# Patient Record
Sex: MALE | Race: WHITE | HISPANIC OR LATINO | Employment: FULL TIME | URBAN - METROPOLITAN AREA
[De-identification: names, ages, dates, MRNs, and addresses within clinical notes are randomized per-mention and may not be internally consistent; named-entity substitution may affect disease eponyms.]

---

## 2017-01-02 ENCOUNTER — TELEPHONE (OUTPATIENT)
Dept: ALLERGY | Facility: CLINIC | Age: 37
End: 2017-01-02

## 2017-01-02 ENCOUNTER — OFFICE VISIT (OUTPATIENT)
Dept: ALLERGY | Facility: CLINIC | Age: 37
End: 2017-01-02
Payer: COMMERCIAL

## 2017-01-02 VITALS
DIASTOLIC BLOOD PRESSURE: 77 MMHG | HEIGHT: 68 IN | OXYGEN SATURATION: 96 % | BODY MASS INDEX: 31.25 KG/M2 | WEIGHT: 206.2 LBS | HEART RATE: 66 BPM | SYSTOLIC BLOOD PRESSURE: 139 MMHG

## 2017-01-02 DIAGNOSIS — J45.30 MILD PERSISTENT ASTHMA WITHOUT COMPLICATION: Primary | ICD-10-CM

## 2017-01-02 DIAGNOSIS — J31.0 NONALLERGIC RHINITIS: ICD-10-CM

## 2017-01-02 LAB
FEF 25/75: NORMAL
FEV-1: NORMAL
FEV1/FVC: NORMAL
FVC: NORMAL

## 2017-01-02 PROCEDURE — 95004 PERQ TESTS W/ALRGNC XTRCS: CPT | Performed by: ALLERGY & IMMUNOLOGY

## 2017-01-02 PROCEDURE — 94060 EVALUATION OF WHEEZING: CPT | Performed by: ALLERGY & IMMUNOLOGY

## 2017-01-02 PROCEDURE — 99244 OFF/OP CNSLTJ NEW/EST MOD 40: CPT | Mod: 25 | Performed by: ALLERGY & IMMUNOLOGY

## 2017-01-02 RX ORDER — FLUTICASONE PROPIONATE AND SALMETEROL XINAFOATE 115; 21 UG/1; UG/1
2 AEROSOL, METERED RESPIRATORY (INHALATION) 2 TIMES DAILY
Qty: 1 INHALER | Refills: 1 | Status: SHIPPED | OUTPATIENT
Start: 2017-01-02 | End: 2018-01-15

## 2017-01-02 NOTE — Clinical Note
My Asthma Action Plan  Name: Keith Spangler   YOB: 1980  Date: 1/2/2017   My doctor: Andrei Kyle MD  My clinic: [facility]      My Control Medicine: Fluticasone+salmeterol (Advair) -  /21 mcg        Dose: 2 puffs twice daily. Use with spacer. Rinse mouth and brush teeth afterwards  My Rescue Medicine: Albuterol (Proair/Ventolin/Proventil) HFA        Dose: 2-4 puffs every 4 hours as needed   My Asthma Severity: mild persistent  Avoid your asthma triggers: upper respiratory infections        GREEN ZONE   Good Control    I feel good    No cough or wheeze    Can work, sleep and play without asthma symptoms       Take your asthma control medicine every day.     1. If exercise triggers your asthma, take your rescue medication    15 minutes before exercise or sports, and    During exercise if you have asthma symptoms  2. Spacer to use with inhaler: If you have a spacer, make sure to use it with your inhaler             YELLOW ZONE Getting Worse  I have ANY of these:    I do not feel good    Cough or wheeze    Chest feels tight    Wake up at night   1. Keep taking your Green Zone medications  2. Start taking your rescue medicine:    every 20 minutes for up to 1 hour. Then every 4 hours for 24-48 hours.  3. If you stay in the Yellow Zone for more than 12-24 hours, contact your doctor.           RED ZONE Medical Alert - Get Help  I have ANY of these:    I feel awful    Medicine is not helping    Breathing getting harder    Trouble walking or talking    Nose opens wide to breathe       1. Take your rescue medicine NOW  2. If your provider has prescribed an oral steroid medicine, start taking it NOW  3. Call your doctor NOW  4. If you are still in the Red Zone after 20 minutes and you have not reached your doctor:    Take your rescue medicine again and    Call 911 or go to the emergency room right away    See your regular doctor within 2 weeks of an Emergency Room or Urgent Care visit for  follow-up treatment.        The above medication may be given at school or day care?: N/A (Adult Patient)  Child can carry and use inhaler(s) at school with approval of school nurse?: N/A (Adult Patient)    Electronically signed by: Andrei Kyle, January 2, 2017    Annual Reminders:  Meet with Asthma Educator,  Flu Shot in the Fall, consider Pneumonia Vaccination for patients with asthma (aged 19 and older).    Pharmacy: [facility], [address] [place].                    Asthma Triggers  How To Control Things That Make Your Asthma Worse    Triggers are things that make your asthma worse.  Look at the list below to help you find your triggers and what you can do about them.  You can help prevent asthma flare-ups by staying away from your triggers.      Trigger                                                          What you can do   Cigarette Smoke  Tobacco smoke can make asthma worse. Do not allow smoking in your home, car or around you.  Be sure no one smokes at a child s day care or school.  If you smoke, ask your health care provider for ways to help you quit.  Ask family members to quit too.  Ask your health care provider for a referral to Quit Plan to help you quit smoking, or call 7-963-803-PLAN.     Colds, Flu, Bronchitis  These are common triggers of asthma. Wash your hands often.  Don t touch your eyes, nose or mouth.  Get a flu shot every year.     Dust Mites  These are tiny bugs that live in cloth or carpet. They are too small to see. Wash sheets and blankets in hot water every week.   Encase pillows and mattress in dust mite proof covers.  Avoid having carpet if you can. If you have carpet, vacuum weekly.   Use a dust mask and HEPA vacuum.   Pollen and Outdoor Mold  Some people are allergic to trees, grass, or weed pollen, or molds. Try to keep your windows closed.  Limit time out doors when pollen count is high.   Ask you health care provider about taking  medicine during allergy season.     Animal Dander  Some people are allergic to skin flakes, urine or saliva from pets with fur or feathers. Keep pets with fur or feathers out of your home.    If you can t keep the pet outdoors, then keep the pet out of your bedroom.  Keep the bedroom door closed.  Keep pets off cloth furniture and away from stuffed toys.     Mice, Rats, and Cockroaches  Some people are allergic to the waste from these pests.   Cover food and garbage.  Clean up spills and food crumbs.  Store grease in the refrigerator.   Keep food out of the bedroom.   Indoor Mold  This can be a trigger if your home has high moisture. Fix leaking faucets, pipes, or other sources of water.   Clean moldy surfaces.  Dehumidify basement if it is damp and smelly.   Smoke, Strong Odors, and Sprays  These can reduce air quality. Stay away from strong odors and sprays, such as perfume, powder, hair spray, paints, smoke incense, paint, cleaning products, candles and new carpet.   Exercise or Sports  Some people with asthma have this trigger. Be active!  Ask your doctor about taking medicine before sports or exercise to prevent symptoms.    Warm up for 5-10 minutes before and after sports or exercise.     Other Triggers of Asthma  Cold air:  Cover your nose and mouth with a scarf.  Sometimes laughing or crying can be a trigger.  Some medicines and food can trigger asthma.

## 2017-01-02 NOTE — NURSING NOTE
"Chief Complaint   Patient presents with     Consult     sob x3 weeks. dried out easy, fatigue        Initial /77 mmHg  Pulse 66  Ht 1.73 m (5' 8.11\")  Wt 93.532 kg (206 lb 3.2 oz)  BMI 31.25 kg/m2  SpO2 96% Estimated body mass index is 31.25 kg/(m^2) as calculated from the following:    Height as of this encounter: 1.73 m (5' 8.11\").    Weight as of this encounter: 93.532 kg (206 lb 3.2 oz).  BP completed using cuff size: regular  Wen Adler MA          "

## 2017-01-02 NOTE — PATIENT INSTRUCTIONS
If you have any questions regarding your allergies, asthma, or what we discussed during your visit today please call the allergy clinic or contact us via [email or web address].    [facility]: [phone]      Start the following medications:   Advair 115/21mcg - Take 2 puffs twice daily. Use with spacer. Rinse mouth and brush teeth afterwards   Ventolin (albuterol) inhaler - Take 2 to 4 puffs every 4 hours as needed. Can also take 2 puffs 15 minutes prior to exercise or activity   Use a sinus irrigation rinse daily as needed      Using an Inhaler with a Spacer  To control asthma, you need to use your medications the right way. Some medications are inhaled using a device called a metered-dose inhaler (MDI). Metered-dose inhalers deliver medication with a fine spray. You may be asked to use a spacer (holding tube) with your inhaler. The spacer helps make sure all the medication you need goes into your lungs.     Steps for Using an Inhaler with a Spacer  Step 1:    Remove the caps from the inhaler and spacer.    Shake the inhaler well and attach the spacer. If the inhaler is being used for the first time or has not been used for a while, prime it as directed by the .  Step 2:    Breathe out normally.    Put the spacer between your teeth. Close your lips tightly around it.    Keep your chin up.  Step 3:    Spray 1 puff into the spacer by pressing down on the inhaler.    Then breathe in through your mouth as slowly and deeply as you can. This should take about 5-10 seconds. (If you breathe too quickly, you may hear a whistling sound in the spacer.)  Step 4:    Take the spacer out of your mouth.    Hold your breath for a count of 10.    Then hold your lips together and slowly breathe out through your mouth.          If you re prescribed more than 1 puff of medication at a time, wait at least 30 seconds between puffs. This number may be different for different medications. Shake the inhaler again. Then repeat  steps 2 to 4.     8226-4796 [employer]. [address] [place] [address], [place] [address]. All rights reserved. This information is not intended as a substitute for professional medical care. Always follow your healthcare professional's instructions.

## 2017-01-02 NOTE — PROGRESS NOTES
"Dear ERMELINDA TADEO PA-C,    Thank you for referring your patient Keith Spangler to the Allergy/Immunology Clinic. Keith Spangler was seen in the Allergy Clinic at [facility]. The following are my recommendations regarding his Mild Persistent Asthma and Nonallergic Rhinitis    1. Begin advair 115/21mcg, 2 puffs twice daily  2. Continue albuterol HFA, 2-4 puffs every 4 hours as needed  3. Optichamber given in clinic and appropriate inhaler and spacer technique reviewed  4. Asthma action plan reviewed with patient and provided  5. Begin trial of sinus irrigation rinse daily  6. Follow-up in 1 month    Keith Spangler is a 36 year old Two or more races male being seen today in consultation for \"ongoing symptoms of inflammation and difficulty breathing.\" Keith states that he feels chest tightness and that his bronchial passages are inflamed. At times his stomach has felt bloated and he does not feel his respiratory system is functioning like it should be. He denies cough and has not had limitations in his activity. Keith feels these symptoms are present throughout the year. Keith states these symptoms began after he moved out to [place] in 1997. Due to the smog and poor air quality he began having respiratory symptoms and had multiple URIs, bronchitis, and \"walking pneumonia.\" Since then he has had issues with chest congestion, coughing, and bringing up phlegm. Keith has also had symptoms of sneezing, rhinorrhea, and nasal congestion that are present during the spring and fall months. He also has nasal congestion, irritation, and dryness during the winter months. He feels that he is very sensitive to changes in climate or temperature. During the winter Keith does not run his furnace at night because his nose will become very dry, irritated, and congested. Keith also reports feeling like he is always dry and dehydrated. He has to drink a lot of water during the day to not feel so irritated. If " he drinks more than one cup of coffee per day the symptoms are exacerbated. He tries to manage the dryness and irritation by using a humidifier and he now also avoids alcoholic beverages. He denies symptoms of itchy, watery, dry, or irritated eyes. Keith has had pet dogs for the past 4 years and grew up with dogs and has not noted any worsening symptoms when around the animals. He has tried to modify his environment by vacuuming regularly, using air purifiers, regularly changing his furnace filters, having his ducts cleaned, and washing his bedding weekly. With these environmental modifications he has not been sick but still has persistent symptoms. He has tried claritin, sudafed, flonase, Zycam, and nasal saline spray but has not found these to be effective. He has not been consistent with medications and uses them for a couple of weeks and then stops. Keith denies having a history of asthma but he was prescribed flovent and albuterol in the past which he states he used until the inhales ran out but he did not refill them as he didn't feel the medications were helpful.    Keith had a rhinoplasty in 2009 to fix a broken nose and deviated septum. He feels he had improvement in his nasal obstruction after this surgery.    No known family history of asthma, allergies, or atopic dermatitis.       Past Medical History   Diagnosis Date     NO ACTIVE PROBLEMS      Uncomplicated asthma Sept 2014     Intermittent asthma     COPD (chronic obstructive pulmonary disease) (H)      Family History   Problem Relation Age of Onset     C.A.D. No family hx of      DIABETES No family hx of      Hypertension No family hx of      CEREBROVASCULAR DISEASE No family hx of      Breast Cancer No family hx of      Cancer - colorectal No family hx of      Prostate Cancer No family hx of      GASTROINTESTINAL DISEASE Brother      colitis     Asthma No family hx of      Unknown/Adopted Mother      Diagnosed w/ALS June 2015     Past Surgical  History   Procedure Laterality Date     Surgical history of -        Thumb 2005-Pins placed      Surgical history of -        ER visit, left upper eyelid laceration repair     Surgical history of -        Septo/rhinoplasty of nose secondary to boxing injury     Surgical history of -        Left hernia      Appendectomy  Oct. 1991     Eye surgery  2013 and 2014     PRK laser correction     Hernia repair  2002       ENVIRONMENTAL HISTORY: [place]. The home is heated with a gas furnace. They does have central air conditioning. The patient's bedroom is furnished with carpeting in bedroom and fabric window coverings.  Pets inside the house include 2 dog(s). There is not history of cockroach or mice infestation. There is/are 0 smokers in the house.  The house does not have a damp basement.     SOCIAL HISTORY:   Keith is employed as an  . He has missed 0 days of school/work. He lives with his girlfriend and kids.  His [employer]riend works as a .    REVIEW OF SYSTEMS:  General: positive  for weight gain. negative for weight loss. negative for changes in sleep.   Eyes: negative for itching. negative for redness. negative for tearing/watering.  Ears: negative for fullness. negative for hearing loss. negative for dizziness.   Nose: positive  for snoring.negative for changes in smell. negative for drainage.   Throat: negative for hoarseness. negative for sore throat. negative for trouble swallowing.   Lungs: positive  for shortness of breath.positive  for wheezing. positive  for sputum production.   Cardiovascular: negative for chest pain. negative for swelling of ankles. negative for fast or irregular heartbeat.   Gastrointestinal: negative for nausea. negative for heartburn. positive  for acid reflux.   Musculoskeletal: negative for joint pain. negative for joint stiffness. negative for joint swelling.   Neurologic: negative for seizures. negative for fainting.  "negative for weakness.   Psychiatric: negative for changes in mood. negative for anxiety.   Endocrine: negative for cold intolerance. negative for heat intolerance. negative for tremors.   Hematologic: negative for easy bruising. negative for easy bleeding.  Integumentary: negative for rash. negative for scaling. negative for nail changes.       Current outpatient prescriptions:      albuterol (PROAIR HFA, PROVENTIL HFA, VENTOLIN HFA) 108 (90 BASE) MCG/ACT inhaler, Inhale 2 puffs into the lungs every 6 hours as needed, Disp: 1 Inhaler, Rfl: 1     fluticasone (FLOVENT HFA) 110 MCG/ACT inhaler, Inhale 2 puffs into the lungs 2 times daily, Disp: 1 Inhaler, Rfl: 2     fluticasone (FLONASE) 50 MCG/ACT nasal spray, Spray 2 sprays into both nostrils daily, Disp: 16 g, Rfl: 2     order for DME, Equipment being ordered: spacer for inhaler, Disp: 1 Inhaler, Rfl: 0  Immunization History   Administered Date(s) Administered     Hepatitis B 11/24/1993, 12/27/1993, 05/17/1994     OPV 1980, 1980, 09/03/1981, 06/14/1984     TD (ADULT, 7+) 11/24/1993, 04/30/2009     TDAP (ADACEL AGES 11-64) 04/30/2009     TDAP (BOOSTRIX AGES 10-64) 04/21/2016     No Known Allergies      EXAM:   /77 mmHg  Pulse 66  Ht 1.73 m (5' 8.11\")  Wt 93.532 kg (206 lb 3.2 oz)  BMI 31.25 kg/m2  SpO2 96%  GENERAL APPEARANCE: alert, cooperative and not in distress  SKIN: no rashes, no lesions  HEAD: atraumatic, normocephalic  EYES: lids and lashes normal, conjunctivae and sclerae clear, pupils equal, round, reactive to light, EOM full and intact  ENT: no scars or lesions, nasal exam showed no discharge, swelling or lesions noted, otoscopy showed external auditory canals clear, tympanic membranes normal, tongue midline and normal, soft palate, uvula, and tonsils normal  NECK: no asymmetry, masses, or scars, supple without significant adenopathy  LUNGS: unlabored respirations, no intercostal retractions or accessory muscle use, clear to " auscultation without rales or wheezes  HEART: regular rate and rhythm without murmurs and normal S1 and S2  MUSCULOSKELETAL: no musculoskeletal defects are noted  NEURO: no focal deficits noted, mental status intact  PSYCH: does not appear depressed or anxious and short and long term memory appears intact    WORKUP: Spirometry  SPIROMETRY  initial FVC 3.85L (76% of predicted); after bronchodilator 4.39L (14% change)   initial FEV1 3.17L (78% of predicted); after bronchodilator 3.72L (17% change)  initial FEV1/FVC 82%; after bronchodilator 85%  initial FEF 25%-75% 3.45L/s (87% of predicted); after bronchodilator 4.53L/s (31% change)    These values are consistent with a mixed obstructive and restrictive pattern. He had significant improvement in values and reversibility of obstruction after bronchodilator administration.    Skin prick testing was performed to our standard adult aeroallergen panel. He had positive reaction to histamine and all others were negative.    ASSESSMENT/PLAN:  Keith Spangler is a 36 year old male here for evaluation of respiratory symptoms. Spirometry obtained today was consistent with airflow obstruction and he had complete reversibility after bronchodilator administration. We discussed that this pattern is consistent with asthma. Keith was counseled regarding the signs and symptoms of asthma, pathophysiology, and treatment options. He has previously been on inhaled corticosteroids for brief periods but did not find these to be helpful. Given the level of improvement after bronchodilator administration he may benefit further from use of combination ICS/LABA therapy and we discussed the risks and benefits of this medication. He agreed to initiate therapy and follow-up in 1 month.    1. Begin advair 115/21mcg, 2 puffs twice daily  2. Continue albuterol HFA, 2-4 puffs every 4 hours as needed  3. Optichamber given in clinic and appropriate inhaler and spacer technique reviewed  4. Asthma  action plan reviewed with patient and provided  5. Begin trial of sinus irrigation rinse daily  6. Follow-up in 1 month      Andrei Kyle MD  Allergy/Immunology  [facility] and [place], [place]      Chart documentation done in part with Dragon Voice Recognition Software. Although reviewed after completion, some word and grammatical errors may remain.

## 2017-01-02 NOTE — TELEPHONE ENCOUNTER
"Keith Spangler is a 36 year old male who MyChart messaged with difficulty breathing, fatigue, difficulty getting up phlegm. Feelings of dehydration.        NURSING ASSESSMENT:  Description:    Onset/duration:  Ongoing- Since   Precip. factors:  Began getting respiratory infections when he lived in [place] (7 years ago)  Associated symptoms:  Feels \"dried out\" even with increased fluid intake  Improves/worsens symptoms: symptoms worsen as the day goes on, nebulizer treatments did help, inhalers did not help  Pain scale (0-10)   0/10  LMP/preg/breast feeding:  N/A  Last exam/Treatment:  August 2016 with a PCP  Allergies: No Known Allergies    MEDICATIONS:   Taking medication(s) as prescribed? No  Taking over the counter medication(s?) No  Any medication side effects? No significant side effects    Any barriers to taking medication(s) as prescribed?  N/A  Medication(s) improving/managing symptoms?  No. Patient states that he does not use Flovent daily. He has not used Albuterol inhaler at all today. He states that breathing isn't too difficult currently, these are just symptoms that he's been dealing with for a long period of time.   Medication reconciliation completed: No      NURSING PLAN: Nursing advice to patient : go to the appt at 1 pm with Dr. Kyle.    RECOMMENDED DISPOSITION:  To office now, another person to drive  Will comply with recommendation: Yes  If further questions/concerns or if symptoms do not improve, worsen or new symptoms develop, call your PCP or [facility] as soon as possible.      Closing encounter.      Anel Hamm RN      "

## 2017-01-02 NOTE — NURSING NOTE
The following nebulizer treatment was given:     MEDICATION: Albuterol Sulfate 2.5 mg  : [employer]  LOT #: [id number]   EXPIRATION DATE:  08/01/18  NDC # 4719-0791-96

## 2017-01-02 NOTE — MR AVS SNAPSHOT
After Visit Summary   1/2/2017    Keith Spangler    MRN: [id number]           Patient Information     Date Of Birth          1980        Visit Information        Provider Department      1/2/2017 1:00 PM Anrdei Kyle MD [facility]        Today's Diagnoses     Mild persistent asthma without complication    -  1     Nonallergic rhinitis           Care Instructions    If you have any questions regarding your allergies, asthma, or what we discussed during your visit today please call the allergy clinic or contact us via "[employer]t.    [facility] Allergy: [phone]      Start the following medications:   Advair 115/21mcg - Take 2 puffs twice daily. Use with spacer. Rinse mouth and brush teeth afterwards   Ventolin (albuterol) inhaler - Take 2 to 4 puffs every 4 hours as needed. Can also take 2 puffs 15 minutes prior to exercise or activity   Use a sinus irrigation rinse daily as needed      Using an Inhaler with a Spacer  To control asthma, you need to use your medications the right way. Some medications are inhaled using a device called a metered-dose inhaler (MDI). Metered-dose inhalers deliver medication with a fine spray. You may be asked to use a spacer (holding tube) with your inhaler. The spacer helps make sure all the medication you need goes into your lungs.     Steps for Using an Inhaler with a Spacer  Step 1:    Remove the caps from the inhaler and spacer.    Shake the inhaler well and attach the spacer. If the inhaler is being used for the first time or has not been used for a while, prime it as directed by the .  Step 2:    Breathe out normally.    Put the spacer between your teeth. Close your lips tightly around it.    Keep your chin up.  Step 3:    Spray 1 puff into the spacer by pressing down on the inhaler.    Then breathe in through your mouth as slowly and deeply as you can. This should take about 5-10 seconds. (If you breathe too quickly, you may  "hear a whistling sound in the spacer.)  Step 4:    Take the spacer out of your mouth.    Hold your breath for a count of 10.    Then hold your lips together and slowly breathe out through your mouth.          If you re prescribed more than 1 puff of medication at a time, wait at least 30 seconds between puffs. This number may be different for different medications. Shake the inhaler again. Then repeat steps 2 to 4.     1149-7791 [employer]. [address] [place] [address]. All rights reserved. This information is not intended as a substitute for professional medical care. Always follow your healthcare professional's instructions.              Follow-ups after your visit        Who to contact     If you have questions or need follow up information about today's clinic visit or your schedule please contact [facility] directly at [phone].  Normal or non-critical lab and imaging results will be communicated to you by MyChart, letter or phone within 4 business days after the clinic has received the results. If you do not hear from us within 7 days, please contact the clinic through [employer] or phone. If you have a critical or abnormal lab result, we will notify you by phone as soon as possible.  Submit refill requests through [employer] or call your pharmacy and they will forward the refill request to us. Please allow 3 business days for your refill to be completed.          Additional Information About Your Visit        MyChart Information     [employer] gives you secure access to your electronic health record. If you see a primary care provider, you can also send messages to your care team and make appointments. If you have questions, please call your primary care clinic.  If you do not have a primary care provider, please call [phone] and they will assist you.        Your Vitals Were     Pulse Height BMI (Body Mass Index) Pulse Oximetry          66 1.73 m (5' 8.11\") 31.25 " kg/m2 96%         Blood Pressure from Last 3 Encounters:   01/02/17 139/77   10/18/16 128/80   04/21/16 126/82    Weight from Last 3 Encounters:   01/02/17 93.532 kg (206 lb 3.2 oz)   10/18/16 94.348 kg (208 lb)   04/21/16 93.895 kg (207 lb)              We Performed the Following     ALBUTEROL UNIT DOSE, 1 MG     ALLERGY SKIN TESTS,ALLERGENS     BRONCHODILATION RESPONSE, PRE/POST ADMIN     INHALATION/NEBULIZER TREATMENT, INITIAL     Spirometry, Breathing Capacity          Today's Medication Changes          These changes are accurate as of: 1/2/17  2:33 PM.  If you have any questions, ask your nurse or doctor.               Start taking these medicines.        Dose/Directions    fluticasone-salmeterol 115-21 MCG/ACT Inhaler   Commonly known as:  ADVAIR-HFA   Used for:  Mild persistent asthma without complication   Started by:  Andrei Kyle MD        Dose:  2 puff   Inhale 2 puffs into the lungs 2 times daily   Quantity:  1 Inhaler   Refills:  1            Where to get your medicines      [place] [facility] [place] [address]  [address], [facility] [address]     Phone:  [phone]    - fluticasone-salmeterol 115-21 MCG/ACT Inhaler             Primary Care Provider Office Phone # Fax #    Joseph Adams PA-C [phone]       [address] [address]        Thank you!     Thank you for choosing [facility]  for your care. Our goal is always to provide you with excellent care. Hearing back from our patients is one way we can continue to improve our services. Please take a few minutes to complete the written survey that you may receive in the mail after your visit with us. Thank you!             Your Updated Medication List - Protect others around you: Learn how to safely use, store and throw away your medicines at www.disposemymeds.org.          This list is  accurate as of: 1/2/17  2:33 PM.  Always use your most recent med list.                   Brand Name Dispense Instructions for use    albuterol 108 (90 BASE) MCG/ACT Inhaler    PROAIR HFA/PROVENTIL HFA/VENTOLIN HFA    1 Inhaler    Inhale 2 puffs into the lungs every 6 hours as needed       fluticasone 110 MCG/ACT Inhaler    FLOVENT HFA    1 Inhaler    Inhale 2 puffs into the lungs 2 times daily       fluticasone 50 MCG/ACT spray    FLONASE    16 g    Spray 2 sprays into both nostrils daily       fluticasone-salmeterol 115-21 MCG/ACT Inhaler    ADVAIR-HFA    1 Inhaler    Inhale 2 puffs into the lungs 2 times daily       order for DME     1 Inhaler    Equipment being ordered: spacer for inhaler

## 2017-03-21 ENCOUNTER — OFFICE VISIT (OUTPATIENT)
Dept: FAMILY MEDICINE | Facility: CLINIC | Age: 37
End: 2017-03-21
Payer: COMMERCIAL

## 2017-03-21 VITALS
TEMPERATURE: 98.1 F | WEIGHT: 212 LBS | BODY MASS INDEX: 32.13 KG/M2 | HEIGHT: 68 IN | HEART RATE: 64 BPM | DIASTOLIC BLOOD PRESSURE: 76 MMHG | SYSTOLIC BLOOD PRESSURE: 114 MMHG

## 2017-03-21 DIAGNOSIS — Z00.00 ROUTINE GENERAL MEDICAL EXAMINATION AT A HEALTH CARE FACILITY: Primary | ICD-10-CM

## 2017-03-21 DIAGNOSIS — J01.01 ACUTE RECURRENT MAXILLARY SINUSITIS: ICD-10-CM

## 2017-03-21 DIAGNOSIS — J45.30 MILD PERSISTENT ASTHMA WITHOUT COMPLICATION: ICD-10-CM

## 2017-03-21 PROCEDURE — 82947 ASSAY GLUCOSE BLOOD QUANT: CPT | Performed by: PHYSICIAN ASSISTANT

## 2017-03-21 PROCEDURE — 36415 COLL VENOUS BLD VENIPUNCTURE: CPT | Performed by: PHYSICIAN ASSISTANT

## 2017-03-21 PROCEDURE — 80061 LIPID PANEL: CPT | Performed by: PHYSICIAN ASSISTANT

## 2017-03-21 PROCEDURE — 99395 PREV VISIT EST AGE 18-39: CPT | Performed by: PHYSICIAN ASSISTANT

## 2017-03-21 NOTE — NURSING NOTE
"Chief Complaint   Patient presents with     Physical       Initial /76 (BP Location: Right arm, Cuff Size: Adult Large)  Pulse 64  Temp 98.1  F (36.7  C) (Oral)  Ht 5' 8\" (1.727 m)  Wt 212 lb (96.2 kg)  BMI 32.23 kg/m2 Estimated body mass index is 32.23 kg/(m^2) as calculated from the following:    Height as of this encounter: 5' 8\" (1.727 m).    Weight as of this encounter: 212 lb (96.2 kg).  Medication Reconciliation: complete     Laura Ochoa MA     "

## 2017-03-21 NOTE — PROGRESS NOTES
SUBJECTIVE:     CC: Keith Spangler is an 36 year old male who presents for preventative health visit.     Physical   Annual:     Getting at least 3 servings of Calcium per day::  Yes    Bi-annual eye exam::  NO    Dental care twice a year::  Yes    Sleep apnea or symptoms of sleep apnea::  None    Diet::  Regular (no restrictions)    Frequency of exercise::  2-3 days/week    Duration of exercise::  45-60 minutes    Taking medications regularly::  Yes    Medication side effects::  Not applicable    Additional concerns today::  No    1. 3 weeks of sinus pain and pressure. Is not recovering from this despite his own self cares and nasal saline rinses and steroids. Would like to consider treating for a sinus infection.     Today's PHQ-2 Score: 0  PHQ-2 ( 1999 Pfizer) 3/16/2017   Little interest or pleasure in doing things Not at all   Feeling down, depressed or hopeless Not at all   PHQ-2 Score 0       Abuse: Current or Past(Physical, Sexual or Emotional)- No  Do you feel safe in your environment - Yes    Social History   Substance Use Topics     Smoking status: Never Smoker     Smokeless tobacco: Never Used      Comment: Mom smoked his upbringing     Alcohol use Yes      Comment: A few drinks a week     The patient does not drink >3 drinks per day nor >7 drinks per week.    Last PSA: No results found for: PSA    Recent Labs   Lab Test  08/04/16   0736  04/21/15   1221   04/30/09   0929   CHOL  224*  223*   --   190   HDL  33*  40*   --   42   LDL  155*  156*   < >  131*   TRIG  179*  137   --   85   CHOLHDLRATIO   --   5.6*   --   4.5   NHDL  191*   --    --    --     < > = values in this interval not displayed.       Reviewed orders with patient. Reviewed health maintenance and updated orders accordingly - Yes    Reviewed and updated as needed this visit by clinical staff  Tobacco  Allergies  Med Hx  Surg Hx  Fam Hx  Soc Hx        Reviewed and updated as needed this visit by Provider            ROS:  C:  "NEGATIVE for fever, chills, change in weight  I: NEGATIVE for worrisome rashes, moles or lesions  E: NEGATIVE for vision changes or irritation  ENT: NEGATIVE for ear, mouth and throat problems  R: NEGATIVE for significant cough or SOB  CV: NEGATIVE for chest pain, palpitations or peripheral edema  GI: NEGATIVE for nausea, abdominal pain, heartburn, or change in bowel habits   male: negative for dysuria, hematuria, decreased urinary stream, erectile dysfunction, urethral discharge  M: NEGATIVE for significant arthralgias or myalgia  N: NEGATIVE for weakness, dizziness or paresthesias  P: NEGATIVE for changes in mood or affect    Problem list, Medication list, Allergies, and Medical/Social/Surgical histories reviewed in [facility] and updated as appropriate.  OBJECTIVE:     /76 (BP Location: Right arm, Cuff Size: Adult Large)  Pulse 64  Temp 98.1  F (36.7  C) (Oral)  Ht 5' 8\" (1.727 m)  Wt 212 lb (96.2 kg)  BMI 32.23 kg/m2  EXAM:  GENERAL: healthy, alert and no distress  EYES: Eyes grossly normal to inspection, PERRL and conjunctivae and sclerae normal  HENT: sinus tenderness with palpation, otherwise ear canals and TM's normal, nose and mouth without ulcers or lesions  NECK: no adenopathy, no asymmetry, masses, or scars and thyroid normal to palpation  RESP: lungs clear to auscultation - no rales, rhonchi or wheezes  CV: regular rate and rhythm, normal S1 S2, no S3 or S4, no murmur, click or rub, no peripheral edema and peripheral pulses strong  ABDOMEN: soft, nontender, no hepatosplenomegaly, no masses and bowel sounds normal  MS: no gross musculoskeletal defects noted, no edema  SKIN: no suspicious lesions or rashes  NEURO: Normal strength and tone, mentation intact and speech normal  BACK: no CVA tenderness, no paralumbar tenderness  PSYCH: mentation appears normal, affect normal/bright  LYMPH: no cervical, supraclavicular, axillary, or inguinal adenopathy    ASSESSMENT/PLAN:     (Z00.00) Routine general medical " "examination at a health care facility  (primary encounter diagnosis)  Comment: Well person  Plan: Lipid Profile (Chol, Trig, HDL, LDL calc),         Glucose        Diet, exercise, wellness and other preventive recommendations related to health maintenance were discussed.  Follow up as needed for acute issues.  Physical exam in 1 year.     (J45.30) Mild persistent asthma without complication  Comment:   Plan: Stable - he was seen by Allergy/Asthma and is happy with how he's feeling     (J01.01) Acute recurrent maxillary sinusitis  Comment:   Plan: amoxicillin-clavulanate (AUGMENTIN) 875-125 MG         per tablet        Will treat. This prescription is given with a discussion of side effects, risks and proper use.  Instructions are given to follow up if not improving or symptoms change or worsen as discussed.       COUNSELING:   Reviewed preventive health counseling, as reflected in patient instructions         reports that he has never smoked. He has never used smokeless tobacco.    Estimated body mass index is 32.23 kg/(m^2) as calculated from the following:    Height as of this encounter: 5' 8\" (1.727 m).    Weight as of this encounter: 212 lb (96.2 kg).       Counseling Resources:  ATP IV Guidelines  Pooled Cohorts Equation Calculator  FRAX Risk Assessment  ICSI Preventive Guidelines  Dietary Guidelines for Americans, 2010  USDA's MyPlate  ASA Prophylaxis  Lung CA Screening    ERMELINDA TADEO PA-C  [facility]  "

## 2017-03-21 NOTE — MR AVS SNAPSHOT
After Visit Summary   3/21/2017    Keith Spangler    MRN: [id number]           Patient Information     Date Of Birth          1980        Visit Information        Provider Department      3/21/2017 3:00 PM Joseph Adams PA-C [facility]        Today's Diagnoses     Routine general medical examination at a health care facility    -  1    Mild persistent asthma without complication        Acute recurrent maxillary sinusitis          Care Instructions      [email or web address] - to check [place] pharmacies     Preventive Health Recommendations  Male Ages 26 - 39    Yearly exam:             See your health care provider every year in order to  o   Review health changes.   o   Discuss preventive care.    o   Review your medicines if your doctor has prescribed any.    You should be tested each year for STDs (sexually transmitted diseases), if you re at risk.     After age 35, talk to your provider about cholesterol testing. If you are at risk for heart disease, have your cholesterol tested at least every 5 years.     If you are at risk for diabetes, you should have a diabetes test (fasting glucose).  Shots: Get a flu shot each year. Get a tetanus shot every 10 years.     Nutrition:    Eat at least 5 servings of fruits and vegetables daily.     Eat whole-grain bread, whole-wheat pasta and brown rice instead of white grains and rice.     Talk to your provider about Calcium and Vitamin D.     Lifestyle    Exercise for at least 150 minutes a week (30 minutes a day, 5 days a week). This will help you control your weight and prevent disease.     Limit alcohol to one drink per day.     No smoking.     Wear sunscreen to prevent skin cancer.     See your dentist every six months for an exam and cleaning.     [facility]   Discharged by : Marisa BARAJAS, Certified Medical Assistant (AAMA)March 21, 2017 3:36 PM    Paper scripts provided to patient : none   If you have any questions  regarding to your visit please contact your care team:   [facility] Hours Telephone Number   CAROLIN Stewart Dr., PA-C    7am-7pm Monday - Thursday   7am-5pm Fridays  [phone]   (Appointment scheduling available 24/7)   RN Line   [phone] option 2       What options do I have for visits at the clinic other than the traditional office visit?   To expand how we care for you, many of our providers are utilizing electronic visits (e-visits) and telephone visits, when medically appropriate, for interactions with their patients rather than a visit in the clinic. We also offer nurse visits for many medical concerns. Just like any other service, we will bill your insurance company for this type of visit based on time spent on the phone with your provider. Not all insurance companies cover these visits. Please check with your medical insurance if this type of visit is covered. You will be responsible for any charges that are not paid by your insurance.     E-visits via [email or web address]: generally incur a $35.00 fee.     Telephone visits:   Time spent on the phone: *charged based on time that is spent on the phone in increments of 10 minutes. Estimated cost:   5-10 mins $30.00   11-20 mins. $59.00   21-30 mins. $85.00   Use [employer] (secure email communication and access to your chart) to send your primary care provider a message or make an appointment. Ask someone on your Team how to sign up for [email or web address].   For a Price Quote for your services, please call our Consumer Price Line at [phone].   As always, Thank you for trusting us with your health care needs!                [facility]:    Scheduling Appointments  [facility]  Call: [phone]    [facility]  Call: [phone]    [facility]  Call: [phone]                  Follow-ups after your visit        Who to  "contact     If you have questions or need follow up information about today's clinic visit or your schedule please contact [facility] directly at [phone].  Normal or non-critical lab and imaging results will be communicated to you by [employer]hart, letter or phone within 4 business days after the clinic has received the results. If you do not hear from us within 7 days, please contact the clinic through [employer] or phone. If you have a critical or abnormal lab result, we will notify you by phone as soon as possible.  Submit refill requests through [employer] or call your pharmacy and they will forward the refill request to us. Please allow 3 business days for your refill to be completed.          Additional Information About Your Visit        [employer] Information     [employer] gives you secure access to your electronic health record. If you see a primary care provider, you can also send messages to your care team and make appointments. If you have questions, please call your primary care clinic.  If you do not have a primary care provider, please call [phone] and they will assist you.        Care EveryWhere ID     This is your Care EveryWhere ID. This could be used by other organizations to access your [place] medical records  [id number]        Your Vitals Were     Pulse Temperature Height BMI (Body Mass Index)          64 98.1  F (36.7  C) (Oral) 5' 8\" (1.727 m) 32.23 kg/m2         Blood Pressure from Last 3 Encounters:   03/21/17 114/76   01/02/17 139/77   10/18/16 128/80    Weight from Last 3 Encounters:   03/21/17 212 lb (96.2 kg)   01/02/17 206 lb 3.2 oz (93.5 kg)   10/18/16 208 lb (94.3 kg)              We Performed the Following     Glucose     Lipid Profile (Chol, Trig, HDL, LDL calc)          Today's Medication Changes          These changes are accurate as of: 3/21/17  3:36 PM.  If you have any questions, ask your nurse or doctor.               Start taking these medicines.        " Dose/Directions    amoxicillin-clavulanate 875-125 MG per tablet   Commonly known as:  AUGMENTIN   Used for:  Acute recurrent maxillary sinusitis   Started by:  Joseph Adams PA-C        Dose:  1 tablet   Take 1 tablet by mouth 2 times daily   Quantity:  20 tablet   Refills:  0            Where to get your medicines      These medications were sent to [facility] [place], [place] - [address]  [address], [facility] [address]     Phone:  [phone]     amoxicillin-clavulanate 875-125 MG per tablet                Primary Care Provider Office Phone # Fax #    Joseph Adams PA-C [phone]       [facility] [address] [address]        Thank you!     Thank you for choosing [facility]  for your care. Our goal is always to provide you with excellent care. Hearing back from our patients is one way we can continue to improve our services. Please take a few minutes to complete the written survey that you may receive in the mail after your visit with us. Thank you!             Your Updated Medication List - Protect others around you: Learn how to safely use, store and throw away your medicines at www.disposemymeds.org.          This list is accurate as of: 3/21/17  3:36 PM.  Always use your most recent med list.                   Brand Name Dispense Instructions for use    albuterol 108 (90 BASE) MCG/ACT Inhaler    PROAIR HFA/PROVENTIL HFA/VENTOLIN HFA    1 Inhaler    Inhale 2 puffs into the lungs every 6 hours as needed       amoxicillin-clavulanate 875-125 MG per tablet    AUGMENTIN    20 tablet    Take 1 tablet by mouth 2 times daily       fluticasone 50 MCG/ACT spray    FLONASE    16 g    Spray 2 sprays into both nostrils daily       fluticasone-salmeterol 115-21 MCG/ACT Inhaler    ADVAIR-HFA    1 Inhaler    Inhale 2 puffs into the lungs 2 times daily       order for DME     1 Inhaler     Equipment being ordered: spacer for inhaler

## 2017-03-21 NOTE — PATIENT INSTRUCTIONS
[email or web address] - to check [place] pharmacies     Preventive Health Recommendations  Male Ages 26 - 39    Yearly exam:             See your health care provider every year in order to  o   Review health changes.   o   Discuss preventive care.    o   Review your medicines if your doctor has prescribed any.    You should be tested each year for STDs (sexually transmitted diseases), if you re at risk.     After age 35, talk to your provider about cholesterol testing. If you are at risk for heart disease, have your cholesterol tested at least every 5 years.     If you are at risk for diabetes, you should have a diabetes test (fasting glucose).  Shots: Get a flu shot each year. Get a tetanus shot every 10 years.     Nutrition:    Eat at least 5 servings of fruits and vegetables daily.     Eat whole-grain bread, whole-wheat pasta and brown rice instead of white grains and rice.     Talk to your provider about Calcium and Vitamin D.     Lifestyle    Exercise for at least 150 minutes a week (30 minutes a day, 5 days a week). This will help you control your weight and prevent disease.     Limit alcohol to one drink per day.     No smoking.     Wear sunscreen to prevent skin cancer.     See your dentist every six months for an exam and cleaning.     [facility]   Discharged by : Marisa BARAJAS Certified Medical Assistant (AAMA)March 21, 2017 3:36 PM    Paper scripts provided to patient : none   If you have any questions regarding to your visit please contact your care team:   [facility] Clinic Hours Telephone Number   CAROLIN Stewart Dr., PA-C    7am-7pm Monday - Thursday   7am-5pm Fridays  [phone]   (Appointment scheduling available 24/7)   RN Line   [phone] option 2       What options do I have for visits at the clinic other than the traditional office visit?   To expand how we care for you, many of our providers are utilizing electronic visits  (e-visits) and telephone visits, when medically appropriate, for interactions with their patients rather than a visit in the clinic. We also offer nurse visits for many medical concerns. Just like any other service, we will bill your insurance company for this type of visit based on time spent on the phone with your provider. Not all insurance companies cover these visits. Please check with your medical insurance if this type of visit is covered. You will be responsible for any charges that are not paid by your insurance.     E-visits via [employer]: generally incur a $35.00 fee.     Telephone visits:   Time spent on the phone: *charged based on time that is spent on the phone in increments of 10 minutes. Estimated cost:   5-10 mins $30.00   11-20 mins. $59.00   21-30 mins. $85.00   Use [employer] (secure email communication and access to your chart) to send your primary care provider a message or make an appointment. Ask someone on your Team how to sign up for [employer].   For a Price Quote for your services, please call our Consumer Price Line at [phone].   As always, Thank you for trusting us with your health care needs!                [facility] [place] [facility]:    Scheduling Appointments  [facility]  Call: [phone]    [facility]  Call: [phone]    [facility]  Call: [phone]

## 2017-03-22 LAB
CHOLEST SERPL-MCNC: 206 MG/DL
GLUCOSE SERPL-MCNC: 90 MG/DL (ref 70–99)
HDLC SERPL-MCNC: 32 MG/DL
LDLC SERPL CALC-MCNC: 126 MG/DL
NONHDLC SERPL-MCNC: 174 MG/DL
TRIGL SERPL-MCNC: 238 MG/DL

## 2017-04-27 ENCOUNTER — OFFICE VISIT (OUTPATIENT)
Dept: URGENT CARE | Facility: URGENT CARE | Age: 37
End: 2017-04-27
Payer: COMMERCIAL

## 2017-04-27 ENCOUNTER — MYC MEDICAL ADVICE (OUTPATIENT)
Dept: FAMILY MEDICINE | Facility: CLINIC | Age: 37
End: 2017-04-27

## 2017-04-27 DIAGNOSIS — R42 VERTIGO: ICD-10-CM

## 2017-04-27 DIAGNOSIS — H60.392 OTHER INFECTIVE ACUTE OTITIS EXTERNA OF LEFT EAR: ICD-10-CM

## 2017-04-27 DIAGNOSIS — H66.012 ACUTE SUPPURATIVE OTITIS MEDIA OF LEFT EAR WITH SPONTANEOUS RUPTURE OF TYMPANIC MEMBRANE, RECURRENCE NOT SPECIFIED: Primary | ICD-10-CM

## 2017-04-27 PROCEDURE — 99213 OFFICE O/P EST LOW 20 MIN: CPT | Performed by: NURSE PRACTITIONER

## 2017-04-27 RX ORDER — MECLIZINE HYDROCHLORIDE 25 MG/1
25 TABLET ORAL EVERY 6 HOURS PRN
Qty: 30 TABLET | Refills: 1 | Status: SHIPPED | OUTPATIENT
Start: 2017-04-27 | End: 2018-03-26

## 2017-04-27 RX ORDER — OFLOXACIN 3 MG/ML
10 SOLUTION AURICULAR (OTIC) 2 TIMES DAILY
Qty: 10 ML | Refills: 0 | Status: SHIPPED | OUTPATIENT
Start: 2017-04-27 | End: 2017-05-04

## 2017-04-27 NOTE — NURSING NOTE
"Chief Complaint   Patient presents with     URI     Pt c/o breathing concerns, left ear problem, vertigo, and nausea. Sx started over one week ago.       Initial There were no vitals taken for this visit. Estimated body mass index is 32.23 kg/(m^2) as calculated from the following:    Height as of 3/21/17: 5' 8\" (1.727 m).    Weight as of 3/21/17: 212 lb (96.2 kg).  Medication Reconciliation: complete     Cami Carlson CMA (AAMA)      "

## 2017-04-27 NOTE — MR AVS SNAPSHOT
After Visit Summary   4/27/2017    Keith Spangler    MRN: [id number]           Patient Information     Date Of Birth          1980        Visit Information        Provider Department      4/27/2017 11:05 AM Drea White NP [facility]        Today's Diagnoses     Acute suppurative otitis media of left ear with spontaneous rupture of tympanic membrane, recurrence not specified    -  1    Other infective acute otitis externa of left ear        Vertigo          Care Instructions      Eardrum Rupture (Perforation)  Your eardrum is a thin membrane between your outer and middle ear. Sound waves entering your ear cause the membrane to vibrate, which helps you hear. An injury or infection can cause your eardrum to tear (rupture). This creates a hole (perforation) that may affect your hearing.  Causes of Eardrum Perforation  Causes of a ruptured eardrum include:    Pressure from an ear infection    Putting an object, such as a cotton swab or pencil, into the ear    A very loud noise (such as a gunshot) close to the ear    Rapid changes in air pressure, which can occur during scuba diving or traveling at high altitudes    A slap or blow to the ear  When to Go to the Emergency Room (ER)  Seek medical care right away if you:    Have severe pain, bleeding, or ringing in your ear.    Lose your hearing suddenly.    Become very dizzy for no reason.    Have an object lodged in your ear.  A ruptured eardrum from an ear infection usually isn't an emergency. In fact, the rupture often relieves pressure and pain. Still, the ear should be examined by a doctor or pediatrician within 24 hours.  What to Expect in the ER  Your ear will be examined. Treatment will depend on how severe the damage is. Small holes often heal on their own. A small patch may be placed over a minor eardrum tear. Large tears may need to be repaired during an operation. If you are very dizzy or have severe hearing loss, you are  likely to stay in the hospital for treatment for one or more days.  Do not clean inside the ear canal with cotton swabs or any other object.     7187-9119 [employer]. [address] [place] [address], [place] [address]. All rights reserved. This information is not intended as a substitute for professional medical care. Always follow your healthcare professional's instructions.              Follow-ups after your visit        Who to contact     If you have questions or need follow up information about today's clinic visit or your schedule please contact [facility] directly at [phone].  Normal or non-critical lab and imaging results will be communicated to you by Pongrhart, letter or phone within 4 business days after the clinic has received the results. If you do not hear from us within 7 days, please contact the clinic through [employer] or phone. If you have a critical or abnormal lab result, we will notify you by phone as soon as possible.  Submit refill requests through Reimage or call your pharmacy and they will forward the refill request to us. Please allow 3 business days for your refill to be completed.          Additional Information About Your Visit        MyChart Information     Reimage gives you secure access to your electronic health record. If you see a primary care provider, you can also send messages to your care team and make appointments. If you have questions, please call your primary care clinic.  If you do not have a primary care provider, please call [phone] and they will assist you.        Care EveryWhere ID     This is your Care EveryWhere ID. This could be used by other organizations to access your [place] medical records  [id number]         Blood Pressure from Last 3 Encounters:   03/21/17 114/76   01/02/17 139/77   10/18/16 128/80    Weight from Last 3 Encounters:   03/21/17 212 lb (96.2 kg)   01/02/17 206 lb 3.2 oz (93.5 kg)   10/18/16 208 lb (94.3 kg)               Today, you had the following     No orders found for display         Today's Medication Changes          These changes are accurate as of: 4/27/17 11:55 AM.  If you have any questions, ask your nurse or doctor.               Start taking these medicines.        Dose/Directions    amoxicillin-clavulanate 875-125 MG per tablet   Commonly known as:  AUGMENTIN   Used for:  Acute suppurative otitis media of left ear with spontaneous rupture of tympanic membrane, recurrence not specified   Started by:  Drea White NP        Dose:  1 tablet   Take 1 tablet by mouth 2 times daily for 7 days   Quantity:  14 tablet   Refills:  0       meclizine 25 MG tablet   Commonly known as:  ANTIVERT   Used for:  Vertigo   Started by:  Drea White NP        Dose:  25 mg   Take 1 tablet (25 mg) by mouth every 6 hours as needed for dizziness   Quantity:  30 tablet   Refills:  1       ofloxacin 0.3 % otic solution   Commonly known as:  FLOXIN   Used for:  Other infective acute otitis externa of left ear   Started by:  Drea White NP        Dose:  10 drop   Place 10 drops Into the left ear 2 times daily for 7 days   Quantity:  10 mL   Refills:  0            Where to get your medicines      These medications were [place] [facility] [place] - [address]  [address], [facility] [address]     Phone:  [phone]     amoxicillin-clavulanate 875-125 MG per tablet    meclizine 25 MG tablet    ofloxacin 0.3 % otic solution                Primary Care Provider Office Phone # Fax #    Joseph Adams PA-C [phone]       [address] [address]        Thank you!     Thank you for choosing [facility]  for your care. Our goal is always to provide you with excellent care. Hearing back from our patients is one way we can continue to improve our services. Please take a few minutes to complete the written  survey that you may receive in the mail after your visit with us. Thank you!             Your Updated Medication List - Protect others around you: Learn how to safely use, store and throw away your medicines at www.disposemymeds.org.          This list is accurate as of: 4/27/17 11:55 AM.  Always use your most recent med list.                   Brand Name Dispense Instructions for use    albuterol 108 (90 BASE) MCG/ACT Inhaler    PROAIR HFA/PROVENTIL HFA/VENTOLIN HFA    1 Inhaler    Inhale 2 puffs into the lungs every 6 hours as needed       amoxicillin-clavulanate 875-125 MG per tablet    AUGMENTIN    14 tablet    Take 1 tablet by mouth 2 times daily for 7 days       fluticasone 50 MCG/ACT spray    FLONASE    16 g    Spray 2 sprays into both nostrils daily       fluticasone-salmeterol 115-21 MCG/ACT Inhaler    ADVAIR-HFA    1 Inhaler    Inhale 2 puffs into the lungs 2 times daily       meclizine 25 MG tablet    ANTIVERT    30 tablet    Take 1 tablet (25 mg) by mouth every 6 hours as needed for dizziness       ofloxacin 0.3 % otic solution    FLOXIN    10 mL    Place 10 drops Into the left ear 2 times daily for 7 days       order for DME     1 Inhaler    Equipment being ordered: spacer for inhaler

## 2017-04-27 NOTE — PROGRESS NOTES
"  SUBJECTIVE:                                                    Keith Spangler is a 36 year old male who presents to clinic today for the following health issues:    RESPIRATORY SYMPTOMS      Duration: over one week    Description  Breathing concerns, nausea, left ear congestion, vertigo that started today.     Severity: moderate    Accompanying signs and symptoms: Pt states that he is not feeling himself.     History (predisposing factors):  Was feeling \"under the weather\" at his last visit about a month ago. Pt was given amoxicillin.     Precipitating or alleviating factors: None    Therapies tried and outcome: increase water intake, rest, inhalers- no relief.      No Known Allergies    Past Medical History:   Diagnosis Date     COPD (chronic obstructive pulmonary disease) (H)      NO ACTIVE PROBLEMS      Uncomplicated asthma Sept 2014    Intermittent asthma         Current Outpatient Prescriptions on File Prior to Visit:  fluticasone-salmeterol (ADVAIR-HFA) 115-21 MCG/ACT Inhaler Inhale 2 puffs into the lungs 2 times daily   albuterol (PROAIR HFA, PROVENTIL HFA, VENTOLIN HFA) 108 (90 BASE) MCG/ACT inhaler Inhale 2 puffs into the lungs every 6 hours as needed   fluticasone (FLONASE) 50 MCG/ACT nasal spray Spray 2 sprays into both nostrils daily   order for DME Equipment being ordered: spacer for inhaler     No current facility-administered medications on file prior to visit.     Social History   Substance Use Topics     Smoking status: Never Smoker     Smokeless tobacco: Never Used      Comment: Mom smoked his upbringing     Alcohol use Yes      Comment: A few drinks a week       ROS:   Constitutional: no fevers  ENT: as above    OBJECTIVE:  There were no vitals taken for this visit.   General:   awake, alert, and cooperative.  NAD.   Head: Normocephalic, atraumatic.  Eyes: Conjunctiva clear,   ENT: . LEFT CANAL is inflamed, left TM is erythematous and is perforated.  Neuro: Alert and oriented - normal " speech.    ASSESSMENT:    ICD-10-CM    1. Acute suppurative otitis media of left ear with spontaneous rupture of tympanic membrane, recurrence not specified H66.012 amoxicillin-clavulanate (AUGMENTIN) 875-125 MG per tablet   2. Other infective acute otitis externa of left ear H60.392 ofloxacin (FLOXIN) 0.3 % otic solution   3. Vertigo R42 meclizine (ANTIVERT) 25 MG tablet       PLAN:   I discussed clinical findings with patient.  Patient educational/instructional material provided including reasons for follow-up    The patient indicates understanding of these issues and agrees with the plan.  Drea White  [facility]-BC  Family Nurse Practitoner

## 2017-04-27 NOTE — TELEPHONE ENCOUNTER
Chart reviewed.  Telephone call to Keith.  Keith was treated with Augmentin for sinus infection in late March. His sx resolved and he has felt well until one week ago. Since then sinus pressure/ear plugged/ feels short of breath despite using inhalers as ordered. No wheezing. Thick phlegm back of throat that he has to clear frequently.  This started about a week ago and he is trying to resolve it by getting adequate sleep, hydration, good food. No improvement. Last night he developed nausea, vertigo/dizziness. He requests a Zpak. I explained that because his sx are different this time around he needs to be seen. He plans to go to  at [facility].  Natalya Rizzo RN  Triage  [facility]  [phone]

## 2017-04-27 NOTE — PATIENT INSTRUCTIONS
Eardrum Rupture (Perforation)  Your eardrum is a thin membrane between your outer and middle ear. Sound waves entering your ear cause the membrane to vibrate, which helps you hear. An injury or infection can cause your eardrum to tear (rupture). This creates a hole (perforation) that may affect your hearing.  Causes of Eardrum Perforation  Causes of a ruptured eardrum include:    Pressure from an ear infection    Putting an object, such as a cotton swab or pencil, into the ear    A very loud noise (such as a gunshot) close to the ear    Rapid changes in air pressure, which can occur during scuba diving or traveling at high altitudes    A slap or blow to the ear  When to Go to the Emergency Room (ER)  Seek medical care right away if you:    Have severe pain, bleeding, or ringing in your ear.    Lose your hearing suddenly.    Become very dizzy for no reason.    Have an object lodged in your ear.  A ruptured eardrum from an ear infection usually isn't an emergency. In fact, the rupture often relieves pressure and pain. Still, the ear should be examined by a doctor or pediatrician within 24 hours.  What to Expect in the ER  Your ear will be examined. Treatment will depend on how severe the damage is. Small holes often heal on their own. A small patch may be placed over a minor eardrum tear. Large tears may need to be repaired during an operation. If you are very dizzy or have severe hearing loss, you are likely to stay in the hospital for treatment for one or more days.  Do not clean inside the ear canal with cotton swabs or any other object.     6605-2021 [employer]. [address] [place] [address], [place] [address]. All rights reserved. This information is not intended as a substitute for professional medical care. Always follow your healthcare professional's instructions.

## 2017-04-27 NOTE — LETTER
[facility]  [address]  [facility] [address]  Phone: [phone]    April 27, 2017        Keith Spangler  [address] [facility] [address]          To whom it may concern:    RE: Keith Spangler    Patient was seen and treated today at our clinic and missed work.  Patient may return to work 4/28/2017 with no restrictions.    Please contact me for questions or concerns.      Sincerely,        Drea White NP

## 2017-05-12 ENCOUNTER — MYC MEDICAL ADVICE (OUTPATIENT)
Dept: FAMILY MEDICINE | Facility: CLINIC | Age: 37
End: 2017-05-12

## 2018-01-11 ENCOUNTER — MYC REFILL (OUTPATIENT)
Dept: FAMILY MEDICINE | Facility: CLINIC | Age: 38
End: 2018-01-11

## 2018-01-11 ENCOUNTER — MYC REFILL (OUTPATIENT)
Dept: ALLERGY | Facility: CLINIC | Age: 38
End: 2018-01-11

## 2018-01-11 DIAGNOSIS — R09.81 CHRONIC NASAL CONGESTION: ICD-10-CM

## 2018-01-11 DIAGNOSIS — J45.30 MILD PERSISTENT ASTHMA WITHOUT COMPLICATION: ICD-10-CM

## 2018-01-11 RX ORDER — FLUTICASONE PROPIONATE AND SALMETEROL XINAFOATE 115; 21 UG/1; UG/1
2 AEROSOL, METERED RESPIRATORY (INHALATION) 2 TIMES DAILY
Qty: 1 INHALER | Refills: 1 | Status: CANCELLED | OUTPATIENT
Start: 2018-01-11

## 2018-01-11 RX ORDER — FLUTICASONE PROPIONATE 50 MCG
2 SPRAY, SUSPENSION (ML) NASAL DAILY
Qty: 16 G | Refills: 2 | Status: SHIPPED | OUTPATIENT
Start: 2018-01-11 | End: 2018-02-21

## 2018-01-11 NOTE — TELEPHONE ENCOUNTER
Will forward to PCP for review and recommendations - patient requesting refill of allergy medication.  Notes indicate he is declining to f/up with allergy clinic due to cost.  It was advised he see PCP but patient has not scheduled yet.    Would you consider refill if patient schedules future office visit with you?    Dez Parikh RN

## 2018-01-11 NOTE — TELEPHONE ENCOUNTER
Message from [place]:  Xuan Lao, RN Thu Jan 11, 2018 7:38 AM        ----- Message -----   From: Keith Spangler   Sent: 1/11/2018 6:53 AM   To: [facility] Team  Subject: Medication Renewal Request     Original authorizing provider: MD Keith Baldwin would like a refill of the following medications:  fluticasone-salmeterol (ADVAIR-HFA) 115-21 MCG/ACT Inhaler [Andrei Kyle MD]    Preferred pharmacy: [address]    Comment:      Medication renewals requested in this message routed to other providers:  fluticasone (FLONASE) 50 MCG/ACT nasal spray [ERMELINDA TADEO PA-C]

## 2018-01-11 NOTE — TELEPHONE ENCOUNTER
Pending Prescriptions:                       Disp   Refills    fluticasone-salmeterol (ADVAIR-HFA) 115-2*1 Inha*1            Sig: Inhale 2 puffs into the lungs 2 times daily    RN spoke with patient to schedule follow up appt, as he has not been seen in >1 year. He states that he will not follow up with Dr. Kyle, as specialty visits cost too much with his insurance. RN recommended that he visit his PCP and request the medication to be filled by him. He stated that he will do that, but he doesn't want to schedule at this moment.     Closing encounter.    Anel Hamm RN

## 2018-01-15 ENCOUNTER — TELEPHONE (OUTPATIENT)
Dept: FAMILY MEDICINE | Facility: CLINIC | Age: 38
End: 2018-01-15

## 2018-01-15 DIAGNOSIS — J45.30 MILD PERSISTENT ASTHMA WITHOUT COMPLICATION: ICD-10-CM

## 2018-01-15 RX ORDER — FLUTICASONE PROPIONATE AND SALMETEROL XINAFOATE 115; 21 UG/1; UG/1
2 AEROSOL, METERED RESPIRATORY (INHALATION) 2 TIMES DAILY
Qty: 1 INHALER | Refills: 2 | Status: SHIPPED | OUTPATIENT
Start: 2018-01-15 | End: 2018-03-26

## 2018-01-15 NOTE — TELEPHONE ENCOUNTER
Sanjay,    It looks like patient's insurance charges extra for him to see specialist so patient would like you to cover Advair HFA.  Will you cover refills on this medication or does the patient need to schedule with you to get a refill?     Ambar Beck PharmD, [facility]  Pharmacist Manager   [facility]  [phone]

## 2018-01-16 NOTE — TELEPHONE ENCOUNTER
Sent with refills - he should see me in March - I noted that in the prescription.  Thank you.  Sanjay

## 2018-01-31 ENCOUNTER — MYC MEDICAL ADVICE (OUTPATIENT)
Dept: FAMILY MEDICINE | Facility: CLINIC | Age: 38
End: 2018-01-31

## 2018-01-31 DIAGNOSIS — J45.30 MILD PERSISTENT ASTHMA WITHOUT COMPLICATION: Primary | ICD-10-CM

## 2018-01-31 RX ORDER — ALBUTEROL SULFATE 0.83 MG/ML
1 SOLUTION RESPIRATORY (INHALATION) EVERY 6 HOURS PRN
Qty: 25 VIAL | Refills: 2 | Status: SHIPPED | OUTPATIENT
Start: 2018-01-31 | End: 2018-03-26

## 2018-02-21 ENCOUNTER — MYC REFILL (OUTPATIENT)
Dept: FAMILY MEDICINE | Facility: CLINIC | Age: 38
End: 2018-02-21

## 2018-02-21 DIAGNOSIS — R06.02 SOB (SHORTNESS OF BREATH): ICD-10-CM

## 2018-02-21 DIAGNOSIS — R09.81 CHRONIC NASAL CONGESTION: ICD-10-CM

## 2018-02-21 RX ORDER — ALBUTEROL SULFATE 90 UG/1
2 AEROSOL, METERED RESPIRATORY (INHALATION) EVERY 6 HOURS PRN
Qty: 1 INHALER | Refills: 1 | Status: SHIPPED | OUTPATIENT
Start: 2018-02-21 | End: 2018-03-26

## 2018-02-21 RX ORDER — FLUTICASONE PROPIONATE 50 MCG
2 SPRAY, SUSPENSION (ML) NASAL DAILY
Qty: 16 G | Refills: 0 | Status: SHIPPED | OUTPATIENT
Start: 2018-02-21 | End: 2018-03-26

## 2018-02-21 NOTE — TELEPHONE ENCOUNTER
Routing refill request to provider for review/approval because:  See below. Patient has physical scheduled for 3/21/18. Almost 6 months past due for asthma visit.    Sebas Pereira RN

## 2018-02-28 ENCOUNTER — TELEPHONE (OUTPATIENT)
Dept: FAMILY MEDICINE | Facility: CLINIC | Age: 38
End: 2018-02-28

## 2018-03-01 ASSESSMENT — ASTHMA QUESTIONNAIRES: ACT_TOTALSCORE: 13

## 2018-03-02 NOTE — TELEPHONE ENCOUNTER
He's due for a yearly follow up either way - please call to schedule.  Thank you.  Joseph Adams, MPAS, PA-C

## 2018-03-26 ENCOUNTER — OFFICE VISIT (OUTPATIENT)
Dept: FAMILY MEDICINE | Facility: CLINIC | Age: 38
End: 2018-03-26
Payer: COMMERCIAL

## 2018-03-26 VITALS
WEIGHT: 208.8 LBS | DIASTOLIC BLOOD PRESSURE: 70 MMHG | BODY MASS INDEX: 30.93 KG/M2 | SYSTOLIC BLOOD PRESSURE: 122 MMHG | HEART RATE: 65 BPM | OXYGEN SATURATION: 95 % | HEIGHT: 69 IN | TEMPERATURE: 98.3 F

## 2018-03-26 DIAGNOSIS — R09.81 CHRONIC NASAL CONGESTION: ICD-10-CM

## 2018-03-26 DIAGNOSIS — J45.30 MILD PERSISTENT ASTHMA WITHOUT COMPLICATION: ICD-10-CM

## 2018-03-26 DIAGNOSIS — Z00.00 ROUTINE GENERAL MEDICAL EXAMINATION AT A HEALTH CARE FACILITY: Primary | ICD-10-CM

## 2018-03-26 DIAGNOSIS — R06.02 SOB (SHORTNESS OF BREATH): ICD-10-CM

## 2018-03-26 PROCEDURE — 99395 PREV VISIT EST AGE 18-39: CPT | Performed by: PHYSICIAN ASSISTANT

## 2018-03-26 RX ORDER — ALBUTEROL SULFATE 90 UG/1
2 AEROSOL, METERED RESPIRATORY (INHALATION) EVERY 6 HOURS PRN
Qty: 1 INHALER | Refills: 5 | Status: SHIPPED | OUTPATIENT
Start: 2018-03-26 | End: 2019-07-08

## 2018-03-26 RX ORDER — MONTELUKAST SODIUM 10 MG/1
10 TABLET ORAL AT BEDTIME
Qty: 90 TABLET | Refills: 3 | Status: SHIPPED | OUTPATIENT
Start: 2018-03-26 | End: 2019-07-08

## 2018-03-26 RX ORDER — FLUTICASONE PROPIONATE AND SALMETEROL XINAFOATE 115; 21 UG/1; UG/1
2 AEROSOL, METERED RESPIRATORY (INHALATION) 2 TIMES DAILY
Qty: 1 INHALER | Refills: 5 | Status: SHIPPED | OUTPATIENT
Start: 2018-03-26 | End: 2019-07-08

## 2018-03-26 RX ORDER — ALBUTEROL SULFATE 0.83 MG/ML
1 SOLUTION RESPIRATORY (INHALATION) EVERY 6 HOURS PRN
Qty: 25 VIAL | Refills: 2 | Status: SHIPPED | OUTPATIENT
Start: 2018-03-26 | End: 2018-07-10

## 2018-03-26 RX ORDER — FLUTICASONE PROPIONATE 50 MCG
2 SPRAY, SUSPENSION (ML) NASAL DAILY
Qty: 16 G | Refills: 5 | Status: SHIPPED | OUTPATIENT
Start: 2018-03-26 | End: 2019-07-08

## 2018-03-26 NOTE — MR AVS SNAPSHOT
After Visit Summary   3/26/2018    Keith Spangler    MRN: [id number]           Patient Information     Date Of Birth          1980        Visit Information        Provider Department      3/26/2018 7:40 AM Joseph Adams PA-C [facility]        Today's Diagnoses     Routine general medical examination at a health care facility    -  1    SOB (shortness of breath)        Chronic nasal congestion        Mild persistent asthma without complication          Care Instructions      1. Keto diet? Could look into it for short term to see on allergy and asthma symptoms       Preventive Health Recommendations  Male Ages 26 - 39    Yearly exam:             See your health care provider every year in order to  o   Review health changes.   o   Discuss preventive care.    o   Review your medicines if your doctor has prescribed any.    You should be tested each year for STDs (sexually transmitted diseases), if you re at risk.     After age 35, talk to your provider about cholesterol testing. If you are at risk for heart disease, have your cholesterol tested at least every 5 years.     If you are at risk for diabetes, you should have a diabetes test (fasting glucose).  Shots: Get a flu shot each year. Get a tetanus shot every 10 years.     Nutrition:    Eat at least 5 servings of fruits and vegetables daily.     Eat whole-grain bread, whole-wheat pasta and brown rice instead of white grains and rice.     Talk to your provider about Calcium and Vitamin D.     Lifestyle    Exercise for at least 150 minutes a week (30 minutes a day, 5 days a week). This will help you control your weight and prevent disease.     Limit alcohol to one drink per day.     No smoking.     Wear sunscreen to prevent skin cancer.     See your dentist every six months for an exam and cleaning.             Follow-ups after your visit        Additional Services     ALLERGY/ASTHMA ADULT REFERRAL       Your provider has  referred you to: [facility]: [facility] [phone]  [email or web address]    Please be aware that coverage of these services is subject to the terms and limitations of your health insurance plan.  Call member services at your health plan with any benefit or coverage questions.      Please bring the following with you to your appointment:    (1) Any X-Rays, CTs or MRIs which have been performed.  Contact the facility where they were done to arrange for  prior to your scheduled appointment.    (2) List of current medications  (3) This referral request   (4) Any documents/labs given to you for this referral                  Who to contact     If you have questions or need follow up information about today's clinic visit or your schedule please contact [facility] directly at [phone].  Normal or non-critical lab and imaging results will be communicated to you by MyChart, letter or phone within 4 business days after the clinic has received the results. If you do not hear from us within 7 days, please contact the clinic through MyChart or phone. If you have a critical or abnormal lab result, we will notify you by phone as soon as possible.  Submit refill requests through [employer] or call your pharmacy and they will forward the refill request to us. Please allow 3 business days for your refill to be completed.          Additional Information About Your Visit        MyChart Information     [employer] gives you secure access to your electronic health record. If you see a primary care provider, you can also send messages to your care team and make appointments. If you have questions, please call your primary care clinic.  If you do not have a primary care provider, please call [phone] and they will assist you.        Care EveryWhere ID     This is your Care EveryWhere ID. This could be used by other organizations to access your [facility]  "records  [id number]        Your Vitals Were     Pulse Temperature Height Pulse Oximetry BMI (Body Mass Index)       65 98.3  F (36.8  C) (Oral) 5' 8.5\" (1.74 m) 95% 31.29 kg/m2        Blood Pressure from Last 3 Encounters:   03/26/18 122/70   03/21/17 114/76   01/02/17 139/77    Weight from Last 3 Encounters:   03/26/18 208 lb 12.8 oz (94.7 kg)   03/21/17 212 lb (96.2 kg)   01/02/17 206 lb 3.2 oz (93.5 kg)              We Performed the Following     ALLERGY/ASTHMA ADULT REFERRAL          Today's Medication Changes          These changes are accurate as of 3/26/18  8:27 AM.  If you have any questions, ask your nurse or doctor.               Start taking these medicines.        Dose/Directions    montelukast 10 MG tablet   Commonly known as:  SINGULAIR   Used for:  Mild persistent asthma without complication, Chronic nasal congestion   Started by:  Joseph Adams PA-C        Dose:  10 mg   Take 1 tablet (10 mg) by mouth At Bedtime   Quantity:  90 tablet   Refills:  3         These medicines have changed or have updated prescriptions.        Dose/Directions    * albuterol 108 (90 BASE) MCG/ACT Inhaler   Commonly known as:  PROAIR HFA/PROVENTIL HFA/VENTOLIN HFA   This may have changed:  additional instructions   Used for:  SOB (shortness of breath)   Changed by:  Joseph Adams PA-C        Dose:  2 puff   Inhale 2 puffs into the lungs every 6 hours as needed   Quantity:  1 Inhaler   Refills:  5       * albuterol (2.5 MG/3ML) 0.083% neb solution   This may have changed:  Another medication with the same name was changed. Make sure you understand how and when to take each.   Used for:  Mild persistent asthma without complication   Changed by:  Joseph Adams PA-C        Dose:  1 vial   Take 1 vial (2.5 mg) by nebulization every 6 hours as needed for shortness of breath / dyspnea or wheezing   Quantity:  25 vial   Refills:  2       fluticasone 50 MCG/ACT spray   Commonly known as:  FLONASE   This may " have changed:  additional instructions   Used for:  Chronic nasal congestion   Changed by:  Joseph Adams PA-C        Dose:  2 spray   Spray 2 sprays into both nostrils daily   Quantity:  16 g   Refills:  5       fluticasone-salmeterol 115-21 MCG/ACT Inhaler   Commonly known as:  ADVAIR-HFA   This may have changed:  additional instructions   Used for:  Mild persistent asthma without complication   Changed by:  Joseph Adams PA-C        Dose:  2 puff   Inhale 2 puffs into the lungs 2 times daily   Quantity:  1 Inhaler   Refills:  5       * Notice:  This list has 2 medication(s) that are the same as other medications prescribed for you. Read the directions carefully, and ask your doctor or other care provider to review them with you.         Where to get your medicines      These medications were sent to [facility] [address] [place] [address].  [address] [place] [address], [place] [address]     Phone:  [phone]     albuterol (2.5 MG/3ML) 0.083% neb solution    albuterol 108 (90 BASE) MCG/ACT Inhaler    fluticasone 50 MCG/ACT spray    fluticasone-salmeterol 115-21 MCG/ACT Inhaler    montelukast 10 MG tablet                Primary Care Provider Office Phone # Fax #    Joseph Adams PA-C [phone]       [address] [place], [place] [id number]        Equal Access to Services     ANDRÉS RENDON AH: Hadii aad ku hadasho Soomaali, waaxda luqadaha, qaybta kaalmada adeegyada, waxay idiin hayaan esau pierce . So [facility] [phone].    ATENCIÓN: Si habla español, tiene a estes disposición servicios gratuitos de asistencia lingüística. Llame al [phone].    We comply with applicable federal civil rights laws and [place] laws. We do not discriminate on the basis of race, color, national origin, age, disability, sex, sexual orientation, or gender identity.            Thank you!     Thank you for choosing [facility]  for your care.  Our goal is always to provide you with excellent care. Hearing back from our patients is one way we can continue to improve our services. Please take a few minutes to complete the written survey that you may receive in the mail after your visit with us. Thank you!             Your Updated Medication List - Protect others around you: Learn how to safely use, store and throw away your medicines at www.disposemymeds.org.          This list is accurate as of 3/26/18  8:27 AM.  Always use your most recent med list.                   Brand Name Dispense Instructions for use Diagnosis    * albuterol 108 (90 BASE) MCG/ACT Inhaler    PROAIR HFA/PROVENTIL HFA/VENTOLIN HFA    1 Inhaler    Inhale 2 puffs into the lungs every 6 hours as needed    SOB (shortness of breath)       * albuterol (2.5 MG/3ML) 0.083% neb solution     25 vial    Take 1 vial (2.5 mg) by nebulization every 6 hours as needed for shortness of breath / dyspnea or wheezing    Mild persistent asthma without complication       fluticasone 50 MCG/ACT spray    FLONASE    16 g    Spray 2 sprays into both nostrils daily    Chronic nasal congestion       fluticasone-salmeterol 115-21 MCG/ACT Inhaler    ADVAIR-HFA    1 Inhaler    Inhale 2 puffs into the lungs 2 times daily    Mild persistent asthma without complication       montelukast 10 MG tablet    SINGULAIR    90 tablet    Take 1 tablet (10 mg) by mouth At Bedtime    Mild persistent asthma without complication, Chronic nasal congestion       order for DME     1 Inhaler    Equipment being ordered: spacer for inhaler    Acute bronchitis with bronchospasm       * Notice:  This list has 2 medication(s) that are the same as other medications prescribed for you. Read the directions carefully, and ask your doctor or other care provider to review them with you.

## 2018-03-26 NOTE — PATIENT INSTRUCTIONS
1. Keto diet? Could look into it for short term to see on allergy and asthma symptoms       Preventive Health Recommendations  Male Ages 26 - 39    Yearly exam:             See your health care provider every year in order to  o   Review health changes.   o   Discuss preventive care.    o   Review your medicines if your doctor has prescribed any.    You should be tested each year for STDs (sexually transmitted diseases), if you re at risk.     After age 35, talk to your provider about cholesterol testing. If you are at risk for heart disease, have your cholesterol tested at least every 5 years.     If you are at risk for diabetes, you should have a diabetes test (fasting glucose).  Shots: Get a flu shot each year. Get a tetanus shot every 10 years.     Nutrition:    Eat at least 5 servings of fruits and vegetables daily.     Eat whole-grain bread, whole-wheat pasta and brown rice instead of white grains and rice.     Talk to your provider about Calcium and Vitamin D.     Lifestyle    Exercise for at least 150 minutes a week (30 minutes a day, 5 days a week). This will help you control your weight and prevent disease.     Limit alcohol to one drink per day.     No smoking.     Wear sunscreen to prevent skin cancer.     See your dentist every six months for an exam and cleaning.

## 2018-03-26 NOTE — NURSING NOTE
"Chief Complaint   Patient presents with     Physical       Initial /70 (BP Location: Right arm, Patient Position: Sitting, Cuff Size: Adult Large)  Pulse 65  Temp 98.3  F (36.8  C) (Oral)  Ht 5' 8.5\" (1.74 m)  Wt 208 lb 12.8 oz (94.7 kg)  SpO2 95%  BMI 31.29 kg/m2 Estimated body mass index is 31.29 kg/(m^2) as calculated from the following:    Height as of this encounter: 5' 8.5\" (1.74 m).    Weight as of this encounter: 208 lb 12.8 oz (94.7 kg).  Medication Reconciliation: complete    "

## 2018-03-26 NOTE — PROGRESS NOTES
SUBJECTIVE:   CC: Keith Spangler is an 37 year old male who presents for preventative health visit.     Physical   Annual:     Getting at least 3 servings of Calcium per day::  Yes    Bi-annual eye exam::  NO    Dental care twice a year::  Yes    Sleep apnea or symptoms of sleep apnea::  Daytime drowsiness    Diet::  Regular (no restrictions)    Taking medications regularly::  Yes    Medication side effects::  Not applicable    Additional concerns today::  No            1. Asthma / allergies - asthma  A bit worse, needing his nebulizer more often - allergies are pretty good now that he's doing nasal rinses. Did see allergist last year - no significant positive skin findings.     Asthma Follow-Up    Was ACT completed today?    Yes    ACT Total Scores 3/26/2018   ACT TOTAL SCORE (Goal Greater than or Equal to 20) 15   In the past 12 months, how many times did you visit the emergency room for your asthma without being admitted to the hospital? 0   In the past 12 months, how many times were you hospitalized overnight because of your asthma? 0       Recent asthma triggers that patient is dealing with: Drinking Alcohol        Today's PHQ-2 Score:   PHQ-2 ( 1999 [employer]) 3/26/2018   Q1: Little interest or pleasure in doing things 0   Q2: Feeling down, depressed or hopeless 0   PHQ-2 Score 0   Q1: Little interest or pleasure in doing things Not at all   Q2: Feeling down, depressed or hopeless Not at all   PHQ-2 Score 0       Abuse: Current or Past(Physical, Sexual or Emotional)- No  Do you feel safe in your environment - Yes    Social History   Substance Use Topics     Smoking status: Never Smoker     Smokeless tobacco: Never Used      Comment: Mom smoked his upbringing     Alcohol use Yes      Comment: Really rare now      Alcohol Use 3/26/2018   If you drink alcohol do you typically have greater than 3 drinks per day OR greater than 7 drinks per week? No   No flowsheet data found.    Last PSA: No results found for:  "PSA    Reviewed orders with patient. Reviewed health maintenance and updated orders accordingly - Yes  Labs reviewed in EPIC    Reviewed and updated as needed this visit by clinical staff  Tobacco  Allergies  Meds  Med Hx  Surg Hx  Fam Hx  Soc Hx        Reviewed and updated as needed this visit by Provider            Review of Systems  C: NEGATIVE for fever, chills, change in weight  I: NEGATIVE for worrisome rashes, moles or lesions  E: NEGATIVE for vision changes or irritation  ENT: NEGATIVE for ear, mouth and throat problems  R: NEGATIVE for significant cough or SOB  CV: NEGATIVE for chest pain, palpitations or peripheral edema  GI: NEGATIVE for nausea, abdominal pain, heartburn, or change in bowel habits   male: negative for dysuria, hematuria, decreased urinary stream, erectile dysfunction, urethral discharge  M: NEGATIVE for significant arthralgias or myalgia  N: NEGATIVE for weakness, dizziness or paresthesias  P: NEGATIVE for changes in mood or affect    OBJECTIVE:   /70 (BP Location: Right arm, Patient Position: Sitting, Cuff Size: Adult Large)  Pulse 65  Temp 98.3  F (36.8  C) (Oral)  Ht 5' 8.5\" (1.74 m)  Wt 208 lb 12.8 oz (94.7 kg)  SpO2 95%  BMI 31.29 kg/m2    Physical Exam  GENERAL: healthy, alert and no distress  EYES: Eyes grossly normal to inspection, PERRL and conjunctivae and sclerae normal  HENT: ear canals and TM's normal, nose and mouth without ulcers or lesions  NECK: no adenopathy, no asymmetry, masses, or scars and thyroid normal to palpation  RESP: lungs clear to auscultation - no rales, rhonchi or wheezes  CV: regular rate and rhythm, normal S1 S2, no S3 or S4, no murmur, click or rub, no peripheral edema and peripheral pulses strong  ABDOMEN: soft, nontender, no hepatosplenomegaly, no masses and bowel sounds normal  MS: no gross musculoskeletal defects noted, no edema  SKIN: no suspicious lesions or rashes  NEURO: Normal strength and tone, mentation intact and speech " "normal  PSYCH: mentation appears normal, affect normal/bright  LYMPH: no cervical, supraclavicular, axillary, or inguinal adenopathy    ASSESSMENT/PLAN:   (Z00.00) Routine general medical examination at a health care facility  (primary encounter diagnosis)  Comment: Well person   Plan: Diet, exercise, wellness and other preventive recommendations related to health maintenance were discussed.  Follow up as needed for acute issues.  Physical exam in 1 year.     (R06.02) SOB (shortness of breath)  Comment:   Plan: albuterol (PROAIR HFA/PROVENTIL HFA/VENTOLIN         HFA) 108 (90 BASE) MCG/ACT Inhaler        Allergy/asthma related     (R09.81) Chronic nasal congestion  Comment:   Plan: fluticasone (FLONASE) 50 MCG/ACT spray,         montelukast (SINGULAIR) 10 MG tablet,         ALLERGY/ASTHMA ADULT REFERRAL        Improved with nasal rinse. Continue that.    (J45.30) Mild persistent asthma without complication  Comment:   Plan: albuterol (2.5 MG/3ML) 0.083% neb solution,         fluticasone-salmeterol (ADVAIR-HFA) 115-21         MCG/ACT Inhaler, montelukast (SINGULAIR) 10 MG         tablet, ALLERGY/ASTHMA ADULT REFERRAL        Reviewed options. Can start Singulair for better symptom management. ACT was 15 today. He agrees. Follow up in a few months       COUNSELING:   Reviewed preventive health counseling, as reflected in patient instructions         reports that he has never smoked. He has never used smokeless tobacco.    Estimated body mass index is 31.29 kg/(m^2) as calculated from the following:    Height as of this encounter: 5' 8.5\" (1.74 m).    Weight as of this encounter: 208 lb 12.8 oz (94.7 kg).       Counseling Resources:  ATP IV Guidelines  Pooled Cohorts Equation Calculator  FRAX Risk Assessment  ICSI Preventive Guidelines  Dietary Guidelines for Americans, 2010  USDA's MyPlate  ASA Prophylaxis  Lung CA Screening    ERMELINDA TADEO PA-C  [facility]  "

## 2018-03-27 ASSESSMENT — ASTHMA QUESTIONNAIRES: ACT_TOTALSCORE: 15

## 2018-04-26 ENCOUNTER — MYC REFILL (OUTPATIENT)
Dept: FAMILY MEDICINE | Facility: CLINIC | Age: 38
End: 2018-04-26

## 2018-04-26 DIAGNOSIS — R09.81 CHRONIC NASAL CONGESTION: ICD-10-CM

## 2018-04-26 DIAGNOSIS — J45.30 MILD PERSISTENT ASTHMA WITHOUT COMPLICATION: ICD-10-CM

## 2018-04-26 RX ORDER — MONTELUKAST SODIUM 10 MG/1
10 TABLET ORAL AT BEDTIME
Qty: 90 TABLET | Refills: 3 | Status: CANCELLED | OUTPATIENT
Start: 2018-04-26

## 2018-04-26 RX ORDER — FLUTICASONE PROPIONATE 50 MCG
2 SPRAY, SUSPENSION (ML) NASAL DAILY
Qty: 16 G | Refills: 5 | Status: CANCELLED | OUTPATIENT
Start: 2018-04-26

## 2018-07-10 ENCOUNTER — MYC REFILL (OUTPATIENT)
Dept: FAMILY MEDICINE | Facility: CLINIC | Age: 38
End: 2018-07-10

## 2018-07-10 DIAGNOSIS — J45.30 MILD PERSISTENT ASTHMA WITHOUT COMPLICATION: ICD-10-CM

## 2018-07-10 DIAGNOSIS — R09.81 CHRONIC NASAL CONGESTION: ICD-10-CM

## 2018-07-10 RX ORDER — MONTELUKAST SODIUM 10 MG/1
10 TABLET ORAL AT BEDTIME
Qty: 90 TABLET | Refills: 3 | Status: CANCELLED | OUTPATIENT
Start: 2018-07-10

## 2018-07-10 NOTE — TELEPHONE ENCOUNTER
"Requested Prescriptions   Pending Prescriptions Disp Refills     albuterol (2.5 MG/3ML) 0.083% neb solution  Last Written Prescription Date:  3/26/2018  Last Fill Quantity: 25 vial,  # refills: 2   Last office visit: 3/26/2018 with prescribing provider:  josef tolentino   Future Office Visit:     25 vial 2     Sig: Take 1 vial (2.5 mg) by nebulization every 6 hours as needed for shortness of breath / dyspnea or wheezing    Asthma Maintenance Inhalers - Anticholinergics Failed    7/10/2018 12:07 PM       Failed - Asthma control assessment score within normal limits in last 6 months    Please review ACT score.     ACT Total Scores 9/1/2015 2/28/2018 3/26/2018   ACT TOTAL SCORE (Goal Greater than or Equal to 20) 12 13 15   In the past 12 months, how many times did you visit the emergency room for your asthma without being admitted to the hospital? 0 0 0   In the past 12 months, how many times were you hospitalized overnight because of your asthma? 1 0 0              Passed - Patient is age 12 years or older       Passed - Recent (6 mo) or future (30 days) visit within the authorizing provider's specialty    Patient had office visit in the last 6 months or has a visit in the next 30 days with authorizing provider or within the authorizing provider's specialty.  See \"Patient Info\" tab in inbasket, or \"Choose Columns\" in Meds & Orders section of the refill encounter.                      montelukast (SINGULAIR) 10 MG tablet  This medication may not be due for a refill.    Last Written Prescription Date:  3/26/2018  Last Fill Quantity: 90 tablet,  # refills: 3   Last office visit: 3/26/2018 with prescribing provider:  josef tolentino   Future Office Visit:     90 tablet 3     Sig: Take 1 tablet (10 mg) by mouth At Bedtime    Leukotriene Inhibitors Protocol Failed    7/10/2018 12:07 PM       Failed - Asthma control assessment score within normal limits in last 6 months    Please review ACT score.   ACT Total Scores 9/1/2015 2/28/2018 " "3/26/2018   ACT TOTAL SCORE (Goal Greater than or Equal to 20) 12 13 15   In the past 12 months, how many times did you visit the emergency room for your asthma without being admitted to the hospital? 0 0 0   In the past 12 months, how many times were you hospitalized overnight because of your asthma? 1 0 0              Passed - Patient is age 12 or older    If patient is under 16, ok to refill using age based dosing.          Passed - Recent (6 mo) or future (30 days) visit within the authorizing provider's specialty    Patient had office visit in the last 6 months or has a visit in the next 30 days with authorizing provider or within the authorizing provider's specialty.  See \"Patient Info\" tab in inbasket, or \"Choose Columns\" in Meds & Orders section of the refill encounter.              "

## 2018-07-12 RX ORDER — ALBUTEROL SULFATE 0.83 MG/ML
2.5 SOLUTION RESPIRATORY (INHALATION) EVERY 6 HOURS PRN
Qty: 25 VIAL | Refills: 0 | Status: SHIPPED | OUTPATIENT
Start: 2018-07-12 | End: 2019-07-08

## 2018-07-12 NOTE — TELEPHONE ENCOUNTER
3/26 OV says: Follow up in a few months. Sent martha. Singulair should have 2 refills left.  Yamilka Wilburn RN

## 2018-07-13 NOTE — TELEPHONE ENCOUNTER
Patient does not understand why he needs an appointment. He had his yearly physical and doesn't want or have time to come in to the clinic when he is managing his asthma very well.

## 2018-07-16 NOTE — TELEPHONE ENCOUNTER
He did have a visit in March, but his asthma was not controlled at that time and we started a new medication. The visit is a follow up / recheck of his current status.     If he's feeling great and has no concerns with the new regimen, we could mail him the ACT - but I would prefer to see him.    Thanks.  Sanjay

## 2019-07-08 ENCOUNTER — OFFICE VISIT (OUTPATIENT)
Dept: FAMILY MEDICINE | Facility: CLINIC | Age: 39
End: 2019-07-08
Payer: COMMERCIAL

## 2019-07-08 VITALS
HEART RATE: 84 BPM | DIASTOLIC BLOOD PRESSURE: 74 MMHG | BODY MASS INDEX: 29.65 KG/M2 | HEIGHT: 69 IN | WEIGHT: 200.2 LBS | TEMPERATURE: 98.4 F | SYSTOLIC BLOOD PRESSURE: 136 MMHG

## 2019-07-08 DIAGNOSIS — J45.30 MILD PERSISTENT ASTHMA WITHOUT COMPLICATION: ICD-10-CM

## 2019-07-08 DIAGNOSIS — B35.3 TINEA PEDIS OF BOTH FEET: ICD-10-CM

## 2019-07-08 DIAGNOSIS — R09.81 CHRONIC NASAL CONGESTION: ICD-10-CM

## 2019-07-08 DIAGNOSIS — Z00.00 ROUTINE GENERAL MEDICAL EXAMINATION AT A HEALTH CARE FACILITY: Primary | ICD-10-CM

## 2019-07-08 DIAGNOSIS — R06.02 SOB (SHORTNESS OF BREATH): ICD-10-CM

## 2019-07-08 PROCEDURE — 80061 LIPID PANEL: CPT | Performed by: PHYSICIAN ASSISTANT

## 2019-07-08 PROCEDURE — 99213 OFFICE O/P EST LOW 20 MIN: CPT | Mod: 25 | Performed by: PHYSICIAN ASSISTANT

## 2019-07-08 PROCEDURE — 99396 PREV VISIT EST AGE 40-64: CPT | Performed by: PHYSICIAN ASSISTANT

## 2019-07-08 PROCEDURE — 36415 COLL VENOUS BLD VENIPUNCTURE: CPT | Performed by: PHYSICIAN ASSISTANT

## 2019-07-08 PROCEDURE — 82947 ASSAY GLUCOSE BLOOD QUANT: CPT | Performed by: PHYSICIAN ASSISTANT

## 2019-07-08 RX ORDER — FLUTICASONE PROPIONATE 50 MCG
2 SPRAY, SUSPENSION (ML) NASAL DAILY
Qty: 16 G | Refills: 5 | Status: SHIPPED | OUTPATIENT
Start: 2019-07-08 | End: 2019-09-03

## 2019-07-08 RX ORDER — ALBUTEROL SULFATE 0.83 MG/ML
2.5 SOLUTION RESPIRATORY (INHALATION) EVERY 6 HOURS PRN
Qty: 25 VIAL | Refills: 5 | Status: SHIPPED | OUTPATIENT
Start: 2019-07-08 | End: 2019-12-16

## 2019-07-08 RX ORDER — MONTELUKAST SODIUM 10 MG/1
10 TABLET ORAL AT BEDTIME
Qty: 90 TABLET | Refills: 3 | Status: SHIPPED | OUTPATIENT
Start: 2019-07-08 | End: 2019-12-16

## 2019-07-08 RX ORDER — FLUTICASONE PROPIONATE AND SALMETEROL XINAFOATE 115; 21 UG/1; UG/1
2 AEROSOL, METERED RESPIRATORY (INHALATION) 2 TIMES DAILY
Qty: 1 INHALER | Refills: 5 | Status: SHIPPED | OUTPATIENT
Start: 2019-07-08 | End: 2020-02-27

## 2019-07-08 RX ORDER — TERBINAFINE HYDROCHLORIDE 250 MG/1
TABLET ORAL
Qty: 56 TABLET | Refills: 1 | Status: SHIPPED | OUTPATIENT
Start: 2019-07-08 | End: 2020-02-27

## 2019-07-08 RX ORDER — ALBUTEROL SULFATE 90 UG/1
2 AEROSOL, METERED RESPIRATORY (INHALATION) EVERY 6 HOURS PRN
Qty: 1 INHALER | Refills: 5 | Status: SHIPPED | OUTPATIENT
Start: 2019-07-08 | End: 2020-08-18

## 2019-07-08 RX ORDER — CLOTRIMAZOLE 1 %
CREAM (GRAM) TOPICAL 2 TIMES DAILY
Qty: 40 G | Refills: 2 | Status: SHIPPED | OUTPATIENT
Start: 2019-07-08 | End: 2020-03-20

## 2019-07-08 ASSESSMENT — MIFFLIN-ST. JEOR: SCORE: 1800.54

## 2019-07-08 NOTE — PATIENT INSTRUCTIONS
1. Antifungal Cream every day in the morning and evening. Apply vaseline or Aquaphor to feet every night and wear socks after. Anti fungal pills 7 days and repeat monthly if needed     Preventive Health Recommendations  Male Ages 40 to 49    Yearly exam:             See your health care provider every year in order to  o   Review health changes.   o   Discuss preventive care.    o   Review your medicines if your doctor has prescribed any.    You should be tested each year for STDs (sexually transmitted diseases) if you re at risk.     Have a cholesterol test every 5 years.     Have a colonoscopy (test for colon cancer) if someone in your family has had colon cancer or polyps before age 50.     After age 45, have a diabetes test (fasting glucose). If you are at risk for diabetes, you should have this test every 3 years.      Talk with your health care provider about whether or not a prostate cancer screening test (PSA) is right for you.    Shots: Get a flu shot each year. Get a tetanus shot every 10 years.     Nutrition:    Eat at least 5 servings of fruits and vegetables daily.     Eat whole-grain bread, whole-wheat pasta and brown rice instead of white grains and rice.     Get adequate Calcium and Vitamin D.     Lifestyle    Exercise for at least 150 minutes a week (30 minutes a day, 5 days a week). This will help you control your weight and prevent disease.     Limit alcohol to one drink per day.     No smoking.     Wear sunscreen to prevent skin cancer.     See your dentist every six months for an exam and cleaning.     [facility]   Discharged by : Xuan Hernandez MA    Paper scripts provided to patient : no   If you have any questions regarding to your visit please contact your care team:     Team Silver              Clinic Hours Telephone Number     Dr. Margi Quiroga PA-C   7am-7pm  Monday - Thursday   7am-5pm  Fridays  [phone]   (Appointment  scheduling available 24/7)     RN Line  [phone] option 2     Urgent Care - [place] [facility] and [place] [facility] - 11am-9pm Monday-Friday Saturday-Sunday- 9am-5pm     [place] -   5pm-9pm Monday-Friday Saturday-Sunday- 9am-5pm    [phone] - [facility]    [phone] - [place]     For a Price Quote for your services, please call our Consumer Price Line at [phone].     What options do I have for visits at the clinic other than the traditional office visit?     To expand how we care for you, many of our providers are utilizing electronic visits (e-visits) and telephone visits, when medically appropriate, for interactions with their patients rather than a visit in the clinic. We also offer nurse visits for many medical concerns. Just like any other service, we will bill your insurance company for this type of visit based on time spent on the phone with your provider. Not all insurance companies cover these visits. Please check with your medical insurance if this type of visit is covered. You will be responsible for any charges that are not paid by your insurance.   E-visits via [employer]: generally incur a $45.00 fee.     Telephone visits:  Time spent on the phone: *charged based on time that is spent on the phone in increments of 10 minutes. Estimated cost:   5-10 mins $30.00   11-20 mins. $59.00   21-30 mins. $85.00       Use [employer] (secure email communication and access to your chart) to send your primary care provider a message or make an appointment. Ask someone on your Team how to sign up for [employer].   As always, Thank you for trusting us with your health care needs!    [facility]:    Scheduling Appointments  [place], [facility]  Call: [phone]    [facility]  Call: [phone]    [facility]  Call: [phone]    For Gastroenterology referrals   [facility]   Clinics and Surgery  [place], [address] Floor   [address] [place] [address]   Appointments: [phone]    WHERE TO GO FOR CARE?  Clinic    Make an appointment if you:       Are sick (cold, cough, flu, sore throat, earache or in pain).       Have a small injury (sprain, small cut, burn or broken bone).       Need a physical exam, Pap smear, vaccine or prescription refill.       Have questions about your health or medicines.    To reach us:      Call [phone] ([phone]). Open 24 hours every day. (For counseling services, call [phone].)    Log into [employer]. (Visit [employer] to create an account.) Hospital emergency room    An emergency is a serious or life- threatening problem that must be treated right away.    Call 040 or get to the hospital if you have:      Very bad or sudden:            - Chest pain or pressure         - Bleeding         - Head or belly pain         - Dizziness or trouble seeing, walking or                          Speaking      Problems breathing      Blood in your vomit or you are coughing up blood      A major injury (knocked out, loss of a finger or limb, rape, broken bone protruding from skin)    A mental health crisis. (Or call the Mental Health Crisis line at 1-637.433.4858 or Suicide Prevention Hotline at 1-879.222.8725.)    Open 24 hours every day. You don't need an appointment.     Urgent care    Visit urgent care for sickness or small injuries when the clinic is closed. You don't need an appointment. To check hours or find an urgent care near you, visit [email or web address]. Online care    Get online care from [facility] for more than 70 common problems, like colds, allergies and infections. Open 24 hours every day at:   [email or web address]   Need help deciding?    For advice about where to be seen, you may call your clinic and ask to speak with a nurse. We're here for you 24 hours every day.         If you are deaf or hard of hearing, please let us know. We  provide many free services including sign language interpreters, oral interpreters, TTYs, telephone amplifiers, note takers and written materials.

## 2019-07-08 NOTE — PROGRESS NOTES
SUBJECTIVE:   CC: Keith Spangler is an 39 year old male who presents for preventive health visit.     Healthy Habits:    Do you get at least three servings of calcium containing foods daily (dairy, green leafy vegetables, etc.)? yes    Amount of exercise or daily activities, outside of work: 3 day(s) per week    Problems taking medications regularly Yes when patient cannot get refills    Medication side effects: No    Have you had an eye exam in the past two years? no    Do you see a dentist twice per year? yes    Do you have sleep apnea, excessive snoring or daytime drowsiness?no    1. Both feet / peeling and cracking with itch - ongoing for a few months - using topical anti fungal - helps with the itch. Has blisters with this however - both feet. Nails are a bit yellow  2. Asthma is a little worse. He hasn't been on his inhalers as much. Cool dry air tends to flare things.     Today's PHQ-2 Score:   PHQ-2 ( 1999 Pfizer) 7/8/2019 3/26/2018   Q1: Little interest or pleasure in doing things 0 0   Q2: Feeling down, depressed or hopeless 0 0   PHQ-2 Score 0 0   Q1: Little interest or pleasure in doing things - Not at all   Q2: Feeling down, depressed or hopeless - Not at all   PHQ-2 Score - 0     Abuse: Current or Past(Physical, Sexual or Emotional)- No  Do you feel safe in your environment? Yes    Social History     Tobacco Use     Smoking status: Never Smoker     Smokeless tobacco: Never Used     Tobacco comment: Mom smoked his upbringing   Substance Use Topics     Alcohol use: Yes     Comment: Really rare now      If you drink alcohol do you typically have >3 drinks per day or >7 drinks per week? No                      Last PSA: No results found for: PSA    Reviewed orders with patient. Reviewed health maintenance and updated orders accordingly - Yes  Lab work is in process    Reviewed and updated as needed this visit by clinical staff  Tobacco  Allergies  Meds  Med Hx  Surg Hx  Fam Hx  Soc Hx     "    Reviewed and updated as needed this visit by Provider            ROS:  CONSTITUTIONAL: NEGATIVE for fever, chills, change in weight  INTEGUMENTARY/SKIN: NEGATIVE for worrisome rashes, moles or lesions  EYES: NEGATIVE for vision changes or irritation  ENT: NEGATIVE for ear, mouth and throat problems  RESP: NEGATIVE for significant cough or SOB  CV: NEGATIVE for chest pain, palpitations or peripheral edema  GI: NEGATIVE for nausea, abdominal pain, heartburn, or change in bowel habits   male: negative for dysuria, hematuria, decreased urinary stream, erectile dysfunction, urethral discharge  MUSCULOSKELETAL: NEGATIVE for significant arthralgias or myalgia  NEURO: NEGATIVE for weakness, dizziness or paresthesias  PSYCHIATRIC: NEGATIVE for changes in mood or affect    OBJECTIVE:   /74 (BP Location: Right arm, Patient Position: Chair, Cuff Size: Adult Large)   Pulse 84   Temp 98.4  F (36.9  C) (Oral)   Ht 1.74 m (5' 8.5\")   Wt 90.8 kg (200 lb 3.2 oz)   BMI 30.00 kg/m    EXAM:  GENERAL: healthy, alert and no distress  EYES: Eyes grossly normal to inspection, PERRL and conjunctivae and sclerae normal  HENT: ear canals and TM's normal, nose and mouth without ulcers or lesions  NECK: no adenopathy, no asymmetry, masses, or scars and thyroid normal to palpation  RESP: lungs clear to auscultation - no rales, rhonchi or wheezes  CV: regular rate and rhythm, normal S1 S2, no S3 or S4, no murmur, click or rub, no peripheral edema and peripheral pulses strong  ABDOMEN: soft, nontender, no hepatosplenomegaly, no masses and bowel sounds normal  MS: no gross musculoskeletal defects noted, no edema  SKIN: Bilateral feet are cracking, peeling, otherwise no suspicious lesions or rashes  NEURO: Normal strength and tone, mentation intact and speech normal  PSYCH: mentation appears normal, affect normal/bright  LYMPH: no cervical, supraclavicular, axillary, or inguinal adenopathy    Diagnostic Test Results:  Labs reviewed " "in Epic    ASSESSMENT/PLAN:   (Z00.00) Routine general medical examination at a health care facility  (primary encounter diagnosis)  Comment: Well person  Plan: Glucose, Lipid panel reflex to direct LDL         Fasting        Diet, exercise, wellness and other preventive recommendations related to health maintenance were discussed.  Follow up as needed for acute issues.  Physical exam in 1 year.     (J45.30) Mild persistent asthma without complication  Comment:   Plan: fluticasone-salmeterol (ADVAIR-HFA) 115-21         MCG/ACT inhaler, montelukast (SINGULAIR) 10 MG         tablet, albuterol (PROVENTIL) (2.5 MG/3ML)         0.083% neb solution        Stable but mildly flaring this past few weeks off inhalers. Restart inhalers and keep me updated.     (R09.81) Chronic nasal congestion  Comment:   Plan: montelukast (SINGULAIR) 10 MG tablet,         fluticasone (FLONASE) 50 MCG/ACT nasal spray        Refills     (R06.02) SOB (shortness of breath)  Comment:   Plan: albuterol (PROAIR HFA/PROVENTIL HFA/VENTOLIN         HFA) 108 (90 Base) MCG/ACT inhaler        Warning symptoms of worsening condition discussed and patient shows good understanding.     (B35.3) Tinea pedis of both feet  Comment:   Plan: clotrimazole (LOTRIMIN) 1 % external cream,         terbinafine (LAMISIL) 250 MG tablet        Treat topical - is failing OTC topical right now, add oral x 7 days, apply vaseline or aquaphor each night, cover with socks.       COUNSELING:  Reviewed preventive health counseling, as reflected in patient instructions    Estimated body mass index is 30 kg/m  as calculated from the following:    Height as of this encounter: 1.74 m (5' 8.5\").    Weight as of this encounter: 90.8 kg (200 lb 3.2 oz).         reports that he has never smoked. He has never used smokeless tobacco.      Counseling Resources:  ATP IV Guidelines  Pooled Cohorts Equation Calculator  FRAX Risk Assessment  ICSI Preventive Guidelines  Dietary Guidelines for " Americans, 2010  USDA's MyPlate  ASA Prophylaxis  Lung CA Screening    ERMELINDA TADEO PA-C  [facility]

## 2019-07-09 LAB
CHOLEST SERPL-MCNC: 208 MG/DL
GLUCOSE SERPL-MCNC: 93 MG/DL (ref 70–99)
HDLC SERPL-MCNC: 35 MG/DL
LDLC SERPL CALC-MCNC: 123 MG/DL
NONHDLC SERPL-MCNC: 173 MG/DL
TRIGL SERPL-MCNC: 252 MG/DL

## 2019-07-09 ASSESSMENT — ASTHMA QUESTIONNAIRES: ACT_TOTALSCORE: 14

## 2019-09-03 ENCOUNTER — MYC MEDICAL ADVICE (OUTPATIENT)
Dept: FAMILY MEDICINE | Facility: CLINIC | Age: 39
End: 2019-09-03

## 2019-09-03 ENCOUNTER — MYC REFILL (OUTPATIENT)
Dept: FAMILY MEDICINE | Facility: CLINIC | Age: 39
End: 2019-09-03

## 2019-09-03 DIAGNOSIS — R09.81 CHRONIC NASAL CONGESTION: ICD-10-CM

## 2019-09-03 NOTE — TELEPHONE ENCOUNTER
"Requested Prescriptions   Pending Prescriptions Disp Refills     fluticasone (FLONASE) 50 MCG/ACT nasal spray  Last Written Prescription Date:  7/8/2019  Last Fill Quantity: 16 g,  # refills: 5   Last office visit: 7/8/2019 with prescribing provider:  CUCA Adams   Future Office Visit:     16 g 5     Sig: Spray 2 sprays into both nostrils daily       Inhaled Steroids Protocol Failed - 9/3/2019  1:25 PM        Failed - Asthma control assessment score within normal limits in last 6 months     Please review ACT score.   ACT Total Scores 2/28/2018 3/26/2018 7/8/2019   ACT TOTAL SCORE (Goal Greater than or Equal to 20) 13 15 14   In the past 12 months, how many times did you visit the emergency room for your asthma without being admitted to the hospital? 0 0 0   In the past 12 months, how many times were you hospitalized overnight because of your asthma? 0 0 0           Passed - Patient is age 12 or older        Passed - Medication is active on med list        Passed - Recent (6 mo) or future (30 days) visit within the authorizing provider's specialty     Patient had office visit in the last 6 months or has a visit in the next 30 days with authorizing provider or within the authorizing provider's specialty.  See \"Patient Info\" tab in inbasket, or \"Choose Columns\" in Meds & Orders section of the refill encounter.              "

## 2019-09-03 NOTE — TELEPHONE ENCOUNTER
[employer] message sent to patient. May close encounter if read with no reply.     Kelley Hamlin RN

## 2019-09-04 ENCOUNTER — OFFICE VISIT (OUTPATIENT)
Dept: URGENT CARE | Facility: URGENT CARE | Age: 39
End: 2019-09-04
Payer: COMMERCIAL

## 2019-09-04 ENCOUNTER — MYC REFILL (OUTPATIENT)
Dept: FAMILY MEDICINE | Facility: CLINIC | Age: 39
End: 2019-09-04

## 2019-09-04 VITALS
WEIGHT: 200 LBS | DIASTOLIC BLOOD PRESSURE: 92 MMHG | BODY MASS INDEX: 29.97 KG/M2 | HEART RATE: 51 BPM | SYSTOLIC BLOOD PRESSURE: 149 MMHG | OXYGEN SATURATION: 98 % | TEMPERATURE: 97.4 F

## 2019-09-04 DIAGNOSIS — J20.9 ACUTE BRONCHITIS, UNSPECIFIED ORGANISM: Primary | ICD-10-CM

## 2019-09-04 DIAGNOSIS — J45.30 MILD PERSISTENT ASTHMA WITHOUT COMPLICATION: ICD-10-CM

## 2019-09-04 PROCEDURE — 99214 OFFICE O/P EST MOD 30 MIN: CPT | Performed by: NURSE PRACTITIONER

## 2019-09-04 RX ORDER — ALBUTEROL SULFATE 0.83 MG/ML
2.5 SOLUTION RESPIRATORY (INHALATION) EVERY 6 HOURS PRN
Qty: 25 VIAL | Refills: 5 | Status: CANCELLED | OUTPATIENT
Start: 2019-09-04

## 2019-09-04 RX ORDER — AZITHROMYCIN 250 MG/1
TABLET, FILM COATED ORAL
Qty: 6 TABLET | Refills: 0 | Status: SHIPPED | OUTPATIENT
Start: 2019-09-04 | End: 2020-02-25

## 2019-09-04 RX ORDER — PREDNISONE 20 MG/1
20 TABLET ORAL 2 TIMES DAILY
Qty: 10 TABLET | Refills: 0 | Status: SHIPPED | OUTPATIENT
Start: 2019-09-04 | End: 2020-02-25

## 2019-09-04 ASSESSMENT — ENCOUNTER SYMPTOMS
FEVER: 0
EYE PAIN: 0
COLOR CHANGE: 0
PALPITATIONS: 0
NAUSEA: 0
COUGH: 1
SEIZURES: 0
SORE THROAT: 0
RHINORRHEA: 1
DYSURIA: 0
SHORTNESS OF BREATH: 0
DIAPHORESIS: 1
ARTHRALGIAS: 0
FATIGUE: 1
ABDOMINAL PAIN: 0
HEMATURIA: 0
VOMITING: 0
BACK PAIN: 0
CHILLS: 1
DIARRHEA: 0
CHEST TIGHTNESS: 1

## 2019-09-04 NOTE — TELEPHONE ENCOUNTER
"Requested Prescriptions   Pending Prescriptions Disp Refills     albuterol (PROVENTIL) (2.5 MG/3ML) 0.083% neb solution  Last Written Prescription Date:  7/8/2019  Last Fill Quantity: 25 vial,  # refills: 5   Last office visit: 7/8/2019 with prescribing provider:  Bryan   Future Office Visit:     25 vial 5     Sig: Take 1 vial (2.5 mg) by nebulization every 6 hours as needed for shortness of breath / dyspnea or wheezing       Asthma Maintenance Inhalers - Anticholinergics Failed - 9/4/2019  8:57 AM        Failed - Asthma control assessment score within normal limits in last 6 months     Please review ACT score.           Passed - Patient is age 12 years or older        Passed - Medication is active on med list        Passed - Recent (6 mo) or future (30 days) visit within the authorizing provider's specialty     Patient had office visit in the last 6 months or has a visit in the next 30 days with authorizing provider or within the authorizing provider's specialty.  See \"Patient Info\" tab in inbasket, or \"Choose Columns\" in Meds & Orders section of the refill encounter.              "

## 2019-09-04 NOTE — PATIENT INSTRUCTIONS
Patient Education     Acute Bronchitis  Your healthcare provider has told you that you have acute bronchitis. Bronchitis is infection or inflammation of the bronchial tubes (airways in the lungs). Normally, air moves easily in and out of the airways. Bronchitis narrows the airways, making it harder for air to flow in and out of the lungs. This causes symptoms such as shortness of breath, coughing up yellow or green mucus, and wheezing. Bronchitis can be acute or chronic. Acute means the condition comes on quickly and goes away in a short time, usually within 3 to 10 days. Chronic means a condition lasts a long time and often comes back.    What causes acute bronchitis?  Acute bronchitis almost always starts as a viral respiratory infection, such as a cold or the flu. Certain factors make it more likely for a cold or flu to turn into bronchitis. These include being very young, being elderly, having a heart or lung problem, or having a weak immune system. Cigarette smoking also makes bronchitis more likely.  When bronchitis develops, the airways become swollen. The airways may also become infected with bacteria. This is known as a secondary infection.  Diagnosing acute bronchitis  Your healthcare provider will examine you and ask about your symptoms and health history. You may also have a sputum culture to test the fluid in your lungs. Chest X-rays may be done to look for infection in the lungs.  Treating acute bronchitis  Bronchitis usually clears up as the cold or flu goes away. You can help feel better faster by doing the following:    Take medicine as directed. You may be told to take ibuprofen or other over-the-counter medicines. These help relieve inflammation in your bronchial tubes. Your healthcare provider may prescribe an inhaler to help open up the bronchial tubes. Most of the time, acute bronchitis is caused by a viral infection. Antibiotics are usually not prescribed for viral infections.    Drink  plenty of fluids, such as water, juice, or warm soup. Fluids loosen mucus so that you can cough it up. This helps you breathe more easily. Fluids also prevent dehydration.    Make sure you get plenty of rest.    Do not smoke. Do not allow anyone else to smoke in your home.  Recovery and follow-up  Follow up with your doctor as you are told. You will likely feel better in a week or two. But a dry cough can linger beyond that time. Let your doctor know if you still have symptoms (other than a dry cough) after 2 weeks, or if you re prone to getting bronchial infections. Take steps to protect yourself from future infections. These steps include stopping smoking and avoiding tobacco smoke, washing your hands often, and getting a yearly flu shot.  When to call your healthcare provider  Call the healthcare provider if you have any of the following:    Fever of 100.4 F (38.0 C) or higher, or as advised    Symptoms that get worse, or new symptoms    Trouble breathing    Symptoms that don t start to improve within a week, or within 3 days of taking antibiotics   Date Last Reviewed: 12/1/2016 2000-2018 [employer]. [address] [place] [address], [place] [address]. All rights reserved. This information is not intended as a substitute for professional medical care. Always follow your healthcare professional's instructions.

## 2019-09-04 NOTE — PROGRESS NOTES
SUBJECTIVE:   Keith Spangler is a 39 year old male presenting with a chief complaint of   Chief Complaint   Patient presents with     URI     Patient complains of cough and cold symptoms        He is an established patient of [facility].    URI Adult    Onset of symptoms was more than 1 week.  Course of illness   is worsening.    Severity moderate  Current and Associated symptoms: chills, sweats, cough - productive, shortness of breath, fatigue and chest tight  Treatment measures tried include Inhaler (name: albuterol).  Predisposing factors include HX of asthma.      Review of Systems   Constitutional: Positive for chills, diaphoresis and fatigue. Negative for fever.   HENT: Positive for congestion and rhinorrhea. Negative for ear pain and sore throat.    Eyes: Negative for pain and visual disturbance.   Respiratory: Positive for cough and chest tightness. Negative for shortness of breath.    Cardiovascular: Negative for chest pain and palpitations.   Gastrointestinal: Negative for abdominal pain, diarrhea, nausea and vomiting.   Genitourinary: Negative for dysuria and hematuria.   Musculoskeletal: Negative for arthralgias and back pain.   Skin: Negative for color change and rash.   Neurological: Negative for seizures and syncope.   All other systems reviewed and are negative.      Past Medical History:   Diagnosis Date     COPD (chronic obstructive pulmonary disease) (H)      NO ACTIVE PROBLEMS      Uncomplicated asthma Sept 2014    Intermittent asthma     Family History   Problem Relation Age of Onset     Unknown/Adopted Mother         Diagnosed w/ALS June 2015     Gastrointestinal Disease Brother         colitis     C.A.D. No family hx of      Diabetes No family hx of      Hypertension No family hx of      Cerebrovascular Disease No family hx of      Breast Cancer No family hx of      Cancer - colorectal No family hx of      Prostate Cancer No family hx of      Asthma No family hx of      Current Outpatient  Medications   Medication Sig Dispense Refill     albuterol (PROAIR HFA/PROVENTIL HFA/VENTOLIN HFA) 108 (90 Base) MCG/ACT inhaler Inhale 2 puffs into the lungs every 6 hours as needed 1 Inhaler 5     albuterol (PROVENTIL) (2.5 MG/3ML) 0.083% neb solution Take 1 vial (2.5 mg) by nebulization every 6 hours as needed for shortness of breath / dyspnea or wheezing 25 vial 5     azithromycin (ZITHROMAX) 250 MG tablet Two tablets first day, then one tablet daily for four days. 6 tablet 0     clotrimazole (LOTRIMIN) 1 % external cream Apply topically 2 times daily (Apply 5 grams to both feet daily) 40 g 2     fluticasone (FLONASE) 50 MCG/ACT nasal spray Spray 2 sprays into both nostrils daily 16 g 5     fluticasone-salmeterol (ADVAIR-HFA) 115-21 MCG/ACT inhaler Inhale 2 puffs into the lungs 2 times daily 1 Inhaler 5     montelukast (SINGULAIR) 10 MG tablet Take 1 tablet (10 mg) by mouth At Bedtime 90 tablet 3     order for DME Equipment being ordered: spacer for inhaler 1 Inhaler 0     predniSONE (DELTASONE) 20 MG tablet Take 1 tablet (20 mg) by mouth 2 times daily for 5 days 10 tablet 0     terbinafine (LAMISIL) 250 MG tablet 1 pill daily for 7 days, repeat monthly as needed 56 tablet 1     Social History     Tobacco Use     Smoking status: Never Smoker     Smokeless tobacco: Never Used     Tobacco comment: Mom smoked his upbringing   Substance Use Topics     Alcohol use: Yes     Comment: Really rare now        OBJECTIVE  BP (!) 149/92 (BP Location: Left arm, Patient Position: Chair, Cuff Size: Adult Regular)   Pulse 51   Temp 97.4  F (36.3  C) (Oral)   Wt 90.7 kg (200 lb)   SpO2 98%   BMI 29.97 kg/m      Physical Exam   Constitutional: No distress.   HENT:   Head: Normocephalic and atraumatic.   Right Ear: Tympanic membrane and external ear normal.   Left Ear: Tympanic membrane and external ear normal.   Nose: Mucosal edema and rhinorrhea present.   Mouth/Throat: Oropharynx is clear and moist.   Eyes: Pupils are  equal, round, and reactive to light. EOM are normal.   Neck: Normal range of motion. Neck supple.   Pulmonary/Chest: Effort normal. No respiratory distress. He has rhonchi (clears with cough) in the right upper field and the left upper field.   Lymphadenopathy:     He has no cervical adenopathy.   Neurological: He is alert. No cranial nerve deficit.   Skin: Skin is warm and dry. He is not diaphoretic.   Psychiatric: He has a normal mood and affect.   Nursing note and vitals reviewed.      ASSESSMENT:      ICD-10-CM    1. Acute bronchitis, unspecified organism J20.9 azithromycin (ZITHROMAX) 250 MG tablet     predniSONE (DELTASONE) 20 MG tablet        Serious Comorbid Conditions:  Adult:  Asthma    PLAN:  I have discussed clinical findings with patient.  Side effects of medications discussed.  Symptomatic care is discussed.  I have discussed the possibility of  worsening symptoms and to RTC or ER if they occur.  All questions are answered and patient is in agreement with plan.   Patient care instructions are given to at the end of visit.              Patient Instructions       Patient Education     Acute Bronchitis  Your healthcare provider has told you that you have acute bronchitis. Bronchitis is infection or inflammation of the bronchial tubes (airways in the lungs). Normally, air moves easily in and out of the airways. Bronchitis narrows the airways, making it harder for air to flow in and out of the lungs. This causes symptoms such as shortness of breath, coughing up yellow or green mucus, and wheezing. Bronchitis can be acute or chronic. Acute means the condition comes on quickly and goes away in a short time, usually within 3 to 10 days. Chronic means a condition lasts a long time and often comes back.    What causes acute bronchitis?  Acute bronchitis almost always starts as a viral respiratory infection, such as a cold or the flu. Certain factors make it more likely for a cold or flu to turn into bronchitis.  These include being very young, being elderly, having a heart or lung problem, or having a weak immune system. Cigarette smoking also makes bronchitis more likely.  When bronchitis develops, the airways become swollen. The airways may also become infected with bacteria. This is known as a secondary infection.  Diagnosing acute bronchitis  Your healthcare provider will examine you and ask about your symptoms and health history. You may also have a sputum culture to test the fluid in your lungs. Chest X-rays may be done to look for infection in the lungs.  Treating acute bronchitis  Bronchitis usually clears up as the cold or flu goes away. You can help feel better faster by doing the following:    Take medicine as directed. You may be told to take ibuprofen or other over-the-counter medicines. These help relieve inflammation in your bronchial tubes. Your healthcare provider may prescribe an inhaler to help open up the bronchial tubes. Most of the time, acute bronchitis is caused by a viral infection. Antibiotics are usually not prescribed for viral infections.    Drink plenty of fluids, such as water, juice, or warm soup. Fluids loosen mucus so that you can cough it up. This helps you breathe more easily. Fluids also prevent dehydration.    Make sure you get plenty of rest.    Do not smoke. Do not allow anyone else to smoke in your home.  Recovery and follow-up  Follow up with your doctor as you are told. You will likely feel better in a week or two. But a dry cough can linger beyond that time. Let your doctor know if you still have symptoms (other than a dry cough) after 2 weeks, or if you re prone to getting bronchial infections. Take steps to protect yourself from future infections. These steps include stopping smoking and avoiding tobacco smoke, washing your hands often, and getting a yearly flu shot.  When to call your healthcare provider  Call the healthcare provider if you have any of the following:    Fever  of 100.4 F (38.0 C) or higher, or as advised    Symptoms that get worse, or new symptoms    Trouble breathing    Symptoms that don t start to improve within a week, or within 3 days of taking antibiotics   Date Last Reviewed: 12/1/2016 2000-2018 [employer]. [address] [place] [address]. All rights reserved. This information is not intended as a substitute for professional medical care. Always follow your healthcare professional's instructions.

## 2019-09-06 RX ORDER — FLUTICASONE PROPIONATE 50 MCG
2 SPRAY, SUSPENSION (ML) NASAL DAILY
Qty: 16 G | Refills: 5 | Status: SHIPPED | OUTPATIENT
Start: 2019-09-06 | End: 2020-08-18

## 2019-09-09 ENCOUNTER — OFFICE VISIT (OUTPATIENT)
Dept: URGENT CARE | Facility: URGENT CARE | Age: 39
End: 2019-09-09
Payer: COMMERCIAL

## 2019-09-09 ENCOUNTER — ANCILLARY PROCEDURE (OUTPATIENT)
Dept: GENERAL RADIOLOGY | Facility: CLINIC | Age: 39
End: 2019-09-09
Attending: NURSE PRACTITIONER
Payer: COMMERCIAL

## 2019-09-09 VITALS
RESPIRATION RATE: 16 BRPM | SYSTOLIC BLOOD PRESSURE: 134 MMHG | BODY MASS INDEX: 30.06 KG/M2 | OXYGEN SATURATION: 96 % | HEART RATE: 69 BPM | TEMPERATURE: 98.2 F | DIASTOLIC BLOOD PRESSURE: 84 MMHG | WEIGHT: 200.6 LBS

## 2019-09-09 DIAGNOSIS — R05.9 COUGH: Primary | ICD-10-CM

## 2019-09-09 DIAGNOSIS — D72.829 LEUKOCYTOSIS, UNSPECIFIED TYPE: ICD-10-CM

## 2019-09-09 LAB
BASOPHILS # BLD AUTO: 0 10E9/L (ref 0–0.2)
BASOPHILS NFR BLD AUTO: 0.2 %
DIFFERENTIAL METHOD BLD: ABNORMAL
EOSINOPHIL # BLD AUTO: 0.1 10E9/L (ref 0–0.7)
EOSINOPHIL NFR BLD AUTO: 0.6 %
ERYTHROCYTE [DISTWIDTH] IN BLOOD BY AUTOMATED COUNT: 12.6 % (ref 10–15)
HCT VFR BLD AUTO: 46.2 % (ref 40–53)
HGB BLD-MCNC: 16 G/DL (ref 13.3–17.7)
LYMPHOCYTES # BLD AUTO: 4.7 10E9/L (ref 0.8–5.3)
LYMPHOCYTES NFR BLD AUTO: 28.5 %
MCH RBC QN AUTO: 30.9 PG (ref 26.5–33)
MCHC RBC AUTO-ENTMCNC: 34.6 G/DL (ref 31.5–36.5)
MCV RBC AUTO: 89 FL (ref 78–100)
MONOCYTES # BLD AUTO: 1.5 10E9/L (ref 0–1.3)
MONOCYTES NFR BLD AUTO: 9.1 %
NEUTROPHILS # BLD AUTO: 10 10E9/L (ref 1.6–8.3)
NEUTROPHILS NFR BLD AUTO: 61.6 %
PLATELET # BLD AUTO: 277 10E9/L (ref 150–450)
RBC # BLD AUTO: 5.17 10E12/L (ref 4.4–5.9)
WBC # BLD AUTO: 16.3 10E9/L (ref 4–11)

## 2019-09-09 PROCEDURE — 71046 X-RAY EXAM CHEST 2 VIEWS: CPT

## 2019-09-09 PROCEDURE — 85025 COMPLETE CBC W/AUTO DIFF WBC: CPT | Performed by: NURSE PRACTITIONER

## 2019-09-09 PROCEDURE — 36415 COLL VENOUS BLD VENIPUNCTURE: CPT | Performed by: NURSE PRACTITIONER

## 2019-09-09 PROCEDURE — 99213 OFFICE O/P EST LOW 20 MIN: CPT | Performed by: NURSE PRACTITIONER

## 2019-09-09 RX ORDER — GUAIFENESIN/DEXTROMETHORPHAN 100-10MG/5
5 SYRUP ORAL EVERY 4 HOURS PRN
COMMUNITY
End: 2020-02-25

## 2019-09-09 RX ORDER — DOXYCYCLINE 100 MG/1
100 CAPSULE ORAL 2 TIMES DAILY
Qty: 20 CAPSULE | Refills: 0 | Status: SHIPPED | OUTPATIENT
Start: 2019-09-09 | End: 2020-02-25

## 2019-09-09 ASSESSMENT — ENCOUNTER SYMPTOMS
WHEEZING: 0
FEVER: 0
GASTROINTESTINAL NEGATIVE: 1
CHILLS: 0
NEUROLOGICAL NEGATIVE: 1
CARDIOVASCULAR NEGATIVE: 1
SHORTNESS OF BREATH: 0
SPUTUM PRODUCTION: 0
COUGH: 1

## 2019-09-09 ASSESSMENT — PAIN SCALES - GENERAL: PAINLEVEL: NO PAIN (0)

## 2019-09-09 NOTE — TELEPHONE ENCOUNTER
Called our pharmacy and they state that patient never picked up medication. Asked them to please fill the prescription today and they will. Patient notified via [employer] message. Removed pended medication.     Kelley Hamlin RN

## 2019-09-09 NOTE — LETTER
[facility]  [address]  [facility] [address]  Phone: [phone]    September 9, 2019        Keith Spangler  [address] [facility] [id number]          To whom it may concern:    RE: Keith Spangler    Patient was seen and treated today at our clinic.  He will need to remain out of  tomorrow and   Will need his parent to stay with him.     Please contact me for questions or concerns.      Sincerely,        EFRAÍN Meza CNP

## 2019-09-09 NOTE — TELEPHONE ENCOUNTER
Was given significant refills in July - please find out why refills are being asked for. And if using inhalers.

## 2019-09-10 NOTE — PROGRESS NOTES
VITA  Keith Spangler 39 year old presents to the urgent care with persistent cough.  He reports that he was seen here 1 week ago and was prescribed prednisone and a Z-Garry.  He has underlying history of asthma though does not feel this is been primarily his problem.  He continues to have a productive cough as well and has some chest congestion and a stuffy head.  He has not had shortness of breath or wheezing.  He notes that he feels a little better with ibuprofen but the combination of prednisone and ibuprofen are starting to bother his stomach.    Past Medical History:   Diagnosis Date     COPD (chronic obstructive pulmonary disease) (H)      NO ACTIVE PROBLEMS      Uncomplicated asthma Sept 2014    Intermittent asthma      Patient Active Problem List   Diagnosis     CARDIOVASCULAR SCREENING; LDL GOAL LESS THAN 160     Acute bronchitis with bronchospasm     Mild persistent asthma without complication      Past Surgical History:   Procedure Laterality Date     APPENDECTOMY  Oct. 1991     EYE SURGERY  2013 and 2014    PRK laser correction     HERNIA REPAIR  2002     SURGICAL HISTORY OF -       Thumb 2005-Pins placed      SURGICAL HISTORY OF -       ER visit, left upper eyelid laceration repair     SURGICAL HISTORY OF -       Septo/rhinoplasty of nose secondary to boxing injury     SURGICAL HISTORY OF -       Left hernia      No Known Allergies  Current Outpatient Medications   Medication     albuterol (PROAIR HFA/PROVENTIL HFA/VENTOLIN HFA) 108 (90 Base) MCG/ACT inhaler     albuterol (PROVENTIL) (2.5 MG/3ML) 0.083% neb solution     guaiFENesin-dextromethorphan (ROBITUSSIN DM) 100-10 MG/5ML syrup     IBUPROFEN PO     azithromycin (ZITHROMAX) 250 MG tablet     clotrimazole (LOTRIMIN) 1 % external cream     fluticasone (FLONASE) 50 MCG/ACT nasal spray     fluticasone-salmeterol (ADVAIR-HFA) 115-21 MCG/ACT inhaler     montelukast (SINGULAIR) 10 MG tablet     order for DME     predniSONE (DELTASONE) 20 MG tablet      terbinafine (LAMISIL) 250 MG tablet     No current facility-administered medications for this visit.        Review of Systems   Constitutional: Positive for malaise/fatigue. Negative for chills and fever.   HENT: Positive for congestion.    Respiratory: Positive for cough. Negative for sputum production, shortness of breath and wheezing.    Cardiovascular: Negative.    Gastrointestinal: Negative.    Genitourinary: Negative.    Skin: Negative.    Neurological: Negative.    Endo/Heme/Allergies: Negative.    All other systems reviewed and are negative.        Physical Exam   Constitutional: He is oriented to person, place, and time.   /84 (BP Location: Left arm, Patient Position: Sitting, Cuff Size: Adult Large)   Pulse 69   Temp 98.2  F (36.8  C) (Oral)   Resp 16   Wt 91 kg (200 lb 9.6 oz)   SpO2 96%   BMI 30.06 kg/m       HENT:   Head: Normocephalic.   Right Ear: Tympanic membrane and external ear normal.   Left Ear: Tympanic membrane and external ear normal.   Nose: Nasal deformity and septal deviation present.   Mouth/Throat: Oropharynx is clear and moist and mucous membranes are normal.   Cardiovascular: Normal rate, regular rhythm and normal heart sounds.   Pulmonary/Chest: Effort normal and breath sounds normal. He has no wheezes. He has no rales.   Abdominal: There is no tenderness.   Musculoskeletal: Normal range of motion.   Neurological: He is alert and oriented to person, place, and time.   Skin: Skin is warm and dry.   Nursing note and vitals reviewed.    CHEST TWO VIEWS  9/9/2019 7:24 PM   HISTORY:  Cough.  COMPARISON: 9/9/2015.                                                               IMPRESSION: Negative chest. Lungs clear    Results for orders placed or performed in visit on 09/09/19   XR Chest 2 Views    Narrative    CHEST TWO VIEWS  9/9/2019 7:24 PM     HISTORY:  Cough.    COMPARISON: 9/9/2015.      Impression    IMPRESSION: Negative chest. Lungs clear.    GUY LOPEZ MD   CBC  with platelets differential   Result Value Ref Range    WBC 16.3 (H) 4.0 - 11.0 10e9/L    RBC Count 5.17 4.4 - 5.9 10e12/L    Hemoglobin 16.0 13.3 - 17.7 g/dL    Hematocrit 46.2 40.0 - 53.0 %    MCV 89 78 - 100 fl    MCH 30.9 26.5 - 33.0 pg    MCHC 34.6 31.5 - 36.5 g/dL    RDW 12.6 10.0 - 15.0 %    Platelet Count 277 150 - 450 10e9/L    % Neutrophils 61.6 %    % Lymphocytes 28.5 %    % Monocytes 9.1 %    % Eosinophils 0.6 %    % Basophils 0.2 %    Absolute Neutrophil 10.0 (H) 1.6 - 8.3 10e9/L    Absolute Lymphocytes 4.7 0.8 - 5.3 10e9/L    Absolute Monocytes 1.5 (H) 0.0 - 1.3 10e9/L    Absolute Eosinophils 0.1 0.0 - 0.7 10e9/L    Absolute Basophils 0.0 0.0 - 0.2 10e9/L    Diff Method Automated Method      Assessment:  1. Cough    2. Leukocytosis, unspecified type        Plan:  Orders Placed This Encounter     XR Chest 2 Views     CBC with platelets differential     IBUPROFEN PO     guaiFENesin-dextromethorphan (ROBITUSSIN DM) 100-10 MG/5ML syrup     doxycycline hyclate (VIBRAMYCIN) 100 MG capsule   Neb three times daily until URI is gone  Tylenol or Ibuprofen as directed on package for pain   Instructions regarding self-care of patient/child reviewed.   Written instructions provided in after visit summary and reviewed.  Patient instructed to see primary care provider for new or persistent symptoms.   Red flag symptoms reviewed and patient has been instructed to seek emergent care  Please contact pharmacy for medication questions.  Patient instructed to take medications as directed on package.      Yoana Wheat, DNP, APRN, CNP

## 2019-09-30 ENCOUNTER — HEALTH MAINTENANCE LETTER (OUTPATIENT)
Age: 39
End: 2019-09-30

## 2019-12-16 ENCOUNTER — TELEPHONE (OUTPATIENT)
Dept: FAMILY MEDICINE | Facility: CLINIC | Age: 39
End: 2019-12-16

## 2019-12-16 ENCOUNTER — MYC REFILL (OUTPATIENT)
Dept: FAMILY MEDICINE | Facility: CLINIC | Age: 39
End: 2019-12-16

## 2019-12-16 DIAGNOSIS — R09.81 CHRONIC NASAL CONGESTION: ICD-10-CM

## 2019-12-16 DIAGNOSIS — J45.30 MILD PERSISTENT ASTHMA WITHOUT COMPLICATION: ICD-10-CM

## 2019-12-16 NOTE — TELEPHONE ENCOUNTER
Panel Management Review      Patient has the following on his problem list:     Asthma review     ACT Total Scores 7/8/2019   ACT TOTAL SCORE (Goal Greater than or Equal to 20) 14   In the past 12 months, how many times did you visit the emergency room for your asthma without being admitted to the hospital? 0   In the past 12 months, how many times were you hospitalized overnight because of your asthma? 0      1. Is Asthma diagnosis on the Problem List? Yes    2. Is Asthma listed on Health Maintenance? Yes    3. Patient is due for:  ACT      Composite cancer screening  Chart review shows that this patient is due/due soon for the following None  Summary:    Patient is due/failing the following:   Influenza vaccine and ACT    Action needed:   Patient needs to do ACT. and due for flu shot    Type of outreach:    Sent [employer] message.    Questions for provider review:    None                                                                                                                                    Maine Camargo,        Chart routed to Care Team .

## 2019-12-17 RX ORDER — MONTELUKAST SODIUM 10 MG/1
10 TABLET ORAL AT BEDTIME
Qty: 90 TABLET | Refills: 1 | Status: SHIPPED | OUTPATIENT
Start: 2019-12-17 | End: 2020-08-18

## 2019-12-17 RX ORDER — ALBUTEROL SULFATE 0.83 MG/ML
2.5 SOLUTION RESPIRATORY (INHALATION) EVERY 6 HOURS PRN
Qty: 25 VIAL | Refills: 5 | Status: SHIPPED | OUTPATIENT
Start: 2019-12-17 | End: 2020-02-25

## 2019-12-17 NOTE — TELEPHONE ENCOUNTER
"Requested Prescriptions   Pending Prescriptions Disp Refills     montelukast (SINGULAIR) 10 MG tablet  Last Written Prescription Date:  7/8/2019  Last Fill Quantity: 90 tabs,  # refills: 3   Last office visit: 7/8/2019 with prescribing provider:  Bryan   Future Office Visit:   90 tablet 3     Sig: Take 1 tablet (10 mg) by mouth At Bedtime       Leukotriene Inhibitors Protocol Failed - 12/17/2019  7:11 AM        Failed - Asthma control assessment score within normal limits in last 6 months     Please review ACT score.           Passed - Patient is age 12 or older     If patient is under 16, ok to refill using age based dosing.           Passed - Medication is active on med list        Passed - Recent (6 mo) or future (30 days) visit within the authorizing provider's specialty     Patient had office visit in the last 6 months or has a visit in the next 30 days with authorizing provider or within the authorizing provider's specialty.  See \"Patient Info\" tab in inbasket, or \"Choose Columns\" in Meds & Orders section of the refill encounter.            albuterol (PROVENTIL) (2.5 MG/3ML) 0.083% neb solution  Last Written Prescription Date:  7/8/2019  Last Fill Quantity: 25 vial,  # refills: 5   Last office visit: 7/8/2019 with prescribing provider:  Bryan Renee Office Visit:   25 vial 5     Sig: Take 1 vial (2.5 mg) by nebulization every 6 hours as needed for shortness of breath / dyspnea or wheezing       Asthma Maintenance Inhalers - Anticholinergics Failed - 12/17/2019  7:11 AM        Failed - Asthma control assessment score within normal limits in last 6 months     Please review ACT score.           Passed - Patient is age 12 years or older        Passed - Medication is active on med list        Passed - Recent (6 mo) or future (30 days) visit within the authorizing provider's specialty     Patient had office visit in the last 6 months or has a visit in the next 30 days with authorizing provider or within the " "authorizing provider's specialty.  See \"Patient Info\" tab in inbasket, or \"Choose Columns\" in Meds & Orders section of the refill encounter.             "

## 2019-12-17 NOTE — TELEPHONE ENCOUNTER
Routing refill request to provider for review/approval because:  ACT Total Scores 2/28/2018 3/26/2018 7/8/2019   ACT TOTAL SCORE (Goal Greater than or Equal to 20) 13 15 14   In the past 12 months, how many times did you visit the emergency room for your asthma without being admitted to the hospital? 0 0 0   In the past 12 months, how many times were you hospitalized overnight because of your asthma? 0 0 0     Sierra Izaguirre, RN, BSN, PHN  [facility]

## 2019-12-26 NOTE — TELEPHONE ENCOUNTER
Panel Management Review  Summary:    Type of outreach:    Phone, spoke to patient.  completed ACT    ACT completed by: telephone, patient had a copy of the ACT form.  Score is 13 and questions regarding IP/ER visits were answered.    Asthma is in control if score is >=20. Chart routed to provider for review if score was less than 20.    Encounter routed to Provider.                                                                                                                               Maine Camargo,

## 2019-12-27 ASSESSMENT — ASTHMA QUESTIONNAIRES: ACT_TOTALSCORE: 13

## 2020-01-31 ENCOUNTER — MYC REFILL (OUTPATIENT)
Dept: FAMILY MEDICINE | Facility: CLINIC | Age: 40
End: 2020-01-31

## 2020-01-31 DIAGNOSIS — J45.30 MILD PERSISTENT ASTHMA WITHOUT COMPLICATION: ICD-10-CM

## 2020-01-31 RX ORDER — ALBUTEROL SULFATE 0.83 MG/ML
2.5 SOLUTION RESPIRATORY (INHALATION) EVERY 6 HOURS PRN
Qty: 25 VIAL | Refills: 5 | Status: CANCELLED | OUTPATIENT
Start: 2020-01-31

## 2020-02-03 NOTE — TELEPHONE ENCOUNTER
"Requested Prescriptions   Pending Prescriptions Disp Refills     albuterol (PROVENTIL) (2.5 MG/3ML) 0.083% neb solution  Last Written Prescription Date:  12/17/2019  Last Fill Quantity: 25 vial,  # refills: 5   Last office visit: 7/8/2019 with prescribing provider:  [facility]   Future Office Visit:   25 vial 5     Sig: Take 1 vial (2.5 mg) by nebulization every 6 hours as needed for shortness of breath / dyspnea or wheezing       Asthma Maintenance Inhalers - Anticholinergics Failed - 1/31/2020  3:18 PM        Failed - Asthma control assessment score within normal limits in last 6 months     Please review ACT score.           Passed - Patient is age 12 years or older        Passed - Medication is active on med list        Passed - Recent (6 mo) or future (30 days) visit within the authorizing provider's specialty     Patient had office visit in the last 6 months or has a visit in the next 30 days with authorizing provider or within the authorizing provider's specialty.  See \"Patient Info\" tab in inbasket, or \"Choose Columns\" in Meds & Orders section of the refill encounter.            "

## 2020-02-04 NOTE — TELEPHONE ENCOUNTER
25 vials with 5 refills was sent to requesting pharmacy on 12/17/19?    Is he out already?    Spoke to pharmacy and verified patient has fills left; may disregard request.        Raquel Shirley RN  [facility]

## 2020-02-05 ENCOUNTER — MYC REFILL (OUTPATIENT)
Dept: FAMILY MEDICINE | Facility: CLINIC | Age: 40
End: 2020-02-05

## 2020-02-05 DIAGNOSIS — J45.30 MILD PERSISTENT ASTHMA WITHOUT COMPLICATION: ICD-10-CM

## 2020-02-05 RX ORDER — ALBUTEROL SULFATE 0.83 MG/ML
2.5 SOLUTION RESPIRATORY (INHALATION) EVERY 6 HOURS PRN
Qty: 25 VIAL | Refills: 5 | Status: CANCELLED | OUTPATIENT
Start: 2020-02-05

## 2020-02-05 NOTE — TELEPHONE ENCOUNTER
"Requested Prescriptions   Pending Prescriptions Disp Refills     albuterol (PROVENTIL) (2.5 MG/3ML) 0.083% neb solution  Last Written Prescription Date:  12/17/2019  Last Fill Quantity: 25 vial,  # refills: 5   Last office visit: 7/8/2019 with prescribing provider:  Bryan   Future Office Visit:   25 vial 5     Sig: Take 1 vial (2.5 mg) by nebulization every 6 hours as needed for shortness of breath / dyspnea or wheezing       Asthma Maintenance Inhalers - Anticholinergics Failed - 2/5/2020  7:20 AM        Failed - Asthma control assessment score within normal limits in last 6 months     Please review ACT score.           Passed - Patient is age 12 years or older        Passed - Medication is active on med list        Passed - Recent (6 mo) or future (30 days) visit within the authorizing provider's specialty     Patient had office visit in the last 6 months or has a visit in the next 30 days with authorizing provider or within the authorizing provider's specialty.  See \"Patient Info\" tab in inbasket, or \"Choose Columns\" in Meds & Orders section of the refill encounter.             "

## 2020-02-06 NOTE — TELEPHONE ENCOUNTER
Called [facility] and spoke with Cassidy, and the patient still has refills remaining. Patient notified through [employer].     Kimmy Grimes RN

## 2020-02-25 ENCOUNTER — OFFICE VISIT (OUTPATIENT)
Dept: URGENT CARE | Facility: URGENT CARE | Age: 40
End: 2020-02-25
Payer: COMMERCIAL

## 2020-02-25 VITALS
DIASTOLIC BLOOD PRESSURE: 93 MMHG | OXYGEN SATURATION: 96 % | HEART RATE: 82 BPM | BODY MASS INDEX: 30.27 KG/M2 | RESPIRATION RATE: 18 BRPM | WEIGHT: 202 LBS | TEMPERATURE: 98 F | SYSTOLIC BLOOD PRESSURE: 162 MMHG

## 2020-02-25 DIAGNOSIS — J45.20 MILD INTERMITTENT ASTHMA WITHOUT COMPLICATION: ICD-10-CM

## 2020-02-25 DIAGNOSIS — J45.30 MILD PERSISTENT ASTHMA WITHOUT COMPLICATION: ICD-10-CM

## 2020-02-25 DIAGNOSIS — J20.9 ACUTE BRONCHITIS WITH COEXISTING CONDITION REQUIRING PROPHYLACTIC TREATMENT: Primary | ICD-10-CM

## 2020-02-25 PROCEDURE — 99214 OFFICE O/P EST MOD 30 MIN: CPT | Performed by: FAMILY MEDICINE

## 2020-02-25 RX ORDER — BENZONATATE 100 MG/1
100 CAPSULE ORAL 3 TIMES DAILY PRN
Qty: 42 CAPSULE | Refills: 0 | Status: SHIPPED | OUTPATIENT
Start: 2020-02-25 | End: 2020-03-20

## 2020-02-25 RX ORDER — AZITHROMYCIN 250 MG/1
TABLET, FILM COATED ORAL
Qty: 6 TABLET | Refills: 0 | Status: SHIPPED | OUTPATIENT
Start: 2020-02-25 | End: 2020-03-20

## 2020-02-25 RX ORDER — ALBUTEROL SULFATE 0.83 MG/ML
2.5 SOLUTION RESPIRATORY (INHALATION) EVERY 6 HOURS PRN
Qty: 25 VIAL | Refills: 1 | Status: SHIPPED | OUTPATIENT
Start: 2020-02-25 | End: 2020-08-18

## 2020-02-26 NOTE — PROGRESS NOTES
Subjective     Keith Spangler is a 39 year old male who presents to clinic today for the following health issues:    HPI   Chief Complaint   Patient presents with     URI     cough, congestion, HA       Duration: 4 days     Description (location/character/radiation): cough, headache, body ache, chest wall pain, fever    Intensity:  moderate    Accompanying signs and symptoms: none     History (similar episodes/previous evaluation): None    Precipitating or alleviating factors: None    Therapies tried and outcome: ibuprofen         Patient Active Problem List   Diagnosis     CARDIOVASCULAR SCREENING; LDL GOAL LESS THAN 160     Acute bronchitis with bronchospasm     Mild persistent asthma without complication     Past Surgical History:   Procedure Laterality Date     APPENDECTOMY  Oct. 1991     EYE SURGERY  2013 and 2014    PRK laser correction     HERNIA REPAIR  2002     SURGICAL HISTORY OF -       Thumb 2005-Pins placed      SURGICAL HISTORY OF -       ER visit, left upper eyelid laceration repair     SURGICAL HISTORY OF -       Septo/rhinoplasty of nose secondary to boxing injury     SURGICAL HISTORY OF -       Left hernia        Social History     Tobacco Use     Smoking status: Never Smoker     Smokeless tobacco: Never Used     Tobacco comment: Mom smoked his upbringing   Substance Use Topics     Alcohol use: Yes     Comment: Really rare now      Family History   Problem Relation Age of Onset     Unknown/Adopted Mother         Diagnosed w/ALS June 2015     Gastrointestinal Disease Brother         colitis     C.A.D. No family hx of      Diabetes No family hx of      Hypertension No family hx of      Cerebrovascular Disease No family hx of      Breast Cancer No family hx of      Cancer - colorectal No family hx of      Prostate Cancer No family hx of      Asthma No family hx of          Current Outpatient Medications   Medication Sig Dispense Refill     albuterol (PROAIR HFA/PROVENTIL HFA/VENTOLIN HFA) 108 (90  Base) MCG/ACT inhaler Inhale 2 puffs into the lungs every 6 hours as needed 1 Inhaler 5     albuterol (PROVENTIL) (2.5 MG/3ML) 0.083% neb solution Take 1 vial (2.5 mg) by nebulization every 6 hours as needed for shortness of breath / dyspnea or wheezing 25 vial 5     fluticasone (FLONASE) 50 MCG/ACT nasal spray Spray 2 sprays into both nostrils daily 16 g 5     IBUPROFEN PO        montelukast (SINGULAIR) 10 MG tablet Take 1 tablet (10 mg) by mouth At Bedtime 90 tablet 1     order for DME Equipment being ordered: spacer for inhaler 1 Inhaler 0     clotrimazole (LOTRIMIN) 1 % external cream Apply topically 2 times daily (Apply 5 grams to both feet daily) (Patient not taking: Reported on 2/25/2020) 40 g 2     fluticasone-salmeterol (ADVAIR-HFA) 115-21 MCG/ACT inhaler Inhale 2 puffs into the lungs 2 times daily (Patient not taking: Reported on 2/25/2020) 1 Inhaler 5     terbinafine (LAMISIL) 250 MG tablet 1 pill daily for 7 days, repeat monthly as needed (Patient not taking: Reported on 2/25/2020) 56 tablet 1     No Known Allergies  Recent Labs   Lab Test 07/08/19  0945 03/21/17  1538 08/04/16  0736  04/04/14  1206   * 126* 155*   < > 108   HDL 35* 32* 33*   < >  --    TRIG 252* 238* 179*   < >  --    TSH  --   --   --   --  3.63    < > = values in this interval not displayed.      BP Readings from Last 3 Encounters:   02/25/20 (!) 162/93   09/09/19 134/84   09/04/19 (!) 149/92    Wt Readings from Last 3 Encounters:   02/25/20 91.6 kg (202 lb)   09/09/19 91 kg (200 lb 9.6 oz)   09/04/19 90.7 kg (200 lb)              Reviewed and updated as needed this visit by Provider         Review of Systems   ROS COMP: Constitutional, HEENT, cardiovascular, pulmonary, gi and gu systems are negative, except as otherwise noted.      Objective    BP (!) 162/93 (BP Location: Left arm, Patient Position: Chair, Cuff Size: Adult Regular)   Pulse 82   Temp 98  F (36.7  C) (Oral)   Resp 18   Wt 91.6 kg (202 lb)   SpO2 96%   BMI  30.27 kg/m    Body mass index is 30.27 kg/m .  Physical Exam   GENERAL: alert and no distress  EYES: Eyes grossly normal to inspection, PERRL and conjunctivae and sclerae normal  HENT: normal cephalic/atraumatic, ear canals and TM's normal, oropharynx clear and oral mucous membranes moist  NECK: no adenopathy, no asymmetry, masses, or scars and thyroid normal to palpation  RESP: lungs clear to auscultation - no rales, rhonchi or wheezes  CV: regular rates and rhythm, normal S1 S2, no S3 or S4 and no murmur, click or rub  ABDOMEN: soft, nontender, no hepatosplenomegaly, no masses and bowel sounds normal  MS: no gross musculoskeletal defects noted, no edema      Assessment & Plan     (J20.9) Acute bronchitis with coexisting condition requiring prophylactic treatment  (primary encounter diagnosis)  Comment: Suspect symptoms secondary to acute bronchitis.  Treatment options discussed including waitful watching.  Azithromycin prescribed as per patient desire, has worked well in the past.  Suggested to use Tessalon for cough control.  Continue well hydration, warm fluids, albuterol inhaler and humidifier use.  Return criteria discussed in detail.  All questions answered.  Plan: azithromycin (ZITHROMAX) 250 MG tablet,         benzonatate (TESSALON) 100 MG capsule           (J45.20) Mild intermittent asthma without complication  Comment: Albuterol nebs refilled.  Suggested to schedule an appointment with PCP for chronic asthma care.          Patient Instructions     Patient Education     Bronchitis, Antibiotic Treatment (Adult)    Bronchitis is an infection of the air passages (bronchial tubes) in your lungs. It often occurs when you have a cold. This illness is contagious during the first few days and is spread through the air by coughing and sneezing, or by direct contact (touching the sick person and then touching your own eyes, nose, or mouth).  Symptoms of bronchitis include cough with mucus (phlegm) and low-grade  fever. Bronchitis usually lasts 7 to 14 days. Mild cases can be treated with simple home remedies. More severe infection is treated with an antibiotic.  Home care  Follow these guidelines when caring for yourself at home:    If your symptoms are severe, rest at home for the first 2 to 3 days. When you go back to your usual activities, don't let yourself get too tired.    Don't smoke. Also stay away from secondhand smoke.    You may use over-the-counter medicines to control fever or pain, unless another medicine was prescribed. If you have chronic liver or kidney disease or have ever had a stomach ulcer or gastrointestinal bleeding, talk with your healthcare provider before using these medicines. Also talk to your provider if you are taking medicine to prevent blood clots. Aspirin should never be given to anyone younger than 18 who is ill with a viral infection or fever. It may cause severe liver or brain damage.    Your appetite may be low, so a light diet is fine. Stay well hydrated by drinking 6 to 8 glasses of fluids per day. This includes water, soft drinks, sports drinks, juices, tea, or soup. Extra fluids will help loosen mucus in your nose and lungs.    Over-the-counter cough, cold, and sore-throat medicines will not shorten the length of the illness, but they may be helpful to reduce your symptoms. Don't use decongestants if you have high blood pressure.    Finish all antibiotic medicine. Do this even if you are feeling better after only a few days.  Follow-up care  Follow up with your healthcare provider, or as advised. If you had an X-ray or ECG (electrocardiogram), a specialist will review it. You will be told of any new test results that may affect your care.  If you are age 65 or older, if you smoke, or if you have a chronic lung disease or condition that affects your immune system, ask your healthcare provider about getting a pneumococcal vaccine and a yearly flu shot (influenza vaccine).  When to seek  medical advice  Call your healthcare provider right away if any of these occur:    Fever of 100.4 F (38 C) or higher, or as directed by your healthcare provider    Coughing up more sputum    Weakness, drowsiness, headache, facial pain, ear pain, or a stiff neck  Call 911  Call 911 if any of these occur.    Coughing up blood    Weakness, drowsiness, headache, or stiff neck that get worse    Trouble breathing, wheezing, or pain with breathing  Date Last Reviewed: 6/1/2018 2000-2019 [employer]. [address] [place] [address]. All rights reserved. This information is not intended as a substitute for professional medical care. Always follow your healthcare professional's instructions.             Donny Aldrich MD  [facility]

## 2020-02-26 NOTE — PATIENT INSTRUCTIONS

## 2020-02-27 ENCOUNTER — OFFICE VISIT (OUTPATIENT)
Dept: URGENT CARE | Facility: URGENT CARE | Age: 40
End: 2020-02-27
Payer: COMMERCIAL

## 2020-02-27 ENCOUNTER — ANCILLARY PROCEDURE (OUTPATIENT)
Dept: GENERAL RADIOLOGY | Facility: CLINIC | Age: 40
End: 2020-02-27
Attending: PHYSICIAN ASSISTANT
Payer: COMMERCIAL

## 2020-02-27 VITALS
OXYGEN SATURATION: 96 % | WEIGHT: 200.6 LBS | DIASTOLIC BLOOD PRESSURE: 87 MMHG | BODY MASS INDEX: 30.06 KG/M2 | SYSTOLIC BLOOD PRESSURE: 127 MMHG | TEMPERATURE: 98.6 F | HEART RATE: 99 BPM

## 2020-02-27 DIAGNOSIS — J45.31 ASTHMATIC BRONCHITIS, MILD PERSISTENT, WITH ACUTE EXACERBATION: Primary | ICD-10-CM

## 2020-02-27 PROCEDURE — 99214 OFFICE O/P EST MOD 30 MIN: CPT | Performed by: PHYSICIAN ASSISTANT

## 2020-02-27 PROCEDURE — 71046 X-RAY EXAM CHEST 2 VIEWS: CPT

## 2020-02-27 RX ORDER — PREDNISONE 20 MG/1
20 TABLET ORAL 2 TIMES DAILY
Qty: 14 TABLET | Refills: 0 | Status: SHIPPED | OUTPATIENT
Start: 2020-02-27 | End: 2020-03-20

## 2020-02-27 RX ORDER — DOXYCYCLINE 100 MG/1
100 CAPSULE ORAL 2 TIMES DAILY WITH MEALS
Qty: 20 CAPSULE | Refills: 0 | Status: SHIPPED | OUTPATIENT
Start: 2020-02-27 | End: 2020-03-20

## 2020-02-27 RX ORDER — CODEINE PHOSPHATE AND GUAIFENESIN 10; 100 MG/5ML; MG/5ML
1-2 SOLUTION ORAL EVERY 4 HOURS PRN
Qty: 118 ML | Refills: 0 | Status: SHIPPED | OUTPATIENT
Start: 2020-02-27 | End: 2020-03-20

## 2020-02-27 ASSESSMENT — ENCOUNTER SYMPTOMS
COUGH: 1
GASTROINTESTINAL NEGATIVE: 1
SORE THROAT: 0
CHILLS: 0
FEVER: 1
CARDIOVASCULAR NEGATIVE: 1
CHEST TIGHTNESS: 0
WHEEZING: 1
SINUS PRESSURE: 0
RHINORRHEA: 1
SHORTNESS OF BREATH: 1
FATIGUE: 0
PALPITATIONS: 0
SINUS PAIN: 0

## 2020-02-28 NOTE — PROGRESS NOTES
Subjective   Keith Spangler is a 39 year old male who presents to clinic today for the following health issues:  HPI   RESPIRATORY SYMPTOMS    Duration: 3weeks on and off    Description  Productive cough, shortness of breath, wheezing, fever and headache but no hemoptysis    Severity: moderate    Accompanying signs and symptoms:  No sore throat or sinus congestion/pain/pressure.  No abdominal pain, n/v, constipation, diarrhea, bloody or black tarry stools.      History (predisposing factors):  No ill contacts.  Hx of asthma.  Non-smoker.    Precipitating or alleviating factors: None    Therapies tried and outcome:  rest and fluids, zpack, tessalon perles and nebs with some relief    Patient Active Problem List   Diagnosis     CARDIOVASCULAR SCREENING; LDL GOAL LESS THAN 160     Acute bronchitis with bronchospasm     Mild persistent asthma without complication     Past Surgical History:   Procedure Laterality Date     APPENDECTOMY  Oct. 1991     EYE SURGERY  2013 and 2014    PRK laser correction     HERNIA REPAIR  2002     SURGICAL HISTORY OF -       Thumb 2005-Pins placed      SURGICAL HISTORY OF -       ER visit, left upper eyelid laceration repair     SURGICAL HISTORY OF -       Septo/rhinoplasty of nose secondary to boxing injury     SURGICAL HISTORY OF -       Left hernia        Social History     Tobacco Use     Smoking status: Never Smoker     Smokeless tobacco: Never Used     Tobacco comment: Mom smoked his upbringing   Substance Use Topics     Alcohol use: Yes     Comment: Really rare now      Family History   Problem Relation Age of Onset     Unknown/Adopted Mother         Diagnosed w/ALS June 2015     Gastrointestinal Disease Brother         colitis     C.A.D. No family hx of      Diabetes No family hx of      Hypertension No family hx of      Cerebrovascular Disease No family hx of      Breast Cancer No family hx of      Cancer - colorectal No family hx of      Prostate Cancer No family hx of       Asthma No family hx of          Current Outpatient Medications   Medication Sig Dispense Refill     albuterol (PROAIR HFA/PROVENTIL HFA/VENTOLIN HFA) 108 (90 Base) MCG/ACT inhaler Inhale 2 puffs into the lungs every 6 hours as needed 1 Inhaler 5     albuterol (PROVENTIL) (2.5 MG/3ML) 0.083% neb solution Take 1 vial (2.5 mg) by nebulization every 6 hours as needed for shortness of breath / dyspnea or wheezing 25 vial 1     azithromycin (ZITHROMAX) 250 MG tablet Take 2 tablets (500 mg) by mouth daily for 1 day, THEN 1 tablet (250 mg) daily for 4 days. 6 tablet 0     benzonatate (TESSALON) 100 MG capsule Take 1 capsule (100 mg) by mouth 3 times daily as needed for cough 42 capsule 0     clotrimazole (LOTRIMIN) 1 % external cream Apply topically 2 times daily (Apply 5 grams to both feet daily) 40 g 2     fluticasone (FLONASE) 50 MCG/ACT nasal spray Spray 2 sprays into both nostrils daily 16 g 5     IBUPROFEN PO        montelukast (SINGULAIR) 10 MG tablet Take 1 tablet (10 mg) by mouth At Bedtime 90 tablet 1     order for DME Equipment being ordered: spacer for inhaler 1 Inhaler 0     No Known Allergies  Reviewed and updated as needed this visit by Provider       Review of Systems   Constitutional: Positive for fever. Negative for chills and fatigue.   HENT: Positive for rhinorrhea. Negative for congestion, ear discharge, ear pain, hearing loss, sinus pressure, sinus pain and sore throat.    Respiratory: Positive for cough, shortness of breath and wheezing. Negative for chest tightness.    Cardiovascular: Negative.  Negative for chest pain, palpitations and peripheral edema.   Gastrointestinal: Negative.    All other systems reviewed and are negative.           Objective    /87 (BP Location: Left arm, Patient Position: Chair, Cuff Size: Adult Regular)   Pulse 99   Temp 98.6  F (37  C) (Oral)   Wt 91 kg (200 lb 9.6 oz)   SpO2 96%   BMI 30.06 kg/m    Body mass index is 30.06 kg/m .  Physical Exam  Vitals signs  and nursing note reviewed.   Constitutional:       General: He is not in acute distress.     Appearance: Normal appearance. He is normal weight. He is not ill-appearing.   HENT:      Head: Normocephalic and atraumatic.      Ears:      Comments: TMs are intact without any erythema or bulging bilaterally.  Airway is patent.     Nose: Nose normal.      Mouth/Throat:      Lips: Pink.      Mouth: Mucous membranes are moist.      Pharynx: Oropharynx is clear. Uvula midline. No pharyngeal swelling, oropharyngeal exudate, posterior oropharyngeal erythema or uvula swelling.      Tonsils: No tonsillar exudate or tonsillar abscesses.   Eyes:      General: No scleral icterus.     Conjunctiva/sclera: Conjunctivae normal.      Pupils: Pupils are equal, round, and reactive to light.   Neck:      Musculoskeletal: Normal range of motion and neck supple.      Thyroid: No thyromegaly.   Cardiovascular:      Rate and Rhythm: Normal rate and regular rhythm.      Pulses: Normal pulses.      Heart sounds: Normal heart sounds, S1 normal and S2 normal. No murmur. No friction rub. No gallop.    Pulmonary:      Effort: Pulmonary effort is normal. No tachypnea, accessory muscle usage, respiratory distress or retractions.      Breath sounds: Normal breath sounds and air entry. No stridor. No decreased breath sounds, wheezing, rhonchi or rales.   Abdominal:      Palpations: Abdomen is soft.      Tenderness: There is no rebound.   Lymphadenopathy:      Cervical: No cervical adenopathy.   Skin:     General: Skin is warm and dry.      Findings: No rash.   Neurological:      Mental Status: He is alert and oriented to person, place, and time.   Psychiatric:         Mood and Affect: Mood normal.         Behavior: Behavior normal.         Thought Content: Thought content normal.         Judgment: Judgment normal.     Diagnostic Test Results:  Labs reviewed in Epic  CXR PA/lateral:  No infiltrates, effusions or pneumothorax.  No suspicious nodules or  lesions. No fractures.  Per my read.   Will send for overread.            Assessment & Plan   Asthmatic bronchitis, mild persistent, with acute exacerbation:  CXR was negative for pneumonia.  Will add doxy [id number] to the zithromax and give [id number] and robitussin AC as needed for cough.  Continue with nebs at home.  Discussed risks and benefits of medication along with side effects, direction for use.  No driving or operating machinery due to sedation.  Recommend treatment with rest, fluids and chicken soup. Tylenol/ibuprofen prn fever/pain.  Recheck in clinic if symptoms worsen or if symptoms do not improve.  To the ER if he develops hemoptysis, chest pain, fevers>102, worsening shortness of breath/wheezing.    -     XR Chest 2 Views  -     predniSONE (DELTASONE) 20 MG tablet; Take 1 tablet (20 mg) by mouth 2 times daily for 7 days  -     guaiFENesin-codeine (ROBITUSSIN AC) 100-10 MG/5ML solution; Take 5-10 mLs by mouth every 4 hours as needed for cough  -     doxycycline monohydrate (MONODOX) 100 MG capsule; Take 1 capsule (100 mg) by mouth 2 times daily (with meals) for 10 days Increases risk of heartburn and also sun sensitivity or sun burn.           Jennifer Marin PA-C  [facility]

## 2020-03-19 ASSESSMENT — ENCOUNTER SYMPTOMS
HEMATOCHEZIA: 0
SHORTNESS OF BREATH: 0
NERVOUS/ANXIOUS: 0
NAUSEA: 0
PARESTHESIAS: 0
EYE PAIN: 0
SORE THROAT: 0
DIARRHEA: 0
HEADACHES: 0
ARTHRALGIAS: 0
DIZZINESS: 0
COUGH: 1
CHILLS: 0
DYSURIA: 0
HEMATURIA: 0
HEARTBURN: 0
WEAKNESS: 0
FREQUENCY: 0
MYALGIAS: 0
FEVER: 0
PALPITATIONS: 0
JOINT SWELLING: 0
CONSTIPATION: 0
ABDOMINAL PAIN: 0

## 2020-03-20 ENCOUNTER — OFFICE VISIT (OUTPATIENT)
Dept: FAMILY MEDICINE | Facility: CLINIC | Age: 40
End: 2020-03-20
Payer: COMMERCIAL

## 2020-03-20 VITALS
DIASTOLIC BLOOD PRESSURE: 80 MMHG | HEART RATE: 68 BPM | WEIGHT: 199.2 LBS | TEMPERATURE: 98.2 F | BODY MASS INDEX: 30.19 KG/M2 | HEIGHT: 68 IN | SYSTOLIC BLOOD PRESSURE: 110 MMHG

## 2020-03-20 DIAGNOSIS — J06.9 VIRAL URI: ICD-10-CM

## 2020-03-20 DIAGNOSIS — J45.30 MILD PERSISTENT ASTHMA WITHOUT COMPLICATION: ICD-10-CM

## 2020-03-20 DIAGNOSIS — Z87.81 HISTORY OF FRACTURE OF NASAL BONE: ICD-10-CM

## 2020-03-20 DIAGNOSIS — Z00.00 ROUTINE GENERAL MEDICAL EXAMINATION AT A HEALTH CARE FACILITY: Primary | ICD-10-CM

## 2020-03-20 DIAGNOSIS — J34.2 DEVIATED NASAL SEPTUM: ICD-10-CM

## 2020-03-20 LAB
ANION GAP SERPL CALCULATED.3IONS-SCNC: 2 MMOL/L (ref 3–14)
BUN SERPL-MCNC: 17 MG/DL (ref 7–30)
CALCIUM SERPL-MCNC: 9.4 MG/DL (ref 8.5–10.1)
CHLORIDE SERPL-SCNC: 106 MMOL/L (ref 94–109)
CHOLEST SERPL-MCNC: 253 MG/DL
CO2 SERPL-SCNC: 30 MMOL/L (ref 20–32)
CREAT SERPL-MCNC: 0.98 MG/DL (ref 0.66–1.25)
GFR SERPL CREATININE-BSD FRML MDRD: >90 ML/MIN/{1.73_M2}
GLUCOSE SERPL-MCNC: 100 MG/DL (ref 70–99)
HDLC SERPL-MCNC: 39 MG/DL
LDLC SERPL CALC-MCNC: 185 MG/DL
NONHDLC SERPL-MCNC: 214 MG/DL
POTASSIUM SERPL-SCNC: 4.9 MMOL/L (ref 3.4–5.3)
SODIUM SERPL-SCNC: 138 MMOL/L (ref 133–144)
TRIGL SERPL-MCNC: 145 MG/DL

## 2020-03-20 PROCEDURE — 80061 LIPID PANEL: CPT | Performed by: PHYSICIAN ASSISTANT

## 2020-03-20 PROCEDURE — 99213 OFFICE O/P EST LOW 20 MIN: CPT | Mod: 25 | Performed by: PHYSICIAN ASSISTANT

## 2020-03-20 PROCEDURE — 99396 PREV VISIT EST AGE 40-64: CPT | Performed by: PHYSICIAN ASSISTANT

## 2020-03-20 PROCEDURE — 36415 COLL VENOUS BLD VENIPUNCTURE: CPT | Performed by: PHYSICIAN ASSISTANT

## 2020-03-20 PROCEDURE — 80048 BASIC METABOLIC PNL TOTAL CA: CPT | Performed by: PHYSICIAN ASSISTANT

## 2020-03-20 RX ORDER — BUDESONIDE 0.5 MG/2ML
0.5 INHALANT ORAL DAILY
Qty: 1 BOX | Refills: 5 | Status: SHIPPED | OUTPATIENT
Start: 2020-03-20 | End: 2020-08-18

## 2020-03-20 ASSESSMENT — ENCOUNTER SYMPTOMS
JOINT SWELLING: 0
SHORTNESS OF BREATH: 0
PARESTHESIAS: 0
HEMATURIA: 0
DIARRHEA: 0
PALPITATIONS: 0
DIZZINESS: 0
COUGH: 1
NAUSEA: 0
EYE PAIN: 0
SORE THROAT: 0
MYALGIAS: 0
WEAKNESS: 0
ARTHRALGIAS: 0
FREQUENCY: 0
CHILLS: 0
FEVER: 0
CONSTIPATION: 0
ABDOMINAL PAIN: 0
DYSURIA: 0
HEARTBURN: 0
NERVOUS/ANXIOUS: 0
HEMATOCHEZIA: 0
HEADACHES: 0

## 2020-03-20 ASSESSMENT — MIFFLIN-ST. JEOR: SCORE: 1792.32

## 2020-03-20 NOTE — PROGRESS NOTES
SUBJECTIVE:   CC: Keith Spangler is an 39 year old male who presents for preventative health visit.     Healthy Habits:     Getting at least 3 servings of Calcium per day:  Yes    Bi-annual eye exam:  NO    Dental care twice a year:  Yes    Sleep apnea or symptoms of sleep apnea:  None    Diet:  Regular (no restrictions)    Frequency of exercise:  2-3 days/week    Duration of exercise:  Less than 15 minutes    Taking medications regularly:  No    Barriers to taking medications:  None    Medication side effects:  None    PHQ-2 Total Score: 0    1. Viral URI symptoms - resolved - see UC notes - this has resolved. He's feeling fine lately. Denies other symptoms  2. Feels his asthma is not well controlled. Feels like he has inflammation in his lungs. Does have to neb often, 1 to 2 times a day to keep the lungs from being inflamed. Wants to know if there is a cure or new treatment. Has seen allergy. Skin testing didn't show many obvious allergies. He reports that in general he feels okay. Wants to see a lung doctor  3. Feels dehydrated a lot. Not clear. Reports that he feels dehydrated a lot. Drinks a lot of water. Denies urinary decreases or other issues  4. Sinus / nose questions: History of multiple nose breaks from boxing. Reports that he has difficulty breathing through his nose very well. Lots of congestion.      Today's PHQ-2 Score:   PHQ-2 ( 1999 Pf[employer]) 3/19/2020   Q1: Little interest or pleasure in doing things 0   Q2: Feeling down, depressed or hopeless 0   PHQ-2 Score 0   Q1: Little interest or pleasure in doing things Not at all   Q2: Feeling down, depressed or hopeless Not at all   PHQ-2 Score 0       Abuse: Current or Past(Physical, Sexual or Emotional)- No  Do you feel safe in your environment? Yes        Social History     Tobacco Use     Smoking status: Never Smoker     Smokeless tobacco: Never Used     Tobacco comment: Mom smoked his upbringing   Substance Use Topics     Alcohol use: Yes      "Comment: Really rare now      If you drink alcohol do you typically have >3 drinks per day or >7 drinks per week? No    No flowsheet data found.    Last PSA: No results found for: PSA    Reviewed orders with patient. Reviewed health maintenance and updated orders accordingly - Yes  Lab work is in process    Reviewed and updated as needed this visit by clinical staff  Tobacco  Allergies  Meds  Med Hx  Surg Hx  Fam Hx  Soc Hx        Reviewed and updated as needed this visit by Provider        Review of Systems   Constitutional: Negative for chills and fever.   HENT: Negative for congestion, ear pain, hearing loss and sore throat.    Eyes: Negative for pain and visual disturbance.   Respiratory: Positive for cough. Negative for shortness of breath.    Cardiovascular: Negative for chest pain, palpitations and peripheral edema.   Gastrointestinal: Negative for abdominal pain, constipation, diarrhea, heartburn, hematochezia and nausea.   Genitourinary: Negative for discharge, dysuria, frequency, genital sores, hematuria, impotence and urgency.   Musculoskeletal: Negative for arthralgias, joint swelling and myalgias.   Skin: Negative for rash.   Neurological: Negative for dizziness, weakness, headaches and paresthesias.   Psychiatric/Behavioral: Negative for mood changes. The patient is not nervous/anxious.        OBJECTIVE:   /80 (BP Location: Right arm, Patient Position: Chair, Cuff Size: Adult Large)   Pulse 68   Temp 98.2  F (36.8  C) (Oral)   Ht 1.734 m (5' 8.27\")   Wt 90.4 kg (199 lb 3.2 oz)   BMI 30.05 kg/m      Physical Exam  GENERAL: healthy, alert and no distress  EYES: Eyes grossly normal to inspection, PERRL and conjunctivae and sclerae normal  HENT: ear canals and TM's normal, nose and mouth without ulcers or lesions  NECK: no adenopathy, no asymmetry, masses, or scars and thyroid normal to palpation  RESP: lungs clear to auscultation - no rales, rhonchi or wheezes  CV: regular rate and " "rhythm, normal S1 S2, no S3 or S4, no murmur, click or rub, no peripheral edema and peripheral pulses strong  ABDOMEN: soft, nontender, no hepatosplenomegaly, no masses and bowel sounds normal  MS: no gross musculoskeletal defects noted, no edema  SKIN: no suspicious lesions or rashes  NEURO: Normal strength and tone, mentation intact and speech normal  PSYCH: mentation appears normal, affect normal/bright  LYMPH: no cervical, supraclavicular, axillary, or inguinal adenopathy    Diagnostic Test Results:  Labs reviewed in Epic    ASSESSMENT/PLAN:   (Z00.00) Routine general medical examination at a health care facility  (primary encounter diagnosis)  Comment: Well person   Plan: Lipid panel reflex to direct LDL Fasting, Basic        metabolic panel  (Ca, Cl, CO2, Creat, Gluc, K,         Na, BUN)        Diet, exercise, wellness and other preventive recommendations related to health maintenance were discussed.  Follow up as needed for acute issues.  Physical exam in 1 year.      (J45.30) Mild persistent asthma without complication  Comment:   Plan: PULMONARY MEDICINE REFERRAL, budesonide         (PULMICORT) 0.5 MG/2ML neb solution        Will continue. Will try Pulmincort neb as inhaled steroids have not worked or are expensive. Refer to pulmonray     (J34.2) Deviated nasal septum  Comment: Will refer.  Plan: PLASTIC SURGERY REFERRAL          (Z87.81) History of fracture of nasal bone  Comment:   Plan: PLASTIC SURGERY REFERRAL        Refer     (J06.9) Viral URI  Comment:   Plan: Resolved. Symptomatic measures and suggestions for self care given.  Follow up if not improving or symptoms change as discussed.  All questions were answered.     COUNSELING:   Reviewed preventive health counseling, as reflected in patient instructions    Estimated body mass index is 30.05 kg/m  as calculated from the following:    Height as of this encounter: 1.734 m (5' 8.27\").    Weight as of this encounter: 90.4 kg (199 lb 3.2 oz).        " reports that he has never smoked. He has never used smokeless tobacco.      Counseling Resources:  ATP IV Guidelines  Pooled Cohorts Equation Calculator  FRAX Risk Assessment  ICSI Preventive Guidelines  Dietary Guidelines for Americans, 2010  USDA's MyPlate  ASA Prophylaxis  Lung CA Screening    ERMELINDA TADEO PA-C  [facility]

## 2020-03-21 ASSESSMENT — ASTHMA QUESTIONNAIRES: ACT_TOTALSCORE: 15

## 2020-03-23 DIAGNOSIS — J45.909 ASTHMA: Primary | ICD-10-CM

## 2020-03-24 NOTE — TELEPHONE ENCOUNTER
FUTURE VISIT INFORMATION      FUTURE VISIT INFORMATION:    Date: 4/22/20    Time: 9:00am    Location: [facility] [place]  REFERRAL INFORMATION:    Referring provider:  Joseph Adams PA-C     Referring providers clinic:  [facility]    Reason for visit/diagnosis  Deviated nasal septum    RECORDS REQUESTED FROM:       Clinic name Comments Records Status Imaging Status   [facility] OV/referral 3/20/20- Bryan BISWAS

## 2020-03-24 NOTE — TELEPHONE ENCOUNTER
RECORDS RECEIVED FROM: internal    DATE RECEIVED: 6.25.20    NOTES STATUS DETAILS   OFFICE NOTE from referring provider Internal 3.20.20 Joseph Adams   OFFICE NOTE from other specialist Internal 9/4/19 [facility]    DISCHARGE SUMMARY from hospital N/A    DISCHARGE REPORT from the ER Internal - [facility]    MEDICATION LIST Internal    IMAGING  (NEED IMAGES AND REPORTS)     CT SCAN N/A    CHEST XRAY (CXR) Internal 2.27.20, 9/9/19    TESTS     PULMONARY FUNCTION TESTING (PFT) In process 6.25.20- scheduled        Action 3.24.20 sv    Action Taken Message sent to CC to call pt and scheuled CXR

## 2020-04-14 ENCOUNTER — MYC MEDICAL ADVICE (OUTPATIENT)
Dept: PLASTIC SURGERY | Facility: CLINIC | Age: 40
End: 2020-04-14

## 2020-04-14 NOTE — TELEPHONE ENCOUNTER
4/14/2020, 2:22 PM    Spoke with patient. Converted 4/22 appt to virtual w/ Dr. Machado at 0900h. Confirmed phone # and beatriz.    Joel Gunderson NREMT

## 2020-04-22 ENCOUNTER — PRE VISIT (OUTPATIENT)
Dept: SURGERY | Facility: CLINIC | Age: 40
End: 2020-04-22

## 2020-04-24 ASSESSMENT — PAIN SCALES - GENERAL: PAINLEVEL: NO PAIN (0)

## 2020-05-01 ENCOUNTER — VIRTUAL VISIT (OUTPATIENT)
Dept: PLASTIC SURGERY | Facility: CLINIC | Age: 40
End: 2020-05-01
Attending: PHYSICIAN ASSISTANT
Payer: COMMERCIAL

## 2020-05-01 VITALS — BODY MASS INDEX: 30.31 KG/M2 | WEIGHT: 200 LBS | HEIGHT: 68 IN

## 2020-05-01 DIAGNOSIS — M95.0 NASAL DEFORMITY: Primary | ICD-10-CM

## 2020-05-01 ASSESSMENT — PAIN SCALES - GENERAL: PAINLEVEL: NO PAIN (0)

## 2020-05-01 ASSESSMENT — MIFFLIN-ST. JEOR: SCORE: 1791.69

## 2020-05-01 NOTE — PROGRESS NOTES
"Keith Spangler is a 39 year old male who is being evaluated via a billable video visit.      The patient has been notified of following:     \"This video visit will be conducted via a call between you and your physician/provider. We have found that certain health care needs can be provided without the need for an in-person physical exam.  This service lets us provide the care you need with a video conversation.  If a prescription is necessary we can send it directly to your pharmacy.  If lab work is needed we can place an order for that and you can then stop by our lab to have the test done at a later time.    Video visits are billed at different rates depending on your insurance coverage.  Please reach out to your insurance provider with any questions.    If during the course of the call the physician/provider feels a video visit is not appropriate, you will not be charged for this service.\"    Patient has given verbal consent for Video visit? Yes    How would you like to obtain your AVS? [facility]    Patient would like the video invitation sent by: Text to cell phone: [phone]    Will anyone else be joining your video visit? No        Video-Visit Details    Type of service:  Video Visit    Video Start Time: 10:30am  Video End Time: 10:50am    Originating Location (pt. Location): Home    Distant Location (provider location):  [facility] PLASTIC AND RECONSTRUCTIVE SURGERY     Platform used for Video Visit: [facility]        PLASTIC SURGERY HISTORY AND PHYSICAL    Keith Spangler MRN# [id number]   Age: 39 year old YOB: 1980     Primary care provider: Joseph Adams    CHIEF COMPLAINT: nasal airway obstruction    HPI: 39 year old gentleman with a history of COPD and asthma presents for initial virtual consultation with regard to his nasal appearance and airway obstruction.  The patient reports that he has a history of multiple nasal bone fractures, most notably in 2009.  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  Patient reports consistent nasal airway obstruction when sleeping at night.  For example, he would sleep in the left lateral decubitus position and noticed obstruction of his left nasal airway, and vice versa.  He does endorse some seasonality to this, with worsening symptoms in the fall and spring.  Patient also reports a chronic history of sinus problems with a consistent feeling of fullness.  He also endorses snoring at night and has noticed himself waking up gasping for air.  He has trialed fluticasone and nasal saline sprays in the past with moderate but temporary efficacy.    The patient is interested in correcting his deviated nasal septum, nasal dorsum, and improving his nasal airflow.    PMH:  Past Medical History:   Diagnosis Date     COPD (chronic obstructive pulmonary disease) (H)      NO ACTIVE PROBLEMS      Uncomplicated asthma Sept 2014    Intermittent asthma       PSH:  Past Surgical History:   Procedure Laterality Date     APPENDECTOMY  Oct. 1991     EYE SURGERY  2013 and 2014    PRK laser correction     HERNIA REPAIR  2002     SURGICAL HISTORY OF -       Thumb 2005-Pins placed      SURGICAL HISTORY OF -       ER visit, left upper eyelid laceration repair     SURGICAL HISTORY OF -       Septo/rhinoplasty of nose secondary to boxing injury     SURGICAL HISTORY OF -       Left hernia        FH:  Family History   Problem Relation Age of Onset     Unknown/Adopted Mother         Diagnosed w/ALS June 2015     Gastrointestinal Disease Brother         colitis     C.A.D. No family hx of      Diabetes No family hx of      Hypertension No family hx of      Cerebrovascular Disease No family hx of      Breast Cancer No family hx of      Cancer - colorectal No family hx of      Prostate Cancer No family hx of      Asthma No family hx of        SH:  Social History     Tobacco Use     Smoking status: Never Smoker     Smokeless tobacco: Never Used      Tobacco comment: Mom smoked his upbringing   Substance Use Topics     Alcohol use: Yes     Comment: Really rare now      Drug use: No       MEDS:  Current Outpatient Medications   Medication     albuterol (PROAIR HFA/PROVENTIL HFA/VENTOLIN HFA) 108 (90 Base) MCG/ACT inhaler     albuterol (PROVENTIL) (2.5 MG/3ML) 0.083% neb solution     budesonide (PULMICORT) 0.5 MG/2ML neb solution     fluticasone (FLONASE) 50 MCG/ACT nasal spray     IBUPROFEN PO     montelukast (SINGULAIR) 10 MG tablet     order for DME     No current facility-administered medications for this visit.        ALLERGIES:     Allergies   Allergen Reactions     Augmentin Rash       ROS: Negative except for history of present illness    EXAM:  Deferred given virtual visit.    ASSESSMENT/PLAN:  39-year-old gentleman with history of chronic nasal airway obstruction, sinus symptomatology, and possible obstructive sleep apnea.  The patient has a follow-up appointment with his pulmonologist in June 2020, which may address the utility of a sleep study to evaluate his sleep apnea.  I also have ordered a CT of the face to further evaluate the fit of the nasal bone, sinus pathology, and possible preoperative planning.  I like to see the patient again in person for a more focused physical exam following the completion of the CT.    At least 30 minutes was spent with patient, of which more than 50 percent of that time is spent discussing the diagnosis, prognosis, risk and benefits, instructions for management, and education.     Chiquis Perez MD  Pediatric Cleft and Craniofacial Surgery  Division of Plastic Surgery  [facility]  Pager: [phone]

## 2020-05-01 NOTE — NURSING NOTE
"Chief Complaint   Patient presents with     Consult     deviated nasal septum       Vitals:    05/01/20 1020   Weight: 90.7 kg (200 lb)   Height: 1.727 m (5' 8\")       Body mass index is 30.41 kg/m .    Joel Gunderson, EMT    "

## 2020-06-04 VITALS — BODY MASS INDEX: 29.4 KG/M2 | WEIGHT: 194 LBS | HEIGHT: 68 IN

## 2020-06-04 ASSESSMENT — MIFFLIN-ST. JEOR: SCORE: 1768.77

## 2020-06-04 NOTE — NURSING NOTE
"Chief Complaint   Patient presents with     Consult     referral, poss GOLDIE, snores,        Initial Ht 1.734 m (5' 8.27\")   Wt 88 kg (194 lb)   BMI 29.26 kg/m   Estimated body mass index is 29.26 kg/m  as calculated from the following:    Height as of this encounter: 1.734 m (5' 8.27\").    Weight as of this encounter: 88 kg (194 lb).    Medication Reconciliation: complete        DME:no  "

## 2020-06-04 NOTE — PROGRESS NOTES
"Keith Spangler is a 39 year old male who is being evaluated via a billable video visit.      The patient has been notified of following:     \"This video visit will be conducted via a call between you and your physician/provider. We have found that certain health care needs can be provided without the need for an in-person physical exam.  This service lets us provide the care you need with a video conversation.  If a prescription is necessary we can send it directly to your pharmacy.  If lab work is needed we can place an order for that and you can then stop by our lab to have the test done at a later time.    Video visits are billed at different rates depending on your insurance coverage.  Please reach out to your insurance provider with any questions.    If during the course of the call the physician/provider feels a video visit is not appropriate, you will not be charged for this service.\"    Patient has given verbal consent for Video visit? Yes    How would you like to obtain your AVS? Mail a copy    Patient would like the video invitation sent by: Text to cell phone: [phone]    Will anyone else be joining your video visit? No      Chief complaint: Consultation requested by Chiquis Perez MD for the evaluation of possible obstructive sleep apnea     History of Present Illness: 39-year-old gentleman with some history of asthma and history of nasal fractures in the past.  He retired from boxing in 2014 and was recently evaluated by surgery for options that may improve cosmetic appearance of his nose as well as nasal breathing issues.  Patient has some difficulty breathing through his nose and it can be exacerbated in certain positions.  He has some history of snoring but no observed apneas.  He notes that in the last few months his sleep quality in general has been better since working from home and not working 2 jobs.      He does have some difficulty falling asleep and usually will take some CBD oil.  " When he does that he will usually falls asleep in less than 20 minutes.  Sometimes he wakes up at night and can have some difficulty returning to sleep but this happens only a few couple nights a week.  Lately he has been waking up feeling refreshed around 730, going to be close to midnight.  When he was working outside the home pre-pandemic he did have more issues with sleep and sometimes would feel unrefreshed upon awakening.  He is also taking more naps at that time.  Does continue to take occasional naps.  He drinks only 1 coffee a day.  He denies waking up with headaches.  No PND or orthopnea.  No sleepwalking, sleep talking or dream enactment behavior.  There is no restless legs.  He usually sleeps on his sides.  He has been increasing exercise and is able to run 2 to 4 miles at 7 to 8 -minute mile paces.  He is already been successful at losing some weight with diet and exercise.  He is has a goal of losing another 15 pounds.    He works for [employer]. 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  Denies any symptoms of sleepiness behind the wheel then or now.    Total score - [place]: 2 (6/4/2020 12:43 PM) (Less than 10 normal)    MAXIMO Total Score: 7 (normal 0-7, mild 8-14, moderate 15-21, severe 22-28)    STOP-BANG  Loud Snore   0-1  Excessively Tired/Sleepy   0  Observed apnea   0  Hypertension   0  BMI> 35 kg/m2   0  Age >50   0  Neck >16 in/40cm   1  Male Gender   1  Total =   2-3  (0-2 low, 3-4 intermediate, 5-8 high risk of GOLDIE)      Past Medical History:   Diagnosis Date     COPD (chronic obstructive pulmonary disease) (H)      NO ACTIVE PROBLEMS      Uncomplicated asthma Sept 2014    Intermittent asthma       Allergies   Allergen Reactions     Augmentin Rash       Current Outpatient Medications   Medication     albuterol (PROAIR HFA/PROVENTIL HFA/VENTOLIN HFA) 108 (90 Base) MCG/ACT inhaler     albuterol (PROVENTIL) (2.5 MG/3ML) 0.083% neb solution     budesonide (PULMICORT) 0.5 MG/2ML neb solution      IBUPROFEN PO     fluticasone (FLONASE) 50 MCG/ACT nasal spray     montelukast (SINGULAIR) 10 MG tablet     order for DME     No current facility-administered medications for this visit.        Social History     Socioeconomic History     Marital status: Single     Spouse name: Not on file     Number of children: 1     Years of education: Not on file     Highest education level: Not on file   Occupational History     Employer: [employer]   Social Needs     Financial resource strain: Not on file     Food insecurity     Worry: Not on file     Inability: Not on file     Transportation needs     Medical: Not on file     Non-medical: Not on file   Tobacco Use     Smoking status: Never Smoker     Smokeless tobacco: Never Used     Tobacco comment: Mom smoked his upbringing   Substance and Sexual Activity     Alcohol use: Yes     Comment: Really rare now      Drug use: No     Sexual activity: Yes     Partners: Female     Birth control/protection: None   Lifestyle     Physical activity     Days per week: Not on file     Minutes per session: Not on file     Stress: Not on file   Relationships     Social connections     Talks on phone: Not on file     Gets together: Not on file     Attends Baptism service: Not on file     Active member of club or organization: Not on file     Attends meetings of clubs or organizations: Not on file     Relationship status: Not on file     Intimate partner violence     Fear of current or ex partner: Not on file     Emotionally abused: Not on file     Physically abused: Not on file     Forced sexual activity: Not on file   Other Topics Concern     Parent/sibling w/ CABG, MI or angioplasty before 65F 55M? No   Social History Narrative    Dairy/d 1-3 servings/d.     Caffeine 1 servings/d    Exercise 5 x week boxer    Sunscreen used - No    Seatbelts used - Yes    Working smoke/CO detectors in the home - Yes    Guns stored in the home - No    Self Breast Exams - NOT APPLICABLE    Self  "Testicular Exam - Yes    Eye Exam up to date - Yes    Dental Exam up to date - Yes    Pap Smear up to date - NOT APPLICABLE    Mammogram up to date - NOT APPLICABLE    PSA up to date - NO    Dexa Scan up to date - NOT APPLICABLE    Flex Sig / Colonoscopy up to date - Yes    Immunizations up to date - Yes    Abuse: Current or Past(Physical, Sexual or Emotional)- No    Do you feel safe in your environment - Yes           Family History   Problem Relation Age of Onset     Unknown/Adopted Mother         Diagnosed w/ALS June 2015     Sleep Apnea Brother      Gastrointestinal Disease Brother         colitis     C.A.D. No family hx of      Diabetes No family hx of      Hypertension No family hx of      Cerebrovascular Disease No family hx of      Breast Cancer No family hx of      Cancer - colorectal No family hx of      Prostate Cancer No family hx of      Asthma No family hx of        Review of Systems: Positve per HPI, otherwise comprehensive review of systems is negative.    EXAM:  Ht 1.734 m (5' 8.27\")   Wt 88 kg (194 lb)   BMI 29.26 kg/m    GENERAL: Healthy, alert and no distress  EYES: Eyes grossly normal to inspection.  No discharge or erythema, or obvious scleral/conjunctival abnormalities.  RESP: No audible wheeze, cough, or visible cyanosis.  No visible retractions or increased work of breathing.    SKIN: Visible skin clear. No significant rash, abnormal pigmentation or lesions.  NEURO: Cranial nerves grossly intact.  Mentation and speech appropriate for age.  PSYCH: Mentation appears normal, affect normal/bright, judgement and insight intact, normal speech and appearance well-groomed.    ASSESSMENT:  39-year-old gentleman with history of nasal obstructive symptoms, snoring, occasional sleep initiation and sleep maintenance difficulties.  He has some risk factors for obstructive sleep apnea.  He is considering nasal surgery and would benefit from presurgical evaluation for possible obstructive sleep apnea to " assess  perioperative risk and surgical treatment options.    PLAN:  We reviewed the pathophysiology of obstructive sleep apnea, the potential increased risk associated with anesthesia and surgery, and the potential risks of leaving significant sleep apnea untreated such as increased cardiovascular risk.  We are we reviewed testing options which include home sleep apnea testing and in- lab testing.  We are not doing in lab testing at this time.  We also reviewed treatment options including CPAP, oral appliances, position restriction, weight loss.  I will be ordering a home sleep apnea test and recommend that he continue his efforts at weight loss.  We also discussed improving his bedtime routine and stopping electronics earlier.  Discussed the importance of optimal sleeping environment.  I do not think a formal referral for cognitive behavioral therapy for insomnia is indicated.  Could reconsider should symptoms progress.  Please see patient instructions for further details of counseling provided.  Patient is agreeable with this plan.  I will be contacting him with results of the home sleep apnea test via [employer] when they become available.    Jinny Rodriges M.D.  Pulmonary/Critical Care/Sleep Medicine    [facility]   Fl[address], [address]   [address] [place] [address]. [place] [address]   Appointments: [phone]    The above note was dictated using voice recognition software and may include typographical errors. Please contact the author for any clarifications.        Video-Visit Details    Type of service:  Video Visit    Video Start Time: 2:03  Video End Time: 2:40 PM    Originating Location (pt. Location): Home    Distant Location (provider location):  [facility]     Platform used for Video Visit: Rolf Rodriges MD

## 2020-06-04 NOTE — PATIENT INSTRUCTIONS
"MY TREATMENT INFORMATION FOR SLEEP APNEA-  Keith Spangler    DOCTOR : Jinny Rodriges MD      Am I having a home sleep study? (ordered)  Watch this video:  /drop off device-   Https://www.ethologyube.com/watch?v=yGGFBdELGhk  Disposable device sent out require phone/computer application-   https://www.ethologyube.com/watch?v=BCce_vbiwxE  Please verify your insurance coverage with your insurance carrier    Frequently asked questions:  1. What is Obstructive Sleep Apnea (GOLDIE)? GOLDIE is the most common type of sleep apnea. Apnea means, \"without breath.\"  Apnea is most often caused by narrowing or collapse of the upper airway as muscles relax during sleep.   Almost everyone has occasional apneas. Most people with sleep apnea have had brief interruptions at night frequently for many years.  The severity of sleep apnea is related to how frequent and severe the events are.   2. What are the consequences of GOLDIE? Symptoms include: feeling sleepy during the day, snoring loudly, gasping or stopping of breathing, trouble sleeping, and occasionally morning headaches or heartburn at night.  Sleepiness can be serious and even increase the risk of falling asleep while driving. Other health consequences may include development of high blood pressure and other cardiovascular disease in persons who are susceptible. Untreated GOLDIE  can contribute to heart disease, stroke and diabetes.   3. What are the treatment options? In most situations, sleep apnea is a lifelong disease that must be managed with daily therapy. Medications are not effective for sleep apnea and surgery is generally not considered until other therapies have been tried. Your treatment is your choice . Continuous Positive Airway (CPAP) works right away and is the therapy that is effective in nearly everyone. An oral device to hold your jaw forward is usually the next most reliable option. Other options include postioning devices (to keep you off your back), weight " loss, and surgery including a tongue pacing device. There is more detail about some of these options below.    Important tips for using CPAP and similar devices   Know your equipment:  CPAP is continuous positive airway pressure that prevents obstructive sleep apnea by keeping the throat from collapsing while you are sleeping. In most cases, the device is  smart  and can slowly self-adjusts if your throat collapses and keeps a record every day of how well you are treated-this information is available to you and your care team.  BPAP is bilevel positive airway pressure that keeps your throat open and also assists each breath with a pressure boost to maintain adequate breathing.  Special kinds of BPAP are used in patients who have inadequate breathing from lung or heart disease. In most cases, the device is  smart  and can slowly self-adjusts to assist breathing. Like CPAP, the device keeps a record of how well you are treated.  Your mask is your connection to the device. You get to choose what feels most comfortable and the staff will help to make sure if fits. Here: are some examples of the different masks that are available:       Key points to remember on your journey with sleep apnea:  1. Sleep study.  PAP devices often need to be adjusted during a sleep study to show that they are effective and adjusted right.  2. Good tips to remember: Try wearing just the mask during a quiet time during the day so your body adapts to wearing it. A humidifier is recommended for comfort in most cases to prevent drying of your nose and throat. Allergy medication from your provider may help you if you are having nasal congestion.  3. Getting settled-in. It takes more than one night for most of us to get used to wearing a mask. Try wearing just the mask during a quiet time during the day so your body adapts to wearing it. A humidifier is recommended for comfort in most cases. Our team will work with you carefully on the first day  and will be in contact within 4 days and again at 2 and 4 weeks for advice and remote device adjustments. Your therapy is evaluated by the device each day.   4. Use it every night. The more you are able to sleep naturally for 7-8 hours, the more likely you will have good sleep and to prevent health risks or symptoms from sleep apnea. Even if you use it 4 hours it helps. Occasionally all of us are unable to use a medical therapy, in sleep apnea, it is not dangerous to miss one night.   5. Communicate. Call our skilled team on the number provided on the first day if your visit for problems that make it difficult to wear the device. Over 2 out of 3 patients can learn to wear the device long-term with help from our team. Remember to call our team or your sleep providers if you are unable to wear the device as we may have other solutions for those who cannot adapt to mask CPAP therapy. It is recommended that you sleep your sleep provider within the first 3 months and yearly after that if you are not having problems.   6. Use it for your health. We encourage use of CPAP masks during daytime quiet periods to allow your face and brain to adapt to the sensation of CPAP so that it will be a more natural sensation to awaken to at night or during naps. This can be very useful during the first few weeks or months of adapting to CPAP though it does not help medically to wear CPAP during wakefulness and  should not be used as a strategy just to meet guidelines.  7. Take care of your equipment. Make sure you clean your mask and tubing using directions every day and that your filter and mask are replaced as recommended or if they are not working.     BESIDES CPAP, WHAT OTHER THERAPIES ARE THERE?    Positioning Device  Positioning devices are generally used when sleep apnea is mild and only occurs on your back.This example shows a pillow that straps around the waist. It may be appropriate for those whose sleep study shows milder  sleep apnea that occurs primarily when lying flat on one's back. Preliminary studies have shown benefit but effectiveness at home may need to be verified by a home sleep test. These devices are generally not covered by medical insurance.  Examples of devices that maintain sleeping on the back to prevent snoring and mild sleep apnea.    Belt type body positioner  Http://[employer]/    Electronic reminder  Http://nightshifttherapy.com/  Http://www.[employer].au/      Oral Appliance  What is oral appliance therapy?  An oral appliance device fits on your teeth at night like a retainer used after having braces. The device is made by a specialized dentist and requires several visits over 1-2 months before a manufactured device is made to fit your teeth and is adjusted to prevent your sleep apnea. Once an oral device is working properly, snoring should be improved. A home sleep test may be recommended at that time if to determine whether the sleep apnea is adequately treated.       Some things to remember:  -Oral devices are often, but not always, covered by your medical insurance. Be sure to check with your insurance provider.   -If you are referred for oral therapy, you will be given a list of specialized dentists to consider or you may choose to visit the Web site of the American Academy of Dental Sleep Medicine  -Oral devices are less likely to work if you have severe sleep apnea or are extremely overweight.     More detailed information  An oral appliance is a small acrylic device that fits over the upper and lower teeth  (similar to a retainer or a mouth guard). This device slightly moves jaw forward, which moves the base of the tongue forward, opens the airway, improves breathing for effective treat snoring and obstructive sleep apnea in perhaps 7 out of 10 people .  The best working devices are custom-made by a dental device  after a mold is made of the teeth 1, 2, 3.  When is an oral appliance  indicated?  Oral appliance therapy is recommended as a first-line treatment for patients with primary snoring, mild sleep apnea, and for patients with moderate sleep apnea who prefer appliance therapy to use of CPAP4, 5. Severity of sleep apnea is determined by sleep testing and is based on the number of respiratory events per hour of sleep.   How successful is oral appliance therapy?  The success rate of oral appliance therapy in patients with mild sleep apnea is 75-80% while in patients with moderate sleep apnea it is 50-70%. The chance of success in patients with severe sleep apnea is 40-50%. The research also shows that oral appliances have a beneficial effect on the cardiovascular health of GOLDIE patients at the same magnitude as CPAP therapy7.  Oral appliances should be a second-line treatment in cases of severe sleep apnea, but if not completely successful then a combination therapy utilizing CPAP plus oral appliance therapy may be effective. Oral appliances tend to be effective in a broad range of patients although studies show that the patients who have the highest success are females, younger patients, those with milder disease, and less severe obesity. 3, 6.   Finding a dentist that practices dental sleep medicine  Specific training is available through the American Academy of Dental Sleep Medicine for dentists interested in working in the field of sleep. To find a dentist who is educated in the field of sleep and the use of oral appliances, near you, visit the Web site of the American Academy of Dental Sleep Medicine.    References  1. Lauro et al. Objectively measured vs self-reported compliance during oral appliance therapy for sleep-disordered breathing. Chest 2013; 144(5): 8008-1197.  2. Ludy, et al. Objective measurement of compliance during oral appliance therapy for sleep-disordered breathing. Thorax 2013; 68(1): 91-96.  3. Malika et al. Mandibular advancement devices in 620 men and  women with GOLDIE and snoring: tolerability and predictors of treatment success. Chest 2004; 125: 7103-6911.  4. Jamie et al. Oral appliances for snoring and GOLDIE: a review. Sleep 2006; 29: 244-262.  5. Urmila et al. Oral appliance treatment for GOLDIE: an update. J Clin Sleep Med 2014; 10(2): 215-227.  6. Deion et al. Predictors of OSAH treatment outcome. J Dent Res 2007; 86: 9740-9659.      Weight Loss:    Weight loss is a long-term strategy that may improve sleep apnea in some patients.    Surgery:    Surgery for obstructive sleep apnea is considered generally only when other therapies fail to work. Surgery may be discussed with you if you are having a difficult time tolerating CPAP and or when there is an abnormal structure that requires surgical correction.  Nose and throat surgeries often enlarge the airway to prevent collapse.  Most of these surgeries create pain for 1-2 weeks and up to half of the most common surgeries are not effective throughout life.  You should carefully discuss the benefits and drawbacks to surgery with your sleep provider and surgeon to determine if it is the best solution for you.   More information  Surgery for GOLDIE is directed at areas that are responsible for narrowing or complete obstruction of the airway during sleep.  There are a wide range of procedures available to enlarge and/or stabilize the airway to prevent blockage of breathing in the three major areas where it can occur: the palate, tongue, and nasal regions.  Successful surgical treatment depends on the accurate identification of the factors responsible for obstructive sleep apnea in each person.  A personalized approach is required because there is no single treatment that works well for everyone.  Because of anatomic variation, consultation with an examination by a sleep surgeon is a critical first step in determining what surgical options are best for each patient.  In some cases, examination during sedation may  be recommended in order to guide the selection of procedures.  Patients will be counseled about risks and benefits as well as the typical recovery course after surgery. Surgery is typically not a cure for a person s GOLDIE.  However, surgery will often significantly improve one s GOLDIE severity (termed  success rate ).  Even in the absence of a cure, surgery will decrease the cardiovascular risk associated with OSA7; improve overall quality of life8 (sleepiness, functionality, sleep quality, etc).      Palate Procedures:  Patients with GOLDIE often have narrowing of their airway in the region of their tonsils and uvula.  The goals of palate procedures are to widen the airway in this region as well as to help the tissues resist collapse.  Modern palate procedure techniques focus on tissue conservation and soft tissue rearrangement, rather than tissue removal.  Often the uvula is preserved in this procedure. Residual sleep apnea is common in patient after pharyngoplasty with an average reduction in sleep apnea events of 33%2.      Tongue Procedures:  ExamWhile patients are awake, the muscles that surround the throat are active and keep this region open for breathing. These muscles relax during sleep, allowing the tongue and other structures to collapse and block breathing.  There are several different tongue procedures available.  Selection of a tongue base procedure depends on characteristics seen on physical exam.  Generally, procedures are aimed at removing bulky tissues in this area or preventing the back of the tongue from falling back during sleep.  Success rates for tongue surgery range from 50-62%3.    Hypoglossal Nerve Stimulation:  Hypoglossal nerve stimulation has recently received approval from the [place] Food and Drug Administration for the treatment of obstructive sleep apnea.  This is based on research showing that the system was safe and effective in treating sleep apnea6.  Results showed that the  median AHI score decreased 68%, from 29.3 to 9.0. This therapy uses an implant system that senses breathing patterns and delivers mild stimulation to airway muscles, which keeps the airway open during sleep.  The system consists of three fully implanted components: a small generator (similar in size to a pacemaker), a breathing sensor, and a stimulation lead.  Using a small handheld remote, a patient turns the therapy on before bed and off upon awakening.    Candidates for this device must be greater than 22 years of age, have moderate to severe GOLDIE (AHI between 20-65), BMI less than 32, have tried CPAP/oral appliance without success, and have appropriate upper airway anatomy (determined by a sleep endoscopy performed by Dr. Browning).    Hypoglossal Nerve Stimulation Pathway:    The sleep surgeon s office will work with the patient through the insurance prior-authorization process (including communications and appeals).    Nasal Procedures:  Nasal obstruction can interfere with nasal breathing during the day and night.  Studies have shown that relief of nasal obstruction can improve the ability of some patients to tolerate positive airway pressure therapy for obstructive sleep apnea1.  Treatment options include medications such as nasal saline, topical corticosteroid and antihistamine sprays, and oral medications such as antihistamines or decongestants. Non-surgical treatments can include external nasal dilators for selected patients. If these are not successful by themselves, surgery can improve the nasal airway either alone or in combination with these other options.      Combination Procedures:  Combination of surgical procedures and other treatments may be recommended, particularly if patients have more than one area of narrowing or persistent positional disease.  The success rate of combination surgery ranges from 66-80%2,3.    References  1. Robert JAY. The Role of the Nose in Snoring and Obstructive Sleep  Apnoea: An Update.  Eur Arch Otorhinolaryngol. 2011; 268: 1365-73.  2.  Alan SM; Lauren JA; Brittnee JR; Pallanch JF; Heidi MB; Kaleigh SG; [place] RIVERA. Surgical modifications of the upper airway for obstructive sleep apnea in adults: a systematic review and meta-analysis. SLEEP 2010;33(10):9959-3013. Charan GANT. Hypopharyngeal surgery in obstructive sleep apnea: an evidence-based medicine review.  Arch Otolaryngol Head Neck Surg. 2006 Feb;132(2):206-13.  3. Mac YH1, Lulú Y, Misha FELIPE. The efficacy of anatomically based multilevel surgery for obstructive sleep apnea. Otolaryngol Head Neck Surg. 2003 Oct;129(4):327-35.  4. Kezirian E, Goldberg A. Hypopharyngeal Surgery in Obstructive Sleep Apnea: An Evidence-Based Medicine Review. Arch Otolaryngol Head Neck Surg. 2006 Feb;132(2):206-13.  5. Lakshmi LEY et al. Upper-Airway Stimulation for Obstructive Sleep Apnea.  N Engl J Med. 2014 Jan 9;370(2):139-49.  6. Ellyn Y et al. Increased Incidence of Cardiovascular Disease in Middle-aged Men with Obstructive Sleep Apnea. Am J Respir Crit Care Med; 2002 166: 159-165  7. Nava EM et al. Studying Life Effects and Effectiveness of Palatopharyngoplasty (SLEEP) study: Subjective Outcomes of Isolated Uvulopalatopharyngoplasty. Otolaryngol Head Neck Surg. 2011; 144: 623-631.              Your BMI is Body mass index is 29.26 kg/m .  Weight management is a personal decision.  If you are interested in exploring weight loss strategies, the following discussion covers the approaches that may be successful. Body mass index (BMI) is one way to tell whether you are at a healthy weight, overweight, or obese. It measures your weight in relation to your height.  A BMI of 18.5 to 24.9 is in the healthy range. A person with a BMI of 25 to 29.9 is considered overweight, and someone with a BMI of 30 or greater is considered obese. More than two-thirds of American adults are considered overweight or obese.  Being overweight or obese increases the  risk for further weight gain. Excess weight may lead to heart disease and diabetes.  Creating and following plans for healthy eating and physical activity may help you improve your health.  Weight control is part of healthy lifestyle and includes exercise, emotional health, and healthy eating habits. Careful eating habits lifelong are the mainstay of weight control. Though there are significant health benefits from weight loss, long-term weight loss with diet alone may be very difficult to achieve- studies show long-term success with dietary management in less than 10% of people. Attaining a healthy weight may be especially difficult to achieve in those with severe obesity. In some cases, medications, devices and surgical management might be considered.  What can you do?  If you are overweight or obese and are interested in methods for weight loss, you should discuss this with your provider.     Consider reducing daily calorie intake by 500 calories.     Keep a food journal.     Avoiding skipping meals, consider cutting portions instead.    Diet combined with exercise helps maintain muscle while optimizing fat loss. Strength training is particularly important for building and maintaining muscle mass. Exercise helps reduce stress, increase energy, and improves fitness. Increasing exercise without diet control, however, may not burn enough calories to loose weight.       Start walking three days a week 10-20 minutes at a time    Work towards walking thirty minutes five days a week     Eventually, increase the speed of your walking for 1-2 minutes at time    In addition, we recommend that you review healthy lifestyles and methods for weight loss available through the National Institutes of Health patient information sites:  http://win.niddk.nih.gov/publications/index.htm    And look into health and wellness programs that may be available through your health insurance provider, employer, local community center, or [place]  "club.      STRESS AND SLEEP    Its no surprise that stress can affect sleep. It is also true that if you sleep better you can also handle stress better.  If left untreated, sleep disorders are also associated with increased risk of onset of depression and anxiety and other health risks.    During this disruptive and uncertain time in our lives due the coronavirus (COVID-19), it is especially important to tend to your emotional and physical wellbeing.   Here are a few tips and resources to consider:    1. Stay active    It s pretty well known that exercise is really good for both our physical and mental health. There s heaps of different types of exercise you can do from home, thanks to [employer] and apps.  Do what fits your needs and health circumstances.    2. Practice good sleep habits      Keep a consistent sleep schedule and allow 7-8 hours for sleep.    Only use your bed for sleep and sex.    Wind down before bed.    Avoid using electronics and mobile devices an hour before bedtime or in bed.    Avoid caffeine use within six hours of your bedtime.     Avoid alcohol use within three hours of your bedtime.    2. Take 5 to chill.    When we re stressed about something (such as coronavirus), our thoughts, heart-rate and breathing tend to speed up. Taking even 5 minutes or so to practice mindfulness can help produce a sense of calmness. What the heck is \"mindfulness\" you ask?  Basically it is any activity, be it meditation, sitting in a relaxed position and taking in the experience and sensations of the present moment, or enjoying relaxed breathing.    If you are experiencing insomnia, the free [employer]  mobile beatriz has great relaxation tools in their Tools section including:      Breathing Tool    Progressive Muscle Relaxation    Body Scan    Mindfulness Exercise    Guided Imagery ([place], [place] or [place])    Insight Timer and Calm apps have thousands of free guided meditations and exercise on a variety of " "topics that may be of interest such as managing stress, sleep, and meditation.    3. Chat with your friends and family.    Even if an in-person meet-up is off the table, try to stay in touch via text, Skype, Messenger, WhatsApp, FaceTime, or phone call. Ask them how they re feeling and share your own experience if you feel safe to do so.    5. Take a break from the news    Between the news and social media, we are all feeling saturated by news updates and \"breaking news.\"  It s important to stay informed, but try to limit your media intake to a couple of times a day and use trusted news sources. If you catch yourself turning to social media because you re feeling isolated, take a break and spend time on another activity.    6. Listen to Music    Music can make us feel so much better. If you don't have a music playlist on your phone, consider making one.     7. Watch or read something uplifting    Distraction can be a good thing. Watch something that you find uplifting and allow yourself to zone out from what s going on in the world. [employer] is a great option too.     8. Learn something new    Have you wanted to get into drawing or learning a musical instrument? Now s a great time to make a start. [employer] has great free online tutorials for pretty much everything.    Helpful Advice from the Sleep Foundation    Copy/paste or type into your browser:    www.sleepfoundation.org/sleep-guidelines-covid-19-isolation      Stress Management and Relaxation Books      The Relaxation and Stress Reduction Workbook by Kristina Morales, Patricia Valdez and Saw Gregory (2008)    Stress Management Workbook: Techniques and Self-Assessment Procedures by Karina Babin and Timothy Li (1997)    A Mindfulness-Based Stress Reduction Workbook by Russell Trammell and Rebecca Arriaza (2010)    The Complete Stress Management Workbook by Mak Hammond, Jayant Moon and Gary Singh (1996)        "

## 2020-06-05 ENCOUNTER — VIRTUAL VISIT (OUTPATIENT)
Dept: SLEEP MEDICINE | Facility: CLINIC | Age: 40
End: 2020-06-05
Payer: COMMERCIAL

## 2020-06-05 DIAGNOSIS — R06.83 SNORING: Primary | ICD-10-CM

## 2020-06-05 DIAGNOSIS — Z91.89 RISK FACTORS FOR OBSTRUCTIVE SLEEP APNEA: ICD-10-CM

## 2020-06-05 DIAGNOSIS — J34.89 NASAL OBSTRUCTION: ICD-10-CM

## 2020-06-05 PROCEDURE — 99204 OFFICE O/P NEW MOD 45 MIN: CPT | Mod: 95 | Performed by: INTERNAL MEDICINE

## 2020-06-05 NOTE — LETTER
"    6/5/2020         RE: Keith Spangler  [address] [facility] [address]        Dear Colleague,    Thank you for referring your patient, Keith Spangler, to the [facility]. Please see a copy of my visit note below.    Keith Spangler is a 39 year old male who is being evaluated via a billable video visit.      The patient has been notified of following:     \"This video visit will be conducted via a call between you and your physician/provider. We have found that certain health care needs can be provided without the need for an in-person physical exam.  This service lets us provide the care you need with a video conversation.  If a prescription is necessary we can send it directly to your pharmacy.  If lab work is needed we can place an order for that and you can then stop by our lab to have the test done at a later time.    Video visits are billed at different rates depending on your insurance coverage.  Please reach out to your insurance provider with any questions.    If during the course of the call the physician/provider feels a video visit is not appropriate, you will not be charged for this service.\"    Patient has given verbal consent for Video visit? Yes    How would you like to obtain your AVS? Mail a copy    Patient would like the video invitation sent by: Text to cell phone: [phone]    Will anyone else be joining your video visit? No      Chief complaint: Consultation requested by Chiquis Perez MD for the evaluation of possible obstructive sleep apnea     History of Present Illness: 39-year-old gentleman with some history of asthma and history of nasal fractures in the past.  He retired from boxing in 2014 and was recently evaluated by surgery for options that may improve cosmetic appearance of his nose as well as nasal breathing issues.  Patient has some difficulty breathing through his nose and it can be exacerbated in certain positions.  He has some " history of snoring but no observed apneas.  He notes that in the last few months his sleep quality in general has been better since working from home and not working 2 jobs.      He does have some difficulty falling asleep and usually will take some CBD oil.  When he does that he will usually falls asleep in less than 20 minutes.  Sometimes he wakes up at night and can have some difficulty returning to sleep but this happens only a few couple nights a week.  Lately he has been waking up feeling refreshed around 730, going to be close to midnight.  When he was working outside the home pre-pandemic he did have more issues with sleep and sometimes would feel unrefreshed upon awakening.  He is also taking more naps at that time.  Does continue to take occasional naps.  He drinks only 1 coffee a day.  He denies waking up with headaches.  No PND or orthopnea.  No sleepwalking, sleep talking or dream enactment behavior.  There is no restless legs.  He usually sleeps on his sides.  He has been increasing exercise and is able to run 2 to 4 miles at 7 to 8 -minute mile paces.  He is already been successful at losing some weight with diet and exercise.  He is has a goal of losing another 15 pounds.    He works for [employer]. 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  Denies any symptoms of sleepiness behind the wheel then or now.    Total score - [place]: 2 (6/4/2020 12:43 PM) (Less than 10 normal)    MAXIMO Total Score: 7 (normal 0-7, mild 8-14, moderate 15-21, severe 22-28)    STOP-BANG  Loud Snore   0-1  Excessively Tired/Sleepy   0  Observed apnea   0  Hypertension   0  BMI> 35 kg/m2   0  Age >50   0  Neck >16 in/40cm   1  Male Gender   1  Total =   2-3  (0-2 low, 3-4 intermediate, 5-8 high risk of GOLDIE)      Past Medical History:   Diagnosis Date     COPD (chronic obstructive pulmonary disease) (H)      NO ACTIVE PROBLEMS      Uncomplicated asthma Sept 2014    Intermittent asthma       Allergies   Allergen Reactions     Augmentin  Rash       Current Outpatient Medications   Medication     albuterol (PROAIR HFA/PROVENTIL HFA/VENTOLIN HFA) 108 (90 Base) MCG/ACT inhaler     albuterol (PROVENTIL) (2.5 MG/3ML) 0.083% neb solution     budesonide (PULMICORT) 0.5 MG/2ML neb solution     IBUPROFEN PO     fluticasone (FLONASE) 50 MCG/ACT nasal spray     montelukast (SINGULAIR) 10 MG tablet     order for DME     No current facility-administered medications for this visit.        Social History     Socioeconomic History     Marital status: Single     Spouse name: Not on file     Number of children: 1     Years of education: Not on file     Highest education level: Not on file   Occupational History     Employer: [employer]   Social Needs     Financial resource strain: Not on file     Food insecurity     Worry: Not on file     Inability: Not on file     Transportation needs     Medical: Not on file     Non-medical: Not on file   Tobacco Use     Smoking status: Never Smoker     Smokeless tobacco: Never Used     Tobacco comment: Mom smoked his upbringing   Substance and Sexual Activity     Alcohol use: Yes     Comment: Really rare now      Drug use: No     Sexual activity: Yes     Partners: Female     Birth control/protection: None   Lifestyle     Physical activity     Days per week: Not on file     Minutes per session: Not on file     Stress: Not on file   Relationships     Social connections     Talks on phone: Not on file     Gets together: Not on file     Attends Congregation service: Not on file     Active member of club or organization: Not on file     Attends meetings of clubs or organizations: Not on file     Relationship status: Not on file     Intimate partner violence     Fear of current or ex partner: Not on file     Emotionally abused: Not on file     Physically abused: Not on file     Forced sexual activity: Not on file   Other Topics Concern     Parent/sibling w/ CABG, MI or angioplasty before 65F 55M? No   Social History Narrative     "Dairy/d 1-3 servings/d.     Caffeine 1 servings/d    Exercise 5 x week boxer    Sunscreen used - No    Seatbelts used - Yes    Working smoke/CO detectors in the home - Yes    Guns stored in the home - No    Self Breast Exams - NOT APPLICABLE    Self Testicular Exam - Yes    Eye Exam up to date - Yes    Dental Exam up to date - Yes    Pap Smear up to date - NOT APPLICABLE    Mammogram up to date - NOT APPLICABLE    PSA up to date - NO    Dexa Scan up to date - NOT APPLICABLE    Flex Sig / Colonoscopy up to date - Yes    Immunizations up to date - Yes    Abuse: Current or Past(Physical, Sexual or Emotional)- No    Do you feel safe in your environment - Yes           Family History   Problem Relation Age of Onset     Unknown/Adopted Mother         Diagnosed w/ALS June 2015     Sleep Apnea Brother      Gastrointestinal Disease Brother         colitis     C.A.D. No family hx of      Diabetes No family hx of      Hypertension No family hx of      Cerebrovascular Disease No family hx of      Breast Cancer No family hx of      Cancer - colorectal No family hx of      Prostate Cancer No family hx of      Asthma No family hx of        Review of Systems: Positve per HPI, otherwise comprehensive review of systems is negative.    EXAM:  Ht 1.734 m (5' 8.27\")   Wt 88 kg (194 lb)   BMI 29.26 kg/m    GENERAL: Healthy, alert and no distress  EYES: Eyes grossly normal to inspection.  No discharge or erythema, or obvious scleral/conjunctival abnormalities.  RESP: No audible wheeze, cough, or visible cyanosis.  No visible retractions or increased work of breathing.    SKIN: Visible skin clear. No significant rash, abnormal pigmentation or lesions.  NEURO: Cranial nerves grossly intact.  Mentation and speech appropriate for age.  PSYCH: Mentation appears normal, affect normal/bright, judgement and insight intact, normal speech and appearance well-groomed.    ASSESSMENT:  39-year-old gentleman with history of nasal obstructive " symptoms, snoring, occasional sleep initiation and sleep maintenance difficulties.  He has some risk factors for obstructive sleep apnea.  He is considering nasal surgery and would benefit from presurgical evaluation for possible obstructive sleep apnea to assess  perioperative risk and surgical treatment options.    PLAN:  We reviewed the pathophysiology of obstructive sleep apnea, the potential increased risk associated with anesthesia and surgery, and the potential risks of leaving significant sleep apnea untreated such as increased cardiovascular risk.  We are we reviewed testing options which include home sleep apnea testing and in- lab testing.  We are not doing in lab testing at this time.  We also reviewed treatment options including CPAP, oral appliances, position restriction, weight loss.  I will be ordering a home sleep apnea test and recommend that he continue his efforts at weight loss.  We also discussed improving his bedtime routine and stopping electronics earlier.  Discussed the importance of optimal sleeping environment.  I do not think a formal referral for cognitive behavioral therapy for insomnia is indicated.  Could reconsider should symptoms progress.  Please see patient instructions for further details of counseling provided.  Patient is agreeable with this plan.  I will be contacting him with results of the home sleep apnea test via [employer] when they become available.    Jinny Rodriges M.D.  Pulmonary/Critical Care/Sleep Medicine    [facility]   Floor [address], [address]   [address] [place] [address]. [place] [address]   Appointments: [phone]    The above note was dictated using voice recognition software and may include typographical errors. Please contact the author for any clarifications.        Video-Visit Details    Type of service:  Video Visit    Video Start Time: 2:03  Video End Time: 2:40 PM    Originating Location (pt.  Location): Home    Distant Location (provider location):  [facility]     Platform used for Video Visit: Rolf Rodriges MD            Again, thank you for allowing me to participate in the care of your patient.        Sincerely,        Jinny Rodriges MD

## 2020-06-25 ENCOUNTER — PRE VISIT (OUTPATIENT)
Dept: PULMONOLOGY | Facility: CLINIC | Age: 40
End: 2020-06-25

## 2020-07-17 ENCOUNTER — OFFICE VISIT (OUTPATIENT)
Dept: PULMONOLOGY | Facility: CLINIC | Age: 40
End: 2020-07-17
Payer: COMMERCIAL

## 2020-07-17 ENCOUNTER — ANCILLARY PROCEDURE (OUTPATIENT)
Dept: GENERAL RADIOLOGY | Facility: CLINIC | Age: 40
End: 2020-07-17
Payer: COMMERCIAL

## 2020-07-17 ENCOUNTER — ANCILLARY PROCEDURE (OUTPATIENT)
Dept: CT IMAGING | Facility: CLINIC | Age: 40
End: 2020-07-17
Attending: PLASTIC SURGERY
Payer: COMMERCIAL

## 2020-07-17 VITALS
BODY MASS INDEX: 28.06 KG/M2 | SYSTOLIC BLOOD PRESSURE: 131 MMHG | WEIGHT: 186 LBS | OXYGEN SATURATION: 93 % | TEMPERATURE: 97.8 F | HEART RATE: 55 BPM | DIASTOLIC BLOOD PRESSURE: 88 MMHG | RESPIRATION RATE: 18 BRPM

## 2020-07-17 DIAGNOSIS — J45.909 ASTHMA: Primary | ICD-10-CM

## 2020-07-17 DIAGNOSIS — J45.30 MILD PERSISTENT ASTHMA WITHOUT COMPLICATION: ICD-10-CM

## 2020-07-17 DIAGNOSIS — M95.0 NASAL DEFORMITY: ICD-10-CM

## 2020-07-17 DIAGNOSIS — J45.909 ASTHMA: ICD-10-CM

## 2020-07-17 LAB
DLCOUNC-%PRED-PRE: 105 %
DLCOUNC-PRE: 30.7 ML/MIN/MMHG
DLCOUNC-PRED: 29.12 ML/MIN/MMHG
ERV-%PRED-PRE: 65 %
ERV-PRE: 0.85 L
ERV-PRED: 1.29 L
EXPTIME-PRE: 6.09 SEC
FEF2575-%PRED-PRE: 118 %
FEF2575-PRE: 4.6 L/SEC
FEF2575-PRED: 3.88 L/SEC
FEFMAX-%PRED-PRE: 102 %
FEFMAX-PRE: 9.92 L/SEC
FEFMAX-PRED: 9.73 L/SEC
FEV1-%PRED-PRE: 89 %
FEV1-PRE: 3.53 L
FEV1FEV6-PRE: 88 %
FEV1FEV6-PRED: 82 %
FEV1FVC-PRE: 87 %
FEV1FVC-PRED: 81 %
FEV1SVC-PRE: 84 %
FEV1SVC-PRED: 77 %
FIFMAX-PRE: 7.11 L/SEC
FRCPLETH-%PRED-PRE: 71 %
FRCPLETH-PRE: 2.38 L
FRCPLETH-PRED: 3.33 L
FVC-%PRED-PRE: 82 %
FVC-PRE: 4.04 L
FVC-PRED: 4.89 L
IC-%PRED-PRE: 87 %
IC-PRE: 3.33 L
IC-PRED: 3.8 L
RADIOLOGIST FLAGS: NORMAL
RVPLETH-%PRED-PRE: 78 %
RVPLETH-PRE: 1.52 L
RVPLETH-PRED: 1.94 L
TLCPLETH-%PRED-PRE: 84 %
TLCPLETH-PRE: 5.7 L
TLCPLETH-PRED: 6.74 L
VA-%PRED-PRE: 85 %
VA-PRE: 5.35 L
VC-%PRED-PRE: 82 %
VC-PRE: 4.18 L
VC-PRED: 5.1 L

## 2020-07-17 PROCEDURE — 90670 PCV13 VACCINE IM: CPT | Mod: ZF | Performed by: STUDENT IN AN ORGANIZED HEALTH CARE EDUCATION/TRAINING PROGRAM

## 2020-07-17 PROCEDURE — 25000128 H RX IP 250 OP 636: Mod: ZF | Performed by: STUDENT IN AN ORGANIZED HEALTH CARE EDUCATION/TRAINING PROGRAM

## 2020-07-17 PROCEDURE — G0463 HOSPITAL OUTPT CLINIC VISIT: HCPCS | Mod: ZF

## 2020-07-17 PROCEDURE — G0009 ADMIN PNEUMOCOCCAL VACCINE: HCPCS | Mod: ZF

## 2020-07-17 RX ORDER — BUDESONIDE AND FORMOTEROL FUMARATE DIHYDRATE 80; 4.5 UG/1; UG/1
2 AEROSOL RESPIRATORY (INHALATION) 2 TIMES DAILY PRN
Qty: 1 INHALER | Refills: 1 | Status: SHIPPED | OUTPATIENT
Start: 2020-07-17 | End: 2020-08-18

## 2020-07-17 RX ADMIN — PNEUMOCOCCAL 13-VALENT CONJUGATE VACCINE 0.5 ML: 2.2; 2.2; 2.2; 2.2; 2.2; 4.4; 2.2; 2.2; 2.2; 2.2; 2.2; 2.2; 2.2 INJECTION, SUSPENSION INTRAMUSCULAR at 16:29

## 2020-07-17 ASSESSMENT — PAIN SCALES - GENERAL: PAINLEVEL: NO PAIN (0)

## 2020-07-17 NOTE — PROGRESS NOTES
Reason for Visit  Keith Spangler is a 40 year old year old male who is being seen for Consult (Asthma )      HPI    Keith Spangler with clinical diagnosis of mild intermittent asthma on prn neb AISHA and ICS     Had high risk COVID exposure in Jan 2020. His girlfriend travelled to [facility] with 4 hr connection in [place]. With 2 weeks of viral respiratory illness after. She travelled back to  and Keith developed respiratory sx after including loss of taste/smell, fevers, chills, body aches and dyspnea. Did not get tested. Symptoms now resolved.     He is very active, runs 4+ miles per day, has intentionally lost 20lbs the past year. No wheeze or dyspnea. Declines nighttime or daily respiratory concerns. He uses his nebulizer AISHA and/or ICS 2-4x per week prior to exercise. His asthma is worse in the winter with dry and cold air. No known allergins.       Current Outpatient Medications   Medication     albuterol (PROVENTIL) (2.5 MG/3ML) 0.083% neb solution     budesonide (PULMICORT) 0.5 MG/2ML neb solution     budesonide-formoterol (SYMBICORT) 80-4.5 MCG/ACT Inhaler     IBUPROFEN PO     order for DME     albuterol (PROAIR HFA/PROVENTIL HFA/VENTOLIN HFA) 108 (90 Base) MCG/ACT inhaler     fluticasone (FLONASE) 50 MCG/ACT nasal spray     montelukast (SINGULAIR) 10 MG tablet     Current Facility-Administered Medications   Medication     pneumococcal (PREVNAR 13) injection 0.5 mL     Allergies   Allergen Reactions     Augmentin Rash     Past Medical History:   Diagnosis Date     COPD (chronic obstructive pulmonary disease) (H)      NO ACTIVE PROBLEMS      Uncomplicated asthma Sept 2014    Intermittent asthma       Past Surgical History:   Procedure Laterality Date     APPENDECTOMY  Oct. 1991     EYE SURGERY  2013 and 2014    PRK laser correction     HERNIA REPAIR  2002     SURGICAL HISTORY OF -       Thumb 2005-Pins placed      SURGICAL HISTORY OF -       ER visit, left upper eyelid laceration repair      SURGICAL HISTORY OF -       Septo/rhinoplasty of nose secondary to boxing injury     SURGICAL HISTORY OF -       Left hernia        Social History     Socioeconomic History     Marital status: Single     Spouse name: Not on file     Number of children: 1     Years of education: Not on file     Highest education level: Not on file   Occupational History     Employer: [employer]   Social Needs     Financial resource strain: Not on file     Food insecurity     Worry: Not on file     Inability: Not on file     Transportation needs     Medical: Not on file     Non-medical: Not on file   Tobacco Use     Smoking status: Never Smoker     Smokeless tobacco: Never Used     Tobacco comment: Mom smoked his upbringing   Substance and Sexual Activity     Alcohol use: Yes     Comment: Really rare now      Drug use: No     Sexual activity: Yes     Partners: Female     Birth control/protection: None   Lifestyle     Physical activity     Days per week: Not on file     Minutes per session: Not on file     Stress: Not on file   Relationships     Social connections     Talks on phone: Not on file     Gets together: Not on file     Attends Restorationist service: Not on file     Active member of club or organization: Not on file     Attends meetings of clubs or organizations: Not on file     Relationship status: Not on file     Intimate partner violence     Fear of current or ex partner: Not on file     Emotionally abused: Not on file     Physically abused: Not on file     Forced sexual activity: Not on file   Other Topics Concern     Parent/sibling w/ CABG, MI or angioplasty before 65F 55M? No   Social History Narrative    Dairy/d 1-3 servings/d.     Caffeine 1 servings/d    Exercise 5 x week boxer    Sunscreen used - No    Seatbelts used - Yes    Working smoke/CO detectors in the home - Yes    Guns stored in the home - No    Self Breast Exams - NOT APPLICABLE    Self Testicular Exam - Yes    Eye Exam up to date - Yes    Dental Exam up  to date - Yes    Pap Smear up to date - NOT APPLICABLE    Mammogram up to date - NOT APPLICABLE    PSA up to date - NO    Dexa Scan up to date - NOT APPLICABLE    Flex Sig / Colonoscopy up to date - Yes    Immunizations up to date - Yes    Abuse: Current or Past(Physical, Sexual or Emotional)- No    Do you feel safe in your environment - Yes           Family History   Problem Relation Age of Onset     Unknown/Adopted Mother         Diagnosed w/ALS June 2015     Sleep Apnea Brother      Gastrointestinal Disease Brother         colitis     C.A.D. No family hx of      Diabetes No family hx of      Hypertension No family hx of      Cerebrovascular Disease No family hx of      Breast Cancer No family hx of      Cancer - colorectal No family hx of      Prostate Cancer No family hx of      Asthma No family hx of        ROS Pulmonary    A complete ROS was otherwise negative except as noted in the HPI.  Vitals:    07/17/20 1512   BP: 131/88   Pulse: 55   Resp: 18   Temp: 97.8  F (36.6  C)   TempSrc: Oral   SpO2: 93%   Weight: 84.4 kg (186 lb)     Exam:   GENERAL APPEARANCE: Well developed, well nourished, alert, and in no apparent distress.  EYES: PERRL, EOMI  HENT: nasal mucosa with out hyperemia or edema, no nasal polyps.  MOUTH: Oral mucosa is moist, without any lesions, no tonsillar enlargement, no oropharyngeal exudate.  NECK: supple, no masses, no thyromegaly.  LYMPHATICS: No significant axillary, cervical, or supraclavicular nodes.  RESP: good air flow throughout, - no crackles, rhonchi or wheezes.  CV: Normal S1, S2, regular rhythm, normal rate, no rub, no murmur,  no gallop, no LE edema.   ABDOMEN:  Bowel sounds normal, soft, nontender, no HSM or masses.   MS: extremities normal- no clubbing, no cyanosis.  SKIN: no rash on limited exam  NEURO: Mentation intact, speech normal, normal strength and tone, normal gait and stance  PSYCH: mentation appears normal. and affect normal/bright    Results: I have reviewed all  imaging, PFTs and other relavent tests, please see below for details, PFT and imaging results were reviewed with the patient.      Assessment and plan:    Mild persistent asthma  Viral Respiratory illness Feb 2020, concerning for COVID  Possible pulmonary opacity on CHEST X-RAY    Asthma is very well controlled. He was interested in PRN symbicort (not formulary so will send to pharmacy and see if he can afford it. Continue PRN nebulized albuterol and budesonide. Will do prevnar vaccine today. Order CT for follow up of ?opacity    -will call with results  -no plan for pulmonary follow up       CBC   Recent Labs   Lab Test 09/09/19 2002 04/04/14  1206   RBC 5.17  --    HGB 16.0 14.8   HCT 46.2  --      --        Basic Metabolic Panel  Recent Labs   Lab Test 03/20/20  1042 07/08/19  0945     --    POTASSIUM 4.9  --    CHLORIDE 106  --    CO2 30  --    BUN 17  --    * 93   RICHARD 9.4  --        INR  No results for input(s): INR in the last 72069 hours.    PFT  PFT Latest Ref Rng & Units 7/17/2020   FVC L 4.04   FEV1 L 3.53   FVC% % 82   FEV1% % 89       Faculty Addendum:  This patient has been seen and evaluated by me.  I have discussed care with Dr. Badillo and agree with the findings and plan in this note.    Dav Elizondo MD  Pulmonary/Critical Care  July 17, 2020 4:36 PM

## 2020-07-17 NOTE — NURSING NOTE
Chief Complaint   Patient presents with     Consult     Asthma      Medications reviewed and updated.  Vitals taken  Holly Short CMA  Given patient [id number] in right deltoid IM. Cleaned site with alcohol, and applied bandage. Pt tolerated injection well  Holly Short CMA

## 2020-07-17 NOTE — LETTER
7/17/2020         RE: Keith Spangler  [address] [facility] [address]        Dear Colleague,    Thank you for referring your patient, Keith Spangler, to the [facility]. Please see a copy of my visit note below.    Reason for Visit  Keith Spangler is a 40 year old year old male who is being seen for Consult (Asthma )      HPI    Keith Spangler with clinical diagnosis of mild intermittent asthma on prn neb AISHA and ICS     Had high risk COVID exposure in Jan 2020. His girlfriend travelled to [facility] with 4 hr connection in [place]. With 2 weeks of viral respiratory illness after. She travelled back to  and Keith developed respiratory sx after including loss of taste/smell, fevers, chills, body aches and dyspnea. Did not get tested. Symptoms now resolved.     He is very active, runs 4+ miles per day, has intentionally lost 20lbs the past year. No wheeze or dyspnea. Declines nighttime or daily respiratory concerns. He uses his nebulizer AISHA and/or ICS 2-4x per week prior to exercise. His asthma is worse in the winter with dry and cold air. No known allergins.       Current Outpatient Medications   Medication     albuterol (PROVENTIL) (2.5 MG/3ML) 0.083% neb solution     budesonide (PULMICORT) 0.5 MG/2ML neb solution     budesonide-formoterol (SYMBICORT) 80-4.5 MCG/ACT Inhaler     IBUPROFEN PO     order for DME     albuterol (PROAIR HFA/PROVENTIL HFA/VENTOLIN HFA) 108 (90 Base) MCG/ACT inhaler     fluticasone (FLONASE) 50 MCG/ACT nasal spray     montelukast (SINGULAIR) 10 MG tablet     Current Facility-Administered Medications   Medication     pneumococcal (PREVNAR 13) injection 0.5 mL     Allergies   Allergen Reactions     Augmentin Rash     Past Medical History:   Diagnosis Date     COPD (chronic obstructive pulmonary disease) (H)      NO ACTIVE PROBLEMS      Uncomplicated asthma Sept 2014    Intermittent asthma       Past Surgical History:    Procedure Laterality Date     APPENDECTOMY  Oct. 1991     EYE SURGERY  2013 and 2014    PRK laser correction     HERNIA REPAIR  2002     SURGICAL HISTORY OF -       Thumb 2005-Pins placed      SURGICAL HISTORY OF -       ER visit, left upper eyelid laceration repair     SURGICAL HISTORY OF -       Septo/rhinoplasty of nose secondary to boxing injury     SURGICAL HISTORY OF -       Left hernia        Social History     Socioeconomic History     Marital status: Single     Spouse name: Not on file     Number of children: 1     Years of education: Not on file     Highest education level: Not on file   Occupational History     Employer: [employer]   Social Needs     Financial resource strain: Not on file     Food insecurity     Worry: Not on file     Inability: Not on file     Transportation needs     Medical: Not on file     Non-medical: Not on file   Tobacco Use     Smoking status: Never Smoker     Smokeless tobacco: Never Used     Tobacco comment: Mom smoked his upbringing   Substance and Sexual Activity     Alcohol use: Yes     Comment: Really rare now      Drug use: No     Sexual activity: Yes     Partners: Female     Birth control/protection: None   Lifestyle     Physical activity     Days per week: Not on file     Minutes per session: Not on file     Stress: Not on file   Relationships     Social connections     Talks on phone: Not on file     Gets together: Not on file     Attends Congregational service: Not on file     Active member of club or organization: Not on file     Attends meetings of clubs or organizations: Not on file     Relationship status: Not on file     Intimate partner violence     Fear of current or ex partner: Not on file     Emotionally abused: Not on file     Physically abused: Not on file     Forced sexual activity: Not on file   Other Topics Concern     Parent/sibling w/ CABG, MI or angioplasty before 65F 55M? No   Social History Narrative    Dairy/d 1-3 servings/d.     Caffeine 1  servings/d    Exercise 5 x week boxer    Sunscreen used - No    Seatbelts used - Yes    Working smoke/CO detectors in the home - Yes    Guns stored in the home - No    Self Breast Exams - NOT APPLICABLE    Self Testicular Exam - Yes    Eye Exam up to date - Yes    Dental Exam up to date - Yes    Pap Smear up to date - NOT APPLICABLE    Mammogram up to date - NOT APPLICABLE    PSA up to date - NO    Dexa Scan up to date - NOT APPLICABLE    Flex Sig / Colonoscopy up to date - Yes    Immunizations up to date - Yes    Abuse: Current or Past(Physical, Sexual or Emotional)- No    Do you feel safe in your environment - Yes           Family History   Problem Relation Age of Onset     Unknown/Adopted Mother         Diagnosed w/ALS June 2015     Sleep Apnea Brother      Gastrointestinal Disease Brother         colitis     C.A.D. No family hx of      Diabetes No family hx of      Hypertension No family hx of      Cerebrovascular Disease No family hx of      Breast Cancer No family hx of      Cancer - colorectal No family hx of      Prostate Cancer No family hx of      Asthma No family hx of        ROS Pulmonary    A complete ROS was otherwise negative except as noted in the HPI.  Vitals:    07/17/20 1512   BP: 131/88   Pulse: 55   Resp: 18   Temp: 97.8  F (36.6  C)   TempSrc: Oral   SpO2: 93%   Weight: 84.4 kg (186 lb)     Exam:   GENERAL APPEARANCE: Well developed, well nourished, alert, and in no apparent distress.  EYES: PERRL, EOMI  HENT: nasal mucosa with out hyperemia or edema, no nasal polyps.  MOUTH: Oral mucosa is moist, without any lesions, no tonsillar enlargement, no oropharyngeal exudate.  NECK: supple, no masses, no thyromegaly.  LYMPHATICS: No significant axillary, cervical, or supraclavicular nodes.  RESP: good air flow throughout, - no crackles, rhonchi or wheezes.  CV: Normal S1, S2, regular rhythm, normal rate, no rub, no murmur,  no gallop, no LE edema.   ABDOMEN:  Bowel sounds normal, soft, nontender, no  HSM or masses.   MS: extremities normal- no clubbing, no cyanosis.  SKIN: no rash on limited exam  NEURO: Mentation intact, speech normal, normal strength and tone, normal gait and stance  PSYCH: mentation appears normal. and affect normal/bright    Results: I have reviewed all imaging, PFTs and other relavent tests, please see below for details, PFT and imaging results were reviewed with the patient.      Assessment and plan:    Mild persistent asthma  Viral Respiratory illness Feb 2020, concerning for COVID  Possible pulmonary opacity on CHEST X-RAY    Asthma is very well controlled. He was interested in PRN symbicort (not formulary so will send to pharmacy and see if he can afford it. Continue PRN nebulized albuterol and budesonide. Will do prevnar vaccine today. Order CT for follow up of ?opacity    -will call with results  -no plan for pulmonary follow up       CBC   Recent Labs   Lab Test 09/09/19 2002 04/04/14  1206   RBC 5.17  --    HGB 16.0 14.8   HCT 46.2  --      --        Basic Metabolic Panel  Recent Labs   Lab Test 03/20/20  1042 07/08/19  0945     --    POTASSIUM 4.9  --    CHLORIDE 106  --    CO2 30  --    BUN 17  --    * 93   RICHARD 9.4  --        INR  No results for input(s): INR in the last 83989 hours.    PFT  PFT Latest Ref Rng & Units 7/17/2020   FVC L 4.04   FEV1 L 3.53   FVC% % 82   FEV1% % 89       Faculty Addendum:  This patient has been seen and evaluated by me.  I have discussed care with Dr. Badillo and agree with the findings and plan in this note.    Dav Elizondo MD  Pulmonary/Critical Care  July 17, 2020 4:36 PM            Again, thank you for allowing me to participate in the care of your patient.        Sincerely,        Yannick Badillo MD

## 2020-07-20 ENCOUNTER — TELEPHONE (OUTPATIENT)
Dept: PLASTIC SURGERY | Facility: CLINIC | Age: 40
End: 2020-07-20

## 2020-07-20 NOTE — TELEPHONE ENCOUNTER
7/20/2020, 9:26 AM    Spoke with patient. Scheduled appointment for 8/14/20 at 0830h - in person. The patient voiced agreement and understanding of date, time, and place of appointment.      WINSTON HarrellT

## 2020-07-24 ENCOUNTER — ANCILLARY PROCEDURE (OUTPATIENT)
Dept: CT IMAGING | Facility: CLINIC | Age: 40
End: 2020-07-24
Attending: INTERNAL MEDICINE
Payer: COMMERCIAL

## 2020-07-24 DIAGNOSIS — J45.30 MILD PERSISTENT ASTHMA WITHOUT COMPLICATION: ICD-10-CM

## 2020-08-14 ENCOUNTER — MYC MEDICAL ADVICE (OUTPATIENT)
Dept: SLEEP MEDICINE | Facility: CLINIC | Age: 40
End: 2020-08-14

## 2020-08-14 ENCOUNTER — OFFICE VISIT (OUTPATIENT)
Dept: PLASTIC SURGERY | Facility: CLINIC | Age: 40
End: 2020-08-14
Payer: COMMERCIAL

## 2020-08-14 VITALS
HEART RATE: 55 BPM | HEIGHT: 68 IN | WEIGHT: 193 LBS | BODY MASS INDEX: 29.25 KG/M2 | DIASTOLIC BLOOD PRESSURE: 88 MMHG | SYSTOLIC BLOOD PRESSURE: 142 MMHG | OXYGEN SATURATION: 98 %

## 2020-08-14 DIAGNOSIS — J34.89 REFRACTORY OBSTRUCTION OF NASAL AIRWAY: Primary | ICD-10-CM

## 2020-08-14 RX ORDER — CEFAZOLIN SODIUM 1 G/50ML
1 INJECTION, SOLUTION INTRAVENOUS SEE ADMIN INSTRUCTIONS
Status: CANCELLED | OUTPATIENT
Start: 2020-08-14

## 2020-08-14 RX ORDER — CEFAZOLIN SODIUM 2 G/50ML
2 SOLUTION INTRAVENOUS
Status: CANCELLED | OUTPATIENT
Start: 2020-08-14

## 2020-08-14 ASSESSMENT — PAIN SCALES - GENERAL: PAINLEVEL: NO PAIN (0)

## 2020-08-14 ASSESSMENT — MIFFLIN-ST. JEOR: SCORE: 1754.94

## 2020-08-14 NOTE — NURSING NOTE
"Chief Complaint   Patient presents with     Consult     nasal airway obstruction, previous history of nasal fractures w/ surgical correction (2009)       Vitals:    08/14/20 0830   BP: (!) 142/88   BP Location: Left arm   Patient Position: Chair   Cuff Size: Adult Regular   Pulse: 55   SpO2: 98%   Weight: 87.5 kg (193 lb)   Height: 1.727 m (5' 8\")       Body mass index is 29.35 kg/m .    Joel Gunderson, EMT    "

## 2020-08-14 NOTE — LETTER
"8/14/2020       RE: Keith Spangler  [address] [facility] [address]     Dear Colleague,    Thank you for referring your patient, Keith Spangler, to the [facility] P[facility] AND RECONSTRUCTIVE SURGERY at [facility]. Please see a copy of my visit note below.    PLASTIC SURGERY OUTPATIENT NOTE     SUBJECTIVE  Lost 25 lbs since April. Noted some improvement with asthma. No significant change with nasal airway obstruction. Nose usually stuffy from respective decubitus position.  Some seasonality, worsened in dry seasons like Winter. Still has airway issues year-round.     Patient does not like bony deviation or \"saddle deformity\" from the side. Does not want nose that ends up wide and big.     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 He would be appropriate candidate for septorhinoplasty, including bilateral  grafts, turbinate outfracture, septoplasty, bilateral osteotomies, and dorsal onlay graft. Brett discussion completed regarding risk of bleeding, infection, no improvement in airway symptoms, and dissatisfaction with aesthetic result. Patient remains interested in surgery. We will initial prior approval process, surgical scheduling pending.      Chiquis Perez MD  Pediatric Cleft and Craniofacial Surgery  Division of Plastic Surgery  Pager: [phone]            "

## 2020-08-14 NOTE — PROGRESS NOTES
"PLASTIC SURGERY OUTPATIENT NOTE     SUBJECTIVE  Lost 25 lbs since April. Noted some improvement with asthma. No significant change with nasal airway obstruction. Nose usually stuffy from respective decubitus position.  Some seasonality, worsened in dry seasons like Winter. Still has airway issues year-round.     Patient does not like bony deviation or \"saddle deformity\" from the side. Does not want nose that ends up wide and big.     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 He would be appropriate candidate for septorhinoplasty, including bilateral  grafts, turbinate outfracture, septoplasty, bilateral osteotomies, and dorsal onlay graft. Brett discussion completed regarding risk of bleeding, infection, no improvement in airway symptoms, and dissatisfaction with aesthetic result. Patient remains interested in surgery. We will initial prior approval process, surgical scheduling pending.      Chiquis Perez MD  Pediatric Cleft and Craniofacial Surgery  Division of Plastic Surgery  Pager: [phone]        "

## 2020-08-17 ENCOUNTER — HOSPITAL ENCOUNTER (OUTPATIENT)
Facility: AMBULATORY SURGERY CENTER | Age: 40
End: 2020-08-17
Attending: PLASTIC SURGERY
Payer: COMMERCIAL

## 2020-08-17 DIAGNOSIS — R06.02 SOB (SHORTNESS OF BREATH): ICD-10-CM

## 2020-08-17 DIAGNOSIS — J45.30 MILD PERSISTENT ASTHMA WITHOUT COMPLICATION: ICD-10-CM

## 2020-08-17 DIAGNOSIS — R09.81 CHRONIC NASAL CONGESTION: ICD-10-CM

## 2020-08-17 DIAGNOSIS — J34.89 REFRACTORY OBSTRUCTION OF NASAL AIRWAY: ICD-10-CM

## 2020-08-18 NOTE — TELEPHONE ENCOUNTER
Routing refill request to provider for review/approval because:  ACT score is less than 20.    ACT Total Scores 7/8/2019 12/26/2019 3/20/2020   ACT TOTAL SCORE (Goal Greater than or Equal to 20) 14 13 15   In the past 12 months, how many times did you visit the emergency room for your asthma without being admitted to the hospital? 0 0 0   In the past 12 months, how many times were you hospitalized overnight because of your asthma? 0 0 0

## 2020-08-19 RX ORDER — ALBUTEROL SULFATE 0.83 MG/ML
2.5 SOLUTION RESPIRATORY (INHALATION) EVERY 6 HOURS PRN
Qty: 25 VIAL | Refills: 5 | Status: SHIPPED | OUTPATIENT
Start: 2020-08-19 | End: 2021-02-26

## 2020-08-19 RX ORDER — FLUTICASONE PROPIONATE 50 MCG
2 SPRAY, SUSPENSION (ML) NASAL DAILY
Qty: 16 G | Refills: 2 | Status: SHIPPED | OUTPATIENT
Start: 2020-08-19 | End: 2021-03-22

## 2020-08-19 RX ORDER — MONTELUKAST SODIUM 10 MG/1
10 TABLET ORAL AT BEDTIME
Qty: 90 TABLET | Refills: 2 | Status: SHIPPED | OUTPATIENT
Start: 2020-08-19 | End: 2021-05-06

## 2020-08-19 RX ORDER — ALBUTEROL SULFATE 90 UG/1
2 AEROSOL, METERED RESPIRATORY (INHALATION) EVERY 6 HOURS PRN
Qty: 1 INHALER | Refills: 5 | Status: SHIPPED | OUTPATIENT
Start: 2020-08-19 | End: 2021-09-20

## 2020-08-19 RX ORDER — BUDESONIDE AND FORMOTEROL FUMARATE DIHYDRATE 80; 4.5 UG/1; UG/1
2 AEROSOL RESPIRATORY (INHALATION) 2 TIMES DAILY PRN
Qty: 1 INHALER | Refills: 5 | Status: SHIPPED | OUTPATIENT
Start: 2020-08-19 | End: 2021-06-21

## 2020-08-19 RX ORDER — BUDESONIDE 0.5 MG/2ML
0.5 INHALANT ORAL DAILY
Qty: 1 BOX | Refills: 5 | Status: SHIPPED | OUTPATIENT
Start: 2020-08-19 | End: 2021-04-19

## 2020-09-10 ENCOUNTER — TELEPHONE (OUTPATIENT)
Dept: PLASTIC SURGERY | Facility: CLINIC | Age: 40
End: 2020-09-10

## 2020-09-23 ENCOUNTER — OFFICE VISIT (OUTPATIENT)
Dept: SLEEP MEDICINE | Facility: CLINIC | Age: 40
End: 2020-09-23
Payer: COMMERCIAL

## 2020-09-23 DIAGNOSIS — Z91.89 RISK FACTORS FOR OBSTRUCTIVE SLEEP APNEA: ICD-10-CM

## 2020-09-23 DIAGNOSIS — J34.89 NASAL OBSTRUCTION: ICD-10-CM

## 2020-09-23 DIAGNOSIS — R06.83 SNORING: ICD-10-CM

## 2020-09-23 PROCEDURE — G0399 HOME SLEEP TEST/TYPE 3 PORTA: HCPCS | Performed by: INTERNAL MEDICINE

## 2020-09-24 ENCOUNTER — DOCUMENTATION ONLY (OUTPATIENT)
Dept: SLEEP MEDICINE | Facility: CLINIC | Age: 40
End: 2020-09-24
Payer: COMMERCIAL

## 2020-09-24 NOTE — PROGRESS NOTES
This HSAT was performed using a Noxturnal T3 device which recorded snore, sound, movement activity, body position, nasal pressure, oronasal thermal airflow, pulse, oximetry and both chest and abdominal respiratory effort. HSAT data was restricted to the time patient states they were in bed.     HSAT was scored using 1B 4% hypopnea rule.     HST AHI (Non-PAT): 2.5  Snoring was reported as mild.  Time with SpO2 below 89% was 0 minutes.   Overall signal quality was good     Pt will follow up with sleep provider to determine appropriate therapy.

## 2020-09-24 NOTE — PROGRESS NOTES
HST POST-STUDY QUESTIONNAIRE    1. What time did you go to bed?  10:30pm  2. How long do you think it took to fall asleep?  11:05pm  3. What time did you wake up to start the day?  0700  4. Did you get up during the night at all?  no  5. If you woke up, do you remember approximately what time(s)? no  6. Did you have any difficulty with the equipment?  No  7. Did you us any type of treatment with this study?  None  8. Was the head of the bed elevated? No  9. Did you sleep in a recliner?  No  10. Did you stop using CPAP at least 3 days before this test?  NA  11. Any other information you'd like us to know? Took 10 drops of CBD to help me sleep

## 2020-09-25 NOTE — PROCEDURES
"HOME SLEEP STUDY INTERPRETATION    Patient: Keith Spangler  MRN: [id number]  YOB: 1980  Study Date: 9/23/2020  Referring Provider: Joseph Adams MD  Ordering Provider: Jinny Rodriges MD     Indications for Home Study: Keith Spangler is a 40 year old male with a history of asthma who presents with symptoms suggestive of obstructive sleep apnea.    Estimated body mass index is 29.35 kg/m  as calculated from the following:    Height as of 8/14/20: 1.727 m (5' 8\").    Weight as of 8/14/20: 87.5 kg (193 lb).  Total score - McGregor: 3 (9/15/2020  7:53 PM)  StopBang Total Score: 3 (6/5/2020  2:00 PM)    Data: A full night home sleep study was performed recording the standard physiologic parameters including body position, movement, sound, nasal pressure, thermal oral airflow, chest and abdominal movements with respiratory inductance plethysmography, and oxygen saturation by pulse oximetry. Pulse rate was estimated by oximetry recording. This study was considered adequate based on > 4 hours of quality oximetry and respiratory recording. As specified by the AASM Manual for the Scoring of Sleep and Associated events, version 2.3, Rule VIII.D 1B, 4% oxygen desaturation scoring for hypopneas is used as a standard of care on all home sleep apnea testing.    Analysis Time:  497 minutes    Respiration:   Sleep Associated Hypoxemia: sustained hypoxemia was not present. Baseline oxygen saturation was 93%.  Time with saturation less than or equal to 88% was 0 minutes. The lowest oxygen saturation was 86%.   Snoring: Snoring was present.  Respiratory events: The home study revealed a presence of 2 obstructive apneas and 7 mixed and central apneas. There were 12 hypopneas resulting in a combined apnea/hypopnea index [AHI] of 2.5 events per hour.  AHI was 3.0 per hour supine, NA per hour prone, 0.8 per hour on left side, and 3.7 per hour on right side.   Pattern: Excluding events noted above, respiratory " rate and pattern was Normal.    Position: Percent of time spent: supine - 52%, prone - 0%, on left - 29%, on right - 19%.    Heart Rate: By pulse oximetry normal rate was noted.     Assessment:   Snoring without obstructive sleep apnea.  Sleep associated hypoxemia was not present.    Recommendations:  Consider optimizing nasal breathing and weightloss for snoring..  Suggest optimizing sleep hygiene and avoiding sleep deprivation.  Weight management.    Diagnosis Code(s): Snoring R06.83    Jinny Rodriges MD, September 25, 2020   Diplomate, American Board of Internal Medicine, Sleep Medicine

## 2020-10-01 NOTE — PROGRESS NOTES
"Keith Spangler is a 40 year old male who is being evaluated via a billable video visit.      The patient has been notified of following:     \"This video visit will be conducted via a call between you and your physician/provider. We have found that certain health care needs can be provided without the need for an in-person physical exam.  This service lets us provide the care you need with a video conversation.  If a prescription is necessary we can send it directly to your pharmacy.  If lab work is needed we can place an order for that and you can then stop by our lab to have the test done at a later time.    Video visits are billed at different rates depending on your insurance coverage.  Please reach out to your insurance provider with any questions.    If during the course of the call the physician/provider feels a video visit is not appropriate, you will not be charged for this service.\"    Patient has given verbal consent for Video visit? Yes  How would you like to obtain your AVS? MyChart  If you are dropped from the video visit, the video invite should be resent to: Text to cell phone: [phone]   Will anyone else be joining your video visit? No        Video-Visit Details    Type of service:  Video Visit    Video Start Time: 10:33 AM  Video End Time: 10:45 AM    Originating Location (pt. Location): Home    Distant Location (provider location):  Home Office    Platform used for Video Visit: Rolf Rodriges MD    Chief complaint: Follow-up home sleep apnea testing    History of Present Illness: 40-year-old with history of asthma and nasal fractures in the past.  He had history of snoring and was being evaluated for nasal obstruction was recommended to have sleep apnea evaluation.  Home sleep apnea testing was recently performed.  Since his last visit he reports success with losing weight.  He is now down to 188 pounds.  His goal is to get to 168 or so.  He denies new sleep concerns.  He " is continuing to use albuterol.  He also uses Symbicort and montelukast but admits to not being consistent.    The results of the home sleep apnea test were reviewed with him today.  Test date 9/23/2020  Analysis time 497 minutes  Snoring was present  AHI 2.5 events per hour.  Over 50% of the recording time was in the supine position.  There was no hypoxemia.      Past Medical History:   Diagnosis Date     COPD (chronic obstructive pulmonary disease) (H)      NO ACTIVE PROBLEMS      Uncomplicated asthma Sept 2014    Intermittent asthma       Allergies   Allergen Reactions     Augmentin Rash       Current Outpatient Medications   Medication     albuterol (PROAIR HFA/PROVENTIL HFA/VENTOLIN HFA) 108 (90 Base) MCG/ACT inhaler     albuterol (PROVENTIL) (2.5 MG/3ML) 0.083% neb solution     budesonide (PULMICORT) 0.5 MG/2ML neb solution     budesonide-formoterol (SYMBICORT) 80-4.5 MCG/ACT Inhaler     fluticasone (FLONASE) 50 MCG/ACT nasal spray     IBUPROFEN PO     montelukast (SINGULAIR) 10 MG tablet     order for DME     No current facility-administered medications for this visit.        Social History     Socioeconomic History     Marital status: Single     Spouse name: Not on file     Number of children: 1     Years of education: Not on file     Highest education level: Not on file   Occupational History     Employer: [employer]   Social Needs     Financial resource strain: Not on file     Food insecurity     Worry: Not on file     Inability: Not on file     Transportation needs     Medical: Not on file     Non-medical: Not on file   Tobacco Use     Smoking status: Never Smoker     Smokeless tobacco: Never Used     Tobacco comment: Mom smoked his upbringing   Substance and Sexual Activity     Alcohol use: Yes     Comment: Really rare now      Drug use: No     Sexual activity: Yes     Partners: Female     Birth control/protection: None   Lifestyle     Physical activity     Days per week: Not on file     Minutes per  session: Not on file     Stress: Not on file   Relationships     Social connections     Talks on phone: Not on file     Gets together: Not on file     Attends Catholic service: Not on file     Active member of club or organization: Not on file     Attends meetings of clubs or organizations: Not on file     Relationship status: Not on file     Intimate partner violence     Fear of current or ex partner: Not on file     Emotionally abused: Not on file     Physically abused: Not on file     Forced sexual activity: Not on file   Other Topics Concern     Parent/sibling w/ CABG, MI or angioplasty before 65F 55M? No   Social History Narrative    Dairy/d 1-3 servings/d.     Caffeine 1 servings/d    Exercise 5 x week boxer    Sunscreen used - No    Seatbelts used - Yes    Working smoke/CO detectors in the home - Yes    Guns stored in the home - No    Self Breast Exams - NOT APPLICABLE    Self Testicular Exam - Yes    Eye Exam up to date - Yes    Dental Exam up to date - Yes    Pap Smear up to date - NOT APPLICABLE    Mammogram up to date - NOT APPLICABLE    PSA up to date - NO    Dexa Scan up to date - NOT APPLICABLE    Flex Sig / Colonoscopy up to date - Yes    Immunizations up to date - Yes    Abuse: Current or Past(Physical, Sexual or Emotional)- No    Do you feel safe in your environment - Yes           Family History   Problem Relation Age of Onset     Unknown/Adopted Mother         Diagnosed w/ALS June 2015     Sleep Apnea Brother      Gastrointestinal Disease Brother         colitis     C.A.D. No family hx of      Diabetes No family hx of      Hypertension No family hx of      Cerebrovascular Disease No family hx of      Breast Cancer No family hx of      Cancer - colorectal No family hx of      Prostate Cancer No family hx of      Asthma No family hx of          EXAM:  There were no vitals taken for this visit.  GENERAL: Healthy, alert and no distress  EYES: Eyes grossly normal to inspection.  No discharge or  erythema, or obvious scleral/conjunctival abnormalities.  RESP: No audible wheeze, cough, or visible cyanosis.  No visible retractions or increased work of breathing.    SKIN: Visible skin clear. No significant rash, abnormal pigmentation or lesions.  NEURO: Cranial nerves grossly intact.  Mentation and speech appropriate for age.  PSYCH: Mentation appears normal, affect normal/bright, judgement and insight intact, normal speech and appearance well-groomed.      ASSESSMENT:  40-year-old man with history of snoring and nasal obstruction, asthma with persistent symptoms.  Home sleep apnea testing showed no significant obstructive sleep apnea and was good quality.  I do not think sleep study needs to be repeated.    PLAN:  Result results of the study were reviewed with him in detail.  Patient was counseled on the importance of continuing his weight loss program.  He is also counseled on the importance of using his controller medications as directed ( Symbicort twice daily followed by rinsing his mouth out and montelukast once daily.)  Recommended he consider putting the Symbicort by his toothbrush to remind him to do this twice a day.  He should follow-up with primary care regarding his respiratory symptoms.  Follow-up with ENT regarding nasal obstruction.  Follow-up in sleep medicine clinic on a as needed basis.  Should he have significant weight gain or develop new sleep concerns he should contact me.        Jinny Rodriges M.D.  Pulmonary/Critical Care/Sleep Medicine    [facility]   [address], [address]   [address] [place] [address]   Appointments: [phone]    The above note was dictated using voice recognition software and may include typographical errors. Please contact the author for any clarifications.

## 2020-10-02 ENCOUNTER — VIRTUAL VISIT (OUTPATIENT)
Dept: SLEEP MEDICINE | Facility: CLINIC | Age: 40
End: 2020-10-02
Payer: COMMERCIAL

## 2020-10-02 DIAGNOSIS — J45.30 MILD PERSISTENT ASTHMA WITHOUT COMPLICATION: ICD-10-CM

## 2020-10-02 DIAGNOSIS — R06.83 SNORING: Primary | ICD-10-CM

## 2020-10-02 PROCEDURE — 99214 OFFICE O/P EST MOD 30 MIN: CPT | Mod: 95 | Performed by: INTERNAL MEDICINE

## 2020-10-02 NOTE — PATIENT INSTRUCTIONS
Remember Symbicort and montelukast are your controller medications and they are to be used every day.  Put Symbicort by your toothbrush and use it twice a day followed by brushing your teeth and rinsing your mouth to prevent thrush.  Take montelukast nightly.  If you are able to be very regular with these medications monitor your as needed albuterol use.  Hopefully you would need it less and less.  Identify triggers of your asthma and come up with ways to avoid her triggers.  Follow-up with primary care for symptoms progress.    Sleep study showed snoring without significant sleep apnea.  It was excellent quality.  I do not think it needs to be repeated.

## 2020-10-06 DIAGNOSIS — Z20.822 SUSPECTED COVID-19 VIRUS INFECTION: ICD-10-CM

## 2020-10-06 PROCEDURE — 86769 SARS-COV-2 COVID-19 ANTIBODY: CPT | Performed by: PHYSICIAN ASSISTANT

## 2020-10-06 PROCEDURE — 36415 COLL VENOUS BLD VENIPUNCTURE: CPT | Performed by: PHYSICIAN ASSISTANT

## 2020-10-07 LAB
COVID-19 ANTIBODY IGG: NEGATIVE
LAB TEST METHOD: NORMAL

## 2020-10-09 ENCOUNTER — HOSPITAL ENCOUNTER (OUTPATIENT)
Facility: CLINIC | Age: 40
End: 2020-10-09
Attending: PLASTIC SURGERY | Admitting: PLASTIC SURGERY
Payer: COMMERCIAL

## 2020-10-09 DIAGNOSIS — Z11.59 ENCOUNTER FOR SCREENING FOR OTHER VIRAL DISEASES: Primary | ICD-10-CM

## 2020-10-12 ENCOUNTER — PATIENT OUTREACH (OUTPATIENT)
Dept: PLASTIC SURGERY | Facility: CLINIC | Age: 40
End: 2020-10-12

## 2020-10-12 NOTE — PATIENT INSTRUCTIONS
Spoke with pt and explained that prior authorization for surgery was requested from insurance. However, they responded by stating that it is not needed for this procedure. This means that it is likely to be covered, but coverage is not guaranteed. We have no way of obtaining approval prior to surgery. Recommended pt speak with financial counselor and insurance to discuss potential cost if procedure is not covered. Pt states understanding and denies any additional questions or concerns. Nisa DONAHUE RNCC

## 2020-10-16 ENCOUNTER — TELEPHONE (OUTPATIENT)
Dept: PLASTIC SURGERY | Facility: CLINIC | Age: 40
End: 2020-10-16

## 2020-10-16 NOTE — TELEPHONE ENCOUNTER
received a VM from  [email or web address]-stated i could request a pre-d

## 2020-10-20 ENCOUNTER — DOCUMENTATION ONLY (OUTPATIENT)
Dept: SURGERY | Facility: CLINIC | Age: 40
End: 2020-10-20

## 2020-10-20 ENCOUNTER — TELEPHONE (OUTPATIENT)
Dept: SURGERY | Facility: CLINIC | Age: 40
End: 2020-10-20

## 2020-10-20 NOTE — PROGRESS NOTES
Canceled surgery per patient request. Patient would like to know out of pocket cost before moving forward. Patient has MyChart communication with RN Nisa OLIVEIRA regarding pre-d, PA, and out of pocket cost. Will reschedule once patient is ready to move forward.

## 2020-10-27 ENCOUNTER — TELEPHONE (OUTPATIENT)
Dept: SURGERY | Facility: CLINIC | Age: 40
End: 2020-10-27

## 2020-10-28 ENCOUNTER — PATIENT OUTREACH (OUTPATIENT)
Dept: PLASTIC SURGERY | Facility: CLINIC | Age: 40
End: 2020-10-28

## 2020-10-28 NOTE — PATIENT INSTRUCTIONS
Spoke with pt and explained that coverage of surgery has been denied. Pt will need to try non-surgical treatments first such as nasal spray. Encouraged pt to discuss this with insurance and PCP and contact us once non-surgical treatments are documented so we can re-send PA. Pt states understanding and denies any questions or concerns. Nisa DONAHUE RNCC

## 2020-11-06 ENCOUNTER — VIRTUAL VISIT (OUTPATIENT)
Dept: FAMILY MEDICINE | Facility: CLINIC | Age: 40
End: 2020-11-06
Payer: COMMERCIAL

## 2020-11-06 DIAGNOSIS — J45.30 MILD PERSISTENT ASTHMA WITHOUT COMPLICATION: ICD-10-CM

## 2020-11-06 DIAGNOSIS — R09.81 NASAL CONGESTION: ICD-10-CM

## 2020-11-06 DIAGNOSIS — U07.1 CLINICAL DIAGNOSIS OF COVID-19: Primary | ICD-10-CM

## 2020-11-06 PROCEDURE — 99214 OFFICE O/P EST MOD 30 MIN: CPT | Mod: 95 | Performed by: FAMILY MEDICINE

## 2020-11-06 RX ORDER — OXYMETAZOLINE HYDROCHLORIDE 0.05 G/100ML
2 SPRAY NASAL 2 TIMES DAILY
Qty: 1 BOTTLE | Refills: 0 | Status: SHIPPED | OUTPATIENT
Start: 2020-11-06 | End: 2020-11-09

## 2020-11-06 NOTE — PROGRESS NOTES
"Keith Spangler is a 40 year old male who is being evaluated via a billable video visit.      The patient has been notified of following:     \"This video visit will be conducted via a call between you and your physician/provider. We have found that certain health care needs can be provided without the need for an in-person physical exam.  This service lets us provide the care you need with a video conversation.  If a prescription is necessary we can send it directly to your pharmacy.  If lab work is needed we can place an order for that and you can then stop by our lab to have the test done at a later time.    Video visits are billed at different rates depending on your insurance coverage.  Please reach out to your insurance provider with any questions.    If during the course of the call the physician/provider feels a video visit is not appropriate, you will not be charged for this service.\"    Patient has given verbal consent for Video visit? Yes  How would you like to obtain your AVS? MyChart  If you are dropped from the video visit, the video invite should be resent to: Text to cell phone: Home  Will anyone else be joining your video visit? No    Subjective     Keith Spangler is a 40 year old male who presents today via video visit for the following health issues:    HPI       Concern for COVID-19  About how many days ago did these symptoms start? 3 days - tested COVID Wednesday evening and found out results this morning. Had similar symptoms in January/February with Contact claritza who was in [facility] and came back sick. Never tested but had a cough for 6 weeks.   Is this your first visit for this illness? Yes  In the 14 days before your symptoms started, have you had close contact with someone with COVID-19 (Coronavirus)? Yes, I have been in contact with someone who has symptoms of COVID-19/Coronavirus (no lab test done or waiting on results).  Do you have a fever or chills? Yes, I felt feverish or " had chills  Are you having new or worsening difficulty breathing? Yes   Please describe what kind of difficulty you are having breathing:Moderate dyspnea (short of breath with ADLs/walking across room, quick recovery/comfortable with rest)  Do you have new or worsening cough? Yes, I am coughing up mucus.  Have you had any new or unexplained body aches? YES    Have you experienced any of the following NEW symptoms?    Headache: YES    Sore throat: No    Loss of taste or smell: YES    Chest pain: No    Diarrhea: No    Rash: No  What treatments have you tried? none  Who do you live with? fiancee  Are you, or a household member, a healthcare worker or a ? No  Do you live in a nursing home, group home, or shelter? No  Do you have a way to get food/medications if quarantined? Yes, I have a friend or family member who can help me.  Works as a  and may have been exposed to a passenger.                Video Start Time: 1108    Asthma Follow-Up    Was ACT completed today?    Yes    ACT Total Scores 3/20/2020   ACT TOTAL SCORE (Goal Greater than or Equal to 20) 15   In the past 12 months, how many times did you visit the emergency room for your asthma without being admitted to the hospital? 0   In the past 12 months, how many times were you hospitalized overnight because of your asthma? 0          How many days per week do you miss taking your asthma controller medication?  0    Please describe any recent triggers for your asthma: upper respiratory infections    Have you had any Emergency Room Visits, Urgent Care Visits, or Hospital Admissions since your last office visit?  No            Review of Systems   Constitutional, HEENT, cardiovascular, pulmonary, gi and gu systems are negative, except as otherwise noted.      Objective           Vitals:  No vitals were obtained today due to virtual visit.    Physical Exam     GENERAL: Healthy, alert and no distress  EYES: Eyes grossly normal to inspection.   No discharge or erythema, or obvious scleral/conjunctival abnormalities.  RESP: No audible wheeze, cough, or visible cyanosis.  No visible retractions. Speech is mildly dyspneic.   SKIN: Visible skin clear. No significant rash, abnormal pigmentation or lesions.  NEURO: Cranial nerves grossly intact.  Mentation and speech appropriate for age.  PSYCH: Mentation appears normal, affect normal/bright, judgement and insight intact, normal speech and appearance well-groomed.      No results found for this or any previous visit (from the past 24 hour(s)).        Assessment & Plan     Clinical diagnosis of COVID-19  With cough, headache, dyspnea, loss of smell and taste. Risk factor is asthma. Discussed signs and symptoms and when to go to emergency department or urgent care. Symptoms are stable now and no fever. Quarantine is recommended for 10 days minimum. Will call if any questions or worsening of symptoms. May use Afrin for 2-3 days with other medications to improve nasal congestion.   He has been running up to 5 miles recently and lost 30 pounds with diet and exercise, so is healthier now than last winter.     Mild persistent asthma without complication  Stable with no increase in wheezing.           FURTHER TESTING:       - CXR and pulse oximeter if symptoms worsen  FUTURE APPOINTMENTS:       - Follow-up visit in 2-3 days or sooner if any questions.   See Patient Instructions    No follow-ups on file.    Anne Pedroza MD  [facility]      Video-Visit Details    Type of service:  Video Visit    Video End Time:1131    Originating Location (pt. Location): Home    Distant Location (provider location):  [facility]     Platform used for Video Visit: [employer]

## 2020-11-06 NOTE — PATIENT INSTRUCTIONS
Patient Education     Coronavirus Disease 2019 (COVID-19): Caring for Yourself or Others   If you or a household member have symptoms of COVID-19, follow the guidelines below. This will help you manage symptoms and keep the virus from spreading.  If you have symptoms of COVID-19    Stay home and contact your care team. They will tell you what to do.    Don t panic. Keep in mind that other illnesses can cause similar symptoms.    Stay away from work, school, and public places.    Limit physical contact with others in your home. Limit visitors. No kissing.  Clean surfaces you touch with disinfectant.  If you need to cough or sneeze, do it into a tissue. Then throw the tissue into the trash. If you don't have tissues, cough or sneeze into the bend of your elbow.  Don t share food or personal items with people in your household. This includes items like eating and drinking utensils, towels, and bedding.  Wear a cloth face mask around other people. During a public health emergency, medical face masks may be reserved for healthcare workers. You may need to make a cloth face mask of your own. You can do this using a bandana, T-shirt, or other cloth. The CDC has instructions on how to make a face mask. Wear the mask so that it covers both your nose and mouth.  If you need to go to a hospital or clinic, call ahead to let them know. Expect the care team to wear masks, gowns, gloves, and eye protection. You may be put in a separate room.  Follow all instructions from your care team.    If you ve been diagnosed with COVID-19    Stay home and away from others, including other people in your home. (This is called self-isolation.) Don t leave home unless you need to get medical care. Don t go to work, school, or public places. Don t use buses, taxis, or other public transportation.    Follow all instructions from your care team.    If you need to go to a hospital or clinic, call ahead to let them know. Expect the care team to  wear masks, gowns, gloves, and eye protection. You may be put in a separate room.    Wear a face mask over your nose and mouth. This is to protect others from your germs. If you can t wear a mask, your caregivers should wear one. You may need to make your own mask using a bandana, T-shirt, or other cloth. See the [facility] s instructions on how to make a face mask.    Have no contact with pets and other animals.    Don t share food or personal items with people in your household. This includes items like eating and drinking utensils, towels, and bedding.    If you need to cough or sneeze, do it into a tissue. Then throw the tissue into the trash. If you don't have tissues, cough or sneeze into the bend of your elbow.    Wash your hands often.    Self-care at home   At this time, there is no medicine approved to prevent or treat COVID-19. Experts are testing different medicines, trying to find one that works.  So far, the most proven treatment is to support your body while it fights the virus.    Getting extra rest.    Drink extra fluids 6 to 8 glasses of liquids each day), unless a doctor has told you not to. Ask your care team which fluids are best for you. Avoid fluids that contain caffeine or alcohol.    Taking over-the-counter (OTC) medicine to reduce pain and fever. Your care team will tell you which medicine to use.  If you ve been in the hospital for COVID-19, follow your care team s instructions. They will tell you when to stop self-isolation. They may also have you change positions to help your breathing (such as lying on your belly).  If a test showed that you have COVID-19, you may be asked to donate plasma after you ve recovered. (This is called COVID-19 convalescent plasma donation.) The plasma may contain antibodies to help fight the virus in others. Scientists are testing whether this might be a treatment in the future. For more information, talk to your care team.  Caring for a sick person     Follow all  instructions from the care team.    Wash your hands often.    Wear protective clothing as advised.    Make sure the sick person wears a mask. If they can't wear a mask, don t stay in the same room with them. If you must be in the same room, wear a face mask. Make sure the mask covers both the nose and mouth.    Keep track of the sick person s symptoms.    Clean surfaces often with disinfectant. This includes phones, kitchen counters, fridge door handles, bathroom surfaces, and others.    Don t let anyone share household items with the sick person. This includes eating and drinking tools, towels, sheets, or blankets.    Clean fabrics and laundry well.    Keep other people and pets away from the sick person.    When you can stop self-isolation  When you are sick with COVID-19, you should stay away from other people. This is called self-isolation. The rules for ending self-isolation depend on your health in general.  If you are normally healthy  You can stop self-isolation when all 3 of these are true:    You ve had no fever--and no medicine that reduces fever--for at least 24 hours. And     Your symptoms are better, such as cough or trouble breathing. And     At least 10 days have passed since your symptoms first started.  Talk with your care team before you leave home. They may tell you it s okay to leave, or they may give you different advice.  If you have a weak immune system  If you re being treated for cancer, have an immune disorder (such as HIV), or have had a transplant &$40;organ or bone marrow), follow your care team s instructions. You may be asked to stay home from 10 days to 20 days after your symptoms first started. Your care team may want to re-test you for COVID-19.  When you return to public settings  When you are well enough to go outside your home, follow the CDC s guidance on cloth face masks.    Anyone over age 2 should wear a face mask in public, especially when it's hard to socially distance.  This includes public transit, public protests and marches, and crowded stores, bars, and restaurants.    Face masks are most likely to reduce the spread of COVID-19 when they are widely used by people who are out in the public.  Certain people should not wear a face covering. These include:    Children under 2 years old    Anyone with a health, developmental, or mental health condition that can be made worse by wearing a mask    Anyone who is unconscious or unable to remove the face covering without help. See the CDC's guidance on who should not wear a face mask.  When to call your care team  Call your care team right away if a sick person has any of these:    Trouble breathing    Pain or pressure in chest  If a sick person has any of these, call 911:    Trouble breathing that gets worse    Pain or pressure in chest that gets worse    Blue tint to lips or face    Fast or irregular heartbeat    Confusion or trouble waking    Fainting or loss of consciousness    Coughing up blood  Going home from the hospital   If you have COVID-19 and were recently in the hospital:    Follow the instructions above for self-care and isolation.    Follow the hospital care team s instructions.    Ask questions if anything is unclear to you. Write down answers so you remember them.  Date last modified: 10/13/2020  [employer] last reviewed this educational content on 4/1/2020  This information has been modified by your health care provider with permission from the publisher.    4674-1024 [employer]. [address] [place] [address], [place] [address]. All rights reserved. This information is not intended as a substitute for professional medical care. Always follow your healthcare professional's instructions.    Use Afrin nose spray or other 12 hour spray twice a day for 3 days. Follow this spray 5-10 minutes later with steroid nose spray after nasal passages open up. Continue to use the steroid nose spray twice a day for the next 2-3  weeks or longer to keep the nasal passages and sinuses open.         Patient Education     COVID-19: Lying in a Prone Position (Proning)  When you have COVID-19, lying on your belly can help your lungs work better. It can help get more oxygen into your lungs more easily. It can help prevent lung injury. Lying on your belly is known as the prone position. You may also hear it called  proning.  If you re in the hospital, the healthcare team may position you to help your lungs. Once you are strong enough, your healthcare team may want you to do these position changes on your own.  How does proning help you breathe?  Most of your lung tissue sits closer to your back than to your front. Lying on your back (supine) can put pressure on your lung tissue. This can make the small air sacs in your lungs need to work harder to inflate. If you have to breathe harder to get enough air in your lungs, this can make lung problems worse. It can also cause lung injury.  But lying on your stomach (prone) can help your lungs work better with less stress. It can help prevent problems such as collapsed lung. This is when the air sacs in the lung can t inflate, or they may fill with fluid. It can happen to part or all of one or both lungs. Lying on your side can also help your lungs work better. Part of your time in bed will be spent on your side as well.  If you're in the hospital  If you re awake and still in the hospital, the healthcare team will help position you if needed. They can show you the safest ways to turn over while you re connected to tubes such as an IV. Your blood oxygen level (oxygen saturation, or O2 sat) may be checked often. This is to make sure your lungs are getting enough oxygen into your body. Your O2 sat level may be checked after each time you change position.  Getting ready for proning at home  Before you do prone positioning at home, tell your healthcare provider if you have any of these:    Neck, spine, or  pelvic pain or other problems    Acid reflux from your stomach (heartburn or GERD)    Nausea or vomiting  You ll need:    A bed surface that can be flat    Pillows    A towel you can roll up  Before you get into bed:    Use the bathroom. This is so you don t have to get up soon after you re lying down.    Make sure you have things in reach that you need. This includes your phone, TV remote, book or magazine, or a bell to ring for a family member.  Changing positions over time  You will need to cycle through these positions. Repeat this cycle as often as your healthcare team advises. Do them in this order:  Proning cycle       Position 1: Belly. Lie on your belly on a flat bed. Do this for 30 minutes to 2 hours.       Position 2: Right side. Lie on your right side on a flat bed. Do this for 30 minutes to 2 hours.         Position 3: Sitting up. Sit up in bed at a 30- to 60-degree angle. You can prop up the head of your bed with blocks, or prop yourself up in bed with pillows. Do this for 30 minutes to 2 hours.       Position 4: Left side. Lie on your left side on a flat bed. Do this for 30 minutes to 2 hours.         Position 5: Belly. Lie on your belly on a flat bed. Do this for 30 minutes to 2 hours.        Making proning more comfortable for you    Place pillows under your hips or lower legs for comfort as needed. If you have large breasts or a large belly, you can place pillows on your upper chest and pelvis to help support these areas while prone.    Put a pillow under your head. Turn your head from side to side at least once every 30 minutes, or more often as needed.    If you have neck problems, you can fold a towel into a horseshoe shape to support your face. This will let you lie face down without turning your head to the side.    Try putting your arms in different positions to see what is most comfortable. To start, try putting 1 arm bent and the other straight.    Don t lie on your stomach if you ve just  had a meal. This can cause stomach acid reflux. Try to wait a couple of hours after eating before you lie on your belly.    Change position slowly and carefully. If you have an IV or other tubes, make sure to not pull on them.    Change position more often if you are at risk for pressure sores (ulcers).    Follow your healthcare team's instructions  The information above is a guideline. Make sure to follow your healthcare team s specific instructions. They may tell you:    When to do proning    How often to do proning    How often to change position  When to call your healthcare provider  Call your healthcare provider if you have any of these:    Breathing that gets worse    New or worse neck, spine, or pelvic pain from proning    New or worse acid reflux    New chest pain  [employer] last reviewed this educational content on 6/1/2020 2000-2020 [employer]. [address] [place] [address], [place] [address]. All rights reserved. This information is not intended as a substitute for professional medical care. Always follow your healthcare professional's instructions.

## 2020-11-25 ENCOUNTER — TELEPHONE (OUTPATIENT)
Dept: SURGERY | Facility: CLINIC | Age: 40
End: 2020-11-25

## 2020-11-27 ENCOUNTER — MYC MEDICAL ADVICE (OUTPATIENT)
Dept: FAMILY MEDICINE | Facility: CLINIC | Age: 40
End: 2020-11-27

## 2020-11-27 NOTE — LETTER
[address] [address] [phone]          November 30, 2020    RE:  Keith Spangler                                                                                                                                                       [address] [facility] [id number]      To whom it may concern:    This is an appeal for approval of sinus/nasal surgery for my patient Keith Spangler.    I am informed that his insurance company has chosen to deny coverage of nasal fracture and sinus surgery. I can only assume this is related to cost savings for the insurance company rather than providing good, evidence based care for a patient.     Mr. Spangler has had multiple nasal fractures. He had a CT scan showing a good deal of deformity within his nasal cavity. This deformity has resulted in nasal obstruction and poor airflow. It has also lead to increased sinus infections. Generally, I've treated him a few times a year with oral steroids and antibiotics to treat his chronic sinus issues. In other words, this is not a cosmetic request, it is a medical request and is medically necessary.    He has tried nasal saline rinses extensively, he has tried nasal steroid of multiple brands, types and for many months without improvement (nasal steroids don't treat deformities of the bone, interestingly). He has tried nasal antihistamines topically which did not relieve things (nasal topical antihistamines don't treat deformities of the bone, interestingly). He has tried multiple oral antihistamines, oral steroids and oral antibiotics (oral medications don't treat deformities of the bone, interestingly.)     I referred him for surgical evaluation for this surgical issue because I know that he requires surgical intervention for what is clearly a surgical issue and has not resolved with multiple years of topical and oral medications. His surgeon that  evaluated him upon doing the CT scan and upon examination also felt that surgical intervention is required to improve his symptoms because interestingly, topical and oral medications don't fix bony deformities that are the result of nasal fractures. He has an increased need for antibiotic and steroid regimens to keep his symptoms at bay. With surgery, he will likely no longer require these interventions.      If you have a medication your medical professionals would recommend that can fix bony deformities that are the result of injuries, we are more than happy to try this. But, as of now, we aren't aware of such a fix.     Otherwise, I think we've done every single thing we possibly can for Mr. Spangler that doesn't involve surgery over the course of multiple years. At this time, this is still medically necessary, just like it was when he saw the surgeon.    We've been receiving more questionable denials from [employer] [place] lately. During a time of a global pandemic when clinicians are barely hanging on, we have more important things to do than filling out appeal requests for things that are clearly medically required.     Sincerely,        Joseph Adams

## 2020-11-27 NOTE — TELEPHONE ENCOUNTER
Routing MYChart message to PCP.    Pt is required by his insurance to have exhausted all non surgical options before they approve surgery for his nose.      Jie Alvarenga RN

## 2020-11-30 ENCOUNTER — TELEPHONE (OUTPATIENT)
Dept: SURGERY | Facility: CLINIC | Age: 40
End: 2020-11-30

## 2020-11-30 NOTE — TELEPHONE ENCOUNTER
Called the , Whitney, and left a voicemail asking her to return my call to the TC line, [phone], to let us know what fax number or email address we can send this appeal letter to.    Sebas Keating

## 2020-11-30 NOTE — TELEPHONE ENCOUNTER
called patient, told him, i will wait on documentation from Dr Adams, dr Perez, also left a VM with [employer] nurse reviewer-once all this is received, i will request an appeal

## 2020-12-01 NOTE — TELEPHONE ENCOUNTER
Whitney returns my call to the  voicemail and left a message with her fax numbers.    Faxed Sanjay Adams' letter to [phone] and [phone]    Sebas Keating

## 2020-12-04 ENCOUNTER — TELEPHONE (OUTPATIENT)
Dept: SURGERY | Facility: CLINIC | Age: 40
End: 2020-12-04

## 2020-12-04 NOTE — TELEPHONE ENCOUNTER
filed out an appeal form, included all the documentation, photos, etc, faxed to [employer]-called patient and informed him of this-for  septorhinoplasty

## 2021-01-05 ENCOUNTER — TELEPHONE (OUTPATIENT)
Dept: SURGERY | Facility: CLINIC | Age: 41
End: 2021-01-05

## 2021-01-05 NOTE — TELEPHONE ENCOUNTER
received a vm from patient appeal overturned and approved, ok to schedule, date span is 10/16/20-6/15/21

## 2021-01-12 ENCOUNTER — PATIENT OUTREACH (OUTPATIENT)
Dept: PLASTIC SURGERY | Facility: CLINIC | Age: 41
End: 2021-01-12

## 2021-01-12 NOTE — PATIENT INSTRUCTIONS
Spoke with pt and explained that we can proceed with surgery but that Dr. Perez would like to see pt back to discuss what surgery would entail. Pt scheduled for virtual consult on 1/15/21 at 2:45pm. Pt confirms appt date, time, and location. Nisa DONAHUE RN, BSN

## 2021-01-15 ENCOUNTER — HEALTH MAINTENANCE LETTER (OUTPATIENT)
Age: 41
End: 2021-01-15

## 2021-01-15 ENCOUNTER — OFFICE VISIT (OUTPATIENT)
Dept: PLASTIC SURGERY | Facility: CLINIC | Age: 41
End: 2021-01-15
Payer: COMMERCIAL

## 2021-01-15 VITALS
BODY MASS INDEX: 28.79 KG/M2 | SYSTOLIC BLOOD PRESSURE: 127 MMHG | OXYGEN SATURATION: 97 % | WEIGHT: 190 LBS | HEIGHT: 68 IN | DIASTOLIC BLOOD PRESSURE: 80 MMHG | HEART RATE: 49 BPM

## 2021-01-15 DIAGNOSIS — J34.89 REFRACTORY OBSTRUCTION OF NASAL AIRWAY: Primary | ICD-10-CM

## 2021-01-15 PROCEDURE — 99213 OFFICE O/P EST LOW 20 MIN: CPT | Performed by: PLASTIC SURGERY

## 2021-01-15 ASSESSMENT — MIFFLIN-ST. JEOR: SCORE: 1741.33

## 2021-01-15 ASSESSMENT — PAIN SCALES - GENERAL: PAINLEVEL: NO PAIN (0)

## 2021-01-15 NOTE — NURSING NOTE
"Chief Complaint   Patient presents with     RECHECK     Pt here today to discuss surgical planning.        Vitals:    01/15/21 1448   BP: 127/80   BP Location: Left arm   Patient Position: Sitting   Cuff Size: Adult Large   Pulse: (!) 49   SpO2: 97%   Weight: 86.2 kg (190 lb)   Height: 1.727 m (5' 8\")       Body mass index is 28.89 kg/m .    Manish Chin LPN      "

## 2021-01-15 NOTE — LETTER
1/15/2021       RE: Keith Spangler  [address] [address]     Dear Colleague,    Thank you for referring your patient, Keith Spangler, to the [facility] PLASTIC AND RECONSTRUCTIVE SURGERY CLINIC [place] at [facility]. Please see a copy of my visit note below.    PLASTIC SURGERY OUTPATIENT NOTE     SUBJECTIVE  Pt returns today for re-consultation. Discussed that breathing is mildly improved with new medications. Still endorses congestion when in lateral decubitus position.    PHYSICAL EXAM  Glabella: appropriately prominent  Radix/Bony upper third: obtuse naso-frontal angle with high radix, RIGHTward bony deviation, wide bony vault  Midvault: widened, deviated to RIGHT, palpable contour irregularities  Dorsum: Saddle deformity   Tip: Boxy, appropriate rotation/projection, depresses quite easily with slow return, minimal support  Columella: non-deviated, appropriate width  Alar rims: Convex with some RIGHT-sided vestibular collapse on inspiration  Nasal sill: Symmetric  Septum: Non-deviated, no perforations  Internal valve: Minimally improved on [place] maneuver (R > L)  Turbinates: Non-hypertrophic  Chin: No micro/macrogenia    CT Face 7/17: Healed comminuted nasal bone fractures with chronic RIGHTWard deviation, septum midline, there is an ossified (presumed columellar) graft, no mucosal enhancement of turbinates    ASSESSMENT/PLAN  40 year old gentleman with 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 After further discussion, patient revealed that he had previously undergone a rhinoplasty at an unspecified time. This question was prompted by finding of ossified graft on his CT scan. We will request operative notes. Pt mentioned this being completed at [facility].     Current surgical plan may include open technique, dorsal onlay rib cartilage graft,  asymmetric lateral/para-median osteotomies with LEFT out-fracture and RIGHT in-fracture,  flaps/grafts to address internal valve, removal of prior columellar graft, possible septal extension graft versus extended  grafts and columellar strut, tip sutures, turbinate outfracture. This was discussed at length with patient, with risks including dissatisfaction with result, cartilage warping, need for further revisions, and lack of improvement or worsening of his airway symptoms.    We will defer final operative plan until review of this note. Depending on findings, we may refer him to facial plastics at [facility] given their relative experience and expertise with revision rhinoplasty.     Total time spent with for this visit was 20 minutes, including review of documentation, counseling/discussion with patient, documentation, and organizing any potential follow-up.    Chiquis Perez MD  Pediatric Cleft and Craniofacial Surgery  Division of Plastic Surgery  Pager: [phone]

## 2021-01-17 NOTE — PROGRESS NOTES
PLASTIC SURGERY OUTPATIENT NOTE     SUBJECTIVE  Pt returns today for re-consultation. Discussed that breathing is mildly improved with new medications. Still endorses congestion when in lateral decubitus position.    PHYSICAL EXAM  Glabella: appropriately prominent  Radix/Bony upper third: obtuse naso-frontal angle with high radix, RIGHTward bony deviation, wide bony vault  Midvault: widened, deviated to RIGHT, palpable contour irregularities  Dorsum: Saddle deformity   Tip: Boxy, appropriate rotation/projection, depresses quite easily with slow return, minimal support  Columella: non-deviated, appropriate width  Alar rims: Convex with some RIGHT-sided vestibular collapse on inspiration  Nasal sill: Symmetric  Septum: Non-deviated, no perforations  Internal valve: Minimally improved on Pierce maneuver (R > L)  Turbinates: Non-hypertrophic  Chin: No micro/macrogenia    CT Face 7/17: Healed comminuted nasal bone fractures with chronic RIGHTWard deviation, septum midline, there is an ossified (presumed columellar) graft, no mucosal enhancement of turbinates    ASSESSMENT/PLAN  40 year old gentleman with 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 After further discussion, patient revealed that he had previously undergone a rhinoplasty at an unspecified time. This question was prompted by finding of ossified graft on his CT scan. We will request operative notes. Pt mentioned this being completed at [facility].     Current surgical plan may include open technique, dorsal onlay rib cartilage graft, asymmetric lateral/para-median osteotomies with LEFT out-fracture and RIGHT in-fracture,  flaps/grafts to address internal valve, removal of prior columellar graft, possible septal extension graft versus extended  grafts and columellar strut, tip sutures, turbinate outfracture. This was discussed at length with patient, with risks  including dissatisfaction with result, cartilage warping, need for further revisions, and lack of improvement or worsening of his airway symptoms.    We will defer final operative plan until review of this note. Depending on findings, we may refer him to facial plastics at [facility] given their relative experience and expertise with revision rhinoplasty.     Total time spent with for this visit was 20 minutes, including review of documentation, counseling/discussion with patient, documentation, and organizing any potential follow-up.    Chiquis Perez MD  Pediatric Cleft and Craniofacial Surgery  Division of Plastic Surgery  Pager: [phone]

## 2021-01-27 ENCOUNTER — PATIENT OUTREACH (OUTPATIENT)
Dept: PLASTIC SURGERY | Facility: CLINIC | Age: 41
End: 2021-01-27

## 2021-01-27 DIAGNOSIS — J34.89 REFRACTORY OBSTRUCTION OF NASAL AIRWAY: Primary | ICD-10-CM

## 2021-01-27 NOTE — PATIENT INSTRUCTIONS
Spoke with pt and relayed per Dr. Perez after review of op note it appears initial septorhinoplasty was pretty involved. He feels it is safest to defer this case to ENT since they have more experience with revision (secondary) rhinoplasty.Pt states understanding and requests referral to ENT be placed. Nisa DONAHUE RN, BSN

## 2021-01-28 ENCOUNTER — TELEPHONE (OUTPATIENT)
Dept: OTOLARYNGOLOGY | Facility: CLINIC | Age: 41
End: 2021-01-28

## 2021-01-28 NOTE — TELEPHONE ENCOUNTER
LVM for pt to schedule appt with Dr. Johnson for refractory obstruction of nasal airway per referral from Dr. Perez.    Left call center number for scheduling.

## 2021-01-28 NOTE — TELEPHONE ENCOUNTER
FUTURE VISIT INFORMATION      FUTURE VISIT INFORMATION:    Date: 2/17/21    Time: 1:20 PM    [place] [facility]  REFERRAL INFORMATION:    Referring provider:  Dr. Chiquis Perez    Referring providers clinic:  [facility] - Plastics    Reason for visit/diagnosis: Refractory Obstruction of Nasal Airway/Revision Rhinoplasty    RECORDS REQUESTED FROM:       Clinic name Comments Records Status Imaging Status   [facility] 1/15/21, 8/14/20 - PLASTICS OV with Dr. Perez  7/17/20 - PULM OV with Dr. Badillo  6/5/20 - SLEEP VV with Dr. Rodriges [facility] 11/6/20 - [facility] VV with Dr. Pedroza  2/27/20 -  OV with KAN Larry  2/25/20 -  OV with Dr. Elinor Marin    [facility] - Imaging 7/17/20 - CT Facial Bones WO [facility]         [facility] 7/24/08 - OV with Dr. Reese Scanned In    [facility] - Procedure 7/11/08 - OP Note for NASAL VALVE RECONSTRUCTIONS, BILATERAL SEPTAL GRAFTS, SEPTOPLASTY, BILATERAL INFERIOR TURBINATE CAUTERY with Dr. Reese Scanned In

## 2021-02-17 ENCOUNTER — PRE VISIT (OUTPATIENT)
Dept: OTOLARYNGOLOGY | Facility: CLINIC | Age: 41
End: 2021-02-17

## 2021-02-17 ENCOUNTER — OFFICE VISIT (OUTPATIENT)
Dept: OTOLARYNGOLOGY | Facility: CLINIC | Age: 41
End: 2021-02-17
Attending: PLASTIC SURGERY
Payer: COMMERCIAL

## 2021-02-17 VITALS — BODY MASS INDEX: 29.1 KG/M2 | WEIGHT: 192 LBS | TEMPERATURE: 97.1 F | HEIGHT: 68 IN

## 2021-02-17 DIAGNOSIS — M95.0 ACQUIRED NASAL DEFORMITY: ICD-10-CM

## 2021-02-17 DIAGNOSIS — Z87.81 HISTORY OF FRACTURE OF NOSE: ICD-10-CM

## 2021-02-17 DIAGNOSIS — J34.2 DEVIATED NASAL SEPTUM: ICD-10-CM

## 2021-02-17 DIAGNOSIS — J34.829 NASAL VALVE COLLAPSE: ICD-10-CM

## 2021-02-17 DIAGNOSIS — J34.89 NASAL OBSTRUCTION: Primary | ICD-10-CM

## 2021-02-17 PROCEDURE — 99215 OFFICE O/P EST HI 40 MIN: CPT | Performed by: OTOLARYNGOLOGY

## 2021-02-17 ASSESSMENT — PAIN SCALES - GENERAL: PAINLEVEL: NO PAIN (0)

## 2021-02-17 ASSESSMENT — MIFFLIN-ST. JEOR: SCORE: 1750.41

## 2021-02-17 NOTE — LETTER
"2/17/2021       RE: Keith Spangler  [address] [address]     Dear Colleague,    Thank you for referring your patient, Keith Spangler, to the [facility] EAR NOSE AND THROAT CLINIC [place] at [facility]. Please see a copy of my visit note below.    Facial Plastic and Reconstructive Surgery Consultation    Referring Provider:   Chiquis Perez MD  [address] [place] [id number]    HPI:   I had the pleasure of seeing Keith Spangler today in clinic for consultation for nasal obstruction and acquired nasal deformity from multiple factors. 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      He was a boxer with multiple traumas and also a past surgical corrective approach.  This was performed in 2008 and the note demonstrates that he had nasal valve reconstruction with bilateral septal grafts septoplasty and bilateral inferior turbinate cautery.  He does not recall that the nose was open at that time.  The surgical note describes a septoplasty in addition to medial and lateral osteotomies and\" elevation of the left nasal valve\".  He had a left  graft placed and a small right  graft placed at the right internal valve.  This appears to have been done endonasally.     He states after this correction he also been suffered additional injuries to his  nose.  He has since retired and is interested in having further corrective surgery for his nose.  He has had significant obstruction of his nasal breathing bilaterally.  If his cheeks are supported his breathing improves.  He also has significant deformity of the nasal bony vault.  He is interested in improving his breathing and correcting the deformity.    He was evaluated by Dr. Perez who found significant irregularity of the nasal bones in addition to significant loss " of dorsal projection resembling a saddle deformity.  A CT scan of the face was performed which demonstrated multiple healed comminuted fractures of the nasal bone with a chronic rightward deviation with what appears to be an ossified graft at the caudal septum/columella.        Review Of Systems  ROS: 10 point ROS neg other than the symptoms noted above in the HPI.    Patient Active Problem List   Diagnosis     CARDIOVASCULAR SCREENING; LDL GOAL LESS THAN 160     Acute bronchitis with bronchospasm     Mild persistent asthma without complication     Refractory obstruction of nasal airway     Past Surgical History:   Procedure Laterality Date     APPENDECTOMY  Oct. 1991     EYE SURGERY  2013 and 2014    PRK laser correction     HERNIA REPAIR  2002     SURGICAL HISTORY OF -       Thumb 2005-Pins placed      SURGICAL HISTORY OF -       ER visit, left upper eyelid laceration repair     SURGICAL HISTORY OF -       Septo/rhinoplasty of nose secondary to boxing injury     SURGICAL HISTORY OF -       Left hernia      Current Outpatient Medications   Medication Sig Dispense Refill     albuterol (PROAIR HFA/PROVENTIL HFA/VENTOLIN HFA) 108 (90 Base) MCG/ACT inhaler Inhale 2 puffs into the lungs every 6 hours as needed for shortness of breath / dyspnea 1 Inhaler 5     albuterol (PROVENTIL) (2.5 MG/3ML) 0.083% neb solution Take 1 vial (2.5 mg) by nebulization every 6 hours as needed for shortness of breath / dyspnea or wheezing 25 vial 5     budesonide (PULMICORT) 0.5 MG/2ML neb solution Take 2 mLs (0.5 mg) by nebulization daily 1 Box 5     budesonide-formoterol (SYMBICORT) 80-4.5 MCG/ACT Inhaler Inhale 2 puffs into the lungs 2 times daily as needed (wheeze or asthma exacerbation) 1 Inhaler 5     fluticasone (FLONASE) 50 MCG/ACT nasal spray Spray 2 sprays into both nostrils daily 16 g 2     IBUPROFEN PO        montelukast (SINGULAIR) 10 MG tablet Take 1 tablet (10 mg) by mouth At Bedtime 90 tablet 2     order for DME Equipment  being ordered: spacer for inhaler 1 Inhaler 0     Augmentin  Social History     Socioeconomic History     Marital status: Single     Spouse name: Not on file     Number of children: 1     Years of education: Not on file     Highest education level: Not on file   Occupational History     Employer: [employer]   Social Needs     Financial resource strain: Not on file     Food insecurity     Worry: Not on file     Inability: Not on file     Transportation needs     Medical: Not on file     Non-medical: Not on file   Tobacco Use     Smoking status: Never Smoker     Smokeless tobacco: Never Used     Tobacco comment: Mom smoked his upbringing   Substance and Sexual Activity     Alcohol use: Yes     Comment: Really rare now      Drug use: No     Sexual activity: Yes     Partners: Female     Birth control/protection: None   Lifestyle     Physical activity     Days per week: Not on file     Minutes per session: Not on file     Stress: Not on file   Relationships     Social connections     Talks on phone: Not on file     Gets together: Not on file     Attends Buddhist service: Not on file     Active member of club or organization: Not on file     Attends meetings of clubs or organizations: Not on file     Relationship status: Not on file     Intimate partner violence     Fear of current or ex partner: Not on file     Emotionally abused: Not on file     Physically abused: Not on file     Forced sexual activity: Not on file   Other Topics Concern     Parent/sibling w/ CABG, MI or angioplasty before 65F 55M? No   Social History Narrative    Dairy/d 1-3 servings/d.     Caffeine 1 servings/d    Exercise 5 x week boxer    Sunscreen used - No    Seatbelts used - Yes    Working smoke/CO detectors in the home - Yes    Guns stored in the home - No    Self Breast Exams - NOT APPLICABLE    Self Testicular Exam - Yes    Eye Exam up to date - Yes    Dental Exam up to date - Yes    Pap Smear up to date - NOT APPLICABLE    Mammogram up  to date - NOT APPLICABLE    PSA up to date - NO    Dexa Scan up to date - NOT APPLICABLE    Flex Sig / Colonoscopy up to date - Yes    Immunizations up to date - Yes    Abuse: Current or Past(Physical, Sexual or Emotional)- No    Do you feel safe in your environment - Yes         Family History   Problem Relation Age of Onset     Unknown/Adopted Mother         Diagnosed w/ALS June 2015     Sleep Apnea Brother      Gastrointestinal Disease Brother         colitis     C.A.D. No family hx of      Diabetes No family hx of      Hypertension No family hx of      Cerebrovascular Disease No family hx of      Breast Cancer No family hx of      Cancer - colorectal No family hx of      Prostate Cancer No family hx of      Asthma No family hx of        PE:  Alert and Oriented, Answering Questions Appropriately  Atraumatic, Normocephalic, Face Symmetric  Skin: Diamond 4  Facial Nerve Intact and facial movement symmetric  EOM's, PEERL  Nasal Exam: Significant acquired external nasal deformity.  The nasal projection has been significantly lost.  His nasal bony vault and mid vault are flat and wide.  The nasal bone on the right is significantly bowed flattened and shifted to the right.  Palpation demonstrates that there are potentially significant fibrous union's with multiple small pieces of fracture.  The left nasal bone is also fractured with loss of height.  He does not have a traditional saddlenose deformity but has significant loss of projection of the mid vault.  This leads to a scooped out profile.  His radix is prominent but the rest of the lower two thirds of the nose are significantly deep projected.  He has completely absent tip support and the tip of the nose can be collapse fully not to contact his upper lip.  The nasal valves are collapse bilaterally due to the dorsal height loss of the mid vault.  Modified Clarke maneuver with the Q-tip leads to improvement in his nasal.  There is a dorsal septal deflection on  the right stenosing the right internal nasal valve.  Breathing.  Chin: Normal   Neck: No lymphadenopathy, no thyromegaly, trachea midline  Chest: No wheezing, cyanosis, or stridor  Card: Normal upper extremity pulses and capillary refill, not diaphoretic  Neuro/Psych: CN's 2-12 intact, Moves all extremities, ambulation in intact, positive affect, no notable muscle weakness            Imaging: CT scan of the face reviewed as noted above.      IMPRESSION:    Nasal obstruction  History of multiple nasal fractures  Acquired nasal deformity  Nasal valve collapse, bilaterally.  Dorsal septal deflection on the right.      PLAN:    Keith Spangler presents today for discussion of nasal reconstruction.  This would be quite beneficial for him as he has significant nasal deformity leading to functional dysfunction of his nose.  He has lost the support the bony vault and the cartilaginous mid vault leading to collapse of the nose and obstruction of the nasal valves bilaterally.  He has significant asymmetry of the nasal bones and he has a significant bowing of flattening of the nasal bones at the upper third.  The right nasal bone is flat and wide and shifted to the right.    He will require osteovitamins to mobilize the nasal bones medially.  The right nasal bone will require also an intermediate osteotomy to help straighten that.  I discussed with him that this will be quite challenging as this appears to have fibrous union from comminution's.  I do think that mobilization can be achieved.  He will need to improve the projection of his nose in order to support best internal nasal valves bilaterally.  He would need a significantly strong  graft and caudal graft to recreate the L strut.  This will be important for tip support also.  This would have to be performed with rib cartilage.  I discussed with the options of cadaveric rib cartilage or autologous rib cartilage and he would like to proceed with cadaveric rib  cartilage.    We discussed that he has a significantly thick soft tissue envelope and thus will have significant edema.  This will take some time to resolve.  We also discussed that he will need additional support to his nose and thus this will alter his appearance and profile.  This is necessary for support of the nasal framework for breathing.    All of his questions were answered today.    Photodocumentation was obtained.     I spent a total of 45 minutes face-to-face with Keith Spangler during today's office visit.  Over 50% of this time was spent counseling the patient and/or coordinating care regarding nasal reconstruction for his significant loss of nasal framework for support of breathing, time was taken to review scan and records.  See note for details.      Again, thank you for allowing me to participate in the care of your patient.      Sincerely,    Becki Johnson MD

## 2021-02-17 NOTE — PROGRESS NOTES
"Facial Plastic and Reconstructive Surgery Consultation    Referring Provider:   Chiquis Perez MD  [address] [facility] [address]  [place] [address]    HPI:   I had the pleasure of seeing Keith Spangler today in clinic for consultation for nasal obstruction and acquired nasal deformity from multiple factors. 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      He was a boxer with multiple traumas and also a past surgical corrective approach.  This was performed in 2008 and the note demonstrates that he had nasal valve reconstruction with bilateral septal grafts septoplasty and bilateral inferior turbinate cautery.  He does not recall that the nose was open at that time.  The surgical note describes a septoplasty in addition to medial and lateral osteotomies and\" elevation of the left nasal valve\".  He had a left  graft placed and a small right  graft placed at the right internal valve.  This appears to have been done endonasally.     He states after this correction he also been suffered additional injuries to his  nose.  He has since retired and is interested in having further corrective surgery for his nose.  He has had significant obstruction of his nasal breathing bilaterally.  If his cheeks are supported his breathing improves.  He also has significant deformity of the nasal bony vault.  He is interested in improving his breathing and correcting the deformity.    He was evaluated by Dr. Perez who found significant irregularity of the nasal bones in addition to significant loss of dorsal projection resembling a saddle deformity.  A CT scan of the face was performed which demonstrated multiple healed comminuted fractures of the nasal bone with a chronic rightward deviation with what appears to be an ossified graft at the caudal septum/columella.        Review Of Systems  ROS: 10 point ROS neg other than the " symptoms noted above in the HPI.    Patient Active Problem List   Diagnosis     CARDIOVASCULAR SCREENING; LDL GOAL LESS THAN 160     Acute bronchitis with bronchospasm     Mild persistent asthma without complication     Refractory obstruction of nasal airway     Past Surgical History:   Procedure Laterality Date     APPENDECTOMY  Oct. 1991     EYE SURGERY  2013 and 2014    PRK laser correction     HERNIA REPAIR  2002     SURGICAL HISTORY OF -       Thumb 2005-Pins placed      SURGICAL HISTORY OF -       ER visit, left upper eyelid laceration repair     SURGICAL HISTORY OF -       Septo/rhinoplasty of nose secondary to boxing injury     SURGICAL HISTORY OF -       Left hernia      Current Outpatient Medications   Medication Sig Dispense Refill     albuterol (PROAIR HFA/PROVENTIL HFA/VENTOLIN HFA) 108 (90 Base) MCG/ACT inhaler Inhale 2 puffs into the lungs every 6 hours as needed for shortness of breath / dyspnea 1 Inhaler 5     albuterol (PROVENTIL) (2.5 MG/3ML) 0.083% neb solution Take 1 vial (2.5 mg) by nebulization every 6 hours as needed for shortness of breath / dyspnea or wheezing 25 vial 5     budesonide (PULMICORT) 0.5 MG/2ML neb solution Take 2 mLs (0.5 mg) by nebulization daily 1 Box 5     budesonide-formoterol (SYMBICORT) 80-4.5 MCG/ACT Inhaler Inhale 2 puffs into the lungs 2 times daily as needed (wheeze or asthma exacerbation) 1 Inhaler 5     fluticasone (FLONASE) 50 MCG/ACT nasal spray Spray 2 sprays into both nostrils daily 16 g 2     IBUPROFEN PO        montelukast (SINGULAIR) 10 MG tablet Take 1 tablet (10 mg) by mouth At Bedtime 90 tablet 2     order for DME Equipment being ordered: spacer for inhaler 1 Inhaler 0     Augmentin  Social History     Socioeconomic History     Marital status: Single     Spouse name: Not on file     Number of children: 1     Years of education: Not on file     Highest education level: Not on file   Occupational History     Employer: [employer]   Social Needs      Financial resource strain: Not on file     Food insecurity     Worry: Not on file     Inability: Not on file     Transportation needs     Medical: Not on file     Non-medical: Not on file   Tobacco Use     Smoking status: Never Smoker     Smokeless tobacco: Never Used     Tobacco comment: Mom smoked his upbringing   Substance and Sexual Activity     Alcohol use: Yes     Comment: Really rare now      Drug use: No     Sexual activity: Yes     Partners: Female     Birth control/protection: None   Lifestyle     Physical activity     Days per week: Not on file     Minutes per session: Not on file     Stress: Not on file   Relationships     Social connections     Talks on phone: Not on file     Gets together: Not on file     Attends Sikhism service: Not on file     Active member of club or organization: Not on file     Attends meetings of clubs or organizations: Not on file     Relationship status: Not on file     Intimate partner violence     Fear of current or ex partner: Not on file     Emotionally abused: Not on file     Physically abused: Not on file     Forced sexual activity: Not on file   Other Topics Concern     Parent/sibling w/ CABG, MI or angioplasty before 65F 55M? No   Social History Narrative    Dairy/d 1-3 servings/d.     Caffeine 1 servings/d    Exercise 5 x week boxer    Sunscreen used - No    Seatbelts used - Yes    Working smoke/CO detectors in the home - Yes    Guns stored in the home - No    Self Breast Exams - NOT APPLICABLE    Self Testicular Exam - Yes    Eye Exam up to date - Yes    Dental Exam up to date - Yes    Pap Smear up to date - NOT APPLICABLE    Mammogram up to date - NOT APPLICABLE    PSA up to date - NO    Dexa Scan up to date - NOT APPLICABLE    Flex Sig / Colonoscopy up to date - Yes    Immunizations up to date - Yes    Abuse: Current or Past(Physical, Sexual or Emotional)- No    Do you feel safe in your environment - Yes         Family History   Problem Relation Age of Onset      Unknown/Adopted Mother         Diagnosed w/ALS June 2015     Sleep Apnea Brother      Gastrointestinal Disease Brother         colitis     C.A.D. No family hx of      Diabetes No family hx of      Hypertension No family hx of      Cerebrovascular Disease No family hx of      Breast Cancer No family hx of      Cancer - colorectal No family hx of      Prostate Cancer No family hx of      Asthma No family hx of        PE:  Alert and Oriented, Answering Questions Appropriately  Atraumatic, Normocephalic, Face Symmetric  Skin: Diamond 4  Facial Nerve Intact and facial movement symmetric  EOM's, PEERL  Nasal Exam: Significant acquired external nasal deformity.  The nasal projection has been significantly lost.  His nasal bony vault and mid vault are flat and wide.  The nasal bone on the right is significantly bowed flattened and shifted to the right.  Palpation demonstrates that there are potentially significant fibrous union's with multiple small pieces of fracture.  The left nasal bone is also fractured with loss of height.  He does not have a traditional saddlenose deformity but has significant loss of projection of the mid vault.  This leads to a scooped out profile.  His radix is prominent but the rest of the lower two thirds of the nose are significantly deep projected.  He has completely absent tip support and the tip of the nose can be collapse fully not to contact his upper lip.  The nasal valves are collapse bilaterally due to the dorsal height loss of the mid vault.  Modified [place] maneuver with the Q-tip leads to improvement in his nasal.  There is a dorsal septal deflection on the right stenosing the right internal nasal valve.  Breathing.  Chin: Normal   Neck: No lymphadenopathy, no thyromegaly, trachea midline  Chest: No wheezing, cyanosis, or stridor  Card: Normal upper extremity pulses and capillary refill, not diaphoretic  Neuro/Psych: CN's 2-12 intact, Moves all extremities, ambulation in intact,  positive affect, no notable muscle weakness            Imaging: CT scan of the face reviewed as noted above.      IMPRESSION:    Nasal obstruction  History of multiple nasal fractures  Acquired nasal deformity  Nasal valve collapse, bilaterally.  Dorsal septal deflection on the right.      PLAN:    Keith Spangler presents today for discussion of nasal reconstruction.  This would be quite beneficial for him as he has significant nasal deformity leading to functional dysfunction of his nose.  He has lost the support the bony vault and the cartilaginous mid vault leading to collapse of the nose and obstruction of the nasal valves bilaterally.  He has significant asymmetry of the nasal bones and he has a significant bowing of flattening of the nasal bones at the upper third.  The right nasal bone is flat and wide and shifted to the right.    He will require osteovitamins to mobilize the nasal bones medially.  The right nasal bone will require also an intermediate osteotomy to help straighten that.  I discussed with him that this will be quite challenging as this appears to have fibrous union from comminution's.  I do think that mobilization can be achieved.  He will need to improve the projection of his nose in order to support best internal nasal valves bilaterally.  He would need a significantly strong  graft and caudal graft to recreate the L strut.  This will be important for tip support also.  This would have to be performed with rib cartilage.  I discussed with the options of cadaveric rib cartilage or autologous rib cartilage and he would like to proceed with cadaveric rib cartilage.    We discussed that he has a significantly thick soft tissue envelope and thus will have significant edema.  This will take some time to resolve.  We also discussed that he will need additional support to his nose and thus this will alter his appearance and profile.  This is necessary for support of the nasal framework  for breathing.    All of his questions were answered today.    Photodocumentation was obtained.     I spent a total of 45 minutes face-to-face with Keith Spangler during today's office visit.  Over 50% of this time was spent counseling the patient and/or coordinating care regarding nasal reconstruction for his significant loss of nasal framework for support of breathing, time was taken to review scan and records.  See note for details.

## 2021-02-18 RX ORDER — CLINDAMYCIN PHOSPHATE 900 MG/50ML
900 INJECTION, SOLUTION INTRAVENOUS SEE ADMIN INSTRUCTIONS
Status: CANCELLED | OUTPATIENT
Start: 2021-02-18

## 2021-02-18 RX ORDER — CLINDAMYCIN PHOSPHATE 900 MG/50ML
900 INJECTION, SOLUTION INTRAVENOUS
Status: CANCELLED | OUTPATIENT
Start: 2021-02-18

## 2021-02-18 NOTE — NURSING NOTE
Photodocumentation obtained.    Will work to obtain PA for Septorhinoplasty, Cadaveric Rib Graft, Possible Conchal Cartilage Graft.    Completed nasal surgery education with pt and gave written materials on what to expect post-op, as well as a list of medications to avoid prior to surgery.    Pt has mild GOLDIE and will need to be cleared be PAC Clinic for the [facility].    Patricia Sherwood RN  2/18/2021 4:40 PM

## 2021-02-19 PROBLEM — J34.2 DEVIATED NASAL SEPTUM: Status: ACTIVE | Noted: 2021-02-19

## 2021-02-19 PROBLEM — Z87.81 HISTORY OF FRACTURE OF NOSE: Status: ACTIVE | Noted: 2021-02-19

## 2021-02-26 ENCOUNTER — TELEPHONE (OUTPATIENT)
Dept: OTOLARYNGOLOGY | Facility: CLINIC | Age: 41
End: 2021-02-26

## 2021-02-26 DIAGNOSIS — J45.30 MILD PERSISTENT ASTHMA WITHOUT COMPLICATION: ICD-10-CM

## 2021-02-26 RX ORDER — ALBUTEROL SULFATE 0.83 MG/ML
SOLUTION RESPIRATORY (INHALATION)
Qty: 75 ML | Refills: 5 | Status: SHIPPED | OUTPATIENT
Start: 2021-02-26 | End: 2021-11-10

## 2021-02-26 NOTE — TELEPHONE ENCOUNTER
Routing refill request to provider for review/approval because:   Patient needs to be seen because:  No 6 month f/u, no recent ACT assessment score    ACT Total Scores 7/8/2019 12/26/2019 3/20/2020   ACT TOTAL SCORE (Goal Greater than or Equal to 20) 14 13 15   In the past 12 months, how many times did you visit the emergency room for your asthma without being admitted to the hospital? 0 0 0   In the past 12 months, how many times were you hospitalized overnight because of your asthma? 0 0 0       Diamond Crews RN

## 2021-02-26 NOTE — TELEPHONE ENCOUNTER
Left message regarding scheduling surgery with Dr. Lorenzo Bain. Writer left call back number on the patients voicemail.       Keri Cade on 2/26/2021 at 4:39 PM   P: [phone]

## 2021-03-02 NOTE — TELEPHONE ENCOUNTER
FUTURE VISIT INFORMATION      SURGERY INFORMATION:    Date:5.27.21    [place] [facility] [place]    Surgeon:  Dr. Johnson    Anesthesia Type:  General    Procedure: Septorhinoplasty, Cadaveric Rib Graft, Possible Conchal Cartilage Graft    Consult: 2.17.21    RECORDS REQUESTED FROM:       Primary Care Provider: Bryan    Most recent PFT's: 7.17.20     Most recent Sleep Study:  9.23.20

## 2021-03-04 NOTE — TELEPHONE ENCOUNTER
Patient called to discuss scheduling surgery with Dr. Lorenzo Bain. Patient desires to be scheduled in May 2021    Surgeon: Dr. Lorenzo Bain  Date of Surgery: 5/27/2021  Location of surgery: [facility]   Pre-Op H&P: PAC appt scheduled for patient. Requested by provider  Post-Op Appt Date: 1 week RN  Imaging needed:  Yes  Discussed COVID-19 testing:  Yes discussed the need for covid-19 testing regardless of immunization and previously testing positive. Patient states he tested positive in Feb 2020 and fall of 2020. Informed patient of 90 day period  Pre-cert/Authorization completed:  Yes patient is aware that insurance was reviewed no prior auth needed.  Packet sent out: Yes, patient instructed to review packet and contact clinic if needed.     Patient will follow up in the [facility] in [place] in April. Encouraged patient to discuss with Dr. Lorenzo Bain and Patricia at that appt if any concerns or questions prior to surgery.   He has no further questions at this time.

## 2021-03-16 ENCOUNTER — MYC MEDICAL ADVICE (OUTPATIENT)
Dept: FAMILY MEDICINE | Facility: CLINIC | Age: 41
End: 2021-03-16

## 2021-03-17 NOTE — TELEPHONE ENCOUNTER
RN replied to patient via [employer]. See message for details.     Mehul French RN, BSN, PHN  [facility]: [place]

## 2021-03-25 ENCOUNTER — ALLIED HEALTH/NURSE VISIT (OUTPATIENT)
Dept: NURSING | Facility: CLINIC | Age: 41
End: 2021-03-25
Payer: COMMERCIAL

## 2021-03-25 DIAGNOSIS — Z23 NEED FOR VACCINATION: Primary | ICD-10-CM

## 2021-03-25 PROCEDURE — 90471 IMMUNIZATION ADMIN: CPT

## 2021-03-25 PROCEDURE — 90732 PPSV23 VACC 2 YRS+ SUBQ/IM: CPT

## 2021-03-25 PROCEDURE — 99207 PR NO CHARGE NURSE ONLY: CPT

## 2021-03-25 NOTE — NURSING NOTE
Prior to immunization administration, verified patients identity using patient s name and date of birth. Please see Immunization Activity for additional information.     Screening Questionnaire for Adult Immunization    Are you sick today?   No   Do you have allergies to medications, food, a vaccine component or latex?   No   Have you ever had a serious reaction after receiving a vaccination?   No   Do you have a long-term health problem with heart, lung, kidney, or metabolic disease (e.g., diabetes), asthma, a blood disorder, no spleen, complement component deficiency, a cochlear implant, or a spinal fluid leak?  Are you on long-term aspirin therapy?   Yes-ASTHMA   Do you have cancer, leukemia, HIV/AIDS, or any other immune system problem?   No   Do you have a parent, brother, or sister with an immune system problem?   No   In the past 3 months, have you taken medications that affect  your immune system, such as prednisone, other steroids, or anticancer drugs; drugs for the treatment of rheumatoid arthritis, Crohn s disease, or psoriasis; or have you had radiation treatments?   No   Have you had a seizure, or a brain or other nervous system problem?   No   During the past year, have you received a transfusion of blood or blood    products, or been given immune (gamma) globulin or antiviral drug?   No   For women: Are you pregnant or is there a chance you could become       pregnant during the next month?   No   Have you received any vaccinations in the past 4 weeks?   No     Immunization questionnaire was positive for at least one answer.  Notified PROVIDER AWARE.      Patient instructed to remain in clinic for 15 minutes afterwards, and to report any adverse reaction to me immediately.       Screening performed by Ondina Childress MA on 3/25/2021 at 2:18 PM.

## 2021-03-26 ASSESSMENT — ASTHMA QUESTIONNAIRES: ACT_TOTALSCORE: 11

## 2021-03-29 ENCOUNTER — TELEPHONE (OUTPATIENT)
Dept: OTOLARYNGOLOGY | Facility: CLINIC | Age: 41
End: 2021-03-29

## 2021-03-29 NOTE — TELEPHONE ENCOUNTER
Returned call to pt re: questions he has about his upcoming nasal surgery with Dr. Johnson.    Pt is planning on getting both doses of the COVID vaccine prior to surgery.  Pt was wondering if the COVID vaccine could skew the results of his pre-op COVID test.    I told the pt that based on what I've read, the COVID vaccine has not been known to cause false positive tests.    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 Johnson, as he may desire cosmetic changes to his nose during nasal surgery.    Patricia Sherwood RN  3/29/2021 10:53 AM

## 2021-04-16 ENCOUNTER — OFFICE VISIT (OUTPATIENT)
Dept: PLASTIC SURGERY | Facility: CLINIC | Age: 41
End: 2021-04-16
Payer: COMMERCIAL

## 2021-04-16 DIAGNOSIS — M95.0 NASAL DEFORMITY: ICD-10-CM

## 2021-04-16 DIAGNOSIS — J34.89 NASAL OBSTRUCTION: Primary | ICD-10-CM

## 2021-04-16 DIAGNOSIS — J34.2 DEVIATED NASAL SEPTUM: ICD-10-CM

## 2021-04-16 NOTE — PROGRESS NOTES
Facial Plastic and Reconstructive Surgery      Keith M Aníbal comes in today to discuss his nasal reconstruction further.  He has had multiple fractures with comminution with additional fibrous union noted on CT scan.  He has significant irregularity of the nasal bones with a quite prominent concavity and lateral displacement of the right nasal bone and collapse of the left nasal bone.  He has a significant severe septal deviation in addition to quite substantial mid vault collapse and loss of dorsal height.  He has absent nasal tip support and his nasal tip can be fully collapsed to his upper lip.  He has also has a thick skin and scarring in the soft tissue envelope as he has had an endonasal septorhinoplasty in the past.    He will require surgical correction which will require costochondral grafting and I discussed this with him and he is amenable to cadaveric rib grafting.  He understands that his nasal dorsum needs to be built up and that his L strut needs to be reconstructed.  I modeled this on his images today with the 3D morphing.  He agrees with the increased projection of his nasal profile.  He also understands with his thick nasal skin the nasal tip definition will be limited in addition to the fact that he has scar in that soft tissue envelope.he also understands the limitations that we have with healing in this complex nasal injury setting.    We discussed the perioperative time.  All his questions were answered.  We will work to schedule him in the future.    I spent a total of 30 minutes face-to-face with Keith M Aníbal during today's office visit.  Over 50% of this time was spent counseling the patient and/or coordinating care regarding complex nasal reconstruction.  See note for details.

## 2021-04-16 NOTE — LETTER
April 16, 2021  Re: Keith Spangler  1980    Dear Dr. Perez,    Thank you so much for referring Keith Spangler to the [facility]. I had the pleasure of visiting with Keith today.     Attached you will find a copy of my note. Please feel free to reach out to me with any questions, [phone].     Facial Plastic and Reconstructive Surgery      Keith Spangler comes in today to discuss his nasal reconstruction further.  He has had multiple fractures with comminution with additional fibrous union noted on CT scan.  He has significant irregularity of the nasal bones with a quite prominent concavity and lateral displacement of the right nasal bone and collapse of the left nasal bone.  He has a significant severe septal deviation in addition to quite substantial mid vault collapse and loss of dorsal height.  He has absent nasal tip support and his nasal tip can be fully collapsed to his upper lip.  He has also has a thick skin and scarring in the soft tissue envelope as he has had an endonasal septorhinoplasty in the past.    He will require surgical correction which will require costochondral grafting and I discussed this with him and he is amenable to cadaveric rib grafting.  He understands that his nasal dorsum needs to be built up and that his L strut needs to be reconstructed.  I modeled this on his images today with the 3D morphing.  He agrees with the increased projection of his nasal profile.  He also understands with his thick nasal skin the nasal tip definition will be limited in addition to the fact that he has scar in that soft tissue envelope.he also understands the limitations that we have with healing in this complex nasal injury setting.    We discussed the perioperative time.  All his questions were answered.  We will work to schedule him in the future.    I spent a total of 30 minutes face-to-face with Keith Spangler during today's office visit.  Over 50% of this time was spent  counseling the patient and/or coordinating care regarding complex nasal reconstruction.  See note for details.          Your trust in our practice and care is much appreciated.    Sincerely,  Becki Johnson MD

## 2021-04-16 NOTE — NURSING NOTE
3D photodocumentation obtained.    Dr. Johnson discussed pt's upcoming surgery in great detail.    Pt denied having any further questions.    Patricia Sherwood RN  4/16/2021 4:48 PM

## 2021-04-16 NOTE — LETTER
4/16/2021       RE: Keith Spangler  [address] [id number]     Dear Colleague,    Thank you for referring your patient, Keith Spangler, to the [facility]. Please see a copy of my visit note below.    Facial Plastic and Reconstructive Surgery      Keith Spangler comes in today to discuss his nasal reconstruction further.  He has had multiple fractures with comminution with additional fibrous union noted on CT scan.  He has significant irregularity of the nasal bones with a quite prominent concavity and lateral displacement of the right nasal bone and collapse of the left nasal bone.  He has a significant severe septal deviation in addition to quite substantial mid vault collapse and loss of dorsal height.  He has absent nasal tip support and his nasal tip can be fully collapsed to his upper lip.  He has also has a thick skin and scarring in the soft tissue envelope as he has had an endonasal septorhinoplasty in the past.    He will require surgical correction which will require costochondral grafting and I discussed this with him and he is amenable to cadaveric rib grafting.  He understands that his nasal dorsum needs to be built up and that his L strut needs to be reconstructed.  I modeled this on his images today with the 3D morphing.  He agrees with the increased projection of his nasal profile.  He also understands with his thick nasal skin the nasal tip definition will be limited in addition to the fact that he has scar in that soft tissue envelope.he also understands the limitations that we have with healing in this complex nasal injury setting.    We discussed the perioperative time.  All his questions were answered.  We will work to schedule him in the future.    I spent a total of 30 minutes face-to-face with Keith Spangler during today's office visit.  Over 50% of this time was spent counseling the  patient and/or coordinating care regarding complex nasal reconstruction.  See note for details.        Again, thank you for allowing me to participate in the care of your patient.      Sincerely,    Becki Johnson MD

## 2021-04-18 ENCOUNTER — TELEPHONE (OUTPATIENT)
Dept: FAMILY MEDICINE | Facility: CLINIC | Age: 41
End: 2021-04-18

## 2021-04-18 DIAGNOSIS — J45.30 MILD PERSISTENT ASTHMA WITHOUT COMPLICATION: ICD-10-CM

## 2021-04-19 RX ORDER — BUDESONIDE 0.5 MG/2ML
INHALANT ORAL
Qty: 60 ML | Refills: 5 | Status: SHIPPED | OUTPATIENT
Start: 2021-04-19 | End: 2022-03-29

## 2021-04-19 NOTE — TELEPHONE ENCOUNTER
Patient had a meeting right now at 4:30 and will be leaving town for a few days. He andre schedule a visit with PCP when he returns through [facility].    Last ACT was 03/25/2021 score of 11

## 2021-04-19 NOTE — TELEPHONE ENCOUNTER
Routing refill request to provider for review/approval because:  Patient needs to be seen because:  6 month f/u asthma  ACT    Routing to team to assist with scheduling      Jie Alvarenga RN

## 2021-04-30 ENCOUNTER — VIRTUAL VISIT (OUTPATIENT)
Dept: FAMILY MEDICINE | Facility: CLINIC | Age: 41
End: 2021-04-30
Payer: COMMERCIAL

## 2021-04-30 DIAGNOSIS — J30.1 ALLERGIC RHINITIS DUE TO POLLEN, UNSPECIFIED SEASONALITY: ICD-10-CM

## 2021-04-30 DIAGNOSIS — J45.30 MILD PERSISTENT ASTHMA WITHOUT COMPLICATION: Primary | ICD-10-CM

## 2021-04-30 PROCEDURE — 99214 OFFICE O/P EST MOD 30 MIN: CPT | Mod: 95 | Performed by: PHYSICIAN ASSISTANT

## 2021-04-30 NOTE — PROGRESS NOTES
Keith is a 40 year old who is being evaluated via a billable video visit.      How would you like to obtain your AVS? MyChart  If the video visit is dropped, the invitation should be resent by: Text to cell phone: [phone]  Will anyone else be joining your video visit? No    Video Start Time: Changed to a phone visit.  Started 1015    Assessment & Plan     Mild persistent asthma without complication  Stable but flares. Wants to see an allergist. Referral placed.   - ALLERGY/ASTHMA ADULT REFERRAL    Allergic rhinitis due to pollen, unspecified seasonality  Stable - but flares a lot. Having surgery soon for nasal reconstruction. Referral placed to allergy as well  - ALLERGY/ASTHMA ADULT REFERRAL    No follow-ups on file.    ERMELINDA TADEO PA-C  [facility]    Noemí Parker is a 40 year old who presents for the following health issues     HPI     Asthma Follow-Up    Was referred years ago, but has not followed up and will like a new referral to see specialist.    Patient tries to see if he needs the medication and sometimes he does sometimes he doesn't.     Was ACT completed today?  No      Do you have a cough?  No    Are you experiencing any wheezing in your chest?  No, just once in a while, exercising helps pt feel better    Do you have any shortness of breath?  No     How often are you using a short-acting (rescue) inhaler or nebulizer, such as Albuterol?  Varies, somes 4-5 times a day or sometimes not that often, sometimes he feels like he does not need it.     How many days per week do you miss taking your asthma controller medication?  2, takes maybe 5 times a week, feels like it doesn't do anything for him    Please describe any recent triggers for your asthma: Overeating, lots of internal inflammation, overweight, types of food he eats    Have you had any Emergency Room Visits, Urgent Care Visits, or Hospital Admissions since your last office visit?  No      How many  servings of fruits and vegetables do you eat daily?  0-1, a few times a week    On average, how many sweetened beverages do you drink each day (Examples: soda, juice, sweet tea, etc.  Do NOT count diet or artificially sweetened beverages)?   0, coffee with sugar daily    How many days per week do you exercise enough to make your heart beat faster? 4    How many minutes a day do you exercise enough to make your heart beat faster? 30 - 60     How many days per week do you miss taking your medication? Misses them sometimes when he is feeling better and great, when he feels like he does not have asthma, he says he can improve on taking medication.       Review of Systems   Constitutional, HEENT, cardiovascular, pulmonary, gi and gu systems are negative, except as otherwise noted.      Objective           Vitals:  No vitals were obtained today due to virtual visit.    Physical Exam   GENERAL: Healthy, alert and no distress  EYES: Eyes grossly normal to inspection.  No discharge or erythema, or obvious scleral/conjunctival abnormalities.  RESP: No audible wheeze, cough, or visible cyanosis.  No visible retractions or increased work of breathing.    SKIN: Visible skin clear. No significant rash, abnormal pigmentation or lesions.  NEURO: Cranial nerves grossly intact.  Mentation and speech appropriate for age.  PSYCH: Mentation appears normal, affect normal/bright, judgement and insight intact, normal speech and appearance well-groomed.          Video-Visit Details    Type of service:  Phone visit     Video End Time:Changed to a phone visit - was 6 minutes in length today     Originating Location (pt. Location): Home    Distant Location (provider location):  [facility]     Platform used for Video Visit: Phone visit

## 2021-05-03 DIAGNOSIS — J45.30 MILD PERSISTENT ASTHMA WITHOUT COMPLICATION: ICD-10-CM

## 2021-05-03 DIAGNOSIS — R09.81 CHRONIC NASAL CONGESTION: ICD-10-CM

## 2021-05-03 NOTE — TELEPHONE ENCOUNTER
Routing to team to please assist with ACT    Route back to to RN when completed      Pending Prescriptions:                       Disp   Refills    montelukast (SINGULAIR) 10 MG tablet [Pha*90 tab*2            Sig: TAKE 1 TABLET AT BEDTIME          Jie Alvarenga RN

## 2021-05-04 NOTE — TELEPHONE ENCOUNTER
[facility] message with ACT attachment sent to patient. Will await the response.  Carla Farrar CMA ([facility])

## 2021-05-05 NOTE — TELEPHONE ENCOUNTER
Last Written Prescription Date:  8/19/20  Last Fill Quantity: 90,  # refills: 2   Last office visit: 3/20/2020 with prescribing provider:  Bryan   VIRTUAL visit 4/30/21 with Bryan for asthma  Future Office Visit:   Next 5 appointments (look out 90 days)    May 24, 2021 10:30 AM  Pre-procedure Covid with Bk Curbside Covid Md [facility] 1  [facility] ([facility] ) [address] [place] [id number]  [phone]           Routing refill request to provider for review/approval because:  Labs out of range:  ACT    ACT Total Scores 12/26/2019 3/20/2020 3/25/2021   ACT TOTAL SCORE (Goal Greater than or Equal to 20) 13 15 11   In the past 12 months, how many times did you visit the emergency room for your asthma without being admitted to the hospital? 0 0 0   In the past 12 months, how many times were you hospitalized overnight because of your asthma? 0 0 0       Anel Crouch, RN, BSN  [facility]

## 2021-05-06 RX ORDER — MONTELUKAST SODIUM 10 MG/1
TABLET ORAL
Qty: 90 TABLET | Refills: 3 | Status: SHIPPED | OUTPATIENT
Start: 2021-05-06 | End: 2022-02-09

## 2021-05-09 ENCOUNTER — HEALTH MAINTENANCE LETTER (OUTPATIENT)
Age: 41
End: 2021-05-09

## 2021-05-12 DIAGNOSIS — Z11.59 ENCOUNTER FOR SCREENING FOR OTHER VIRAL DISEASES: ICD-10-CM

## 2021-05-14 ENCOUNTER — PRE VISIT (OUTPATIENT)
Dept: SURGERY | Facility: CLINIC | Age: 41
End: 2021-05-14

## 2021-05-14 ENCOUNTER — OFFICE VISIT (OUTPATIENT)
Dept: SURGERY | Facility: CLINIC | Age: 41
End: 2021-05-14
Payer: COMMERCIAL

## 2021-05-14 ENCOUNTER — ANESTHESIA EVENT (OUTPATIENT)
Dept: SURGERY | Facility: CLINIC | Age: 41
End: 2021-05-14

## 2021-05-14 VITALS
BODY MASS INDEX: 29.55 KG/M2 | WEIGHT: 195 LBS | TEMPERATURE: 97.7 F | SYSTOLIC BLOOD PRESSURE: 130 MMHG | DIASTOLIC BLOOD PRESSURE: 82 MMHG | HEIGHT: 68 IN | HEART RATE: 51 BPM | RESPIRATION RATE: 16 BRPM | OXYGEN SATURATION: 98 %

## 2021-05-14 DIAGNOSIS — Z01.818 PREOP EXAMINATION: Primary | ICD-10-CM

## 2021-05-14 PROCEDURE — 99203 OFFICE O/P NEW LOW 30 MIN: CPT | Performed by: PHYSICIAN ASSISTANT

## 2021-05-14 RX ORDER — MULTIVIT-MIN/IRON/FOLIC ACID/K 18-600-40
CAPSULE ORAL EVERY MORNING
COMMUNITY
End: 2022-02-09

## 2021-05-14 RX ORDER — GARLIC 200 MG
TABLET ORAL
COMMUNITY
End: 2022-02-09

## 2021-05-14 ASSESSMENT — PAIN SCALES - GENERAL: PAINLEVEL: NO PAIN (0)

## 2021-05-14 ASSESSMENT — MIFFLIN-ST. JEOR: SCORE: 1764.01

## 2021-05-14 NOTE — H&P
Pre-Operative H & P     CC:  Preoperative exam to assess for increased cardiopulmonary risk while undergoing surgery and anesthesia.    Date of Encounter: 5/14/2021  Primary Care Physician:  Joseph Adams  Associated Diagnosis:  nasal obstruction, h/o deviated nasal septum, History of fracture of nose, Nasal valve collapse, acquired nasal deformity     HPI  Keith Spangler is a 40 year old male who presents for pre-operative H & P in preparation for septorhinoplasty,Cadaveric Rib Graft, Possible Conchal Cartilage Graft with Dr. Johnson on 5/27/21 at [facility].     This is a 40-year-old male with past medical history significant for nasal obstruction, asthma, and history of positive Covid November 2020.  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  He has multiple fractures with comminution with additional fibrous union noted on his CT scan.  Nasal bones are irregular.  He is had 1 prior septorhinoplasty.  He has met with Dr. Lorenzo Bain and per her evaluation he has severe septal deviation in addition to mid vault collapse and loss of dorsal height.  He has absent nasal tip support.  The above procedure is now planned.    History is obtained from the patient and the medical record.    Past Medical History  Past Medical History:   Diagnosis Date     COPD (chronic obstructive pulmonary disease) (H)      NO ACTIVE PROBLEMS      Uncomplicated asthma Sept 2014    Intermittent asthma       Past Surgical History  Past Surgical History:   Procedure Laterality Date     APPENDECTOMY  Oct. 1991     EYE SURGERY  2013 and 2014    PRK laser correction     HERNIA REPAIR  2002     SURGICAL HISTORY OF -       Thumb 2005-Pins placed      SURGICAL HISTORY OF -       ER visit, left upper eyelid laceration repair     SURGICAL HISTORY OF -       Septo/rhinoplasty of nose secondary to boxing injury     SURGICAL HISTORY OF -       Left hernia        Hx of Blood  transfusions/reactions: denies     Hx of abnormal bleeding or anti-platelet use: denies    Menstrual history: No LMP for male patient.:     Steroid use in the last year: denies    Personal or FH with difficulty with Anesthesia:  denies    Prior to Admission Medications  Current Outpatient Medications   Medication Sig Dispense Refill     albuterol (PROAIR HFA/PROVENTIL HFA/VENTOLIN HFA) 108 (90 Base) MCG/ACT inhaler Inhale 2 puffs into the lungs every 6 hours as needed for shortness of breath / dyspnea 1 Inhaler 5     albuterol (PROVENTIL) (2.5 MG/3ML) 0.083% neb solution INHALE THE CONTENTS OF 1 VIAL VIA NEBULIZER EVERY 6 HOURS AS NEEDED FOR SHORTNESS OF BREATH/DYSPNEA OR WHEEZING (Patient taking differently: as needed ) 75 mL 5     Ascorbic Acid (VITAMIN C) POWD        Aspirin-Acetaminophen-Caffeine (EXCEDRIN PO) Take by mouth as needed        budesonide (PULMICORT) 0.5 MG/2ML neb solution INHALE THE CONTENTS OF 1   VIAL VIA NEBULIZER DAILY (Patient taking differently: as needed ) 60 mL 5     budesonide-formoterol (SYMBICORT) 80-4.5 MCG/ACT Inhaler Inhale 2 puffs into the lungs 2 times daily as needed (wheeze or asthma exacerbation) 1 Inhaler 5     fluticasone (FLONASE) 50 MCG/ACT nasal spray USE 2 SPRAYS IN EACH       NOSTRIL DAILY (Patient taking differently: as needed ) 16 g 2     montelukast (SINGULAIR) 10 MG tablet TAKE 1 TABLET AT BEDTIME (Patient taking differently: Take 10 mg by mouth At Bedtime ) 90 tablet 3     Vitamin D, Cholecalciferol, 25 MCG (1000 UT) TABS Take by mouth every morning       Zinc Sulfate (ZINC-220 PO) Take by mouth every morning       IBUPROFEN PO        order for DME Equipment being ordered: spacer for inhaler 1 Inhaler 0       Allergies  Allergies   Allergen Reactions     Augmentin Rash       Social History  Social History     Socioeconomic History     Marital status: Single     Spouse name: Not on file     Number of children: 1     Years of education: Not on file     Highest  education level: Not on file   Occupational History     Employer: [employer]   Social Needs     Financial resource strain: Not on file     Food insecurity     Worry: Not on file     Inability: Not on file     Transportation needs     Medical: Not on file     Non-medical: Not on file   Tobacco Use     Smoking status: Never Smoker     Smokeless tobacco: Never Used     Tobacco comment: Mom smoked his upbringing   Substance and Sexual Activity     Alcohol use: Yes     Comment: Really rare now      Drug use: No     Sexual activity: Yes     Partners: Female     Birth control/protection: None   Lifestyle     Physical activity     Days per week: Not on file     Minutes per session: Not on file     Stress: Not on file   Relationships     Social connections     Talks on phone: Not on file     Gets together: Not on file     Attends Worship service: Not on file     Active member of club or organization: Not on file     Attends meetings of clubs or organizations: Not on file     Relationship status: Not on file     Intimate partner violence     Fear of current or ex partner: Not on file     Emotionally abused: Not on file     Physically abused: Not on file     Forced sexual activity: Not on file   Other Topics Concern     Parent/sibling w/ CABG, MI or angioplasty before 65F 55M? No   Social History Narrative    Dairy/d 1-3 servings/d.     Caffeine 1 servings/d    Exercise 5 x week boxer    Sunscreen used - No    Seatbelts used - Yes    Working smoke/CO detectors in the home - Yes    Guns stored in the home - No    Self Breast Exams - NOT APPLICABLE    Self Testicular Exam - Yes    Eye Exam up to date - Yes    Dental Exam up to date - Yes    Pap Smear up to date - NOT APPLICABLE    Mammogram up to date - NOT APPLICABLE    PSA up to date - NO    Dexa Scan up to date - NOT APPLICABLE    Flex Sig / Colonoscopy up to date - Yes    Immunizations up to date - Yes    Abuse: Current or Past(Physical, Sexual or Emotional)- No    Do  "you feel safe in your environment - Yes           Family History  Family History   Problem Relation Age of Onset     Unknown/Adopted Mother         Diagnosed w/ALS June 2015     Sleep Apnea Brother      Gastrointestinal Disease Brother         colitis     C.A.D. No family hx of      Diabetes No family hx of      Hypertension No family hx of      Cerebrovascular Disease No family hx of      Breast Cancer No family hx of      Cancer - colorectal No family hx of      Prostate Cancer No family hx of      Asthma No family hx of            Anesthesia Evaluation   Pt has had prior anesthetic.     No history of anesthetic complications       ROS/MED HX  ENT/Pulmonary: Comment: Nasal obstruction    (+) Intermittent, asthma Last exacerbation: non,  Treatment: Inhaler prn,      Neurologic:  - neg neurologic ROS     Cardiovascular:  - neg cardiovascular ROS   (+) -----No previous cardiac testing     METS/Exercise Tolerance: >4 METS    Hematologic:  - neg hematologic  ROS  (-) history of blood clots and history of blood transfusion   Musculoskeletal:  - neg musculoskeletal ROS     GI/Hepatic:  - neg GI/hepatic ROS     Renal/Genitourinary:  - neg Renal ROS     Endo:  - neg endo ROS     Psychiatric/Substance Use:  - neg psychiatric ROS     Infectious Disease:  - neg infectious disease ROS     Malignancy:  - neg malignancy ROS     Other:              The complete review of systems is negative other than noted in the HPI or here.   Temp: 97.7  F (36.5  C) Temp src: Oral BP: 130/82(recheck manual BP) Pulse: 51   Resp: 16 SpO2: 98 %         195 lbs 0 oz  5' 8\"[PT REPORTED[   Body mass index is 29.65 kg/m .       Physical Exam  Constitutional: Awake, alert, cooperative, no apparent distress, and appears stated age.  Eyes: Pupils equal, round and reactive to light, extra ocular muscles intact, sclera clear, conjunctiva normal.  HENT: Normocephalic, oral pharynx with moist mucus membranes, good dentition. No goiter appreciated. "   Respiratory: Clear to auscultation bilaterally, no crackles or wheezing.  Cardiovascular: Regular rate and rhythm, normal S1 and S2, and no murmur noted.  Carotids +2, no bruits. No edema. Palpable pulses to radial  DP and PT arteries.   GI: not assessed  Lymph/Hematologic: No cervical lymphadenopathy and no supraclavicular lymphadenopathy.  Genitourinary:  deferred  Skin: Warm and dry.  No rashes at anticipated surgical site.   Musculoskeletal: Full ROM of neck. There is no redness, warmth, or swelling of the joints. Gross motor strength is normal.    Neurologic: Awake, alert, oriented to name, place and time. Cranial nerves II-XII are grossly intact. Gait is normal.   Neuropsychiatric: Calm, cooperative. Normal affect.     PRIOR LABS/DIAGNOSTIC STUDIES:  All labs and imaging personally reviewed      EXAMINATION: CT CHEST W/O CONTRAST, 7/24/2020 8:42 AM  IMPRESSION: 1. No focal airspace opacity. Nonspecific focal  interlobular septal thickening in the medial left lower lobe.     2. Scattered sub-4 mm pulmonary nodules. If patient is at high risk  for cancer, consider repeat chest CT in one year to evaluate for  stability.      CT FACIAL BONES WITHOUT CONTRAST 7/17/2020 2:58 PM     History:  Nasal deformity, history of multiple nasal fractures; Nasal  deformity     Additional history from EMR: 39-year-old man with history of nasal  fractures.?He retired from boxing in 2014 and was recently evaluated  by surgery for options that may improve cosmetic appearance of his  nose as well as nasal breathing issues.     Comparison:  None       Technique: Using thin collimation multidetector helical acquisition  technique, axial and coronal thin section CT images were reconstructed  through the facial bones. Images were reviewed in bone and soft tissue  windows.     Findings: Comminuted displaced nasal bone fractures and fracture  deformity of the left frontal process of the maxilla, as well as a  nondisplaced fracture of  the perpendicular plate of the ethmoid bone.  The cribriform plate appears intact. There is no soft tissue swelling  of the face.     There is no hematoma, soft tissue mass or gas visualized within the  orbits. Mild mucosal thickening in the bilateral maxillary sinuses,  otherwise the visualized portions of the paranasal sinuses are clear.  Mastoid air cells are clear.                                                                      Impression: Multiple nasal bone, maxillary, and ethmoid fracture  deformities that are most likely chronic given lack of associated soft  tissue edema.        Outside records reviewed from: care everywhere    ASSESSMENT and PLAN  Keith Spangler is a 40 year old male scheduled for septorhinoplasty on 5/27/21 by Dr. Lorenzo Bain in treatment of nasal obstruction, h/o deviated nasal septum, History of fracture of nose, Nasal valve collapse, acquired nasal deformity .  PAC referral for risk assessment and optimization for anesthesia:    Pre-operative considerations:  1.  Cardiac:  Functional status- METS >4.  Low risk surgery with 0.4% (RCRI #) risk of major adverse cardiac event.   --no cardiac history, no cardiac symptoms  --BP today 135/93.  Recommend keeping BP log at home (has home cuff), discussing with PCP    2.  Pulm:  Airway feasible.  GOLDIE risk: Low  --never smoker  --h/o asthma, well controlled and mild. No recent exacerbations or steroid use.    3.  GI:  Risk of PONV score = 1.  If > 2, anti-emetic intervention recommended.    4.  ID:  --Covid positive 11/2020. No hospitalization.  Thinks his blood pressure has been higher since infection.    VTE risk: 0.5%    Patient is optimized and is acceptable candidate for the proposed procedure.  No further diagnostic evaluation is needed.         Cassidy Mcgraw PA-C  Preoperative Assessment Center  [facility]  Phone: [phone]  Fax: [phone]

## 2021-05-14 NOTE — PATIENT INSTRUCTIONS
Preparing for Your Surgery      Name:  Keith Spangler   MRN:  [id number]   :  1980   Today's Date:  2021         Arriving for surgery:  Surgery date:  2021  Arrival time:  7:00 am    Restrictions due to COVID 19:  One consistent visitor is allowed per patient  No ill visitors  All visitors must wear face mask     parking is available for anyone with mobility limitations or disabilities. (Monday- Friday 7 am- 5 pm)    Please come to:    [facility]  [address] [place] [id number]    Please check in on the 5th floor at the Ambulatory Surgery Center       What can I eat or drink?    -  You may eat and drink normally until 8 hours before surgery. (Until midnight)  -  You may have clear liquids up to 4 hours before surgery. (Until 4 am)  Examples of clear liquids:  Water  Clear broth  Juices (apple, white grape, white cranberry  and cider) without pulp  Noncarbonated, powder based beverages  (lemonade and Michael-Aid)  Sodas (Sprite, 7-Up, ginger ale and seltzer)  Coffee or tea (without milk or cream)  Gatorade    --No alcohol for at least 24 hours before surgery    Which medicines can I take?    Hold Aspirin (Excedrin) for 7 days before surgery.     Hold Multivitamins for 7 days before surgery.  Hold Supplements for 7 days before surgery.    Hold Ibuprofen (Advil, Motrin) for 1 day before surgery--unless otherwise directed by surgeon.    Hold Naproxen (Aleve) for 4 days before surgery.    -  DO NOT take the following medications the day of surgery:  Vitamin D  Vitamin C   Zinc       -  PLEASE TAKE the following medications the day of surgery   Inhalers as usual and bring to Surgery Center   Flonase if needed       How do I prepare myself?  - Please take 2 showers before surgery using Scrubcare or Hibiclens soap.    Use this soap only from the neck to your toes.     Leave the soap on your skin for one minute--then rinse thoroughly.      You may use your own  shampoo and conditioner; no other hair products.   - Please remove all jewelry and body piercings.  - No lotions, deodorants or fragrance.  - No makeup or fingernail polish.   - Bring your ID and insurance card.        - All patients are required to have a Covid-19 test within 4 days of surgery/procedure.      -Patients will be contacted by the [facility] scheduling team within 1 week of surgery to make an appointment.      - Patients may call the Scheduling team at [phone] if they have not been scheduled within 4 days of  surgery.      ALL PATIENTS ARE REQUIRED TO HAVE A RESPONSIBLE ADULT TO DRIVE AND BE IN ATTENDANCE WITH THEM FOR 24 HOURS FOLLOWING SURGERY              Questions or Concerns:    -For questions regarding the day of surgery please contact the Ambulatory Surgery Center at [phone].    -If you have health changes between today and your surgery please contact your surgeon.     For questions after surgery please call your surgeons office.

## 2021-05-14 NOTE — ANESTHESIA PREPROCEDURE EVALUATION
Anesthesia Pre-Procedure Evaluation    Patient: Keith Spangler   MRN: [id number] : 1980        Preoperative Diagnosis: * No surgery found *   Procedure :      Past Medical History:   Diagnosis Date     COPD (chronic obstructive pulmonary disease) (H)      NO ACTIVE PROBLEMS      Uncomplicated asthma 2014    Intermittent asthma      Past Surgical History:   Procedure Laterality Date     APPENDECTOMY  Oct. 1991     EYE SURGERY   and     PRK laser correction     HERNIA REPAIR       SURGICAL HISTORY OF -       Thumb 2005-Pins placed      SURGICAL HISTORY OF -       ER visit, left upper eyelid laceration repair     SURGICAL HISTORY OF -       Septo/rhinoplasty of nose secondary to boxing injury     SURGICAL HISTORY OF -       Left hernia       Allergies   Allergen Reactions     Augmentin Rash      Social History     Tobacco Use     Smoking status: Never Smoker     Smokeless tobacco: Never Used     Tobacco comment: Mom smoked his upbringing   Substance Use Topics     Alcohol use: Yes     Comment: Really rare now       Wt Readings from Last 1 Encounters:   21 87.1 kg (192 lb)        Anesthesia Evaluation   Pt has had prior anesthetic.     No history of anesthetic complications       ROS/MED HX  ENT/Pulmonary: Comment: Nasal obstruction    (+) Intermittent, asthma Last exacerbation: non,  Treatment: Inhaler prn,      Neurologic:  - neg neurologic ROS     Cardiovascular:  - neg cardiovascular ROS   (+) -----No previous cardiac testing     METS/Exercise Tolerance: >4 METS    Hematologic:  - neg hematologic  ROS  (-) history of blood clots and history of blood transfusion   Musculoskeletal:  - neg musculoskeletal ROS     GI/Hepatic:  - neg GI/hepatic ROS     Renal/Genitourinary:  - neg Renal ROS     Endo:  - neg endo ROS     Psychiatric/Substance Use:  - neg psychiatric ROS     Infectious Disease:  - neg infectious disease ROS     Malignancy:  - neg malignancy ROS     Other:            Physical  Exam    Airway  airway exam normal           Respiratory Devices and Support         Dental  no notable dental history         Cardiovascular   cardiovascular exam normal       Rhythm and rate: regular and normal     Pulmonary   pulmonary exam normal        breath sounds clear to auscultation           OUTSIDE LABS:  CBC:   Lab Results   Component Value Date    WBC 16.3 (H) 09/09/2019    HGB 16.0 09/09/2019    HGB 14.8 04/04/2014    HCT 46.2 09/09/2019     09/09/2019     BMP:   Lab Results   Component Value Date     03/20/2020    POTASSIUM 4.9 03/20/2020    CHLORIDE 106 03/20/2020    CO2 30 03/20/2020    BUN 17 03/20/2020    CR 0.98 03/20/2020     (H) 03/20/2020    GLC 93 07/08/2019     COAGS: No results found for: PTT, INR, FIBR  POC: No results found for: BGM, HCG, HCGS  HEPATIC: No results found for: ALBUMIN, PROTTOTAL, ALT, AST, GGT, ALKPHOS, BILITOTAL, BILIDIRECT, ROSEMARY  OTHER:   Lab Results   Component Value Date    RICHARD 9.4 03/20/2020    TSH 3.63 04/04/2014             PAC Discussion and Assessment    ASA Classification: 2  Case is suitable for: ASC  Anesthetic techniques and relevant risks discussed: GA  Invasive monitoring and risk discussed: No    Possibility and Risk of blood transfusion discussed: No            PAC Resident/NP Anesthesia Assessment: Keith Spangler is a 40 year old male scheduled for septorhinoplasty on 5/27/21 by Dr. Lorenzo Bain in treatment of nasal obstruction, h/o deviated nasal septum, History of fracture of nose, Nasal valve collapse, acquired nasal deformity .  PAC referral for risk assessment and optimization for anesthesia:    Pre-operative considerations:  1.  Cardiac:  Functional status- METS >4.  Low risk surgery with 0.4% (RCRI #) risk of major adverse cardiac event.   --no cardiac history, no cardiac symptoms  --BP today 135/93.  Recommend keeping BP log at home (has home cuff), discussing with PCP    2.  Pulm:  Airway feasible.  GOLDIE risk: Low  --never  smoker  --h/o asthma, well controlled and mild. No recent exacerbations or steroid use.    3.  GI:  Risk of PONV score = 1.  If > 2, anti-emetic intervention recommended.    4.  ID:  --Covid positive 11/2020. No hospitalization.  Thinks his blood pressure has been higher since infection.    VTE risk: 0.5%    Patient is optimized and is acceptable candidate for the proposed procedure.  No further diagnostic evaluation is needed.         **For further details of assessment, testing, and physical exam please see H and P completed on same date.          Cassidy Mcgraw PA-C, [facility]    Reviewed and Signed by PAC Mid-Level Provider/Resident  Mid-Level Provider/Resident: Cassidy Mcgraw  Date: 5/14/21                                 Cassidy Mcgraw PA-C

## 2021-05-17 ENCOUNTER — OFFICE VISIT (OUTPATIENT)
Dept: ALLERGY | Facility: CLINIC | Age: 41
End: 2021-05-17
Payer: COMMERCIAL

## 2021-05-17 VITALS
DIASTOLIC BLOOD PRESSURE: 79 MMHG | SYSTOLIC BLOOD PRESSURE: 125 MMHG | BODY MASS INDEX: 29.19 KG/M2 | WEIGHT: 192 LBS | OXYGEN SATURATION: 97 % | HEART RATE: 48 BPM

## 2021-05-17 DIAGNOSIS — J45.30 POORLY CONTROLLED MILD PERSISTENT ASTHMA: Primary | ICD-10-CM

## 2021-05-17 PROCEDURE — 99244 OFF/OP CNSLTJ NEW/EST MOD 40: CPT | Performed by: ALLERGY & IMMUNOLOGY

## 2021-05-17 NOTE — PROGRESS NOTES
Keith Spangler was seen in the Allergy Clinic at [facility].    Keith Spangler is a 40 year old White male being seen today at the request of Joseph Adams PA-C in consultation for asthma and allergies. He reports that he has been having more difficulty with his asthma. He has been frequently refilling his nebulizer solution. He had a severe respiratory infection in 2/2020 and was diagnosed with bronchitis and later pneumonia but he didn't improve with antibiotics. Keith had a dry cough for about 2 months afterwards along with persistent fatigue and difficulty breathing. He developed a fever, chills, body aches, and loss of taste and smell in 11/2020 and he tested positive for COVID at that time. Over the last year he has had persistent respiratory symptoms including shortness of breath but he doesn't have much wheezing. He feels uncomfortable and bloated when his asthma is acting up and he has difficulty clearing mucus from his chest. When he uses the nebulizer things improve and the bloating resolves.    Keith is taking budesonide via nebulizer a few times per week and takes albuterol a few times per week as well. He is not using the Symbicort inhaler. He states that he is not good at taking the maintenance medications and waits until he is not feeling well. Montelukast has also been prescribed and he takes this a few times per week.      Pulmonary Function Testing 7/17/20:  IMPRESSION:   Normal Pulmonary Function.   No previous studies available for comparison.     Skin Testing 1/2/17: Skin prick testing was performed to our standard adult aeroallergen panel. He had positive reaction to histamine and all others were negative.      Past Medical History:   Diagnosis Date     COPD (chronic obstructive pulmonary disease) (H)      NO ACTIVE PROBLEMS      Uncomplicated asthma Sept 2014    Intermittent asthma     Family History   Problem Relation Age of Onset     Unknown/Adopted Mother          Diagnosed w/ALS June 2015     Sleep Apnea Brother      Gastrointestinal Disease Brother         colitis     C.A.D. No family hx of      Diabetes No family hx of      Hypertension No family hx of      Cerebrovascular Disease No family hx of      Breast Cancer No family hx of      Cancer - colorectal No family hx of      Prostate Cancer No family hx of      Asthma No family hx of      Past Surgical History:   Procedure Laterality Date     APPENDECTOMY  Oct. 1991     EYE SURGERY  2013 and 2014    PRK laser correction     HERNIA REPAIR  2002     SURGICAL HISTORY OF -       Thumb 2005-Pins placed      SURGICAL HISTORY OF -       ER visit, left upper eyelid laceration repair     SURGICAL HISTORY OF -       Septo/rhinoplasty of nose secondary to boxing injury     SURGICAL HISTORY OF -       Left hernia        ENVIRONMENTAL HISTORY: [place] The home is heated with a gas furnace. They does have central air conditioning. The patient's bedroom is furnished with carpeting in bedroom and fabric window coverings.  Pets inside the house include 2 dog(s). There is not history of cockroach or mice infestation. There is/are 0 smokers in the house.  The house does not have a damp basement.      SOCIAL HISTORY:   Keith is employed as an . He lives with his girlfriend and kids.  His gifriend works as a .    REVIEW OF SYSTEMS:  General: negative for weight gain. negative for weight loss. negative for changes in sleep.   Eyes: negative for itching. negative for redness. negative for tearing/watering. negative for vision changes  Ears: negative for fullness. negative for hearing loss. negative for dizziness.   Nose: negative for snoring.negative for changes in smell. negative for drainage.   Throat: negative for hoarseness. negative for sore throat. negative for trouble swallowing.   Lungs: negative for cough. negative for shortness of breath.negative for wheezing.  negative for sputum production.   Cardiovascular: negative for chest pain. negative for swelling of ankles. negative for fast or irregular heartbeat.   Gastrointestinal: negative for nausea. negative for heartburn. negative for acid reflux.   Musculoskeletal: negative for joint pain. negative for joint stiffness. negative for joint swelling.   Neurologic: negative for seizures. negative for fainting. negative for weakness.   Psychiatric: negative for changes in mood. negative for anxiety.   Endocrine: negative for cold intolerance. negative for heat intolerance. negative for tremors.   Hematologic: negative for easy bruising. negative for easy bleeding.  Integumentary: negative for rash. negative for scaling. negative for nail changes.       Current Outpatient Medications:      albuterol (PROAIR HFA/PROVENTIL HFA/VENTOLIN HFA) 108 (90 Base) MCG/ACT inhaler, Inhale 2 puffs into the lungs every 6 hours as needed for shortness of breath / dyspnea, Disp: 1 Inhaler, Rfl: 5     albuterol (PROVENTIL) (2.5 MG/3ML) 0.083% neb solution, INHALE THE CONTENTS OF 1 VIAL VIA NEBULIZER EVERY 6 HOURS AS NEEDED FOR SHORTNESS OF BREATH/DYSPNEA OR WHEEZING (Patient taking differently: as needed ), Disp: 75 mL, Rfl: 5     Ascorbic Acid (VITAMIN C) POWD, , Disp: , Rfl:      Aspirin-Acetaminophen-Caffeine (EXCEDRIN PO), Take by mouth as needed , Disp: , Rfl:      budesonide (PULMICORT) 0.5 MG/2ML neb solution, INHALE THE CONTENTS OF 1   VIAL VIA NEBULIZER DAILY (Patient taking differently: as needed ), Disp: 60 mL, Rfl: 5     budesonide-formoterol (SYMBICORT) 80-4.5 MCG/ACT Inhaler, Inhale 2 puffs into the lungs 2 times daily as needed (wheeze or asthma exacerbation), Disp: 1 Inhaler, Rfl: 5     fluticasone (FLONASE) 50 MCG/ACT nasal spray, USE 2 SPRAYS IN EACH       NOSTRIL DAILY (Patient taking differently: as needed ), Disp: 16 g, Rfl: 2     IBUPROFEN PO, , Disp: , Rfl:      montelukast (SINGULAIR) 10 MG tablet, TAKE 1 TABLET AT  BEDTIME (Patient taking differently: Take 10 mg by mouth At Bedtime ), Disp: 90 tablet, Rfl: 3     order for DME, Equipment being ordered: spacer for inhaler, Disp: 1 Inhaler, Rfl: 0     Vitamin D, Cholecalciferol, 25 MCG (1000 UT) TABS, Take by mouth every morning, Disp: , Rfl:      Zinc Sulfate (ZINC-220 PO), Take by mouth every morning, Disp: , Rfl:   Immunization History   Administered Date(s) Administered     HepB 11/24/1993, 12/27/1993, 05/17/1994     OPV, trivalent, live 1979, 1979, 09/03/1981, 06/14/1984     Pneumo Conj 13-V (2010&after) 07/17/2020     Pneumococcal 23 valent 03/25/2021     TD (ADULT, 7+) 11/24/1993, 04/30/2009     TDAP Vaccine (Adacel) 04/30/2009     TDAP Vaccine (Boostrix) 04/21/2016     Allergies   Allergen Reactions     Augmentin Rash         EXAM:   /79 (BP Location: Right arm, Patient Position: Sitting, Cuff Size: Adult Regular)   Pulse (!) 48   Wt 87.1 kg (192 lb)   SpO2 97%   BMI 29.19 kg/m    GENERAL APPEARANCE: alert, cooperative and not in distress  SKIN: no rashes, no lesions  HEAD: atraumatic, normocephalic  EYES: lids and lashes normal, conjunctivae and sclerae clear, pupils equal, round, reactive to light, EOM full and intact  ENT: no scars or lesions, nasal exam showed no discharge, swelling or lesions noted, otoscopy showed external auditory canals clear, tympanic membranes normal, tongue midline and normal, soft palate, uvula, and tonsils normal  NECK: no asymmetry, masses, or scars, supple without significant adenopathy  LUNGS: unlabored respirations, no intercostal retractions or accessory muscle use, clear to auscultation without rales or wheezes  HEART: regular rate and rhythm without murmurs and normal S1 and S2  MUSCULOSKELETAL: no musculoskeletal defects are noted  NEURO: no focal deficits noted  PSYCH: does not appear depressed or anxious    WORKUP: None    ASSESSMENT/PLAN:  Keith Spangler is a 40 year old male seen for evaluation of  asthma.    1. Poorly controlled mild persistent asthma - Keith reports that his asthma has not been well controlled over the past year. He suspects that he had COVID infection in 2/2020 however widespread testing was not available at that time. He did have a documented infection in 11/2020 though he did not have significant respiratory symptoms at that time. He continues to take budesonide and albuterol via nebulizer a few times a week to manage acute symptoms and admits he has not been adherent to daily, preventative medication regimens in the past. He did have PFTs last July that were normal though he reports he was exercising quite a bit at that time which helps with his asthma symptoms. Post-bronchodilator studies were not done at that time though significant improvement was noted on post-bronchodilator studies in 2017. Keith was advised regarding preventative/maintenance medications and we discussed strategies for improving medication adherence. Will start with Breo as the once daily dosing may improve overall adherence.    - fluticasone-vilanterol (BREO ELLIPTA) 100-25 MCG/INH inhaler; Inhale 1 puff into the lungs daily  Dispense: 3 each; Refill: 0  - take 2 to 4 puffs of albuterol HFA and/or use nebulizer every 4 hours as needed      Follow-up in 4 weeks, sooner if needed      Thank you for allowing me to participate in the care of Keith Spangler.      Andrei Kyle MD, FAAAAI  Allergy/Immunology  [facility]      Chart documentation done in part with Dragon Voice Recognition Software. Although reviewed after completion, some word and grammatical errors may remain.

## 2021-05-17 NOTE — LETTER
5/17/2021         RE: Keith Spangler  [address] [id number]        Dear Colleague,    Thank you for referring your patient, Keith Spangler, to the [facility]. Please see a copy of my visit note below.    Keith Spangler was seen in the Allergy Clinic at [facility].    Keith Spangler is a 40 year old White male being seen today at the request of Joseph Adams PA-C in consultation for asthma and allergies. He reports that he has been having more difficulty with his asthma. He has been frequently refilling his nebulizer solution. He had a severe respiratory infection in 2/2020 and was diagnosed with bronchitis and later pneumonia but he didn't improve with antibiotics. Keith had a dry cough for about 2 months afterwards along with persistent fatigue and difficulty breathing. He developed a fever, chills, body aches, and loss of taste and smell in 11/2020 and he tested positive for COVID at that time. Over the last year he has had persistent respiratory symptoms including shortness of breath but he doesn't have much wheezing. He feels uncomfortable and bloated when his asthma is acting up and he has difficulty clearing mucus from his chest. When he uses the nebulizer things improve and the bloating resolves.    Keith is taking budesonide via nebulizer a few times per week and takes albuterol a few times per week as well. He is not using the Symbicort inhaler. He states that he is not good at taking the maintenance medications and waits until he is not feeling well. Montelukast has also been prescribed and he takes this a few times per week.      Pulmonary Function Testing 7/17/20:  IMPRESSION:   Normal Pulmonary Function.   No previous studies available for comparison.     Skin Testing 1/2/17: Skin prick testing was performed to our standard adult aeroallergen panel. He had positive reaction to histamine and all others were  negative.      Past Medical History:   Diagnosis Date     COPD (chronic obstructive pulmonary disease) (H)      NO ACTIVE PROBLEMS      Uncomplicated asthma Sept 2014    Intermittent asthma     Family History   Problem Relation Age of Onset     Unknown/Adopted Mother         Diagnosed w/ALS June 2015     Sleep Apnea Brother      Gastrointestinal Disease Brother         colitis     C.A.D. No family hx of      Diabetes No family hx of      Hypertension No family hx of      Cerebrovascular Disease No family hx of      Breast Cancer No family hx of      Cancer - colorectal No family hx of      Prostate Cancer No family hx of      Asthma No family hx of      Past Surgical History:   Procedure Laterality Date     APPENDECTOMY  Oct. 1991     EYE SURGERY  2013 and 2014    PRK laser correction     HERNIA REPAIR  2002     SURGICAL HISTORY OF -       Thumb 2005-Pins placed      SURGICAL HISTORY OF -       ER visit, left upper eyelid laceration repair     SURGICAL HISTORY OF -       Septo/rhinoplasty of nose secondary to boxing injury     SURGICAL HISTORY OF -       Left hernia        ENVIRONMENTAL HISTORY: [place] The home is heated with a gas furnace. They does have central air conditioning. The patient's bedroom is furnished with carpeting in bedroom and fabric window coverings.  Pets inside the house include 2 dog(s). There is not history of cockroach or mice infestation. There is/are 0 smokers in the house.  The house does not have a damp basement.      SOCIAL HISTORY:   Keith is employed as an . He lives with his girlfriend and kids.  His gifriend works as a .    REVIEW OF SYSTEMS:  General: negative for weight gain. negative for weight loss. negative for changes in sleep.   Eyes: negative for itching. negative for redness. negative for tearing/watering. negative for vision changes  Ears: negative for fullness. negative for hearing loss. negative for  dizziness.   Nose: negative for snoring.negative for changes in smell. negative for drainage.   Throat: negative for hoarseness. negative for sore throat. negative for trouble swallowing.   Lungs: negative for cough. negative for shortness of breath.negative for wheezing. negative for sputum production.   Cardiovascular: negative for chest pain. negative for swelling of ankles. negative for fast or irregular heartbeat.   Gastrointestinal: negative for nausea. negative for heartburn. negative for acid reflux.   Musculoskeletal: negative for joint pain. negative for joint stiffness. negative for joint swelling.   Neurologic: negative for seizures. negative for fainting. negative for weakness.   Psychiatric: negative for changes in mood. negative for anxiety.   Endocrine: negative for cold intolerance. negative for heat intolerance. negative for tremors.   Hematologic: negative for easy bruising. negative for easy bleeding.  Integumentary: negative for rash. negative for scaling. negative for nail changes.       Current Outpatient Medications:      albuterol (PROAIR HFA/PROVENTIL HFA/VENTOLIN HFA) 108 (90 Base) MCG/ACT inhaler, Inhale 2 puffs into the lungs every 6 hours as needed for shortness of breath / dyspnea, Disp: 1 Inhaler, Rfl: 5     albuterol (PROVENTIL) (2.5 MG/3ML) 0.083% neb solution, INHALE THE CONTENTS OF 1 VIAL VIA NEBULIZER EVERY 6 HOURS AS NEEDED FOR SHORTNESS OF BREATH/DYSPNEA OR WHEEZING (Patient taking differently: as needed ), Disp: 75 mL, Rfl: 5     Ascorbic Acid (VITAMIN C) POWD, , Disp: , Rfl:      Aspirin-Acetaminophen-Caffeine (EXCEDRIN PO), Take by mouth as needed , Disp: , Rfl:      budesonide (PULMICORT) 0.5 MG/2ML neb solution, INHALE THE CONTENTS OF 1   VIAL VIA NEBULIZER DAILY (Patient taking differently: as needed ), Disp: 60 mL, Rfl: 5     budesonide-formoterol (SYMBICORT) 80-4.5 MCG/ACT Inhaler, Inhale 2 puffs into the lungs 2 times daily as needed (wheeze or asthma exacerbation),  Disp: 1 Inhaler, Rfl: 5     fluticasone (FLONASE) 50 MCG/ACT nasal spray, USE 2 SPRAYS IN EACH       NOSTRIL DAILY (Patient taking differently: as needed ), Disp: 16 g, Rfl: 2     IBUPROFEN PO, , Disp: , Rfl:      montelukast (SINGULAIR) 10 MG tablet, TAKE 1 TABLET AT BEDTIME (Patient taking differently: Take 10 mg by mouth At Bedtime ), Disp: 90 tablet, Rfl: 3     order for DME, Equipment being ordered: spacer for inhaler, Disp: 1 Inhaler, Rfl: 0     Vitamin D, Cholecalciferol, 25 MCG (1000 UT) TABS, Take by mouth every morning, Disp: , Rfl:      Zinc Sulfate (ZINC-220 PO), Take by mouth every morning, Disp: , Rfl:   Immunization History   Administered Date(s) Administered     HepB 11/24/1993, 12/27/1993, 05/17/1994     OPV, trivalent, live 1979, 1979, 09/03/1981, 06/14/1984     Pneumo Conj 13-V (2010&after) 07/17/2020     Pneumococcal 23 valent 03/25/2021     TD (ADULT, 7+) 11/24/1993, 04/30/2009     TDAP Vaccine (Adacel) 04/30/2009     TDAP Vaccine (Boostrix) 04/21/2016     Allergies   Allergen Reactions     Augmentin Rash         EXAM:   /79 (BP Location: Right arm, Patient Position: Sitting, Cuff Size: Adult Regular)   Pulse (!) 48   Wt 87.1 kg (192 lb)   SpO2 97%   BMI 29.19 kg/m    GENERAL APPEARANCE: alert, cooperative and not in distress  SKIN: no rashes, no lesions  HEAD: atraumatic, normocephalic  EYES: lids and lashes normal, conjunctivae and sclerae clear, pupils equal, round, reactive to light, EOM full and intact  ENT: no scars or lesions, nasal exam showed no discharge, swelling or lesions noted, otoscopy showed external auditory canals clear, tympanic membranes normal, tongue midline and normal, soft palate, uvula, and tonsils normal  NECK: no asymmetry, masses, or scars, supple without significant adenopathy  LUNGS: unlabored respirations, no intercostal retractions or accessory muscle use, clear to auscultation without rales or wheezes  HEART: regular rate and rhythm without  murmurs and normal S1 and S2  MUSCULOSKELETAL: no musculoskeletal defects are noted  NEURO: no focal deficits noted  PSYCH: does not appear depressed or anxious    WORKUP: None    ASSESSMENT/PLAN:  Keith Spangler is a 40 year old male seen for evaluation of asthma.    1. Poorly controlled mild persistent asthma - Keith reports that his asthma has not been well controlled over the past year. He suspects that he had COVID infection in 2/2020 however widespread testing was not available at that time. He did have a documented infection in 11/2020 though he did not have significant respiratory symptoms at that time. He continues to take budesonide and albuterol via nebulizer a few times a week to manage acute symptoms and admits he has not been adherent to daily, preventative medication regimens in the past. He did have PFTs last July that were normal though he reports he was exercising quite a bit at that time which helps with his asthma symptoms. Post-bronchodilator studies were not done at that time though significant improvement was noted on post-bronchodilator studies in 2017. Keith was advised regarding preventative/maintenance medications and we discussed strategies for improving medication adherence. Will start with Breo as the once daily dosing may improve overall adherence.    - fluticasone-vilanterol (BREO ELLIPTA) 100-25 MCG/INH inhaler; Inhale 1 puff into the lungs daily  Dispense: 3 each; Refill: 0  - take 2 to 4 puffs of albuterol HFA and/or use nebulizer every 4 hours as needed      Follow-up in 4 weeks, sooner if needed      Thank you for allowing me to participate in the care of Keith Spangler.      Andrei Kyle MD, FAAAAI  Allergy/Immunology  [facility]      Chart documentation done in part with Dragon Voice Recognition Software. Although reviewed after completion, some word and grammatical errors may  remain.      Again, thank you for allowing me to participate in the care of your patient.        Sincerely,        Andrei Kyle MD

## 2021-05-17 NOTE — PATIENT INSTRUCTIONS
If you have any questions regarding your allergies, asthma, or what we discussed during your visit today please call the allergy clinic or contact us via [employer].    [facility] Allergy RN Line: [phone]  [facility] Scheduling Line: [phone]  [facility] Scheduling Line: [phone]    Clinic Schedule:   [place] - Monday, Tuesday, and Thursday  [facility] - Wednesday      Breo - 1 puff daily. Rinse your mouth and brush your teeth afterwards.      Follow-up in 4 weeks    Patient Education   Breo Ellipta Inhaler  Medicines: Fluticasone (tqys-OKE-h-sone) and Vilanterol (vye EUGENE ter ahl)  What it does  The two medicines work together over time to help you breathe better. They relax the muscles in the airway and reduce inflammation, swelling and mucus.   Use every day, even if you feel well. Do not use for fast relief.  How to use this inhaler  1. Wash and dry your hands well.  2. Do not shake the inhaler.  3. Slide down the cover until you hear a click.  4. Breathe out through your mouth away from the inhaler.  5. Close your lips tightly around the mouthpiece.  6. Breathe in deeply through your mouth. Be careful not to block the air vent on the inhaler with your fingers.  7. Remove the mouthpiece and hold your breath for at least 3 to 4 seconds or as long as comfortable.  8. Breathe out through your mouth slowly, away from the inhaler.  9. You may not taste or feel the medicine, even when you are using the inhaler correctly. Do not take another dose.     10. Close the inhaler.  11. Rinse your mouth or brush your teeth and spit out the water-- do not swallow.  12. Store inhaler in a cool, dry place.  Note: Open the inhaler only if you are ready to take your dose. The counter counts down by 1 each time you open the cover.   Cleaning  Keep dry at all times. Wipe the mouthpiece with a dry cloth if needed.  This inhaler contains vilanterol,  a LABA class medicine. Do not use with other LABA containing medicines.   How to tell if the inhaler is empty  This inhaler has 30 doses. It is empty when the dose counter reads 0. Doses 9-0 will be in red to remind you to get a refill.  Throw away the inhaler 6 weeks after you open the foil pouch (or when dose counter reads 0, whichever comes first).  If you have questions about the use of your inhaler, please ask your pharmacist or provider.   For more information and video demos, go to   [email or web address].

## 2021-05-18 ASSESSMENT — ASTHMA QUESTIONNAIRES: ACT_TOTALSCORE: 16

## 2021-05-19 DIAGNOSIS — J45.30 POORLY CONTROLLED MILD PERSISTENT ASTHMA: ICD-10-CM

## 2021-05-20 ENCOUNTER — TELEPHONE (OUTPATIENT)
Dept: OTOLARYNGOLOGY | Facility: CLINIC | Age: 41
End: 2021-05-20

## 2021-05-20 NOTE — TELEPHONE ENCOUNTER
Called pt to let him know that I faxed the forms to [facility] that he sent me.  The forms included diagnosis codes and CPT codes for his upcoming nasal surgery with Dr. Johnson.  Pt is hoping that [facility] will be able to give him a good catrina estimate in the event that his surgery isn't deemed medically necessary.    Forms faxed to [facility] [place] at [phone].    Patricia Sherwood RN  5/20/2021 3:58 PM

## 2021-05-20 NOTE — TELEPHONE ENCOUNTER
fluticasone-vilanterol (BREO ELLIPTA) 100-25 MCG/INH inhaler filled at office visit on 5-17-21    Sakshi KIRKLAND RN

## 2021-05-24 DIAGNOSIS — Z11.59 ENCOUNTER FOR SCREENING FOR OTHER VIRAL DISEASES: ICD-10-CM

## 2021-05-24 LAB
LABORATORY COMMENT REPORT: NORMAL
SARS-COV-2 RNA RESP QL NAA+PROBE: NEGATIVE
SARS-COV-2 RNA RESP QL NAA+PROBE: NORMAL
SPECIMEN SOURCE: NORMAL
SPECIMEN SOURCE: NORMAL

## 2021-05-24 PROCEDURE — U0003 INFECTIOUS AGENT DETECTION BY NUCLEIC ACID (DNA OR RNA); SEVERE ACUTE RESPIRATORY SYNDROME CORONAVIRUS 2 (SARS-COV-2) (CORONAVIRUS DISEASE [COVID-19]), AMPLIFIED PROBE TECHNIQUE, MAKING USE OF HIGH THROUGHPUT TECHNOLOGIES AS DESCRIBED BY CMS-2020-01-R: HCPCS | Performed by: OTOLARYNGOLOGY

## 2021-05-24 PROCEDURE — U0005 INFEC AGEN DETEC AMPLI PROBE: HCPCS | Performed by: OTOLARYNGOLOGY

## 2021-05-27 ENCOUNTER — HOSPITAL ENCOUNTER (OUTPATIENT)
Facility: AMBULATORY SURGERY CENTER | Age: 41
End: 2021-05-27
Attending: OTOLARYNGOLOGY
Payer: COMMERCIAL

## 2021-05-27 DIAGNOSIS — J34.2 DEVIATED NASAL SEPTUM: ICD-10-CM

## 2021-05-27 DIAGNOSIS — Z87.81 HISTORY OF FRACTURE OF NOSE: ICD-10-CM

## 2021-06-21 ENCOUNTER — OFFICE VISIT (OUTPATIENT)
Dept: ALLERGY | Facility: CLINIC | Age: 41
End: 2021-06-21
Payer: COMMERCIAL

## 2021-06-21 VITALS — HEART RATE: 47 BPM | OXYGEN SATURATION: 97 % | DIASTOLIC BLOOD PRESSURE: 86 MMHG | SYSTOLIC BLOOD PRESSURE: 127 MMHG

## 2021-06-21 DIAGNOSIS — J45.30 MILD PERSISTENT ASTHMA WITHOUT COMPLICATION: ICD-10-CM

## 2021-06-21 PROCEDURE — 99213 OFFICE O/P EST LOW 20 MIN: CPT | Performed by: ALLERGY & IMMUNOLOGY

## 2021-06-21 NOTE — PROGRESS NOTES
"Keith Spangler was seen in the Allergy Clinic at [facility].      Keith Spangler is a 41 year old  /  male who is seen today for follow-up of asthma. He reports that he feels much better since starting the Breo. He typically takes the medication in the morning and feels like he has \"instant relief.\" After around 8PM he feels the medication seems to be wearing off however he does not have symptoms in the evening that require him to use albuterol. Keith is sleeping well and denies having nocturnal symptoms of cough, shortness of breath, or wheezing.      Past Medical History:   Diagnosis Date     COPD (chronic obstructive pulmonary disease) (H)      NO ACTIVE PROBLEMS      Uncomplicated asthma Sept 2014    Intermittent asthma     Family History   Problem Relation Age of Onset     Unknown/Adopted Mother         Diagnosed w/ALS June 2015     Sleep Apnea Brother      Gastrointestinal Disease Brother         colitis     C.A.D. No family hx of      Diabetes No family hx of      Hypertension No family hx of      Cerebrovascular Disease No family hx of      Breast Cancer No family hx of      Cancer - colorectal No family hx of      Prostate Cancer No family hx of      Asthma No family hx of      Social History     Tobacco Use     Smoking status: Never Smoker     Smokeless tobacco: Never Used     Tobacco comment: Mom smoked his upbringing   Substance Use Topics     Alcohol use: Yes     Comment: Really rare now      Drug use: No     Social History     Social History Narrative    Dairy/d 1-3 servings/d.     Caffeine 1 servings/d    Exercise 5 x week boxer    Sunscreen used - No    Seatbelts used - Yes    Working smoke/CO detectors in the home - Yes    Guns stored in the home - No    Self Breast Exams - NOT APPLICABLE    Self Testicular Exam - Yes    Eye Exam up to date - Yes    Dental Exam up to date - Yes    Pap Smear up to date - NOT APPLICABLE    Mammogram up to date - NOT APPLICABLE "    PSA up to date - NO    Dexa Scan up to date - NOT APPLICABLE    Flex Sig / Colonoscopy up to date - Yes    Immunizations up to date - Yes    Abuse: Current or Past(Physical, Sexual or Emotional)- No    Do you feel safe in your environment - Yes           Past medical, family, and social history were reviewed.    REVIEW OF SYSTEMS:  General: negative for weight gain. negative for weight loss. negative for changes in sleep.   Eyes: negative for itching. negative for redness. negative for tearing/watering. negative for vision changes  Ears: negative for fullness. negative for hearing loss. negative for dizziness.   Nose: negative for snoring.negative for changes in smell. negative for drainage.   Throat: negative for hoarseness. negative for sore throat. negative for trouble swallowing.   Lungs: negative for cough. negative for shortness of breath.negative for wheezing. negative for sputum production.   Cardiovascular: negative for chest pain. negative for swelling of ankles. negative for fast or irregular heartbeat.   Gastrointestinal: negative for nausea. negative for heartburn. negative for acid reflux.   Musculoskeletal: negative for joint pain. negative for joint stiffness. negative for joint swelling.   Neurologic: negative for seizures. negative for fainting. negative for weakness.   Psychiatric: negative for changes in mood. negative for anxiety.   Endocrine: negative for cold intolerance. negative for heat intolerance. negative for tremors.   Hematologic: negative for easy bruising. negative for easy bleeding.  Integumentary: negative for rash. negative for scaling. negative for nail changes.       Current Outpatient Medications:      albuterol (PROAIR HFA/PROVENTIL HFA/VENTOLIN HFA) 108 (90 Base) MCG/ACT inhaler, Inhale 2 puffs into the lungs every 6 hours as needed for shortness of breath / dyspnea, Disp: 1 Inhaler, Rfl: 5     albuterol (PROVENTIL) (2.5 MG/3ML) 0.083% neb solution, INHALE THE CONTENTS OF  1 VIAL VIA NEBULIZER EVERY 6 HOURS AS NEEDED FOR SHORTNESS OF BREATH/DYSPNEA OR WHEEZING, Disp: 75 mL, Rfl: 5     Ascorbic Acid (VITAMIN C) POWD, , Disp: , Rfl:      Aspirin-Acetaminophen-Caffeine (EXCEDRIN PO), Take by mouth as needed , Disp: , Rfl:      budesonide (PULMICORT) 0.5 MG/2ML neb solution, INHALE THE CONTENTS OF 1   VIAL VIA NEBULIZER DAILY (Patient taking differently: as needed ), Disp: 60 mL, Rfl: 5     budesonide-formoterol (SYMBICORT) 80-4.5 MCG/ACT Inhaler, Inhale 2 puffs into the lungs 2 times daily as needed (wheeze or asthma exacerbation), Disp: 1 Inhaler, Rfl: 5     fluticasone (FLONASE) 50 MCG/ACT nasal spray, USE 2 SPRAYS IN EACH       NOSTRIL DAILY (Patient taking differently: as needed ), Disp: 16 g, Rfl: 2     fluticasone-vilanterol (BREO ELLIPTA) 100-25 MCG/INH inhaler, Inhale 1 puff into the lungs daily, Disp: 3 each, Rfl: 0     IBUPROFEN PO, , Disp: , Rfl:      montelukast (SINGULAIR) 10 MG tablet, TAKE 1 TABLET AT BEDTIME (Patient taking differently: Take 10 mg by mouth At Bedtime ), Disp: 90 tablet, Rfl: 3     order for DME, Equipment being ordered: spacer for inhaler, Disp: 1 Inhaler, Rfl: 0     Vitamin D, Cholecalciferol, 25 MCG (1000 UT) TABS, Take by mouth every morning, Disp: , Rfl:      Zinc Sulfate (ZINC-220 PO), Take by mouth every morning, Disp: , Rfl:   Allergies   Allergen Reactions     Augmentin Rash       EXAM:   /86 (BP Location: Right arm, Patient Position: Sitting, Cuff Size: Adult Regular)   Pulse (!) 47   SpO2 97%   GENERAL APPEARANCE: alert, cooperative and not in distress  SKIN: no rashes, no lesions  HEAD: atraumatic, normocephalic  EYES: lids and lashes normal, conjunctivae and sclerae clear, EOM full and intact  ENT: no scars or lesions, nasal exam showed no discharge, swelling or lesions noted, tongue midline and normal, soft palate, uvula, and tonsils normal  NECK: no asymmetry, masses, or scars, supple without significant adenopathy  LUNGS: unlabored  respirations, no intercostal retractions or accessory muscle use, clear to auscultation without rales or wheezes  HEART: regular rate and rhythm without murmurs and normal S1 and S2  MUSCULOSKELETAL: no musculoskeletal defects are noted  NEURO: no focal deficits noted  PSYCH: does not appear depressed or anxious      WORKUP:  None    ASSESSMENT/PLAN:  Keith Spangler is a 41 year old male seen for a follow-up visit.    1. Mild persistent asthma without complication - Keith reports his asthma symptoms have significantly improved since he started ICS/LABA therapy. He is doing well with Breo and has not had any side effects related to the medication. He does note that while his symptoms have improved, he feels the medication begins to wear off in the evenings though not to the point where he needs additional medication. We discussed continuing with the Breo and monitoring this vs transition to a twice daily ICS/LABA preparation. Given the convenience of once daily dosing as well as improvement in his symptoms we will plan to continue to monitor and reassess at his next visit.    - fluticasone-vilanterol (BREO ELLIPTA) 100-25 MCG/INH inhaler; Inhale 1 puff into the lungs daily  Dispense: 3 each; Refill: 0  - take 2 to 4 puffs of albuterol HFA and/or use nebulizer every 4 hours as needed      Follow-up in 3 months, sooner if needed      Thank you for allowing me to participate in the care of Keith Spangler.      Andrei Kyle MD, FAAAAI  Allergy/Immunology  [facility]      Chart documentation done in part with Dragon Voice Recognition Software. Although reviewed after completion, some word and grammatical errors may remain.

## 2021-06-21 NOTE — PATIENT INSTRUCTIONS
If you have any questions regarding your allergies, asthma, or what we discussed during your visit today please call the allergy clinic or contact us via [employer].    [facility] Allergy RN Line: [phone]  [facility] Scheduling Line: [phone]  [facility] Scheduling Line: [phone]    Clinic Schedule:   [place] - Monday, Tuesday, and Thursday  [facility] - Wednesday      Continue with the Breo once daily - we can consider switching to a twice daily medication if you feel the medication is not working in the evenings    Follow-up in 3 months

## 2021-06-21 NOTE — LETTER
"    6/21/2021         RE: Keith Spangler  [address] [address]        Dear Colleague,    Thank you for referring your patient, Keith Spangler, to the [facility]. Please see a copy of my visit note below.    Keith Spangler was seen in the Allergy Clinic at [facility].      Keith Spangler is a 41 year old  /  male who is seen today for follow-up of asthma. He reports that he feels much better since starting the Breo. He typically takes the medication in the morning and feels like he has \"instant relief.\" After around 8PM he feels the medication seems to be wearing off however he does not have symptoms in the evening that require him to use albuterol. Keith is sleeping well and denies having nocturnal symptoms of cough, shortness of breath, or wheezing.      Past Medical History:   Diagnosis Date     COPD (chronic obstructive pulmonary disease) (H)      NO ACTIVE PROBLEMS      Uncomplicated asthma Sept 2014    Intermittent asthma     Family History   Problem Relation Age of Onset     Unknown/Adopted Mother         Diagnosed w/ALS June 2015     Sleep Apnea Brother      Gastrointestinal Disease Brother         colitis     C.A.D. No family hx of      Diabetes No family hx of      Hypertension No family hx of      Cerebrovascular Disease No family hx of      Breast Cancer No family hx of      Cancer - colorectal No family hx of      Prostate Cancer No family hx of      Asthma No family hx of      Social History     Tobacco Use     Smoking status: Never Smoker     Smokeless tobacco: Never Used     Tobacco comment: Mom smoked his upbringing   Substance Use Topics     Alcohol use: Yes     Comment: Really rare now      Drug use: No     Social History     Social History Narrative    Dairy/d 1-3 servings/d.     Caffeine 1 servings/d    Exercise 5 x week boxer    Sunscreen used - No    Seatbelts used - Yes    Working smoke/CO " detectors in the home - Yes    Guns stored in the home - No    Self Breast Exams - NOT APPLICABLE    Self Testicular Exam - Yes    Eye Exam up to date - Yes    Dental Exam up to date - Yes    Pap Smear up to date - NOT APPLICABLE    Mammogram up to date - NOT APPLICABLE    PSA up to date - NO    Dexa Scan up to date - NOT APPLICABLE    Flex Sig / Colonoscopy up to date - Yes    Immunizations up to date - Yes    Abuse: Current or Past(Physical, Sexual or Emotional)- No    Do you feel safe in your environment - Yes           Past medical, family, and social history were reviewed.    REVIEW OF SYSTEMS:  General: negative for weight gain. negative for weight loss. negative for changes in sleep.   Eyes: negative for itching. negative for redness. negative for tearing/watering. negative for vision changes  Ears: negative for fullness. negative for hearing loss. negative for dizziness.   Nose: negative for snoring.negative for changes in smell. negative for drainage.   Throat: negative for hoarseness. negative for sore throat. negative for trouble swallowing.   Lungs: negative for cough. negative for shortness of breath.negative for wheezing. negative for sputum production.   Cardiovascular: negative for chest pain. negative for swelling of ankles. negative for fast or irregular heartbeat.   Gastrointestinal: negative for nausea. negative for heartburn. negative for acid reflux.   Musculoskeletal: negative for joint pain. negative for joint stiffness. negative for joint swelling.   Neurologic: negative for seizures. negative for fainting. negative for weakness.   Psychiatric: negative for changes in mood. negative for anxiety.   Endocrine: negative for cold intolerance. negative for heat intolerance. negative for tremors.   Hematologic: negative for easy bruising. negative for easy bleeding.  Integumentary: negative for rash. negative for scaling. negative for nail changes.       Current Outpatient Medications:       albuterol (PROAIR HFA/PROVENTIL HFA/VENTOLIN HFA) 108 (90 Base) MCG/ACT inhaler, Inhale 2 puffs into the lungs every 6 hours as needed for shortness of breath / dyspnea, Disp: 1 Inhaler, Rfl: 5     albuterol (PROVENTIL) (2.5 MG/3ML) 0.083% neb solution, INHALE THE CONTENTS OF 1 VIAL VIA NEBULIZER EVERY 6 HOURS AS NEEDED FOR SHORTNESS OF BREATH/DYSPNEA OR WHEEZING, Disp: 75 mL, Rfl: 5     Ascorbic Acid (VITAMIN C) POWD, , Disp: , Rfl:      Aspirin-Acetaminophen-Caffeine (EXCEDRIN PO), Take by mouth as needed , Disp: , Rfl:      budesonide (PULMICORT) 0.5 MG/2ML neb solution, INHALE THE CONTENTS OF 1   VIAL VIA NEBULIZER DAILY (Patient taking differently: as needed ), Disp: 60 mL, Rfl: 5     budesonide-formoterol (SYMBICORT) 80-4.5 MCG/ACT Inhaler, Inhale 2 puffs into the lungs 2 times daily as needed (wheeze or asthma exacerbation), Disp: 1 Inhaler, Rfl: 5     fluticasone (FLONASE) 50 MCG/ACT nasal spray, USE 2 SPRAYS IN EACH       NOSTRIL DAILY (Patient taking differently: as needed ), Disp: 16 g, Rfl: 2     fluticasone-vilanterol (BREO ELLIPTA) 100-25 MCG/INH inhaler, Inhale 1 puff into the lungs daily, Disp: 3 each, Rfl: 0     IBUPROFEN PO, , Disp: , Rfl:      montelukast (SINGULAIR) 10 MG tablet, TAKE 1 TABLET AT BEDTIME (Patient taking differently: Take 10 mg by mouth At Bedtime ), Disp: 90 tablet, Rfl: 3     order for DME, Equipment being ordered: spacer for inhaler, Disp: 1 Inhaler, Rfl: 0     Vitamin D, Cholecalciferol, 25 MCG (1000 UT) TABS, Take by mouth every morning, Disp: , Rfl:      Zinc Sulfate (ZINC-220 PO), Take by mouth every morning, Disp: , Rfl:   Allergies   Allergen Reactions     Augmentin Rash       EXAM:   /86 (BP Location: Right arm, Patient Position: Sitting, Cuff Size: Adult Regular)   Pulse (!) 47   SpO2 97%   GENERAL APPEARANCE: alert, cooperative and not in distress  SKIN: no rashes, no lesions  HEAD: atraumatic, normocephalic  EYES: lids and lashes normal, conjunctivae and  sclerae clear, EOM full and intact  ENT: no scars or lesions, nasal exam showed no discharge, swelling or lesions noted, tongue midline and normal, soft palate, uvula, and tonsils normal  NECK: no asymmetry, masses, or scars, supple without significant adenopathy  LUNGS: unlabored respirations, no intercostal retractions or accessory muscle use, clear to auscultation without rales or wheezes  HEART: regular rate and rhythm without murmurs and normal S1 and S2  MUSCULOSKELETAL: no musculoskeletal defects are noted  NEURO: no focal deficits noted  PSYCH: does not appear depressed or anxious      WORKUP:  None    ASSESSMENT/PLAN:  Keith Spangler is a 41 year old male seen for a follow-up visit.    1. Mild persistent asthma without complication - Keith reports his asthma symptoms have significantly improved since he started ICS/LABA therapy. He is doing well with Breo and has not had any side effects related to the medication. He does note that while his symptoms have improved, he feels the medication begins to wear off in the evenings though not to the point where he needs additional medication. We discussed continuing with the Breo and monitoring this vs transition to a twice daily ICS/LABA preparation. Given the convenience of once daily dosing as well as improvement in his symptoms we will plan to continue to monitor and reassess at his next visit.    - fluticasone-vilanterol (BREO ELLIPTA) 100-25 MCG/INH inhaler; Inhale 1 puff into the lungs daily  Dispense: 3 each; Refill: 0  - take 2 to 4 puffs of albuterol HFA and/or use nebulizer every 4 hours as needed      Follow-up in 3 months, sooner if needed      Thank you for allowing me to participate in the care of Keith Spangler.      Andrei Kyle MD, FAAAAI  Allergy/Immunology  [facility]      Chart documentation done in part with Dragon Voice Recognition Software. Although reviewed  after completion, some word and grammatical errors may remain.      Again, thank you for allowing me to participate in the care of your patient.        Sincerely,        Andrei Kyle MD

## 2021-06-22 ASSESSMENT — ASTHMA QUESTIONNAIRES: ACT_TOTALSCORE: 21

## 2021-08-02 DIAGNOSIS — J45.30 MILD PERSISTENT ASTHMA WITHOUT COMPLICATION: ICD-10-CM

## 2021-08-03 NOTE — TELEPHONE ENCOUNTER
Prescription approved per [facility] Refill Protocol.    Heath OLIVEIRA RN....8/3/2021 10:33 AM

## 2021-08-04 DIAGNOSIS — Z11.59 ENCOUNTER FOR SCREENING FOR OTHER VIRAL DISEASES: ICD-10-CM

## 2021-08-09 NOTE — TELEPHONE ENCOUNTER
FUTURE VISIT INFORMATION      SURGERY INFORMATION:    Date: 8/19/21    Location:  or    Surgeon:  Becki Johnson MD    Anesthesia Type:  general    Procedure: RHINOSEPTOPLASTY, CADAVERIC RIB GRAFT, POSSIBLE CONCHAL CARTILAGE GRAFT    Consult:  2/17/21    RECORDS REQUESTED FROM:       Primary Care Provider: Joseph Adams PA-C- [facility]    Most recent PFT's: 7/17/20    Most recent Sleep Study:  9/23/20

## 2021-08-16 ENCOUNTER — PRE VISIT (OUTPATIENT)
Dept: SURGERY | Facility: CLINIC | Age: 41
End: 2021-08-16

## 2021-08-16 ENCOUNTER — ANESTHESIA EVENT (OUTPATIENT)
Dept: SURGERY | Facility: AMBULATORY SURGERY CENTER | Age: 41
End: 2021-08-16
Payer: COMMERCIAL

## 2021-08-16 ENCOUNTER — OFFICE VISIT (OUTPATIENT)
Dept: SURGERY | Facility: CLINIC | Age: 41
End: 2021-08-16
Payer: COMMERCIAL

## 2021-08-16 ENCOUNTER — LAB (OUTPATIENT)
Dept: LAB | Facility: CLINIC | Age: 41
End: 2021-08-16
Payer: COMMERCIAL

## 2021-08-16 ENCOUNTER — ANESTHESIA EVENT (OUTPATIENT)
Dept: SURGERY | Facility: CLINIC | Age: 41
End: 2021-08-16

## 2021-08-16 VITALS
HEIGHT: 68 IN | SYSTOLIC BLOOD PRESSURE: 118 MMHG | OXYGEN SATURATION: 95 % | WEIGHT: 189.7 LBS | DIASTOLIC BLOOD PRESSURE: 80 MMHG | BODY MASS INDEX: 28.75 KG/M2 | HEART RATE: 68 BPM | TEMPERATURE: 97.8 F | RESPIRATION RATE: 16 BRPM

## 2021-08-16 DIAGNOSIS — Z11.59 ENCOUNTER FOR SCREENING FOR OTHER VIRAL DISEASES: ICD-10-CM

## 2021-08-16 DIAGNOSIS — Z01.818 PREOP EXAMINATION: Primary | ICD-10-CM

## 2021-08-16 LAB — SARS-COV-2 RNA RESP QL NAA+PROBE: NEGATIVE

## 2021-08-16 PROCEDURE — U0005 INFEC AGEN DETEC AMPLI PROBE: HCPCS | Mod: 90 | Performed by: PATHOLOGY

## 2021-08-16 PROCEDURE — 99213 OFFICE O/P EST LOW 20 MIN: CPT | Performed by: PHYSICIAN ASSISTANT

## 2021-08-16 PROCEDURE — U0003 INFECTIOUS AGENT DETECTION BY NUCLEIC ACID (DNA OR RNA); SEVERE ACUTE RESPIRATORY SYNDROME CORONAVIRUS 2 (SARS-COV-2) (CORONAVIRUS DISEASE [COVID-19]), AMPLIFIED PROBE TECHNIQUE, MAKING USE OF HIGH THROUGHPUT TECHNOLOGIES AS DESCRIBED BY CMS-2020-01-R: HCPCS | Mod: 90 | Performed by: PATHOLOGY

## 2021-08-16 ASSESSMENT — MIFFLIN-ST. JEOR: SCORE: 1734.97

## 2021-08-16 ASSESSMENT — PAIN SCALES - GENERAL: PAINLEVEL: NO PAIN (0)

## 2021-08-16 NOTE — PATIENT INSTRUCTIONS
Preparing for Your Surgery      Name:  Keith Spangler   MRN:  [id number]   :  1980   Today's Date:  2021         Arriving for surgery:  Surgery date:  21  Arrival time:  9:00 am    Restrictions due to COVID 19:  One consistent visitor is allowed per patient  No ill visitors  All visitors must wear face mask     parking is available for anyone with mobility limitations or disabilities. (Monday- Friday 7 am- 5 pm)    Please come to:    [facility]  [address] [place] [id number]    Please check in on the 5th floor at the Ambulatory Surgery Center       What can I eat or drink?    -  You may eat and drink normally until 8 hours before surgery. (Until 2:30 am)  -  You may have clear liquids up to 4 hours before surgery. (Until 6:30 am)    Examples of clear liquids:  Water  Clear broth  Juices (apple, white grape, white cranberry  and cider) without pulp  Noncarbonated, powder based beverages  (lemonade and Michael-Aid)  Sodas (Sprite, 7-Up, ginger ale and seltzer)  Coffee or tea (without milk or cream)  Gatorade    --No alcohol for at least 24 hours before surgery    Which medicines can I take?    **Hold Aspirin (including EXCEDRIN) for 7 days before surgery - take your last dose on .   Hold Multivitamins for 7 days before surgery.  Hold Supplements for 7 days before surgery.    **Hold Ibuprofen (Advil, Motrin) for 1 day before surgery--unless otherwise directed by surgeon.*    Hold Naproxen (Aleve) for 4 days before surgery.    -  PLEASE TAKE the following medications the day of surgery:   Albuterol (Proair) Inhaler  Albuterol Nebulizer  Budesonide (Pulmicort) Nebulizer  Fluticasone (Flonase)  Fluticasone-Vilanterol (Breo-Ellipta) Inhaler    How do I prepare myself?  - Please take 2 showers before surgery using Scrubcare or Hibiclens soap.    Use this soap only from the neck to your toes.     Leave the soap on your skin for one minute--then rinse  thoroughly.      You may use your own shampoo and conditioner; no other hair products.   - Please remove all jewelry and body piercings.  - No lotions, deodorants or fragrance.  - Bring your ID and insurance card.    -If you have a Deep Brain Stimulator, a Spinal Cord Stimulator or any implanted Neuro Device you must bring the remote to the Surgery Center         - All patients are required to have a Covid-19 test within 4 days of surgery/procedure.      -Patients will be contacted by the [facility] scheduling team within 1 week of surgery to make an appointment.      - Patients may call the Scheduling team at [phone] if they have not been scheduled within 4 days of  surgery.      ALL PATIENTS ARE REQUIRED TO HAVE A RESPONSIBLE ADULT TO DRIVE AND BE IN ATTENDANCE WITH THEM FOR 24 HOURS FOLLOWING SURGERY       Questions or Concerns:    -For questions regarding the day of surgery please contact the Ambulatory Surgery Center at [phone].    -If you have health changes between today and your surgery please contact your surgeon.     For questions after surgery please call your surgeons office.

## 2021-08-16 NOTE — ANESTHESIA PREPROCEDURE EVALUATION
Anesthesia Pre-Procedure Evaluation    Patient: Keith Spangler   MRN: [id number] : 1980        Preoperative Diagnosis: * No surgery found *   Procedure :      Past Medical History:   Diagnosis Date     COPD (chronic obstructive pulmonary disease) (H)      NO ACTIVE PROBLEMS      Uncomplicated asthma 2014    Intermittent asthma      Past Surgical History:   Procedure Laterality Date     APPENDECTOMY  Oct. 1991     EYE SURGERY   and     PRK laser correction     HERNIA REPAIR       SURGICAL HISTORY OF -       Thumb 2005-Pins placed      SURGICAL HISTORY OF -       ER visit, left upper eyelid laceration repair     SURGICAL HISTORY OF -       Septo/rhinoplasty of nose secondary to boxing injury     SURGICAL HISTORY OF -       Left hernia       Allergies   Allergen Reactions     Augmentin Rash      Social History     Tobacco Use     Smoking status: Never Smoker     Smokeless tobacco: Never Used     Tobacco comment: Mom smoked his upbringing   Substance Use Topics     Alcohol use: Yes     Comment: Really rare now       Wt Readings from Last 1 Encounters:   21 87.1 kg (192 lb)        Anesthesia Evaluation   Pt has had prior anesthetic.     No history of anesthetic complications       ROS/MED HX  ENT/Pulmonary: Comment: Nasal obstruction    (+) Intermittent, asthma Last exacerbation: none,  Treatment: Inhaler prn,      Neurologic:  - neg neurologic ROS     Cardiovascular:  - neg cardiovascular ROS   (+) -----No previous cardiac testing     METS/Exercise Tolerance: >4 METS    Hematologic:  - neg hematologic  ROS  (-) history of blood clots and history of blood transfusion   Musculoskeletal:  - neg musculoskeletal ROS     GI/Hepatic:  - neg GI/hepatic ROS     Renal/Genitourinary:  - neg Renal ROS     Endo:  - neg endo ROS     Psychiatric/Substance Use:  - neg psychiatric ROS     Infectious Disease:  - neg infectious disease ROS     Malignancy:  - neg malignancy ROS     Other:  - neg other ROS           Physical Exam    Airway  airway exam normal           Respiratory Devices and Support         Dental  no notable dental history         Cardiovascular   cardiovascular exam normal       Rhythm and rate: regular and normal     Pulmonary   pulmonary exam normal        breath sounds clear to auscultation           OUTSIDE LABS:  CBC:   Lab Results   Component Value Date    WBC 16.3 (H) 09/09/2019    HGB 16.0 09/09/2019    HGB 14.8 04/04/2014    HCT 46.2 09/09/2019     09/09/2019     BMP:   Lab Results   Component Value Date     03/20/2020    POTASSIUM 4.9 03/20/2020    CHLORIDE 106 03/20/2020    CO2 30 03/20/2020    BUN 17 03/20/2020    CR 0.98 03/20/2020     (H) 03/20/2020    GLC 93 07/08/2019     COAGS: No results found for: PTT, INR, FIBR  POC: No results found for: BGM, HCG, HCGS  HEPATIC: No results found for: ALBUMIN, PROTTOTAL, ALT, AST, GGT, ALKPHOS, BILITOTAL, BILIDIRECT, ROSEMARY  OTHER:   Lab Results   Component Value Date    RICHARD 9.4 03/20/2020    TSH 3.63 04/04/2014             PAC Discussion and Assessment    ASA Classification: 2  Case is suitable for: ASC  Anesthetic techniques and relevant risks discussed: GA  Invasive monitoring and risk discussed: No    Possibility and Risk of blood transfusion discussed: No            PAC Resident/NP Anesthesia Assessment: Keith Spangler is a 40 year old male scheduled for septorhinoplasty on 8/19/21 by Dr. Lorenzo Bain in treatment of nasal obstruction, h/o deviated nasal septum, History of fracture of nose, Nasal valve collapse, acquired nasal deformity .  PAC referral for risk assessment and optimization for anesthesia:    Pre-operative considerations:  1.  Cardiac:  Functional status- METS >4.  Low risk surgery with 0.4% (RCRI #) risk of major adverse cardiac event.   --no cardiac history, no cardiac symptoms    2.  Pulm:  Airway feasible.  GOLDIE risk: Low  --never smoker  --h/o asthma, well controlled and mild. No recent exacerbations or  steroid use.    3.  GI:  Risk of PONV score = 1.  If > 2, anti-emetic intervention recommended.    4.  ID:  --Covid positive 11/2020. No hospitalization.     VTE risk: 0.5%    Patient is optimized and is acceptable candidate for the proposed procedure.  No further diagnostic evaluation is needed.         **For further details of assessment, testing, and physical exam please see H and P completed on same date.          Cassidy Mcgraw PA-C, [facility]    Reviewed and Signed by PAC Mid-Level Provider/Resident  Mid-Level Provider/Resident: Cassidy Mcgraw  Date: 5/14/21        Reviewed and Signed by PAC Anesthesiologist  Anesthesiologist: Galo  Date: 8/16/21                     Cassidy Mcgraw PA-C

## 2021-08-16 NOTE — H&P
Pre-Operative H & P     CC:  Preoperative exam to assess for increased cardiopulmonary risk while undergoing surgery and anesthesia.    Date of Encounter: 8/16/2021  Primary Care Physician:  Joseph Adams  Associated Diagnosis: nasal obstruction, h/o deviated nasal septum, History of fracture of nose, Nasal valve collapse, acquired nasal deformity    HPI  Keith Spangler is a 41 year old male who presents for pre-operative H & P in preparation for septorhinoplasty with Dr. Lorenzo Bain on 8/19/21 at [facility].     This is a 40-year-old male with past medical history significant for nasal obstruction, asthma, and history of positive Covid November 2020.  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  He has multiple fractures with comminution with additional fibrous union noted on his CT scan.  Nasal bones are irregular.  He is had 1 prior septorhinoplasty.  He has met with Dr. Lorenzo Bain and per her evaluation he has severe septal deviation in addition to mid vault collapse and loss of dorsal height.  He has absent nasal tip support.  The above procedure is now planned.     History is obtained from the patient and the medical record.        Past Medical History  Past Medical History:   Diagnosis Date     COPD (chronic obstructive pulmonary disease) (H)      NO ACTIVE PROBLEMS      Uncomplicated asthma Sept 2014    Intermittent asthma       Past Surgical History  Past Surgical History:   Procedure Laterality Date     APPENDECTOMY  Oct. 1991     EYE SURGERY  2013 and 2014    PRK laser correction     HERNIA REPAIR  2002     SURGICAL HISTORY OF -       Thumb 2005-Pins placed      SURGICAL HISTORY OF -       ER visit, left upper eyelid laceration repair     SURGICAL HISTORY OF -       Septo/rhinoplasty of nose secondary to boxing injury     SURGICAL HISTORY OF -       Left hernia        Hx of Blood transfusions/reactions: denies     Hx of abnormal bleeding or  anti-platelet use: denies    Menstrual history: No LMP for male patient.:     Steroid use in the last year: denies    Personal or FH with difficulty with Anesthesia:  denies    Prior to Admission Medications  Current Outpatient Medications   Medication Sig Dispense Refill     albuterol (PROAIR HFA/PROVENTIL HFA/VENTOLIN HFA) 108 (90 Base) MCG/ACT inhaler Inhale 2 puffs into the lungs every 6 hours as needed for shortness of breath / dyspnea 1 Inhaler 5     albuterol (PROVENTIL) (2.5 MG/3ML) 0.083% neb solution INHALE THE CONTENTS OF 1 VIAL VIA NEBULIZER EVERY 6 HOURS AS NEEDED FOR SHORTNESS OF BREATH/DYSPNEA OR WHEEZING (Patient taking differently: every 4 hours as needed ) 75 mL 5     Aspirin-Acetaminophen-Caffeine (EXCEDRIN PO) Take by mouth as needed        budesonide (PULMICORT) 0.5 MG/2ML neb solution INHALE THE CONTENTS OF 1   VIAL VIA NEBULIZER DAILY (Patient taking differently: as needed ) 60 mL 5     fluticasone (FLONASE) 50 MCG/ACT nasal spray USE 2 SPRAYS IN EACH       NOSTRIL DAILY (Patient taking differently: as needed ) 16 g 2     fluticasone-vilanterol (BREO ELLIPTA) 100-25 MCG/INH inhaler Inhale 1 puff into the lungs daily (Patient taking differently: Inhale 1 puff into the lungs every morning ) 3 each 0     IBUPROFEN PO Take by mouth every 4 hours as needed        montelukast (SINGULAIR) 10 MG tablet TAKE 1 TABLET AT BEDTIME (Patient taking differently: Take 10 mg by mouth At Bedtime ) 90 tablet 3     Ascorbic Acid (VITAMIN C) POWD  (Patient not taking: Reported on 8/16/2021)       order for DME Equipment being ordered: spacer for inhaler 1 Inhaler 0     Vitamin D, Cholecalciferol, 25 MCG (1000 UT) TABS Take by mouth every morning (Patient not taking: Reported on 8/16/2021)       Zinc Sulfate (ZINC-220 PO) Take by mouth every morning (Patient not taking: Reported on 8/16/2021)         Allergies  Allergies   Allergen Reactions     Augmentin Rash       Social History  Social History     Socioeconomic  History     Marital status: Single     Spouse name: Not on file     Number of children: 1     Years of education: Not on file     Highest education level: Not on file   Occupational History     Employer: [employer]   Tobacco Use     Smoking status: Never Smoker     Smokeless tobacco: Never Used     Tobacco comment: Mom smoked his upbringing   Substance and Sexual Activity     Alcohol use: Yes     Comment: Really rare now      Drug use: No     Sexual activity: Yes     Partners: Female     Birth control/protection: None   Other Topics Concern     Parent/sibling w/ CABG, MI or angioplasty before 65F 55M? No   Social History Narrative    Dairy/d 1-3 servings/d.     Caffeine 1 servings/d    Exercise 5 x week boxer    Sunscreen used - No    Seatbelts used - Yes    Working smoke/CO detectors in the home - Yes    Guns stored in the home - No    Self Breast Exams - NOT APPLICABLE    Self Testicular Exam - Yes    Eye Exam up to date - Yes    Dental Exam up to date - Yes    Pap Smear up to date - NOT APPLICABLE    Mammogram up to date - NOT APPLICABLE    PSA up to date - NO    Dexa Scan up to date - NOT APPLICABLE    Flex Sig / Colonoscopy up to date - Yes    Immunizations up to date - Yes    Abuse: Current or Past(Physical, Sexual or Emotional)- No    Do you feel safe in your environment - Yes         Social Determinants of Health     Financial Resource Strain:      Difficulty of Paying Living Expenses:    Food Insecurity:      Worried About Running Out of Food in the Last Year:      Ran Out of Food in the Last Year:    Transportation Needs:      Lack of Transportation (Medical):      Lack of Transportation (Non-Medical):    Physical Activity:      Days of Exercise per Week:      Minutes of Exercise per Session:    Stress:      Feeling of Stress :    Social Connections:      Frequency of Communication with Friends and Family:      Frequency of Social Gatherings with Friends and Family:      Attends Church Services:   "    Active Member of Clubs or Organizations:      Attends Club or Organization Meetings:      Marital Status:    Intimate Partner Violence:      Fear of Current or Ex-Partner:      Emotionally Abused:      Physically Abused:      Sexually Abused:        Family History  Family History   Problem Relation Age of Onset     Unknown/Adopted Mother         Diagnosed w/ALS June 2015     Sleep Apnea Brother      Gastrointestinal Disease Brother         colitis     C.A.D. No family hx of      Diabetes No family hx of      Hypertension No family hx of      Cerebrovascular Disease No family hx of      Breast Cancer No family hx of      Cancer - colorectal No family hx of      Prostate Cancer No family hx of      Asthma No family hx of            Anesthesia Evaluation   Pt has had prior anesthetic.     No history of anesthetic complications       ROS/MED HX  ENT/Pulmonary: Comment: Nasal obstruction    (+) Intermittent, asthma Last exacerbation: none,  Treatment: Inhaler prn,      Neurologic:  - neg neurologic ROS     Cardiovascular:  - neg cardiovascular ROS   (+) -----No previous cardiac testing     METS/Exercise Tolerance: >4 METS    Hematologic:  - neg hematologic  ROS  (-) history of blood clots and history of blood transfusion   Musculoskeletal:  - neg musculoskeletal ROS     GI/Hepatic:  - neg GI/hepatic ROS     Renal/Genitourinary:  - neg Renal ROS     Endo:  - neg endo ROS     Psychiatric/Substance Use:  - neg psychiatric ROS     Infectious Disease:  - neg infectious disease ROS     Malignancy:  - neg malignancy ROS     Other:  - neg other ROS        The complete review of systems is negative other than noted in the HPI or here.   Temp: 97.8  F (36.6  C) Temp src: Oral BP: 118/80 Pulse: 68   Resp: 16 SpO2: 95 %         189 lbs 11.2 oz  5' 8\"   Body mass index is 28.84 kg/m .       Physical Exam  Constitutional: Awake, alert, cooperative, no apparent distress, and appears stated age.  Eyes: Pupils equal, round and " reactive to light, extra ocular muscles intact, sclera clear, conjunctiva normal.  HENT: Normocephalic, oral pharynx with moist mucus membranes, good dentition. No goiter appreciated.   Respiratory: Clear to auscultation bilaterally, no crackles or wheezing.  Cardiovascular: Regular rate and rhythm, normal S1 and S2, and no murmur noted.  Carotids +2, no bruits. No edema. Palpable pulses to radial  DP and PT arteries.   GI: not assessed  Lymph/Hematologic: No cervical lymphadenopathy and no supraclavicular lymphadenopathy.  Genitourinary:  deferred  Skin: Warm and dry.  No rashes at anticipated surgical site.   Musculoskeletal: Full ROM of neck. There is no redness, warmth, or swelling of the joints. Gross motor strength is normal.    Neurologic: Awake, alert, oriented to name, place and time. Cranial nerves II-XII are grossly intact. Gait is normal.   Neuropsychiatric: Calm, cooperative. Normal affect.     PRIOR LABS/DIAGNOSTIC STUDIES:  All labs and imaging personally reviewed      EXAMINATION: CT CHEST W/O CONTRAST, 7/24/2020 8:42 AM    IMPRESSION:   1. No focal airspace opacity. Nonspecific focal  interlobular septal thickening in the medial left lower lobe.     2. Scattered sub-4 mm pulmonary nodules. If patient is at high risk  for cancer, consider repeat chest CT in one year to evaluate for  stability.        CT FACIAL BONES WITHOUT CONTRAST 7/17/2020 2:58 PM     History:  Nasal deformity, history of multiple nasal fractures; Nasal  deformity     Additional history from EMR: 39-year-old man with history of nasal  fractures.?He retired from boxing in 2014 and was recently evaluated  by surgery for options that may improve cosmetic appearance of his  nose as well as nasal breathing issues.     Comparison:  None       Technique: Using thin collimation multidetector helical acquisition  technique, axial and coronal thin section CT images were reconstructed  through the facial bones. Images were reviewed in bone  and soft tissue  windows.     Findings: Comminuted displaced nasal bone fractures and fracture  deformity of the left frontal process of the maxilla, as well as a  nondisplaced fracture of the perpendicular plate of the ethmoid bone.  The cribriform plate appears intact. There is no soft tissue swelling  of the face.     There is no hematoma, soft tissue mass or gas visualized within the  orbits. Mild mucosal thickening in the bilateral maxillary sinuses,  otherwise the visualized portions of the paranasal sinuses are clear.  Mastoid air cells are clear.                                                                      Impression: Multiple nasal bone, maxillary, and ethmoid fracture  deformities that are most likely chronic given lack of associated soft  tissue edema.        Outside records reviewed from: care everywhere      ASSESSMENT and PLAN  Keith Spangler is a 40 year old male scheduled for septorhinoplasty on 8/19/21 by Dr. Lorenzo Bain in treatment of nasal obstruction, h/o deviated nasal septum, History of fracture of nose, Nasal valve collapse, acquired nasal deformity .  PAC referral for risk assessment and optimization for anesthesia:    Pre-operative considerations:  1.  Cardiac:  Functional status- METS >4.  Low risk surgery with 0.4% (RCRI #) risk of major adverse cardiac event.   --no cardiac history, no cardiac symptoms    2.  Pulm:  Airway feasible.  GOLDIE risk: Low  --never smoker  --h/o asthma, well controlled and mild. No recent exacerbations or steroid use.    3.  GI:  Risk of PONV score = 1.  If > 2, anti-emetic intervention recommended.    4.  ID:  --Covid positive 11/2020. No hospitalization.   --has received Moderna vaccine    VTE risk: 0.5%    Patient is optimized and is acceptable candidate for the proposed procedure.  No further diagnostic evaluation is needed.         Cassidy Mcgraw PA-C  Preoperative Assessment Center  [facility]  Phone:  [phone]  Fax: [phone]

## 2021-08-16 NOTE — H&P (VIEW-ONLY)
Pre-Operative H & P     CC:  Preoperative exam to assess for increased cardiopulmonary risk while undergoing surgery and anesthesia.    Date of Encounter: 8/16/2021  Primary Care Physician:  Joseph Adams  Associated Diagnosis: nasal obstruction, h/o deviated nasal septum, History of fracture of nose, Nasal valve collapse, acquired nasal deformity    HPI  Keith Spangler is a 41 year old male who presents for pre-operative H & P in preparation for septorhinoplasty with Dr. Lorenzo Bain on 8/19/21 at [facility].     This is a 40-year-old male with past medical history significant for nasal obstruction, asthma, and history of positive Covid November 2020.  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  He has multiple fractures with comminution with additional fibrous union noted on his CT scan.  Nasal bones are irregular.  He is had 1 prior septorhinoplasty.  He has met with Dr. Lorenzo Bain and per her evaluation he has severe septal deviation in addition to mid vault collapse and loss of dorsal height.  He has absent nasal tip support.  The above procedure is now planned.     History is obtained from the patient and the medical record.        Past Medical History  Past Medical History:   Diagnosis Date     COPD (chronic obstructive pulmonary disease) (H)      NO ACTIVE PROBLEMS      Uncomplicated asthma Sept 2014    Intermittent asthma       Past Surgical History  Past Surgical History:   Procedure Laterality Date     APPENDECTOMY  Oct. 1991     EYE SURGERY  2013 and 2014    PRK laser correction     HERNIA REPAIR  2002     SURGICAL HISTORY OF -       Thumb 2005-Pins placed      SURGICAL HISTORY OF -       ER visit, left upper eyelid laceration repair     SURGICAL HISTORY OF -       Septo/rhinoplasty of nose secondary to boxing injury     SURGICAL HISTORY OF -       Left hernia        Hx of Blood transfusions/reactions: denies     Hx of abnormal bleeding or  anti-platelet use: denies    Menstrual history: No LMP for male patient.:     Steroid use in the last year: denies    Personal or FH with difficulty with Anesthesia:  denies    Prior to Admission Medications  Current Outpatient Medications   Medication Sig Dispense Refill     albuterol (PROAIR HFA/PROVENTIL HFA/VENTOLIN HFA) 108 (90 Base) MCG/ACT inhaler Inhale 2 puffs into the lungs every 6 hours as needed for shortness of breath / dyspnea 1 Inhaler 5     albuterol (PROVENTIL) (2.5 MG/3ML) 0.083% neb solution INHALE THE CONTENTS OF 1 VIAL VIA NEBULIZER EVERY 6 HOURS AS NEEDED FOR SHORTNESS OF BREATH/DYSPNEA OR WHEEZING (Patient taking differently: every 4 hours as needed ) 75 mL 5     Aspirin-Acetaminophen-Caffeine (EXCEDRIN PO) Take by mouth as needed        budesonide (PULMICORT) 0.5 MG/2ML neb solution INHALE THE CONTENTS OF 1   VIAL VIA NEBULIZER DAILY (Patient taking differently: as needed ) 60 mL 5     fluticasone (FLONASE) 50 MCG/ACT nasal spray USE 2 SPRAYS IN EACH       NOSTRIL DAILY (Patient taking differently: as needed ) 16 g 2     fluticasone-vilanterol (BREO ELLIPTA) 100-25 MCG/INH inhaler Inhale 1 puff into the lungs daily (Patient taking differently: Inhale 1 puff into the lungs every morning ) 3 each 0     IBUPROFEN PO Take by mouth every 4 hours as needed        montelukast (SINGULAIR) 10 MG tablet TAKE 1 TABLET AT BEDTIME (Patient taking differently: Take 10 mg by mouth At Bedtime ) 90 tablet 3     Ascorbic Acid (VITAMIN C) POWD  (Patient not taking: Reported on 8/16/2021)       order for DME Equipment being ordered: spacer for inhaler 1 Inhaler 0     Vitamin D, Cholecalciferol, 25 MCG (1000 UT) TABS Take by mouth every morning (Patient not taking: Reported on 8/16/2021)       Zinc Sulfate (ZINC-220 PO) Take by mouth every morning (Patient not taking: Reported on 8/16/2021)         Allergies  Allergies   Allergen Reactions     Augmentin Rash       Social History  Social History     Socioeconomic  History     Marital status: Single     Spouse name: Not on file     Number of children: 1     Years of education: Not on file     Highest education level: Not on file   Occupational History     Employer: [employer]   Tobacco Use     Smoking status: Never Smoker     Smokeless tobacco: Never Used     Tobacco comment: Mom smoked his upbringing   Substance and Sexual Activity     Alcohol use: Yes     Comment: Really rare now      Drug use: No     Sexual activity: Yes     Partners: Female     Birth control/protection: None   Other Topics Concern     Parent/sibling w/ CABG, MI or angioplasty before 65F 55M? No   Social History Narrative    Dairy/d 1-3 servings/d.     Caffeine 1 servings/d    Exercise 5 x week boxer    Sunscreen used - No    Seatbelts used - Yes    Working smoke/CO detectors in the home - Yes    Guns stored in the home - No    Self Breast Exams - NOT APPLICABLE    Self Testicular Exam - Yes    Eye Exam up to date - Yes    Dental Exam up to date - Yes    Pap Smear up to date - NOT APPLICABLE    Mammogram up to date - NOT APPLICABLE    PSA up to date - NO    Dexa Scan up to date - NOT APPLICABLE    Flex Sig / Colonoscopy up to date - Yes    Immunizations up to date - Yes    Abuse: Current or Past(Physical, Sexual or Emotional)- No    Do you feel safe in your environment - Yes         Social Determinants of Health     Financial Resource Strain:      Difficulty of Paying Living Expenses:    Food Insecurity:      Worried About Running Out of Food in the Last Year:      Ran Out of Food in the Last Year:    Transportation Needs:      Lack of Transportation (Medical):      Lack of Transportation (Non-Medical):    Physical Activity:      Days of Exercise per Week:      Minutes of Exercise per Session:    Stress:      Feeling of Stress :    Social Connections:      Frequency of Communication with Friends and Family:      Frequency of Social Gatherings with Friends and Family:      Attends [employer] Services:   "    Active Member of Clubs or Organizations:      Attends Club or Organization Meetings:      Marital Status:    Intimate Partner Violence:      Fear of Current or Ex-Partner:      Emotionally Abused:      Physically Abused:      Sexually Abused:        Family History  Family History   Problem Relation Age of Onset     Unknown/Adopted Mother         Diagnosed w/ALS June 2015     Sleep Apnea Brother      Gastrointestinal Disease Brother         colitis     C.A.D. No family hx of      Diabetes No family hx of      Hypertension No family hx of      Cerebrovascular Disease No family hx of      Breast Cancer No family hx of      Cancer - colorectal No family hx of      Prostate Cancer No family hx of      Asthma No family hx of            Anesthesia Evaluation   Pt has had prior anesthetic.     No history of anesthetic complications       ROS/MED HX  ENT/Pulmonary: Comment: Nasal obstruction    (+) Intermittent, asthma Last exacerbation: none,  Treatment: Inhaler prn,      Neurologic:  - neg neurologic ROS     Cardiovascular:  - neg cardiovascular ROS   (+) -----No previous cardiac testing     METS/Exercise Tolerance: >4 METS    Hematologic:  - neg hematologic  ROS  (-) history of blood clots and history of blood transfusion   Musculoskeletal:  - neg musculoskeletal ROS     GI/Hepatic:  - neg GI/hepatic ROS     Renal/Genitourinary:  - neg Renal ROS     Endo:  - neg endo ROS     Psychiatric/Substance Use:  - neg psychiatric ROS     Infectious Disease:  - neg infectious disease ROS     Malignancy:  - neg malignancy ROS     Other:  - neg other ROS        The complete review of systems is negative other than noted in the HPI or here.   Temp: 97.8  F (36.6  C) Temp src: Oral BP: 118/80 Pulse: 68   Resp: 16 SpO2: 95 %         189 lbs 11.2 oz  5' 8\"   Body mass index is 28.84 kg/m .       Physical Exam  Constitutional: Awake, alert, cooperative, no apparent distress, and appears stated age.  Eyes: Pupils equal, round and " reactive to light, extra ocular muscles intact, sclera clear, conjunctiva normal.  HENT: Normocephalic, oral pharynx with moist mucus membranes, good dentition. No goiter appreciated.   Respiratory: Clear to auscultation bilaterally, no crackles or wheezing.  Cardiovascular: Regular rate and rhythm, normal S1 and S2, and no murmur noted.  Carotids +2, no bruits. No edema. Palpable pulses to radial  DP and PT arteries.   GI: not assessed  Lymph/Hematologic: No cervical lymphadenopathy and no supraclavicular lymphadenopathy.  Genitourinary:  deferred  Skin: Warm and dry.  No rashes at anticipated surgical site.   Musculoskeletal: Full ROM of neck. There is no redness, warmth, or swelling of the joints. Gross motor strength is normal.    Neurologic: Awake, alert, oriented to name, place and time. Cranial nerves II-XII are grossly intact. Gait is normal.   Neuropsychiatric: Calm, cooperative. Normal affect.     PRIOR LABS/DIAGNOSTIC STUDIES:  All labs and imaging personally reviewed      EXAMINATION: CT CHEST W/O CONTRAST, 7/24/2020 8:42 AM    IMPRESSION:   1. No focal airspace opacity. Nonspecific focal  interlobular septal thickening in the medial left lower lobe.     2. Scattered sub-4 mm pulmonary nodules. If patient is at high risk  for cancer, consider repeat chest CT in one year to evaluate for  stability.        CT FACIAL BONES WITHOUT CONTRAST 7/17/2020 2:58 PM     History:  Nasal deformity, history of multiple nasal fractures; Nasal  deformity     Additional history from EMR: 39-year-old man with history of nasal  fractures.?He retired from boxing in 2014 and was recently evaluated  by surgery for options that may improve cosmetic appearance of his  nose as well as nasal breathing issues.     Comparison:  None       Technique: Using thin collimation multidetector helical acquisition  technique, axial and coronal thin section CT images were reconstructed  through the facial bones. Images were reviewed in bone  and soft tissue  windows.     Findings: Comminuted displaced nasal bone fractures and fracture  deformity of the left frontal process of the maxilla, as well as a  nondisplaced fracture of the perpendicular plate of the ethmoid bone.  The cribriform plate appears intact. There is no soft tissue swelling  of the face.     There is no hematoma, soft tissue mass or gas visualized within the  orbits. Mild mucosal thickening in the bilateral maxillary sinuses,  otherwise the visualized portions of the paranasal sinuses are clear.  Mastoid air cells are clear.                                                                      Impression: Multiple nasal bone, maxillary, and ethmoid fracture  deformities that are most likely chronic given lack of associated soft  tissue edema.        Outside records reviewed from: care everywhere      ASSESSMENT and PLAN  Keith Spangler is a 40 year old male scheduled for septorhinoplasty on 8/19/21 by Dr. Lorenzo Bain in treatment of nasal obstruction, h/o deviated nasal septum, History of fracture of nose, Nasal valve collapse, acquired nasal deformity .  PAC referral for risk assessment and optimization for anesthesia:    Pre-operative considerations:  1.  Cardiac:  Functional status- METS >4.  Low risk surgery with 0.4% (RCRI #) risk of major adverse cardiac event.   --no cardiac history, no cardiac symptoms    2.  Pulm:  Airway feasible.  GOLDIE risk: Low  --never smoker  --h/o asthma, well controlled and mild. No recent exacerbations or steroid use.    3.  GI:  Risk of PONV score = 1.  If > 2, anti-emetic intervention recommended.    4.  ID:  --Covid positive 11/2020. No hospitalization.   --has received Moderna vaccine    VTE risk: 0.5%    Patient is optimized and is acceptable candidate for the proposed procedure.  No further diagnostic evaluation is needed.         Cassidy Mcgraw PA-C  Preoperative Assessment Center  [facility]  Phone:  [phone]  Fax: [phone]

## 2021-08-18 NOTE — ANESTHESIA PREPROCEDURE EVALUATION
Anesthesia Pre-Procedure Evaluation    Patient: Keith Spangler   MRN: [id number] : 1980        Preoperative Diagnosis: Nasal obstruction [J34.89]   Procedure : Procedure(s):  RHINOSEPTOPLASTY, CADAVERIC RIB GRAFT, POSSIBLE CONCHAL CARTILAGE GRAFT     Past Medical History:   Diagnosis Date     COPD (chronic obstructive pulmonary disease) (H)      NO ACTIVE PROBLEMS      Uncomplicated asthma 2014    Intermittent asthma      Past Surgical History:   Procedure Laterality Date     APPENDECTOMY  Oct. 1991     EYE SURGERY   and     PRK laser correction     HERNIA REPAIR       SURGICAL HISTORY OF -       Thumb 2005-Pins placed      SURGICAL HISTORY OF -       ER visit, left upper eyelid laceration repair     SURGICAL HISTORY OF -       Septo/rhinoplasty of nose secondary to boxing injury     SURGICAL HISTORY OF -       Left hernia       Allergies   Allergen Reactions     Augmentin Rash      Social History     Tobacco Use     Smoking status: Never Smoker     Smokeless tobacco: Never Used     Tobacco comment: Mom smoked his upbringing   Substance Use Topics     Alcohol use: Yes     Comment: Really rare now       Wt Readings from Last 1 Encounters:   21 86 kg (189 lb 11.2 oz)        Anesthesia Evaluation   Pt has had prior anesthetic.     No history of anesthetic complications       ROS/MED HX  ENT/Pulmonary: Comment: Nasal obstruction    (+) Intermittent, asthma Last exacerbation: none,  Treatment: Inhaler prn,      Neurologic:  - neg neurologic ROS     Cardiovascular:  - neg cardiovascular ROS   (+) -----No previous cardiac testing     METS/Exercise Tolerance: >4 METS    Hematologic:  - neg hematologic  ROS  (-) history of blood clots and history of blood transfusion   Musculoskeletal:  - neg musculoskeletal ROS     GI/Hepatic:  - neg GI/hepatic ROS     Renal/Genitourinary:  - neg Renal ROS     Endo:  - neg endo ROS     Psychiatric/Substance Use:  - neg psychiatric ROS     Infectious Disease:   - neg infectious disease ROS     Malignancy:  - neg malignancy ROS     Other:  - neg other ROS          Physical Exam    Airway  airway exam normal      Mallampati: I   TM distance: < 3 FB   Neck ROM: full   Mouth opening: > 3 cm    Respiratory Devices and Support         Dental  no notable dental history         Cardiovascular   cardiovascular exam normal       Rhythm and rate: regular and normal     Pulmonary   pulmonary exam normal        breath sounds clear to auscultation           OUTSIDE LABS:  CBC:   Lab Results   Component Value Date    WBC 16.3 (H) 09/09/2019    HGB 16.0 09/09/2019    HGB 14.8 04/04/2014    HCT 46.2 09/09/2019     09/09/2019     BMP:   Lab Results   Component Value Date     03/20/2020    POTASSIUM 4.9 03/20/2020    CHLORIDE 106 03/20/2020    CO2 30 03/20/2020    BUN 17 03/20/2020    CR 0.98 03/20/2020     (H) 03/20/2020    GLC 93 07/08/2019     COAGS: No results found for: PTT, INR, FIBR  POC: No results found for: BGM, HCG, HCGS  HEPATIC: No results found for: ALBUMIN, PROTTOTAL, ALT, AST, GGT, ALKPHOS, BILITOTAL, BILIDIRECT, ROSEMARY  OTHER:   Lab Results   Component Value Date    RICHARD 9.4 03/20/2020    TSH 3.63 04/04/2014       Anesthesia Plan    ASA Status:  2   NPO Status:  NPO Appropriate    Anesthesia Type: General.     - Airway: ETT   Induction: Intravenous, Propofol.   Maintenance: Balanced.        Consents    Anesthesia Plan(s) and associated risks, benefits, and realistic alternatives discussed. Questions answered and patient/representative(s) expressed understanding.     - Discussed with:  Patient      - Extended Intubation/Ventilatory Support Discussed: No.      - Patient is DNR/DNI Status: No    Use of blood products discussed: No .     Postoperative Care    Pain management: IV analgesics, Oral pain medications, Multi-modal analgesia.   PONV prophylaxis: Ondansetron (or other 5HT-3), Dexamethasone or Solumedrol     Comments:              PAC Discussion and  Assessment    ASA Classification: 2  Case is suitable for: ASC  Anesthetic techniques and relevant risks discussed: GA  Invasive monitoring and risk discussed: No    Possibility and Risk of blood transfusion discussed: No            PAC Resident/NP Anesthesia Assessment: Keith Spangler is a 40 year old male scheduled for septorhinoplasty on 8/19/21 by Dr. Lorenzo Bain in treatment of nasal obstruction, h/o deviated nasal septum, History of fracture of nose, Nasal valve collapse, acquired nasal deformity .  PAC referral for risk assessment and optimization for anesthesia:    Pre-operative considerations:  1.  Cardiac:  Functional status- METS >4.  Low risk surgery with 0.4% (RCRI #) risk of major adverse cardiac event.   --no cardiac history, no cardiac symptoms    2.  Pulm:  Airway feasible.  GOLDIE risk: Low  --never smoker  --h/o asthma, well controlled and mild. No recent exacerbations or steroid use.    3.  GI:  Risk of PONV score = 1.  If > 2, anti-emetic intervention recommended.    4.  ID:  --Covid positive 11/2020. No hospitalization.     VTE risk: 0.5%    Patient is optimized and is acceptable candidate for the proposed procedure.  No further diagnostic evaluation is needed.         **For further details of assessment, testing, and physical exam please see H and P completed on same date.          Cassidy Mcgraw PA-C, [facility]    Reviewed and Signed by PAC Mid-Level Provider/Resident  Mid-Level Provider/Resident: Cassidy Mcgraw  Date: 5/14/21        Reviewed and Signed by PAC Anesthesiologist  Anesthesiologist: Galo  Date: 8/16/21                     Charly Larkin MD

## 2021-08-19 ENCOUNTER — ANESTHESIA (OUTPATIENT)
Dept: SURGERY | Facility: AMBULATORY SURGERY CENTER | Age: 41
End: 2021-08-19
Payer: COMMERCIAL

## 2021-08-19 ENCOUNTER — HOSPITAL ENCOUNTER (OUTPATIENT)
Facility: AMBULATORY SURGERY CENTER | Age: 41
End: 2021-08-19
Attending: OTOLARYNGOLOGY
Payer: COMMERCIAL

## 2021-08-19 VITALS
RESPIRATION RATE: 14 BRPM | OXYGEN SATURATION: 94 % | DIASTOLIC BLOOD PRESSURE: 93 MMHG | SYSTOLIC BLOOD PRESSURE: 140 MMHG | TEMPERATURE: 97.8 F | HEART RATE: 86 BPM | BODY MASS INDEX: 28.04 KG/M2 | WEIGHT: 185 LBS | HEIGHT: 68 IN

## 2021-08-19 DIAGNOSIS — Z98.890 POST-OPERATIVE STATE: ICD-10-CM

## 2021-08-19 DIAGNOSIS — M95.0 NASAL DEFORMITY: Primary | ICD-10-CM

## 2021-08-19 DIAGNOSIS — R09.81 CHRONIC NASAL CONGESTION: ICD-10-CM

## 2021-08-19 PROCEDURE — 30802 ABLATE INF TURBINATE SUBMUC: CPT

## 2021-08-19 PROCEDURE — 21235 EAR CARTILAGE GRAFT: CPT

## 2021-08-19 PROCEDURE — 30420 RECONSTRUCTION OF NOSE: CPT

## 2021-08-19 DEVICE — GRAFT COSTAL CARTILAGE MED 3X30MM 10-30MM 450030: Type: IMPLANTABLE DEVICE | Site: NOSE | Status: FUNCTIONAL

## 2021-08-19 RX ORDER — LIDOCAINE 40 MG/G
CREAM TOPICAL
Status: DISCONTINUED | OUTPATIENT
Start: 2021-08-19 | End: 2021-08-19 | Stop reason: HOSPADM

## 2021-08-19 RX ORDER — FENTANYL CITRATE 50 UG/ML
INJECTION, SOLUTION INTRAMUSCULAR; INTRAVENOUS PRN
Status: DISCONTINUED | OUTPATIENT
Start: 2021-08-19 | End: 2021-08-19

## 2021-08-19 RX ORDER — MUPIROCIN 20 MG/G
OINTMENT TOPICAL PRN
Status: DISCONTINUED | OUTPATIENT
Start: 2021-08-19 | End: 2021-08-19 | Stop reason: HOSPADM

## 2021-08-19 RX ORDER — ECHINACEA PURPUREA EXTRACT 125 MG
2 TABLET ORAL EVERY 4 HOURS
Qty: 30 ML | Refills: 3 | Status: SHIPPED | OUTPATIENT
Start: 2021-08-19

## 2021-08-19 RX ORDER — ONDANSETRON 4 MG/1
4 TABLET, ORALLY DISINTEGRATING ORAL EVERY 30 MIN PRN
Status: DISCONTINUED | OUTPATIENT
Start: 2021-08-19 | End: 2021-08-20 | Stop reason: HOSPADM

## 2021-08-19 RX ORDER — KETOROLAC TROMETHAMINE 30 MG/ML
15 INJECTION, SOLUTION INTRAMUSCULAR; INTRAVENOUS EVERY 6 HOURS PRN
Status: DISCONTINUED | OUTPATIENT
Start: 2021-08-19 | End: 2021-08-20 | Stop reason: HOSPADM

## 2021-08-19 RX ORDER — HYDROCODONE BITARTRATE AND ACETAMINOPHEN 5; 325 MG/1; MG/1
1 TABLET ORAL
Status: DISCONTINUED | OUTPATIENT
Start: 2021-08-19 | End: 2021-08-20 | Stop reason: HOSPADM

## 2021-08-19 RX ORDER — OXYMETAZOLINE HYDROCHLORIDE 0.05 G/100ML
SPRAY NASAL PRN
Status: DISCONTINUED | OUTPATIENT
Start: 2021-08-19 | End: 2021-08-19 | Stop reason: HOSPADM

## 2021-08-19 RX ORDER — ONDANSETRON 2 MG/ML
INJECTION INTRAMUSCULAR; INTRAVENOUS PRN
Status: DISCONTINUED | OUTPATIENT
Start: 2021-08-19 | End: 2021-08-19

## 2021-08-19 RX ORDER — OXYCODONE HYDROCHLORIDE 5 MG/1
5 TABLET ORAL EVERY 4 HOURS PRN
Status: DISCONTINUED | OUTPATIENT
Start: 2021-08-19 | End: 2021-08-20 | Stop reason: HOSPADM

## 2021-08-19 RX ORDER — PROPOFOL 10 MG/ML
INJECTION, EMULSION INTRAVENOUS CONTINUOUS PRN
Status: DISCONTINUED | OUTPATIENT
Start: 2021-08-19 | End: 2021-08-19

## 2021-08-19 RX ORDER — OXYCODONE HYDROCHLORIDE 5 MG/1
5 TABLET ORAL EVERY 6 HOURS PRN
Qty: 30 TABLET | Refills: 0 | Status: SHIPPED | OUTPATIENT
Start: 2021-08-19 | End: 2022-04-19

## 2021-08-19 RX ORDER — ONDANSETRON 4 MG/1
4 TABLET, ORALLY DISINTEGRATING ORAL EVERY 8 HOURS PRN
Qty: 10 TABLET | Refills: 0 | Status: SHIPPED | OUTPATIENT
Start: 2021-08-19 | End: 2022-02-09

## 2021-08-19 RX ORDER — PROPOFOL 10 MG/ML
INJECTION, EMULSION INTRAVENOUS PRN
Status: DISCONTINUED | OUTPATIENT
Start: 2021-08-19 | End: 2021-08-19

## 2021-08-19 RX ORDER — ECHINACEA PURPUREA EXTRACT 125 MG
2 TABLET ORAL EVERY 4 HOURS
Qty: 30 ML | Refills: 0 | Status: SHIPPED | OUTPATIENT
Start: 2021-08-19 | End: 2021-08-19

## 2021-08-19 RX ORDER — FLUTICASONE PROPIONATE 50 MCG
SPRAY, SUSPENSION (ML) NASAL
Qty: 48 G | Refills: 0 | Status: SHIPPED | OUTPATIENT
Start: 2021-08-19 | End: 2022-04-29

## 2021-08-19 RX ORDER — SODIUM CHLORIDE, SODIUM LACTATE, POTASSIUM CHLORIDE, CALCIUM CHLORIDE 600; 310; 30; 20 MG/100ML; MG/100ML; MG/100ML; MG/100ML
INJECTION, SOLUTION INTRAVENOUS CONTINUOUS
Status: DISCONTINUED | OUTPATIENT
Start: 2021-08-19 | End: 2021-08-19 | Stop reason: HOSPADM

## 2021-08-19 RX ORDER — OXYCODONE HYDROCHLORIDE 5 MG/1
5 TABLET ORAL EVERY 6 HOURS PRN
Qty: 20 TABLET | Refills: 0 | Status: SHIPPED | OUTPATIENT
Start: 2021-08-19 | End: 2021-08-19 | Stop reason: DRUGHIGH

## 2021-08-19 RX ORDER — CLINDAMYCIN PHOSPHATE 900 MG/50ML
900 INJECTION, SOLUTION INTRAVENOUS SEE ADMIN INSTRUCTIONS
Status: DISCONTINUED | OUTPATIENT
Start: 2021-08-19 | End: 2021-08-19 | Stop reason: HOSPADM

## 2021-08-19 RX ORDER — SODIUM CHLORIDE, SODIUM LACTATE, POTASSIUM CHLORIDE, CALCIUM CHLORIDE 600; 310; 30; 20 MG/100ML; MG/100ML; MG/100ML; MG/100ML
INJECTION, SOLUTION INTRAVENOUS CONTINUOUS
Status: DISCONTINUED | OUTPATIENT
Start: 2021-08-19 | End: 2021-08-20 | Stop reason: HOSPADM

## 2021-08-19 RX ORDER — DEXAMETHASONE SODIUM PHOSPHATE 10 MG/ML
INJECTION, SOLUTION INTRAMUSCULAR; INTRAVENOUS PRN
Status: DISCONTINUED | OUTPATIENT
Start: 2021-08-19 | End: 2021-08-19

## 2021-08-19 RX ORDER — FENTANYL CITRATE 50 UG/ML
25 INJECTION, SOLUTION INTRAMUSCULAR; INTRAVENOUS EVERY 5 MIN PRN
Status: DISCONTINUED | OUTPATIENT
Start: 2021-08-19 | End: 2021-08-20 | Stop reason: HOSPADM

## 2021-08-19 RX ORDER — TRAMADOL HYDROCHLORIDE 50 MG/1
50 TABLET ORAL EVERY 6 HOURS PRN
Qty: 20 TABLET | Refills: 0 | Status: SHIPPED | OUTPATIENT
Start: 2021-08-19 | End: 2021-08-19

## 2021-08-19 RX ORDER — GABAPENTIN 300 MG/1
300 CAPSULE ORAL
Status: COMPLETED | OUTPATIENT
Start: 2021-08-19 | End: 2021-08-19

## 2021-08-19 RX ORDER — BUPIVACAINE HYDROCHLORIDE 2.5 MG/ML
INJECTION, SOLUTION INFILTRATION; PERINEURAL PRN
Status: DISCONTINUED | OUTPATIENT
Start: 2021-08-19 | End: 2021-08-19 | Stop reason: HOSPADM

## 2021-08-19 RX ORDER — LIDOCAINE HYDROCHLORIDE 20 MG/ML
INJECTION, SOLUTION INFILTRATION; PERINEURAL PRN
Status: DISCONTINUED | OUTPATIENT
Start: 2021-08-19 | End: 2021-08-19

## 2021-08-19 RX ORDER — MUPIROCIN 20 MG/G
OINTMENT TOPICAL 3 TIMES DAILY
Qty: 30 G | Refills: 0 | Status: SHIPPED | OUTPATIENT
Start: 2021-08-19 | End: 2021-08-22

## 2021-08-19 RX ORDER — LIDOCAINE HYDROCHLORIDE AND EPINEPHRINE 10; 10 MG/ML; UG/ML
INJECTION, SOLUTION INFILTRATION; PERINEURAL PRN
Status: DISCONTINUED | OUTPATIENT
Start: 2021-08-19 | End: 2021-08-19 | Stop reason: HOSPADM

## 2021-08-19 RX ORDER — MEPERIDINE HYDROCHLORIDE 25 MG/ML
12.5 INJECTION INTRAMUSCULAR; INTRAVENOUS; SUBCUTANEOUS
Status: DISCONTINUED | OUTPATIENT
Start: 2021-08-19 | End: 2021-08-20 | Stop reason: HOSPADM

## 2021-08-19 RX ORDER — CLINDAMYCIN PHOSPHATE 900 MG/50ML
900 INJECTION, SOLUTION INTRAVENOUS
Status: COMPLETED | OUTPATIENT
Start: 2021-08-19 | End: 2021-08-19

## 2021-08-19 RX ORDER — ONDANSETRON 2 MG/ML
4 INJECTION INTRAMUSCULAR; INTRAVENOUS EVERY 30 MIN PRN
Status: DISCONTINUED | OUTPATIENT
Start: 2021-08-19 | End: 2021-08-20 | Stop reason: HOSPADM

## 2021-08-19 RX ORDER — ACETAMINOPHEN 325 MG/1
975 TABLET ORAL ONCE
Status: COMPLETED | OUTPATIENT
Start: 2021-08-19 | End: 2021-08-19

## 2021-08-19 RX ORDER — ACETAMINOPHEN 325 MG/1
650 TABLET ORAL
Status: DISCONTINUED | OUTPATIENT
Start: 2021-08-19 | End: 2021-08-20 | Stop reason: HOSPADM

## 2021-08-19 RX ADMIN — PROPOFOL 150 MCG/KG/MIN: 10 INJECTION, EMULSION INTRAVENOUS at 10:01

## 2021-08-19 RX ADMIN — CLINDAMYCIN PHOSPHATE 900 MG: 900 INJECTION, SOLUTION INTRAVENOUS at 09:54

## 2021-08-19 RX ADMIN — DEXAMETHASONE SODIUM PHOSPHATE 10 MG: 10 INJECTION, SOLUTION INTRAMUSCULAR; INTRAVENOUS at 10:03

## 2021-08-19 RX ADMIN — ACETAMINOPHEN 975 MG: 325 TABLET ORAL at 09:27

## 2021-08-19 RX ADMIN — PROPOFOL 50 MG: 10 INJECTION, EMULSION INTRAVENOUS at 10:02

## 2021-08-19 RX ADMIN — PROPOFOL 300 MG: 10 INJECTION, EMULSION INTRAVENOUS at 10:00

## 2021-08-19 RX ADMIN — FENTANYL CITRATE 100 MCG: 50 INJECTION, SOLUTION INTRAMUSCULAR; INTRAVENOUS at 09:59

## 2021-08-19 RX ADMIN — FENTANYL CITRATE 50 MCG: 50 INJECTION, SOLUTION INTRAMUSCULAR; INTRAVENOUS at 11:12

## 2021-08-19 RX ADMIN — Medication 50 MG: at 10:02

## 2021-08-19 RX ADMIN — ONDANSETRON 4 MG: 2 INJECTION INTRAMUSCULAR; INTRAVENOUS at 10:04

## 2021-08-19 RX ADMIN — GABAPENTIN 300 MG: 300 CAPSULE ORAL at 09:27

## 2021-08-19 RX ADMIN — PROPOFOL 50 MG: 10 INJECTION, EMULSION INTRAVENOUS at 10:04

## 2021-08-19 RX ADMIN — OXYCODONE HYDROCHLORIDE 5 MG: 5 TABLET ORAL at 15:08

## 2021-08-19 RX ADMIN — Medication 20 MG: at 10:53

## 2021-08-19 RX ADMIN — LIDOCAINE HYDROCHLORIDE 100 MG: 20 INJECTION, SOLUTION INFILTRATION; PERINEURAL at 09:59

## 2021-08-19 RX ADMIN — SODIUM CHLORIDE, SODIUM LACTATE, POTASSIUM CHLORIDE, CALCIUM CHLORIDE: 600; 310; 30; 20 INJECTION, SOLUTION INTRAVENOUS at 09:47

## 2021-08-19 RX ADMIN — FENTANYL CITRATE 25 MCG: 50 INJECTION, SOLUTION INTRAMUSCULAR; INTRAVENOUS at 15:16

## 2021-08-19 RX ADMIN — FENTANYL CITRATE 25 MCG: 50 INJECTION, SOLUTION INTRAMUSCULAR; INTRAVENOUS at 15:20

## 2021-08-19 RX ADMIN — FENTANYL CITRATE 50 MCG: 50 INJECTION, SOLUTION INTRAMUSCULAR; INTRAVENOUS at 11:29

## 2021-08-19 ASSESSMENT — MIFFLIN-ST. JEOR: SCORE: 1713.65

## 2021-08-19 NOTE — ANESTHESIA POSTPROCEDURE EVALUATION
Patient: Keith Spangler    Procedure(s):  RHINOSEPTOPLASTY, CADAVERIC RIB GRAFT,  CONCHAL CARTILAGE GRAFT    Diagnosis:Nasal obstruction [J34.89]  Diagnosis Additional Information: No value filed.    Anesthesia Type:  General    Note:  Disposition: Outpatient   Postop Pain Control: Uneventful            Sign Out: Well controlled pain   PONV: No   Neuro/Psych: Uneventful            Sign Out: Acceptable/Baseline neuro status   Airway/Respiratory: Uneventful            Sign Out: Acceptable/Baseline resp. status   CV/Hemodynamics: Uneventful            Sign Out: Acceptable CV status; No obvious hypovolemia; No obvious fluid overload   Other NRE: NONE   DID A NON-ROUTINE EVENT OCCUR? No           Last vitals:  Vitals Value Taken Time   BP     Temp     Pulse 75 08/19/21 1411   Resp 19 08/19/21 1411   SpO2 98 % 08/19/21 1411   Vitals shown include unvalidated device data.    Electronically Signed By: DARSHAN PRADO MD  August 19, 2021  2:13 PM

## 2021-08-19 NOTE — ANESTHESIA CARE TRANSFER NOTE
Patient: Keith Spangler    Procedure(s):  RHINOSEPTOPLASTY, CADAVERIC RIB GRAFT,  CONCHAL CARTILAGE GRAFT    Diagnosis: Nasal obstruction [J34.89]  Diagnosis Additional Information: No value filed.    Anesthesia Type:   General     Note:    Oropharynx: oral airway in place  Level of Consciousness: drowsy  Oxygen Supplementation: face mask  Level of Supplemental Oxygen (L/min / FiO2): 6  Independent Airway: airway patency satisfactory and stable  Dentition: dentition unchanged  Vital Signs Stable: post-procedure vital signs reviewed and stable  Report to RN Given: handoff report given  Patient transferred to: PACU  Comments: VSS and WNL, comfortable, no PONV, report to [facility]  Handoff Report: Identifed the Patient, Identified the Reponsible Provider, Reviewed the pertinent medical history, Discussed the surgical course, Reviewed Intra-OP anesthesia mangement and issues during anesthesia, Set expectations for post-procedure period and Allowed opportunity for questions and acknowledgement of understanding      Vitals:  Vitals Value Taken Time   BP     Temp     Pulse 74 08/19/21 1410   Resp 19 08/19/21 1410   SpO2 98 % 08/19/21 1410   Vitals shown include unvalidated device data.    Electronically Signed By: EFRAÍN Traore CRNA  August 19, 2021  2:11 PM

## 2021-08-19 NOTE — OP NOTE
SURGEON:  Becki Johnson MD     ASSISTANT SURGEON: Chuck Perez MD; Yenny Ernandez MD      TITLE OF OPERATION:                       Septorhinoplasty    Bilateral inferior turbinate reduction and outfracture.    Extensive nasal reconstruction with homograft costal cartilage   Conchal cartilage harvest and use in nasal reconstruction for dorsal overlay graft      INDICATIONS:      Keith Spangler is a 41 year old patient with a significant history of nasal obstruction that has led to significant dysfunction and symptoms. Specifically, he is a boxer with multiple traumas with previous history of nasal valve reconstruction with bilateral septal grafts, septoplasty, and inferior turbinate reduction performed endonasally.  His CT scan shows multiple comminuted fractures with fibrous union and he has significant severe septal deviation on exam with substantial mid vault collapse, loss of dorsal height, and absent tip support. The risks and benefits of the procedure were discussed in clinic but also in the preoperative area. The risks discussed included bleeding with need for intervention, scarring, infection, shifting of the nasal framework, incomplete correction of nasal obstruction, contour deformities, vasomotor rhinitis and changes to the external appearance of the nose. The possibility of future need for additional procedures was also discussed. We also discussed the post operative care and expected course. All questions were answered and the patient signed consent for the above mentioned procedures.     PREOPERATIVE DIAGNOSES:   History of nasal trauma.     History of nasal fracture.    Nasal obstruction.   Nasal valve stenosis.     Bilateral inferior turbinate hypertrophy      POSTOPERATIVE DIAGNOSES:     History of nasal trauma.     History of nasal fracture.    Nasal obstruction.   Nasal valve stenosis.     Bilateral inferior turbinate hypertrophy       ANESTHESIA:    General endotracheal  anesthesia  Local 1% lidocaine with 1:100,000 epinephrine 10 cc's   Topical Afrin intranasally on pledgets  0.25% Bupivacaine, 2 cc's      IMPLANT DEVICES:   Bilateral Eugene splints, Aquaplast nasal splint over the nasal dorsum.       SPECIMENS:  None.       COMPLICATIONS:  None.       BLOOD LOSS:  75 mL        SURGEON'S NARRATIVE:       FINDINGS:  The patient had a small stump of remaining dorsal septum, though most had resorbed and collapsed. There was no caudal septum present, no quadrangular left at all, upper lateral cartilage severely collapsed. Severe bony irregularity. Significant bony deviation to the right requiring osteotomies.     Grafts:   Bilateral extended  grafts, costal cartilage  Caudal septal extension graft, costal cartilage  Dorsal onlay graft, costal cartilage  Tip onlay graft, conchal cartilage    Reconstruction: Complete septal L-strut reconstruction with homograft rib cartilage    Osteotomies:  Bilateral medial fading  Left lateral   Right transcutaneous postage stamp osteotomies, with completion inferior lateral osteotomy      DESCRIPTION OF PROCEDURE:      The patient was brought back to the operating room by the Anesthesiology staff. They were laid supine on the operating room table and induced into general anesthesia with the endotracheal tube secured at the midline of the chin.  The head of the bed was then turned 90 degrees towards the surgical team.  Arms were tucked at the side with all pressure points appropriately padded.  A time-out procedure was performed with all members of the operating room staff in agreement of the site of surgery, the patient identification and surgery to be performed. The nasal block was then performed with 1% lidocaine with 1:100,000 epinephrine and four-percent cocaine nasal pledgets were placed topically into bilateral nasal passages. The face was prepped and draped in usual sterile fashion with ophthalmic diluted Betadine.  The eyes were taped  with Tegaderm.  Subsequently, surgery began.       A columellar incision was made with an #11 blade scalpel, and subsequently marginal incisions were made with a #15 blade.  The soft tissue envelope in a sub-SMAS plane was elevated off of the lower lateral cartilages.  This exposed the dome and bilateral lower lateral cartilages. We noted evidence of surgery at the nasal tip immediately with scar and lack of the dorsal septum.  We began to elevate cranially, though planes were generally obliterated due to his previous surgery.  We elevated superiorly without evidence of an L-strut but were able to locate a stump of the dorsal septum that was deep to the level of the nasal bones.  Once we reached the edge of the nasal bones, a Dashawn elevator was used to transition the elevation to a subperiosteal plane.        Bilateral mucoperichondrial flaps were elevated using a #15 blade scalpel.  This was significantly tedious and time-consuming, as it had to be meticulously done because he had a lot of scar in this soft tissue plane. Once we elevated the bilateral flaps, we noted there was no previous septal cartilage due to his previous surgery.      Medial fading osteotomies were then performed using a curved guarded osteotome.  Laterally, on the left a standard lateral osteotomy was performed but not connected to the medial osteotomy in order to kick the bone out slightly.  On the right, transcutaneous postage stamp osteotomies were used to kick the bone medially.  A lateral osteotomy was performed on the right inferiorly to re-shape the deviation of the facial bone on the right.     Irradiated homograft costal cartilage was used for reconstruction and graft material. This was rinsed multiple times on the back table per provided instructions. Additionally, the ear was injected with 1% lidocaine with 1:100,000 epinephrine and anterior and posterior hydro-dissection was performed. A post auricular incision was performed with  a 15 blade scalpel and the soft tissue lifted off of the cartilage with blunt scissors. The conchal cartilage was harvested with care to not damage the anterior skin. Hemostasis was obtained with cautery. The skin was then closed with interrupted deep stitches and a running 4.0 chromic gut that was also used to mattress through the surgical pocket to prevent possible hematoma formation.     Two  grafts were fashioned from the harvested septal cartilage homograft rib cartilage. These were placed in between the upper lateral cartilages and sutured to the dorsal septal stump.  They were sutured in place with mattress 5-0 PDS sutures.  The upper lateral cartilages were then resuspended onto the complex. These extended spreaders traversed inferiorly and articulated with the septal extension graft that was placed in a pocket along the pre-maxilla. Bilateral inferior turbinates were then reduced submucosally with the bipolar turbinate cautery and outfractured.     Next, the conchal cartilage graft was crushed into an onlay graft and this was placed over the midvault dorsal portion to smooth the curve into the tip.  The medial crura were resuspended to the septal extensino graft to maintain adequate projection. For additional tip projection, the conchal cartilage graft was fashioned into a shield graft for the tip and sutured in place with a needle holding the graft in place.      The soft tissue envelope was then redraped over the framework.  This demonstrated that the nose was nice and straight and found that the tip had appropriate support. The medial crura were then reapproximated with through-and-through 4-0 plain gut sutures on a Ismael needle.  The above mentioned grafts were placed. The dome was set in this position, also.  The columellar incision was closed with interrupted 6-0 Monocryl sutures.  Bilateral marginal incisions were then closed with interrupted 4-0 chromic sutures.  The nose was suctioned, the  nasopharynx was suctioned, the oropharynx was suctioned, and the stomach was suctioned and emptied of its contents.       We then placed bilateral Eugene splints with Bactroban ointment.  Mastisol and Steri-Strip were placed over the nasal dorsum.  The Aquaplast nasal splint was then placed above that. 0.25% Bupivacaine was the injected, 2 cc's for a lasting nasal block. This completed the procedure.    Due to the extensive loss of nasal structural framework and the significant scarring intranasally and on the mucosal flaps, this case had increased complexity than the typical septorhinoplasty. It required one hour of additional surgical time then a typical procedure of this type. In addition, the severe deformity required almost complete reconstruction of the nasal framework.        The patient tolerated the procedure well.  There were no complications. The patient was extubated and taken to recovery following commands and breathing spontaneously.       I was present and scrubbed for the entire procedure.     Dictated by: Chuck Perez MD    Dictated for: MD LILLIAN Woodall MD

## 2021-08-19 NOTE — BRIEF OP NOTE
[facility]    Brief Operative Note    Pre-operative diagnosis: Nasal obstruction [J34.89]  Post-operative diagnosis Same as pre-operative diagnosis    Procedure: Procedure(s):  RHINOSEPTOPLASTY, CADAVERIC RIB GRAFT, POSSIBLE CONCHAL CARTILAGE GRAFT  Surgeon: Surgeon(s) and Role:     * Becki Johnson MD - Primary     * Yenny Ernandez MD - Resident - Assisting  Anesthesia: General   Estimated blood loss: 50 mL  Drains: None  Specimens: None  Findings: See full operative report for details / complete nasal tip collapse, lateralized right nasal bone and depressed left nasal bone, loss of midvault support.  Complications: None  Implants:   Implant Name Type Inv. Item Serial No.  Lot No. LRB No. Used Action   GRAFT COSTAL CARTILAGE MED 3X30MM 10-30MM [id number] - [id number] Bone/Tissue/Biologic GRAFT COSTAL CARTILAGE MED 3X30MM 10-30MM [id number] [employer]CULOSKELETAL [place]  N/A 1 Implanted

## 2021-08-19 NOTE — DISCHARGE INSTRUCTIONS
[facility]  Home Care Following Anesthesia  For 24 hours after surgery:  1. Get plenty of rest.  A responsible adult must stay with you for at least 24 hours after you leave the surgery center.  2. Do not drive or use heavy equipment.  If you have weakness or tingling, don't drive or use heavy equipment until this feeling goes away.   3. Do not drink alcohol.   4. Avoid strenuous or risky activities.  Ask for help when climbing stairs.  5. You may feel lightheaded.  IF so, sit for a few minutes before standing.  Have someone help you get up.   6. If you have nausea (feel sick to your stomach): Drink only clear liquids such as apple juice, ginger ale, broth or 7-Up.  Rest may also help.  Be sure to drink enough fluids.  Move to a regular diet as you feel able.   7. You may have a slight fever.  Call the doctor if your fever is over 100 F (37.7 C) (taken under the tongue) or lasts longer than 24 hours.  8. You may have a dry mouth, a sore throat, muscle aches or trouble sleeping. These should go away after 24 hours.  9. Do not make important or legal decisions.   10. It is recommended to avoid smoking.   Tips for taking pain medications  To get the best pain relief possible, remember these points:    Take pain medications as directed, before pain becomes severe.    Pain medication can upset your stomach: taking it with food may help.    Constipation is a common side effect of pain medication. Drink plenty of  fluids.    Eat foods high in fiber. Take a stool softener if recommended by your doctor or pharmacist.    Do not drink alcohol, drive or operate machinery while taking pain medications.    Ask about other ways to control pain, such as with heat, ice or relaxation.    Tylenol/Acetaminophen Consumption  To help encourage the safe use of acetaminophen, the makers of TYLENOL  have lowered the maximum daily dose for single-ingredient Extra Strength TYLENOL  (acetaminophen)  products sold in the [place] from 8 pills per day (4,000 mg) to 6 pills per day (3,000 mg). The dosing interval has also changed from 2 pills every 4-6 hours to 2 pills every 6 hours.    If you feel your pain relief is insufficient, you may take Tylenol/Acetaminophen in addition to your narcotic pain medication.     Be careful not to exceed 3,000 mg of Tylenol/Acetaminophen in a 24 hour period from all sources.    If you are taking extra strength Tylenol/acetaminophen (500 mg), the maximum dose is 6 tablets in 24 hours.    If you are taking regular strength acetaminophen (325 mg), the maximum dose is 9 tablets in 24 hours.     You received 975 mg of tylenol at 9:30 am, next dose due at 3:30 pm, then follow the package instructions    Call a doctor for any of the followin. Signs of infection (fever, growing tenderness at the surgery site, a large amount of drainage or bleeding, severe pain, foul-smelling drainage, redness, swelling).  2. It has been over 8 to 10 hours since surgery and you are still not able to urinate (pass water).  3. Headache for over 24 hours.  4. Signs of Covid-19 infection (temperature over 100 degrees, shortness of breath, cough, loss of taste/smell, generalized body aches, persistent headache, chills, sore throat, nausea/vomiting/diarrhea)  Your doctor is:  Dr. Becki Johnson, Otolaryngology: [phone]                Or dial [phone] and ask for the resident on call for:  ENT Otolaryngology  For emergency care, call the:  [facility]:  [phone] (TTY for hearing impaired: [phone])

## 2021-08-20 ENCOUNTER — NURSE TRIAGE (OUTPATIENT)
Dept: NURSING | Facility: CLINIC | Age: 41
End: 2021-08-20

## 2021-08-20 DIAGNOSIS — G89.18 ACUTE POST-OPERATIVE PAIN: Primary | ICD-10-CM

## 2021-08-20 NOTE — TELEPHONE ENCOUNTER
Patient is day 1 post op Rhinoplasty surgery. Patient is complaining of difficulty breathing and says he is unable to lay down or sleep.  Patient says pain is mild currently and last pain medication take over 8 hours ago. Denies any fever.  Triager went over discharge and clinical reference for home care monitoring and treatment post-op rhinoplasty. Caller says information was helpful. Patient says he will take his pain medication and continue to prop and elevated head to promote sleep and prevent swelling.  Triage guidelines recommend to call pcp within 24 hours. Caller verbalized and understands directives.  COVID 19 Nurse Triage Plan/Patient Instructions    Please be aware that novel coronavirus (COVID-19) may be circulating in the community. If you develop symptoms such as fever, cough, or SOB or if you have concerns about the presence of another infection including coronavirus (COVID-19), please contact your health care provider or visit [email or web address].     Disposition/Instructions    Virtual Visit with provider recommended. Reference Visit Selection Guide.    Thank you for taking steps to prevent the spread of this virus.  o Limit your contact with others.  o Wear a simple mask to cover your cough.  o Wash your hands well and often.    Resources    [facility]: About COVID-19: [email or web address]    CDC: What to Do If You're Sick: www.cdc.gov/coronavirus/2019-ncov/about/steps-when-sick.html    CDC: Ending Home Isolation: www.cdc.gov/coronavirus/2019-ncov/hcp/disposition-in-home-patients.html     CDC: Caring for Someone: www.cdc.gov/coronavirus/2019-ncov/if-you-are-sick/care-for-someone.html     [facility]: Interim Guidance for Hospital Discharge to Home: www.health.[facility].mn.us/diseases/coronavirus/hcp/hospdischarge.pdf    [facility] clinical trials (COVID-19 research studies): [email or web address]     Below are the COVID-19 hotlines at the [place]  Department of Health ([facility]). Interpreters are available.   o For health questions: Call 664-879-3678 or 1-854.965.4524 (7 a.m. to 7 p.m.)  o For questions about schools and childcare: Call 270-411-7271 or 1-123.253.6288 (7 a.m. to 7 p.m.)                     Reason for Disposition    [1] Caller has NON-URGENT question AND [2] triager unable to answer question    Additional Information    Negative: Chest pain    Negative: Difficulty breathing    Negative: Acting confused (e.g., disoriented, slurred speech) or excessively sleepy    Negative: Surgical incision symptoms and questions    Negative: [1] Discomfort (pain, burning or stinging) when passing urine AND [2] male    Negative: [1] Discomfort (pain, burning or stinging) when passing urine AND [2] female    Negative: Constipation    Negative: New or worsening leg (calf, thigh) pain    Negative: New or worsening leg swelling    Negative: Dizziness is severe, or persists > 24 hours after surgery    Negative: Pain, redness, swelling, or pus at IV Site    Negative: Symptoms arising from use of a urinary catheter ([place] or [place])    Negative: Cast problems or questions    Negative: Medication question    Negative: [1] Widespread rash AND [2] bright red, sunburn-like    Negative: [1] SEVERE headache AND [2] after spinal (epidural) anesthesia    Negative: [1] Vomiting AND [2] persists > 4 hours    Negative: [1] Vomiting AND [2] abdomen looks much more swollen than usual    Negative: [1] Drinking very little AND [2] dehydration suspected (e.g., no urine > 12 hours, very dry mouth, very lightheaded)    Negative: Patient sounds very sick or weak to the triager    Negative: Sounds like a serious complication to the triager    Negative: Fever > 100.4 F (38.0 C)    Negative: [1] SEVERE post-op pain (e.g., excruciating, pain scale 8-10) AND [2] not controlled with pain medications    Negative: [1] Caller has URGENT question AND [2] triager unable to answer question    Negative:  [1] Headache AND [2] after spinal (epidural) anesthesia AND [3] not severe    Negative: Fever present > 3 days (72 hours)    Negative: [1] MILD-MODERATE post-op pain (e.g., pain scale 1-7) AND [2] not controlled with pain medications    Protocols used: POST-OP SYMPTOMS AND [id number]

## 2021-08-23 ENCOUNTER — NURSE TRIAGE (OUTPATIENT)
Dept: NURSING | Facility: CLINIC | Age: 41
End: 2021-08-23

## 2021-08-23 NOTE — TELEPHONE ENCOUNTER
Patient gave consent to talk with zaira. Patient states he could not breathe well starting around 5 pm today. Patient rates pain as 10/10. Patient states he feels disoriented and confused.    Per protocol patient to call 911 . Patient asks if he can take excedrin to help aleviate the pain. Zaira verbalizes understanding and states they will call 911.    Kimberly Ramírez RN   08/23/21 1:01 AM  [facility] [place] Nurse Advisor  COVID 19 Nurse Triage Plan/Patient Instructions    Please be aware that novel coronavirus (COVID-19) may be circulating in the community. If you develop symptoms such as fever, cough, or SOB or if you have concerns about the presence of another infection including coronavirus (COVID-19), please contact your health care provider or visit [email or web address].     Disposition/Instructions    Call to EMS/911 recommended. Follow protocol based instructions.     Bring Your Own Device:  Please also bring your smart device(s) (smart phones, tablets, laptops) and their charging cables for your personal use and to communicate with your care team during your visit.    Thank you for taking steps to prevent the spread of this virus.  o Limit your contact with others.  o Wear a simple mask to cover your cough.  o Wash your hands well and often.    Resources    [facility]: About COVID-19: [email or web address]    CDC: What to Do If You're Sick: www.cdc.gov/coronavirus/2019-ncov/about/steps-when-sick.html    CDC: Ending Home Isolation: www.cdc.gov/coronavirus/2019-ncov/hcp/disposition-in-home-patients.html     CDC: Caring for Someone: www.cdc.gov/coronavirus/2019-ncov/if-you-are-sick/care-for-someone.html     [facility]: Interim Guidance for Hospital Discharge to Home: www.health.[facility].mn.us/diseases/coronavirus/hcp/hospdischarge.pdf    [facility] clinical trials (COVID-19 research studies): [email or web address].[email or web address]/umn-[email or web address]-trials     Below are the COVID-19 hotlines at the  [place] Department of Health ([facility]). Interpreters are available.   o For health questions: Call 754-252-0631 or 1-813.703.1151 (7 a.m. to 7 p.m.)  o For questions about schools and childcare: Call 579-550-8705 or 1-631.821.7452 (7 a.m. to 7 p.m.)                       Reason for Disposition    Difficult to awaken or acting confused (e.g., disoriented, slurred speech)    Protocols used: HEADACHE-A-AH

## 2021-08-25 ENCOUNTER — ALLIED HEALTH/NURSE VISIT (OUTPATIENT)
Dept: OTOLARYNGOLOGY | Facility: CLINIC | Age: 41
End: 2021-08-25
Payer: COMMERCIAL

## 2021-08-25 DIAGNOSIS — Z98.890 POSTOPERATIVE STATE: Primary | ICD-10-CM

## 2021-08-25 PROCEDURE — 99207 PR NO CHARGE NURSE ONLY: CPT

## 2021-08-26 NOTE — PROGRESS NOTES
Pt comes in POD #6 s/p Septorhinoplasty, Cadaver Rib Graft, Conchal Cartilage Graft.    Nasal cast fell off a few days ago, per patient.    Bilateral periorbital bruising and swelling present.  Pt's nose is also swollen and tender.    Bilateral Eugene splints removed.    Suction nasal passageways.    Pt instantly pleased with how much his breathing has improved post splint removal.  Encouraged pt to continue with nasal saline spray.    F/u in 3 wks.    Patricia Sherwood RN  8/26/2021 12:59 PM

## 2021-09-01 ENCOUNTER — OFFICE VISIT (OUTPATIENT)
Dept: OTOLARYNGOLOGY | Facility: CLINIC | Age: 41
End: 2021-09-01
Payer: COMMERCIAL

## 2021-09-01 VITALS
OXYGEN SATURATION: 98 % | BODY MASS INDEX: 28.33 KG/M2 | TEMPERATURE: 98.1 F | HEART RATE: 75 BPM | WEIGHT: 186.95 LBS | HEIGHT: 68 IN

## 2021-09-01 DIAGNOSIS — Z98.890 POSTOPERATIVE STATE: Primary | ICD-10-CM

## 2021-09-01 PROCEDURE — 99024 POSTOP FOLLOW-UP VISIT: CPT | Performed by: OTOLARYNGOLOGY

## 2021-09-01 ASSESSMENT — MIFFLIN-ST. JEOR: SCORE: 1722.5

## 2021-09-01 ASSESSMENT — PAIN SCALES - GENERAL: PAINLEVEL: NO PAIN (0)

## 2021-09-01 NOTE — NURSING NOTE
"Chief Complaint   Patient presents with     RECHECK     [facility] return       Pulse 75, temperature 98.1  F (36.7  C), temperature source Temporal, height 1.727 m (5' 8\"), weight 84.8 kg (186 lb 15.2 oz), SpO2 98 %.    Stephen Frye, EMT  "

## 2021-09-01 NOTE — LETTER
9/1/2021       RE: Keith Spangler  [address] [id number]     Dear Colleague,    Thank you for referring your patient, Keith Spangler, to the [facility] [place] at [facility]. Please see a copy of my visit note below.    Facial Plastic and Reconstructive Surgery      Keith Spangler comes in for post op check,  He has had significant epistaxis. He presented to [facility] and was evaluated and found to have bleeding from the inferior turbinate. He had some dissolvable packing placed. He then used afrin.    He since has not had bleeding since this weekend and is feeling much better. He is using nasal saline. He is please with his nasal breathing and pleased with the appearance of his nose.     On exam he is doing well. He looks like he is healing well. No evidence of bleeding, but evidence of dried blood in bilateral nasal passages.    He is to continue nasal saline. He will come back in two weeks.      Again, thank you for allowing me to participate in the care of your patient.      Sincerely,    Becki Johnson MD

## 2021-09-01 NOTE — PROGRESS NOTES
Facial Plastic and Reconstructive Surgery      Keith Spangler comes in for post op check,  He has had significant epistaxis. He presented to [facility] and was evaluated and found to have bleeding from the inferior turbinate. He had some dissolvable packing placed. He then used afrin.    He since has not had bleeding since this weekend and is feeling much better. He is using nasal saline. He is please with his nasal breathing and pleased with the appearance of his nose.     On exam he is doing well. He looks like he is healing well. No evidence of bleeding, but evidence of dried blood in bilateral nasal passages.    He is to continue nasal saline. He will come back in two weeks.

## 2021-09-22 ENCOUNTER — ALLIED HEALTH/NURSE VISIT (OUTPATIENT)
Dept: OTOLARYNGOLOGY | Facility: CLINIC | Age: 41
End: 2021-09-22
Payer: COMMERCIAL

## 2021-09-22 DIAGNOSIS — Z98.890 POSTOPERATIVE STATE: Primary | ICD-10-CM

## 2021-09-22 PROCEDURE — 99207 PR NO CHARGE NURSE ONLY: CPT

## 2021-09-27 NOTE — NURSING NOTE
Pt comes in PO 8/19/21 s/p  Septorhinoplasty, Cadaver Rib Graft, Conchal Cartilage Graft.    Pt's swelling has greatly improved since his last visit.  He states that his breathing is much better and he hasn't had a bloody nose in a few weeks.    Pt still has obvious swelling present along the right side of the nasal bridge.    Pt encouraged to continue with nasal saline spray.    F/u with Dr. Johnson in 2 mos.    Patricia Sherwood RN  9/27/2021 3:41 PM

## 2021-10-14 ENCOUNTER — E-VISIT (OUTPATIENT)
Dept: FAMILY MEDICINE | Facility: CLINIC | Age: 41
End: 2021-10-14
Payer: COMMERCIAL

## 2021-10-14 DIAGNOSIS — R05.9 COUGH: Primary | ICD-10-CM

## 2021-10-14 DIAGNOSIS — J40 BRONCHITIS: ICD-10-CM

## 2021-10-14 DIAGNOSIS — J45.31 MILD PERSISTENT ASTHMA WITH (ACUTE) EXACERBATION: ICD-10-CM

## 2021-10-14 PROCEDURE — 99421 OL DIG E/M SVC 5-10 MIN: CPT | Performed by: PHYSICIAN ASSISTANT

## 2021-10-15 RX ORDER — PREDNISONE 20 MG/1
TABLET ORAL
Qty: 14 TABLET | Refills: 0 | Status: SHIPPED | OUTPATIENT
Start: 2021-10-15 | End: 2022-02-09

## 2021-10-15 RX ORDER — AZITHROMYCIN 250 MG/1
TABLET, FILM COATED ORAL
Qty: 6 TABLET | Refills: 0 | Status: SHIPPED | OUTPATIENT
Start: 2021-10-15 | End: 2021-10-20

## 2021-10-24 ENCOUNTER — HEALTH MAINTENANCE LETTER (OUTPATIENT)
Age: 41
End: 2021-10-24

## 2021-11-05 DIAGNOSIS — J45.30 MILD PERSISTENT ASTHMA WITHOUT COMPLICATION: ICD-10-CM

## 2021-11-10 DIAGNOSIS — J45.30 MILD PERSISTENT ASTHMA WITHOUT COMPLICATION: ICD-10-CM

## 2021-11-10 RX ORDER — ALBUTEROL SULFATE 0.83 MG/ML
SOLUTION RESPIRATORY (INHALATION)
Qty: 75 ML | Refills: 5 | Status: SHIPPED | OUTPATIENT
Start: 2021-11-10 | End: 2022-02-09

## 2021-11-10 NOTE — TELEPHONE ENCOUNTER
Routing refill request to provider for review/approval because:      Needing clarification    Directions are every 6 hours PRN.    Patient reported q 4 hours last visit.    Last Seen by Sanjay French, RN, BSN, PHN  [facility]

## 2021-11-17 ENCOUNTER — OFFICE VISIT (OUTPATIENT)
Dept: OTOLARYNGOLOGY | Facility: CLINIC | Age: 41
End: 2021-11-17
Payer: COMMERCIAL

## 2021-11-17 DIAGNOSIS — Z98.890 POSTOPERATIVE STATE: Primary | ICD-10-CM

## 2021-11-17 PROCEDURE — 99024 POSTOP FOLLOW-UP VISIT: CPT | Performed by: OTOLARYNGOLOGY

## 2021-11-17 NOTE — PROGRESS NOTES
Facial Plastic and Reconstructive Surgery      Keith Spangler is doing well and happy with his nasal reconstruction. His breathing is improved with surgery.     Nasal exam shows a well healed incision and anterior rhinoscopy shows a nicely supported nasal tip with good valve support. His nasal mucosa is dry but his septum is nicely intact.     He is to increase his nasal saline use and mold the right nasal bone.   I will see him back in three months.

## 2021-11-17 NOTE — LETTER
11/17/2021       RE: Keith Spangler  [address] [facility] [address]     Dear Colleague,    Thank you for referring your patient, Keith Spangler, to the [facility] E[facility] NOSE AND [facility]IC [place] at [facility]. Please see a copy of my visit note below.    Facial Plastic and Reconstructive Surgery      Keith Spangler is doing well and happy with his nasal reconstruction. His breathing is improved with surgery.     Nasal exam shows a well healed incision and anterior rhinoscopy shows a nicely supported nasal tip with good valve support. His nasal mucosa is dry but his septum is nicely intact.     He is to increase his nasal saline use and mold the right nasal bone.   I will see him back in three months.         Again, thank you for allowing me to participate in the care of your patient.      Sincerely,    Becki Johnson MD

## 2022-02-01 DIAGNOSIS — J45.30 MILD PERSISTENT ASTHMA WITHOUT COMPLICATION: ICD-10-CM

## 2022-02-01 NOTE — TELEPHONE ENCOUNTER
"Routing refill request to provider for review/approval because:  Patient needs to be seen because it has been more than 6 months since last office visit.    LOV: 06-, Follow-up in 3 months    Sakshi KIRKLAND RN    Requested Prescriptions   Pending Prescriptions Disp Refills     fluticasone-vilanterol (BREO ELLIPTA) 100-25 MCG/INH inhaler 3 each 0     Sig: Inhale 1 puff into the lungs daily       Inhaled Steroids Protocol Failed - 2/1/2022  7:46 AM        Failed - Asthma control assessment score within normal limits in last 6 months     Please review ACT score.           Failed - Recent (6 mo) or future (30 days) visit within the authorizing provider's specialty     Patient had office visit in the last 6 months or has a visit in the next 30 days with authorizing provider or within the authorizing provider's specialty.  See \"Patient Info\" tab in inbasket, or \"Choose Columns\" in Meds & Orders section of the refill encounter.            Passed - Patient is age 12 or older        Passed - Medication is active on med list             "

## 2022-02-01 NOTE — TELEPHONE ENCOUNTER
Rx filled x 1. Patient is overdue for a follow-up visit and will need to be seen for additional refills.

## 2022-02-07 ENCOUNTER — NURSE TRIAGE (OUTPATIENT)
Dept: NURSING | Facility: CLINIC | Age: 42
End: 2022-02-07
Payer: COMMERCIAL

## 2022-02-07 NOTE — TELEPHONE ENCOUNTER
Please clarify with the pharmacy whether PA is needed. If generic albuterol is preferred OK to give verbal authorization to dispense generic instead of brand name medication.

## 2022-02-07 NOTE — TELEPHONE ENCOUNTER
"Caller is Pharmacy Ame Cantu) at [facility].  Current refill on albuterol HFA inhaler shows \"alternative required.\"  Therefore needs an alternative Rx for the current refill.  States \"Prior Auth is not the issue.\"  \"Alternative Rx is needed\" per insurance flag.    If calling back to [facility] , please refer to Order # [id number].  Otherwise can simply transmit an alternative electronically.  Thank you-    Ariadne MARADIAGA Health Nurse Advisor     Reason for Disposition    Pharmacy calling with prescription questions and triager unable to answer question    Protocols used: MEDICATION QUESTION CALL-A-OH      "

## 2022-02-08 ENCOUNTER — TELEPHONE (OUTPATIENT)
Dept: FAMILY MEDICINE | Facility: CLINIC | Age: 42
End: 2022-02-08

## 2022-02-08 DIAGNOSIS — R06.02 SOB (SHORTNESS OF BREATH): ICD-10-CM

## 2022-02-08 RX ORDER — ALBUTEROL SULFATE 90 UG/1
AEROSOL, METERED RESPIRATORY (INHALATION)
Qty: 18 G | Refills: 2 | Status: SHIPPED | OUTPATIENT
Start: 2022-02-08 | End: 2023-01-05

## 2022-02-09 ENCOUNTER — VIRTUAL VISIT (OUTPATIENT)
Dept: FAMILY MEDICINE | Facility: CLINIC | Age: 42
End: 2022-02-09
Payer: COMMERCIAL

## 2022-02-09 DIAGNOSIS — J45.30 MILD PERSISTENT ASTHMA WITHOUT COMPLICATION: Primary | ICD-10-CM

## 2022-02-09 DIAGNOSIS — R09.81 CHRONIC NASAL CONGESTION: ICD-10-CM

## 2022-02-09 DIAGNOSIS — Z28.21 INFLUENZA VACCINE REFUSED: ICD-10-CM

## 2022-02-09 PROCEDURE — 99213 OFFICE O/P EST LOW 20 MIN: CPT | Mod: 95 | Performed by: PREVENTIVE MEDICINE

## 2022-02-09 RX ORDER — MONTELUKAST SODIUM 10 MG/1
10 TABLET ORAL AT BEDTIME
Start: 2022-02-09 | End: 2022-04-19

## 2022-02-09 RX ORDER — ALBUTEROL SULFATE 0.83 MG/ML
SOLUTION RESPIRATORY (INHALATION)
Qty: 75 ML | Refills: 5 | Status: SHIPPED | OUTPATIENT
Start: 2022-02-09 | End: 2022-02-15

## 2022-02-09 NOTE — PROGRESS NOTES
"Keith is a 41 year old who is being evaluated via a billable video visit.      How would you like to obtain your AVS? MyChart  If the video visit is dropped, the invitation should be resent by: Text to cell phone: In chart  Will anyone else be joining your video visit? No      Video Start Time: 4:16 PM    Assessment & Plan     Mild persistent asthma without complication  -refills on nebs provided  -Last saw Allergy 6/21  -symptoms tend to be worse in the winter months   - montelukast (SINGULAIR) 10 MG tablet  - albuterol (PROVENTIL) (2.5 MG/3ML) 0.083% neb solution  Dispense: 75 mL; Refill: 5    Chronic nasal congestion  - montelukast (SINGULAIR) 10 MG tablet    Influenza vaccine refused  -declined   -covid and Pneumonia vaccines are up to date     Encouraged scheduling a preventive visit      Prescription drug management  30 minutes spent on the date of the encounter doing chart review, history and exam, documentation and further activities per the note       BMI:   Estimated body mass index is 28.43 kg/m  as calculated from the following:    Height as of 9/1/21: 1.727 m (5' 8\").    Weight as of 9/1/21: 84.8 kg (186 lb 15.2 oz).       Return in about 6 months (around 8/9/2022) for Routine preventive, in person, with any available provider.    Serina Monet MD MPH    [facility]    Noemí Parker is a 41 year old who presents for the following health issues:    HPI     Asthma Follow-Up    Has been seen by Allergy in the past, last visit 6/21.  PCP has retired  Would like to renew asthma medication  No hospital admission for asthma  Worse during winter months  Dry air makes it worse  Does better in the summer  Using Albuterol almost daily  No night time symptoms  Only needs refills on the Albuterol nebs at this time     Review of Systems   Constitutional, HEENT, cardiovascular, pulmonary, gi and gu systems are negative, except as otherwise noted.      Objective           Vitals:  No " vitals were obtained today due to virtual visit.    Physical Exam   GENERAL: Healthy, alert and no distress  EYES: Eyes grossly normal to inspection.  No discharge or erythema, or obvious scleral/conjunctival abnormalities.  RESP: No audible wheeze, cough, or visible cyanosis.  No visible retractions or increased work of breathing.    SKIN: Visible skin clear. No significant rash, abnormal pigmentation or lesions.  NEURO: Cranial nerves grossly intact.  Mentation and speech appropriate for age.  PSYCH: Mentation appears normal, affect normal/bright, judgement and insight intact, normal speech and appearance well-groomed.          Video-Visit Details    Type of service:  Video Visit    Video End Time:4:38 PM    Originating Location (pt. Location): Home    Distant Location (provider location):  [facility]     Platform used for Video Visit: [employer] [facility]

## 2022-02-14 ENCOUNTER — TELEPHONE (OUTPATIENT)
Dept: FAMILY MEDICINE | Facility: CLINIC | Age: 42
End: 2022-02-14
Payer: COMMERCIAL

## 2022-02-14 DIAGNOSIS — J45.30 MILD PERSISTENT ASTHMA WITHOUT COMPLICATION: ICD-10-CM

## 2022-02-15 RX ORDER — ALBUTEROL SULFATE 0.83 MG/ML
SOLUTION RESPIRATORY (INHALATION)
Qty: 90 ML | Refills: 5 | Status: SHIPPED | OUTPATIENT
Start: 2022-02-15 | End: 2022-02-18

## 2022-02-15 NOTE — TELEPHONE ENCOUNTER
"Routing refill request to provider for review/approval because:  Requested Prescriptions   Pending Prescriptions Disp Refills    albuterol (PROVENTIL) (2.5 MG/3ML) 0.083% neb solution [Pharmacy Med Name: ALBUTER 3ML NEB 0.083%] 90 mL 5     Sig: FOR DIRECTIONS ON HOW TO   TAKE THIS MEDICATION, READ THE ENCLOSED MAIL SERVICE  INVOICE/RECEIPT        Asthma Maintenance Inhalers - Anticholinergics Failed - 2/14/2022  2:21 PM        Failed - Asthma control assessment score within normal limits in last 6 months     Please review ACT score.           Passed - Patient is age 12 years or older        Passed - Medication is active on med list        Passed - Recent (6 mo) or future (30 days) visit within the authorizing provider's specialty     Patient had office visit in the last 6 months or has a visit in the next 30 days with authorizing provider or within the authorizing provider's specialty.  See \"Patient Info\" tab in inbasket, or \"Choose Columns\" in Meds & Orders section of the refill encounter.           Short-Acting Beta Agonist Inhalers Protocol  Failed - 2/14/2022  2:21 PM        Failed - Asthma control assessment score within normal limits in last 6 months     Please review ACT score.           Passed - Patient is age 12 or older        Passed - Medication is active on med list        Passed - Recent (6 mo) or future (30 days) visit within the authorizing provider's specialty     Patient had office visit in the last 6 months or has a visit in the next 30 days with authorizing provider or within the authorizing provider's specialty.  See \"Patient Info\" tab in inbasket, or \"Choose Columns\" in Meds & Orders section of the refill encounter.                    .Jackie AMAYA, RN        "

## 2022-02-17 NOTE — TELEPHONE ENCOUNTER
Pharmacy calling to clarify on direction use of the albuterol use.    Previous SIG direction given.    Sig - Route: Take 1 vial (2.5 mg) by nebulization every 6 hours as needed for shortness of breath / dyspnea or wheezing - Nebulization  Pharmacy will fill per these directions    Please advise if should be different  Kelly Galo RN

## 2022-02-18 RX ORDER — ALBUTEROL SULFATE 0.83 MG/ML
SOLUTION RESPIRATORY (INHALATION)
Qty: 90 ML | Refills: 5
Start: 2022-02-18 | End: 2023-01-05

## 2022-03-24 DIAGNOSIS — J45.30 MILD PERSISTENT ASTHMA WITHOUT COMPLICATION: ICD-10-CM

## 2022-03-28 NOTE — TELEPHONE ENCOUNTER
Routing refill request to provider for review/approval because:  ACT score not current.      Yani Vee RN  [facility]

## 2022-03-29 RX ORDER — BUDESONIDE 0.5 MG/2ML
0.5 INHALANT ORAL 2 TIMES DAILY PRN
Qty: 100 ML | Refills: 1 | Status: SHIPPED | OUTPATIENT
Start: 2022-03-29 | End: 2022-07-28

## 2022-04-06 ENCOUNTER — OFFICE VISIT (OUTPATIENT)
Dept: OTOLARYNGOLOGY | Facility: CLINIC | Age: 42
End: 2022-04-06
Payer: COMMERCIAL

## 2022-04-06 VITALS — BODY MASS INDEX: 28.04 KG/M2 | WEIGHT: 185 LBS | HEIGHT: 68 IN

## 2022-04-06 DIAGNOSIS — Z87.81 HISTORY OF CLOSED FRACTURE OF NASAL BONES: Primary | ICD-10-CM

## 2022-04-06 PROCEDURE — 99212 OFFICE O/P EST SF 10 MIN: CPT | Performed by: OTOLARYNGOLOGY

## 2022-04-06 ASSESSMENT — PAIN SCALES - GENERAL: PAINLEVEL: NO PAIN (0)

## 2022-04-06 NOTE — LETTER
4/6/2022       RE: Keith Spangler  [address] [facility] [address]     Dear Colleague,    Thank you for referring your patient, Keith Spangler, to the [facility] EAR NOSE AND THROAT CLINIC [place] at [facility]. Please see a copy of my visit note below.    Facial Plastic and Reconstructive Surgery      Keith Spangler is here 7 months post op.   He notes he still has fullness on the right.   This is the nasal bone that was fractured in this direction and I had mobilized it significantly already.    I discussed further reduction of the bone on the right. I would need to perform percutaneous osteotomies.     I would still wait for his year of recovery prior to moving ahead.     I spent a total of 15 minutes in the care of patient Keith Spangler during today's office visit. This time includes reviewing the patient's chart and prior history, obtaining a history, performing an examination and evaluation and counseling the patient. This time also includes ordering mediations or tests necessary in addition to communication to other member's of the patient's health care team. Time spent in documentation and care coordination is included.           Again, thank you for allowing me to participate in the care of your patient.      Sincerely,    Becki Johnson MD

## 2022-04-06 NOTE — NURSING NOTE
"Chief Complaint   Patient presents with     RECHECK     Follow up       Height 1.727 m (5' 8\"), weight 83.9 kg (185 lb).    Stephen Frye, EMT  "

## 2022-04-06 NOTE — PROGRESS NOTES
Facial Plastic and Reconstructive Surgery      Keith Spangler is here 7 months post op.   He notes he still has fullness on the right.   This is the nasal bone that was fractured in this direction and I had mobilized it significantly already.    I discussed further reduction of the bone on the right. I would need to perform percutaneous osteotomies.     I would still wait for his year of recovery prior to moving ahead.     I spent a total of 15 minutes in the care of patient Keith Spangler during today's office visit. This time includes reviewing the patient's chart and prior history, obtaining a history, performing an examination and evaluation and counseling the patient. This time also includes ordering mediations or tests necessary in addition to communication to other member's of the patient's health care team. Time spent in documentation and care coordination is included.

## 2022-04-19 ENCOUNTER — OFFICE VISIT (OUTPATIENT)
Dept: ALLERGY | Facility: CLINIC | Age: 42
End: 2022-04-19
Payer: COMMERCIAL

## 2022-04-19 VITALS
SYSTOLIC BLOOD PRESSURE: 135 MMHG | DIASTOLIC BLOOD PRESSURE: 85 MMHG | HEIGHT: 69 IN | OXYGEN SATURATION: 97 % | HEART RATE: 68 BPM | WEIGHT: 195 LBS | BODY MASS INDEX: 28.88 KG/M2

## 2022-04-19 DIAGNOSIS — J45.30 UNCONTROLLED MILD PERSISTENT ASTHMA: ICD-10-CM

## 2022-04-19 PROCEDURE — 99214 OFFICE O/P EST MOD 30 MIN: CPT | Performed by: ALLERGY & IMMUNOLOGY

## 2022-04-19 RX ORDER — MONTELUKAST SODIUM 10 MG/1
10 TABLET ORAL AT BEDTIME
Qty: 90 TABLET | Refills: 0 | Status: SHIPPED | OUTPATIENT
Start: 2022-04-19 | End: 2022-07-28

## 2022-04-19 ASSESSMENT — ASTHMA QUESTIONNAIRES
QUESTION_4 LAST FOUR WEEKS HOW OFTEN HAVE YOU USED YOUR RESCUE INHALER OR NEBULIZER MEDICATION (SUCH AS ALBUTEROL): ONE OR TWO TIMES PER DAY
QUESTION_1 LAST FOUR WEEKS HOW MUCH OF THE TIME DID YOUR ASTHMA KEEP YOU FROM GETTING AS MUCH DONE AT WORK, SCHOOL OR AT HOME: SOME OF THE TIME
ACT_TOTALSCORE: 12
QUESTION_5 LAST FOUR WEEKS HOW WOULD YOU RATE YOUR ASTHMA CONTROL: SOMEWHAT CONTROLLED
QUESTION_2 LAST FOUR WEEKS HOW OFTEN HAVE YOU HAD SHORTNESS OF BREATH: ONCE A DAY
QUESTION_3 LAST FOUR WEEKS HOW OFTEN DID YOUR ASTHMA SYMPTOMS (WHEEZING, COUGHING, SHORTNESS OF BREATH, CHEST TIGHTNESS OR PAIN) WAKE YOU UP AT NIGHT OR EARLIER THAN USUAL IN THE MORNING: TWO OR THREE NIGHTS A WEEK
ACT_TOTALSCORE: 12

## 2022-04-19 NOTE — PROGRESS NOTES
"Keith Spangler was seen in the Allergy Clinic at [facility].      Keith Spangler is a 41 year old male who is seen today for a follow-up visit. He reports that his asthma has not been well controlled. He states he has been taking the Breo once a day. He feels it only works in the summer months but does not work in the winter. Over the winter he has also used Symbicort as needed. His other daily medication is montelukast. Keith was also prescribed budesonide via nebulizer and uses that \"as needed.\" He used the budesonide up to 4 or 5 times per day last month when he had a cold. He was also using his albuterol inhaler more frequently. He has been having more frequent shortness of breath and difficulty sleeping. He feels that \"asthma spasms\" don't allow him to take a full breath of air in. He states that exercise helps him to feel better and he has continued to exercise as much as possible.    Keith states that when he takes Excedrin his nasal congestion improves and he feels he can breathe more easily. It improves both his sinus symptoms as well as asthma symptoms. He had extensive nasal reconstruction and sinus surgery last August.      Past Medical History:   Diagnosis Date     COPD (chronic obstructive pulmonary disease) (H)      NO ACTIVE PROBLEMS      Uncomplicated asthma Sept 2014    Intermittent asthma     Family History   Problem Relation Age of Onset     Unknown/Adopted Mother         Diagnosed w/ALS June 2015     Sleep Apnea Brother      Gastrointestinal Disease Brother         colitis     C.A.D. No family hx of      Diabetes No family hx of      Hypertension No family hx of      Cerebrovascular Disease No family hx of      Breast Cancer No family hx of      Cancer - colorectal No family hx of      Prostate Cancer No family hx of      Asthma No family hx of      Social History     Tobacco Use     Smoking status: Never Smoker     Smokeless tobacco: Never Used     Tobacco comment: " Mom smoked his upbringing   Substance Use Topics     Alcohol use: Yes     Comment: Really rare now      Drug use: No     Social History     Social History Narrative    Dairy/d 1-3 servings/d.     Caffeine 1 servings/d    Exercise 5 x week boxer    Sunscreen used - No    Seatbelts used - Yes    Working smoke/CO detectors in the home - Yes    Guns stored in the home - No    Self Breast Exams - NOT APPLICABLE    Self Testicular Exam - Yes    Eye Exam up to date - Yes    Dental Exam up to date - Yes    Pap Smear up to date - NOT APPLICABLE    Mammogram up to date - NOT APPLICABLE    PSA up to date - NO    Dexa Scan up to date - NOT APPLICABLE    Flex Sig / Colonoscopy up to date - Yes    Immunizations up to date - Yes    Abuse: Current or Past(Physical, Sexual or Emotional)- No    Do you feel safe in your environment - Yes           Past medical, family, and social history were reviewed.    REVIEW OF SYSTEMS:  General: positive  for weight gain. negative for weight loss. positive  for changes in sleep.   Eyes: negative for itching. negative for redness. negative for tearing/watering. negative for vision changes  Ears: negative for fullness. positive  for hearing loss. negative for dizziness.   Nose: positive  for snoring.negative for changes in smell. positive  for drainage.   Throat: positive  for hoarseness. negative for sore throat. positive  for trouble swallowing.   Lungs: negative for cough. positive  for shortness of breath.negative for wheezing. positive  for sputum production.   Cardiovascular: negative for chest pain. negative for swelling of ankles. positive  for fast or irregular heartbeat.   Gastrointestinal: negative for nausea. negative for heartburn. negative for acid reflux.   Musculoskeletal: negative for joint pain. negative for joint stiffness. negative for joint swelling.   Neurologic: negative for seizures. negative for fainting. negative for weakness.   Psychiatric: positive  for changes in mood.  "negative for anxiety.   Endocrine: positive  for cold intolerance. positive  for heat intolerance. negative for tremors.   Hematologic: negative for easy bruising. negative for easy bleeding.  Integumentary: negative for rash. negative for scaling. negative for nail changes.       Current Outpatient Medications:      albuterol (PROAIR HFA/PROVENTIL HFA/VENTOLIN HFA) 108 (90 Base) MCG/ACT inhaler, USE 2 INHALATIONS ORALLY   EVERY 6 HOURS AS NEEDED FORSHORTNESS OF BREATH/DYSPNEA, Disp: 18 g, Rfl: 2     albuterol (PROVENTIL) (2.5 MG/3ML) 0.083% neb solution, Route: Take 1 vial (2.5 mg) by nebulization every 6 hours as needed for shortness of breath / dyspnea or wheezing - Nebulization, Disp: 90 mL, Rfl: 5     Aspirin-Acetaminophen-Caffeine (EXCEDRIN PO), Take by mouth as needed , Disp: , Rfl:      budesonide (PULMICORT) 0.5 MG/2ML neb solution, Take 2 mLs (0.5 mg) by nebulization 2 times daily as needed (asthma symptoms), Disp: 100 mL, Rfl: 1     fluticasone (FLONASE) 50 MCG/ACT nasal spray, USE 2 SPRAYS IN EACH       NOSTRIL DAILY, Disp: 48 g, Rfl: 0     fluticasone-vilanterol (BREO ELLIPTA) 200-25 MCG/INH inhaler, Inhale 1 puff into the lungs daily, Disp: 3 each, Rfl: 0     IBUPROFEN PO, Take by mouth every 4 hours as needed , Disp: , Rfl:      montelukast (SINGULAIR) 10 MG tablet, Take 1 tablet (10 mg) by mouth At Bedtime, Disp: 90 tablet, Rfl: 0     order for DME, Equipment being ordered: spacer for inhaler, Disp: 1 Inhaler, Rfl: 0     sodium chloride (OCEAN) 0.65 % nasal spray, Spray 2 sprays in nostril every 4 hours, Disp: 30 mL, Rfl: 3  Allergies   Allergen Reactions     Augmentin Rash       EXAM:   /85 (BP Location: Left arm, Patient Position: Sitting, Cuff Size: Adult Regular)   Pulse 68   Ht 1.75 m (5' 8.9\")   Wt 88.5 kg (195 lb)   SpO2 97%   BMI 28.88 kg/m    GENERAL APPEARANCE: alert, cooperative and not in distress  SKIN: no rashes, no lesions  HEAD: atraumatic, normocephalic  EYES: lids and " lashes normal, conjunctivae and sclerae clear  ENT: no scars or lesions, nasal exam showed no discharge, swelling or lesions noted, tongue midline and normal, soft palate, uvula, and tonsils normal  NECK: no asymmetry, masses, or scars  LUNGS: unlabored respirations, no intercostal retractions or accessory muscle use, clear to auscultation without rales or wheezes  HEART: regular rate and rhythm without murmurs and normal S1 and S2  MUSCULOSKELETAL: no musculoskeletal defects are noted  NEURO: no focal deficits noted  PSYCH: does not appear depressed or anxious      WORKUP:  None    ASSESSMENT/PLAN:  Keith Spangler is a 41 year old male here for a follow-up visit.    1. Uncontrolled mild persistent asthma - Keith reports that his asthma has not been well controlled. He does not feel the Breo is as effective during the winter and has been taking additional medications including Symbicort and nebulized budesonide. Despite his frequent shortness of breath he has been able to exercise and feels that exercise helps to relieve some of his symptoms. We discussed increasing his dose of Breo vs switching to an alternative medication. PFTs obtained in 7/2020 were normal however these should be repeated should his symptoms not improve.    - discontinue nebulized budesonide and Symbicort  - take 2 to 4 puffs of albuterol HFA every 4 hours as needed  - fluticasone-vilanterol (BREO ELLIPTA) 200-25 MCG/INH inhaler; Inhale 1 puff into the lungs daily  Dispense: 3 each; Refill: 0  - montelukast (SINGULAIR) 10 MG tablet; Take 1 tablet (10 mg) by mouth At Bedtime  Dispense: 90 tablet; Refill: 0      Follow-up in 6-8 weeks, sooner if needed      Thank you for allowing me to participate in the care of Keith Spangler.      Andrei Kyle MD, FAAAAI  Allergy/Immunology  [facility]      Chart documentation done in part with Dragon Voice Recognition  Software. Although reviewed after completion, some word and grammatical errors may remain.

## 2022-04-19 NOTE — LETTER
"    4/19/2022         RE: Keith Spangler  [address] [facility] [address]        Dear Colleague,    Thank you for referring your patient, Keith Spangler, to the [facility]. Please see a copy of my visit note below.    Keith Spangler was seen in the Allergy Clinic at [facility].      Keith Spangler is a 41 year old male who is seen today for a follow-up visit. He reports that his asthma has not been well controlled. He states he has been taking the Breo once a day. He feels it only works in the summer months but does not work in the winter. Over the winter he has also used Symbicort as needed. His other daily medication is montelukast. Keith was also prescribed budesonide via nebulizer and uses that \"as needed.\" He used the budesonide up to 4 or 5 times per day last month when he had a cold. He was also using his albuterol inhaler more frequently. He has been having more frequent shortness of breath and difficulty sleeping. He feels that \"asthma spasms\" don't allow him to take a full breath of air in. He states that exercise helps him to feel better and he has continued to exercise as much as possible.    Keith states that when he takes Excedrin his nasal congestion improves and he feels he can breathe more easily. It improves both his sinus symptoms as well as asthma symptoms. He had extensive nasal reconstruction and sinus surgery last August.      Past Medical History:   Diagnosis Date     COPD (chronic obstructive pulmonary disease) (H)      NO ACTIVE PROBLEMS      Uncomplicated asthma Sept 2014    Intermittent asthma     Family History   Problem Relation Age of Onset     Unknown/Adopted Mother         Diagnosed w/ALS June 2015     Sleep Apnea Brother      Gastrointestinal Disease Brother         colitis     C.A.D. No family hx of      Diabetes No family hx of      Hypertension No family hx of      Cerebrovascular Disease No family hx of      " Breast Cancer No family hx of      Cancer - colorectal No family hx of      Prostate Cancer No family hx of      Asthma No family hx of      Social History     Tobacco Use     Smoking status: Never Smoker     Smokeless tobacco: Never Used     Tobacco comment: Mom smoked his upbringing   Substance Use Topics     Alcohol use: Yes     Comment: Really rare now      Drug use: No     Social History     Social History Narrative    Dairy/d 1-3 servings/d.     Caffeine 1 servings/d    Exercise 5 x week boxer    Sunscreen used - No    Seatbelts used - Yes    Working smoke/CO detectors in the home - Yes    Guns stored in the home - No    Self Breast Exams - NOT APPLICABLE    Self Testicular Exam - Yes    Eye Exam up to date - Yes    Dental Exam up to date - Yes    Pap Smear up to date - NOT APPLICABLE    Mammogram up to date - NOT APPLICABLE    PSA up to date - NO    Dexa Scan up to date - NOT APPLICABLE    Flex Sig / Colonoscopy up to date - Yes    Immunizations up to date - Yes    Abuse: Current or Past(Physical, Sexual or Emotional)- No    Do you feel safe in your environment - Yes           Past medical, family, and social history were reviewed.    REVIEW OF SYSTEMS:  General: positive  for weight gain. negative for weight loss. positive  for changes in sleep.   Eyes: negative for itching. negative for redness. negative for tearing/watering. negative for vision changes  Ears: negative for fullness. positive  for hearing loss. negative for dizziness.   Nose: positive  for snoring.negative for changes in smell. positive  for drainage.   Throat: positive  for hoarseness. negative for sore throat. positive  for trouble swallowing.   Lungs: negative for cough. positive  for shortness of breath.negative for wheezing. positive  for sputum production.   Cardiovascular: negative for chest pain. negative for swelling of ankles. positive  for fast or irregular heartbeat.   Gastrointestinal: negative for nausea. negative for  heartburn. negative for acid reflux.   Musculoskeletal: negative for joint pain. negative for joint stiffness. negative for joint swelling.   Neurologic: negative for seizures. negative for fainting. negative for weakness.   Psychiatric: positive  for changes in mood. negative for anxiety.   Endocrine: positive  for cold intolerance. positive  for heat intolerance. negative for tremors.   Hematologic: negative for easy bruising. negative for easy bleeding.  Integumentary: negative for rash. negative for scaling. negative for nail changes.       Current Outpatient Medications:      albuterol (PROAIR HFA/PROVENTIL HFA/VENTOLIN HFA) 108 (90 Base) MCG/ACT inhaler, USE 2 INHALATIONS ORALLY   EVERY 6 HOURS AS NEEDED FORSHORTNESS OF BREATH/DYSPNEA, Disp: 18 g, Rfl: 2     albuterol (PROVENTIL) (2.5 MG/3ML) 0.083% neb solution, Route: Take 1 vial (2.5 mg) by nebulization every 6 hours as needed for shortness of breath / dyspnea or wheezing - Nebulization, Disp: 90 mL, Rfl: 5     Aspirin-Acetaminophen-Caffeine (EXCEDRIN PO), Take by mouth as needed , Disp: , Rfl:      budesonide (PULMICORT) 0.5 MG/2ML neb solution, Take 2 mLs (0.5 mg) by nebulization 2 times daily as needed (asthma symptoms), Disp: 100 mL, Rfl: 1     fluticasone (FLONASE) 50 MCG/ACT nasal spray, USE 2 SPRAYS IN EACH       NOSTRIL DAILY, Disp: 48 g, Rfl: 0     fluticasone-vilanterol (BREO ELLIPTA) 200-25 MCG/INH inhaler, Inhale 1 puff into the lungs daily, Disp: 3 each, Rfl: 0     IBUPROFEN PO, Take by mouth every 4 hours as needed , Disp: , Rfl:      montelukast (SINGULAIR) 10 MG tablet, Take 1 tablet (10 mg) by mouth At Bedtime, Disp: 90 tablet, Rfl: 0     order for DME, Equipment being ordered: spacer for inhaler, Disp: 1 Inhaler, Rfl: 0     sodium chloride (OCEAN) 0.65 % nasal spray, Spray 2 sprays in nostril every 4 hours, Disp: 30 mL, Rfl: 3  Allergies   Allergen Reactions     Augmentin Rash       EXAM:   /85 (BP Location: Left arm, Patient  "Position: Sitting, Cuff Size: Adult Regular)   Pulse 68   Ht 1.75 m (5' 8.9\")   Wt 88.5 kg (195 lb)   SpO2 97%   BMI 28.88 kg/m    GENERAL APPEARANCE: alert, cooperative and not in distress  SKIN: no rashes, no lesions  HEAD: atraumatic, normocephalic  EYES: lids and lashes normal, conjunctivae and sclerae clear  ENT: no scars or lesions, nasal exam showed no discharge, swelling or lesions noted, tongue midline and normal, soft palate, uvula, and tonsils normal  NECK: no asymmetry, masses, or scars  LUNGS: unlabored respirations, no intercostal retractions or accessory muscle use, clear to auscultation without rales or wheezes  HEART: regular rate and rhythm without murmurs and normal S1 and S2  MUSCULOSKELETAL: no musculoskeletal defects are noted  NEURO: no focal deficits noted  PSYCH: does not appear depressed or anxious      WORKUP:  None    ASSESSMENT/PLAN:  Keith Spangler is a 41 year old male here for a follow-up visit.    1. Uncontrolled mild persistent asthma - Keith reports that his asthma has not been well controlled. He does not feel the Breo is as effective during the winter and has been taking additional medications including Symbicort and nebulized budesonide. Despite his frequent shortness of breath he has been able to exercise and feels that exercise helps to relieve some of his symptoms. We discussed increasing his dose of Breo vs switching to an alternative medication. PFTs obtained in 7/2020 were normal however these should be repeated should his symptoms not improve.    - discontinue nebulized budesonide and Symbicort  - take 2 to 4 puffs of albuterol HFA every 4 hours as needed  - fluticasone-vilanterol (BREO ELLIPTA) 200-25 MCG/INH inhaler; Inhale 1 puff into the lungs daily  Dispense: 3 each; Refill: 0  - montelukast (SINGULAIR) 10 MG tablet; Take 1 tablet (10 mg) by mouth At Bedtime  Dispense: 90 tablet; Refill: 0      Follow-up in 6-8 weeks, sooner if needed      Thank you for " allowing me to participate in the care of Keith Spangler.      Andrei Kyle MD, FAAAAI  Allergy/Immunology  [facility]      Chart documentation done in part with Dragon Voice Recognition Software. Although reviewed after completion, some word and grammatical errors may remain.        Again, thank you for allowing me to participate in the care of your patient.        Sincerely,        Andrei Kyle MD

## 2022-04-19 NOTE — PATIENT INSTRUCTIONS
If you have any questions regarding your allergies, asthma, or what we discussed during your visit today please call the allergy clinic or contact us via [employer].    [facility] Allergy RN Line: [phone] - call this number with any questions during or after business/clinic hours  [facility] Scheduling Line: [phone]  [facility] Scheduling Line: [phone] - this number is ONLY for scheduling at the [facility] and should not be used to get in touch with the allergy team    All visits for food challenges, medication/drug allergy testing, and drug challenges MUST be scheduled through the allergy clinic nurse. Please call the nurse at [phone] or send a Storitz message for scheduling. Appointments for these visits that are made through the schedulers or via [employer] may be cancelled or rescheduled.    Clinic Schedule:   [place] - Monday, Tuesday, and Thursday  [address] [place] [address]    [facility] - Wednesday  [facility] [place] [address]        Stop the Symbicort  Breo dose increased to 200/25mcg - 1 puff daily  Continue the montelukast - 10mg daily  Use the albuterol inhaler every 4 hours as needed

## 2022-04-29 ENCOUNTER — OFFICE VISIT (OUTPATIENT)
Dept: FAMILY MEDICINE | Facility: CLINIC | Age: 42
End: 2022-04-29
Payer: COMMERCIAL

## 2022-04-29 VITALS
BODY MASS INDEX: 29.02 KG/M2 | HEIGHT: 68 IN | RESPIRATION RATE: 16 BRPM | SYSTOLIC BLOOD PRESSURE: 135 MMHG | HEART RATE: 88 BPM | OXYGEN SATURATION: 99 % | TEMPERATURE: 97.6 F | WEIGHT: 191.5 LBS | DIASTOLIC BLOOD PRESSURE: 93 MMHG

## 2022-04-29 DIAGNOSIS — N52.9 ERECTILE DYSFUNCTION, UNSPECIFIED ERECTILE DYSFUNCTION TYPE: ICD-10-CM

## 2022-04-29 DIAGNOSIS — R09.81 CHRONIC NASAL CONGESTION: ICD-10-CM

## 2022-04-29 DIAGNOSIS — Z00.00 ROUTINE HISTORY AND PHYSICAL EXAMINATION OF ADULT: Primary | ICD-10-CM

## 2022-04-29 DIAGNOSIS — J45.30 UNCONTROLLED MILD PERSISTENT ASTHMA: ICD-10-CM

## 2022-04-29 DIAGNOSIS — R03.0 ELEVATED BLOOD PRESSURE READING WITHOUT DIAGNOSIS OF HYPERTENSION: ICD-10-CM

## 2022-04-29 DIAGNOSIS — L65.9 THINNING HAIR: ICD-10-CM

## 2022-04-29 LAB
CHOLEST SERPL-MCNC: 203 MG/DL
FASTING STATUS PATIENT QL REPORTED: NO
FASTING STATUS PATIENT QL REPORTED: NO
GLUCOSE BLD-MCNC: 106 MG/DL (ref 70–99)
HDLC SERPL-MCNC: 50 MG/DL
LDLC SERPL CALC-MCNC: 132 MG/DL
NONHDLC SERPL-MCNC: 153 MG/DL
TRIGL SERPL-MCNC: 106 MG/DL

## 2022-04-29 PROCEDURE — 82947 ASSAY GLUCOSE BLOOD QUANT: CPT | Performed by: PREVENTIVE MEDICINE

## 2022-04-29 PROCEDURE — 99214 OFFICE O/P EST MOD 30 MIN: CPT | Mod: 25 | Performed by: PREVENTIVE MEDICINE

## 2022-04-29 PROCEDURE — 99396 PREV VISIT EST AGE 40-64: CPT | Performed by: PREVENTIVE MEDICINE

## 2022-04-29 PROCEDURE — 80061 LIPID PANEL: CPT | Performed by: PREVENTIVE MEDICINE

## 2022-04-29 PROCEDURE — 36415 COLL VENOUS BLD VENIPUNCTURE: CPT | Performed by: PREVENTIVE MEDICINE

## 2022-04-29 RX ORDER — FLUTICASONE PROPIONATE 50 MCG
SPRAY, SUSPENSION (ML) NASAL
Qty: 48 G | Refills: 3 | Status: SHIPPED | OUTPATIENT
Start: 2022-04-29 | End: 2023-07-03

## 2022-04-29 RX ORDER — SILDENAFIL CITRATE 20 MG/1
TABLET ORAL
Qty: 30 TABLET | Refills: 1 | Status: SHIPPED | OUTPATIENT
Start: 2022-04-29 | End: 2022-10-21

## 2022-04-29 ASSESSMENT — ENCOUNTER SYMPTOMS
COUGH: 0
SHORTNESS OF BREATH: 0
PALPITATIONS: 0
WEAKNESS: 0
NAUSEA: 0
FREQUENCY: 0
JOINT SWELLING: 0
ARTHRALGIAS: 0
EYE PAIN: 0
NERVOUS/ANXIOUS: 1
FEVER: 0
MYALGIAS: 0
DIARRHEA: 0
CHILLS: 0
HEMATOCHEZIA: 0
ABDOMINAL PAIN: 0
HEARTBURN: 0
HEMATURIA: 0
DYSURIA: 0
SORE THROAT: 0
PARESTHESIAS: 0
CONSTIPATION: 0
DIZZINESS: 0
HEADACHES: 0

## 2022-04-29 ASSESSMENT — ASTHMA QUESTIONNAIRES: ACT_TOTALSCORE: 18

## 2022-04-29 ASSESSMENT — PAIN SCALES - GENERAL: PAINLEVEL: NO PAIN (0)

## 2022-04-29 NOTE — RESULT ENCOUNTER NOTE
Keith, your test results were within normal limits.    Cholesterol is at goal for you and improved from last check.   Non fasting glucose is in a normal range, you do not have diabetes.    Please do not hesitate to call us at [phone] if you have any questions or concerns.    Thank you,    Serina Monet MD MPH

## 2022-04-29 NOTE — PATIENT INSTRUCTIONS
He is informed that Viagra is usually not covered by insurance. It is available on a fee-for-service cost basis, and is relatively expensive. The method of use 1 hour prior to anticipated intercourse is explained. He should not use any more than 100 mg in a 24 hour period. The side effects of possible headache, flushing, dyspepsia and transient changes in vision have been explained. The patient is not taking nitrates, and denies he has access to nitrates in any form at any time. I have counseled him that taking Viagra with nitrates of any form can cause death. Additionally, Viagra serum concentrations can be increased by the following: cimetidine, erythromycin, itraconazole or ketoconazole. This patient does not take these drugs.    Serina Monet MD MPH

## 2022-04-29 NOTE — PROGRESS NOTES
SUBJECTIVE:   CC: Keith Spangler is an 41 year old male who presents for preventative health visit.       Patient has been advised of split billing requirements and indicates understanding: Yes  Healthy Habits:     Getting at least 3 servings of Calcium per day:  Yes    Bi-annual eye exam:  NO    Dental care twice a year:  Yes    Sleep apnea or symptoms of sleep apnea:  None    Diet:  Regular (no restrictions)    Frequency of exercise:  4-5 days/week    Duration of exercise:  30-45 minutes    Taking medications regularly:  Yes    Medication side effects:  None    PHQ-2 Total Score: 0    Additional concerns today:  Yes    Snoring+  Evaluated for sleep apnea in 2020 and negative work up.    Elevated blood pressure readings+ more stress due to work. Has a Heart beatriz and can monitor blood pressure with that.     Breathing is better now, worse in colder months.      Thinning of the hair since many years, no itching, no bald spots. We talked about using Rogaine foam for men, some receding hair line.     Has been using Excedrin quite a bit, sinus tightness+. 1-2 times a week, no vision changes, no emesis. No headaches+.     Sometimes will have erectile dysfunction+ , no ETOH use, problems getting and maintaining an erection, once a week, no pain, no urine symptoms. No relationship concerns. No breathing problems during intercourse. Has been using Bluechew which he purchased online    Did have chicken pox as a kid. Question about shingles vaccine.     Today's PHQ-2 Score:   PHQ-2 ( 1999 Pfizer) 4/29/2022   Q1: Little interest or pleasure in doing things 0   Q2: Feeling down, depressed or hopeless 0   PHQ-2 Score 0   PHQ-2 Total Score (12-17 Years)- Positive if 3 or more points; Administer PHQ-A if positive -   Q1: Little interest or pleasure in doing things Not at all   Q2: Feeling down, depressed or hopeless Not at all   PHQ-2 Score 0       Abuse: Current or Past(Physical, Sexual or Emotional)- No  Do you feel safe in  your environment? Yes    Have you ever done Advance Care Planning? (For example, a Health Directive, POLST, or a discussion with a medical provider or your loved ones about your wishes): No, advance care planning information given to patient to review.  Patient declined advance care planning discussion at this time.    Social History     Tobacco Use     Smoking status: Never Smoker     Smokeless tobacco: Never Used     Tobacco comment: Mom smoked his upbringing   Substance Use Topics     Alcohol use: Yes     Comment: Really rare now      If you drink alcohol do you typically have >3 drinks per day or >7 drinks per week? No    Alcohol Use 4/29/2022   Prescreen: >3 drinks/day or >7 drinks/week? No   Prescreen: >3 drinks/day or >7 drinks/week? -       Last PSA: No results found for: PSA    Reviewed orders with patient. Reviewed health maintenance and updated orders accordingly - Yes  Lab work is in process  Labs reviewed in EPIC  BP Readings from Last 3 Encounters:   04/29/22 (!) 135/93   04/19/22 135/85   08/19/21 (!) 140/93    Wt Readings from Last 3 Encounters:   04/29/22 86.9 kg (191 lb 8 oz)   04/19/22 88.5 kg (195 lb)   04/06/22 83.9 kg (185 lb)                  Patient Active Problem List   Diagnosis     CARDIOVASCULAR SCREENING; LDL GOAL LESS THAN 160     Acute bronchitis with bronchospasm     Mild persistent asthma without complication     Refractory obstruction of nasal airway     Acquired nasal deformity     Nasal obstruction     Nasal valve collapse     History of fracture of nose     Deviated nasal septum     Nasal deformity     History of closed fracture of nasal bones     Past Surgical History:   Procedure Laterality Date     APPENDECTOMY  Oct. 1991     EYE SURGERY  2013 and 2014    PRK laser correction     HERNIA REPAIR  2002     SEPTORHINOPLASTY N/A 8/19/2021    Procedure: RHINOSEPTOPLASTY, CADAVERIC RIB GRAFT,  CONCHAL CARTILAGE GRAFT;  Surgeon: Becki Johnson MD;  Location[place] [facility]     SURGICAL  HISTORY OF -       Thumb 2005-Pins placed      SURGICAL HISTORY OF -       ER visit, left upper eyelid laceration repair     SURGICAL HISTORY OF -       Septo/rhinoplasty of nose secondary to boxing injury     SURGICAL HISTORY OF -       Left hernia        Social History     Tobacco Use     Smoking status: Never Smoker     Smokeless tobacco: Never Used     Tobacco comment: Mom smoked his upbringing   Substance Use Topics     Alcohol use: Yes     Comment: Really rare now      Family History   Problem Relation Age of Onset     Unknown/Adopted Mother         Diagnosed w/ALS June 2015     Sleep Apnea Brother      Gastrointestinal Disease Brother         colitis     C.A.D. No family hx of      Diabetes No family hx of      Hypertension No family hx of      Cerebrovascular Disease No family hx of      Breast Cancer No family hx of      Cancer - colorectal No family hx of      Prostate Cancer No family hx of      Asthma No family hx of          Current Outpatient Medications   Medication Sig Dispense Refill     albuterol (PROAIR HFA/PROVENTIL HFA/VENTOLIN HFA) 108 (90 Base) MCG/ACT inhaler USE 2 INHALATIONS ORALLY   EVERY 6 HOURS AS NEEDED FORSHORTNESS OF BREATH/DYSPNEA 18 g 2     albuterol (PROVENTIL) (2.5 MG/3ML) 0.083% neb solution Route: Take 1 vial (2.5 mg) by nebulization every 6 hours as needed for shortness of breath / dyspnea or wheezing - Nebulization 90 mL 5     Aspirin-Acetaminophen-Caffeine (EXCEDRIN PO) Take by mouth as needed        budesonide (PULMICORT) 0.5 MG/2ML neb solution Take 2 mLs (0.5 mg) by nebulization 2 times daily as needed (asthma symptoms) 100 mL 1     fluticasone (FLONASE) 50 MCG/ACT nasal spray USE 2 SPRAYS IN EACH       NOSTRIL DAILY 48 g 3     fluticasone-vilanterol (BREO ELLIPTA) 200-25 MCG/INH inhaler Inhale 1 puff into the lungs daily 3 each 0     IBUPROFEN PO Take by mouth every 4 hours as needed        montelukast (SINGULAIR) 10 MG tablet Take 1 tablet (10 mg) by mouth At Bedtime 90  tablet 0     order for DME Equipment being ordered: spacer for inhaler 1 Inhaler 0     sildenafil (REVATIO) 20 MG tablet 1-4 tablets 30 minutes to 2 hours before intercourse. Maximum 100 mg in 24 hours. 30 tablet 1     sodium chloride (OCEAN) 0.65 % nasal spray Spray 2 sprays in nostril every 4 hours 30 mL 3     Allergies   Allergen Reactions     Augmentin Rash       Reviewed and updated as needed this visit by clinical staff   Tobacco  Allergies  Meds  Problems  Med Hx  Surg Hx  Fam Hx  Soc   Hx          Reviewed and updated as needed this visit by Provider   Tobacco  Allergies  Meds  Problems  Med Hx  Surg Hx  Fam Hx           Past Medical History:   Diagnosis Date     COPD (chronic obstructive pulmonary disease) (H)      NO ACTIVE PROBLEMS      Uncomplicated asthma Sept 2014    Intermittent asthma      Past Surgical History:   Procedure Laterality Date     APPENDECTOMY  Oct. 1991     EYE SURGERY  2013 and 2014    PRK laser correction     HERNIA REPAIR  2002     SEPTORHINOPLASTY N/A 8/19/2021    Procedure: RHINOSEPTOPLASTY, CADAVERIC RIB GRAFT,  CONCHAL CARTILAGE GRAFT;  Surgeon: Becki Johnson MD;  Location: [facility] OR     SURGICAL HISTORY OF -       Thumb 2005-Pins placed      SURGICAL HISTORY OF -       ER visit, left upper eyelid laceration repair     SURGICAL HISTORY OF -       Septo/rhinoplasty of nose secondary to boxing injury     SURGICAL HISTORY OF -       Left hernia        Review of Systems   Constitutional: Negative for chills and fever.   HENT: Negative for congestion, ear pain, hearing loss and sore throat.    Eyes: Negative for pain and visual disturbance.   Respiratory: Negative for cough and shortness of breath.    Cardiovascular: Negative for chest pain, palpitations and peripheral edema.   Gastrointestinal: Negative for abdominal pain, constipation, diarrhea, heartburn, hematochezia and nausea.   Genitourinary: Negative for dysuria, frequency, genital sores, hematuria and  "urgency.   Musculoskeletal: Negative for arthralgias, joint swelling and myalgias.   Skin: Negative for rash.   Neurological: Negative for dizziness, weakness, headaches and paresthesias.   Psychiatric/Behavioral: Negative for mood changes. The patient is nervous/anxious.    Increased stress at work, plans to look for a new job.     OBJECTIVE:   BP (!) 135/93 (BP Location: Left arm, Patient Position: Sitting, Cuff Size: Adult Large)   Pulse 88   Temp 97.6  F (36.4  C) (Tympanic)   Resp 16   Ht 1.726 m (5' 7.95\")   Wt 86.9 kg (191 lb 8 oz)   SpO2 99%   BMI 29.16 kg/m      Physical Exam  GENERAL APPEARANCE: healthy, alert and no distress  EYES: Eyes grossly normal to inspection and conjunctivae and sclerae normal  HENT: Intact TMs  NECK: no adenopathy and trachea midline and normal to palpation  RESP: lungs clear to auscultation - no rales, rhonchi or wheezes  CV: regular rates and rhythm, normal S1 S2, no S3 or S4 and no murmur, click or rub  ABDOMEN: soft, non-tender and no rebound or guarding   MS: extremities normal- no gross deformities noted and peripheral pulses normal  SKIN: no suspicious lesions or rashes  NEURO: Normal strength and tone, mentation intact and speech normal  PSYCH: mentation appears normal  Scalp: no patches of alopecia, no scaling, no rash, no drainage       Diagnostic Test Results:  Labs reviewed in Epic  No results found for this or any previous visit (from the past 24 hour(s)).    ASSESSMENT/PLAN:   Keith was seen today for physical.    Diagnoses and all orders for this visit:    Routine history and physical examination of adult  -     REVIEW OF HEALTH MAINTENANCE PROTOCOL ORDERS  -     Lipid panel reflex to direct LDL Non-fasting  -     Glucose    Uncontrolled mild persistent asthma  -followed by Allergy/Asthma Dr. Kyle    Elevated blood pressure reading without diagnosis of hypertension  -able to monitor on a phone beatriz  -has been normal at home  -sleep evaluation negative in the " "past  -will monitor and follow up if over 130/90  -DASH diet information reviewed  -150 minutes of exercise per week    Chronic nasal congestion  -     fluticasone (FLONASE) 50 MCG/ACT nasal spray; USE 2 SPRAYS IN EACH       NOSTRIL DAILY    Erectile dysfunction, unspecified erectile dysfunction type  -     sildenafil (REVATIO) 20 MG tablet; 1-4 tablets 30 minutes to 2 hours before intercourse. Maximum 100 mg in 24 hours.    Has been buying medication online from [employer].  Coupon for GoodRx given, he understands that he will self pay and no Prior Authorization will be done from insurance.     He is informed that Viagra is usually not covered by insurance. It is available on a fee-for-service cost basis, and is relatively expensive. The method of use 1 hour prior to anticipated intercourse is explained. He should not use any more than 100 mg in a 24 hour period. The side effects of possible headache, flushing, dyspepsia and transient changes in vision have been explained. The patient is not taking nitrates, and denies he has access to nitrates in any form at any time. I have counseled him that taking Viagra with nitrates of any form can cause death. Additionally, Viagra serum concentrations can be increased by the following: cimetidine, erythromycin, itraconazole or ketoconazole. This patient does not take these drugs.    Thinning hair  -Rogaine foam over the counter  -if not better then refer to DERM         COUNSELING:   Reviewed preventive health counseling, as reflected in patient instructions       Regular exercise       Healthy diet/nutrition    Estimated body mass index is 29.16 kg/m  as calculated from the following:    Height as of this encounter: 1.726 m (5' 7.95\").    Weight as of this encounter: 86.9 kg (191 lb 8 oz).     Weight management plan: Discussed healthy diet and exercise guidelines    He reports that he has never smoked. He has never used smokeless tobacco.      Counseling Resources:  ATP IV " Guidelines  Pooled Cohorts Equation Calculator  FRAX Risk Assessment  ICSI Preventive Guidelines  Dietary Guidelines for Americans, 2010  USDA's MyPlate  ASA Prophylaxis  Lung CA Screening    Serina Monet MD MPH    [facility]

## 2022-04-30 ENCOUNTER — TELEPHONE (OUTPATIENT)
Dept: FAMILY MEDICINE | Facility: CLINIC | Age: 42
End: 2022-04-30
Payer: COMMERCIAL

## 2022-04-30 NOTE — TELEPHONE ENCOUNTER
Plan does not cover sildenafil (REVATIO) 20 MG tablet. Please call [phone] to initiate prior authorization or switch to alternative medication.    Patient ID #: [id number]

## 2022-05-02 NOTE — TELEPHONE ENCOUNTER
No need for Prior Authorization. Had explained that he would need to self pay for the medication and he can find coupons on [employer].    Thank you,  Serina Monet MD MPH

## 2022-07-28 ENCOUNTER — OFFICE VISIT (OUTPATIENT)
Dept: ALLERGY | Facility: CLINIC | Age: 42
End: 2022-07-28
Payer: COMMERCIAL

## 2022-07-28 VITALS — OXYGEN SATURATION: 100 % | HEART RATE: 60 BPM | DIASTOLIC BLOOD PRESSURE: 76 MMHG | SYSTOLIC BLOOD PRESSURE: 127 MMHG

## 2022-07-28 DIAGNOSIS — J45.30 UNCONTROLLED MILD PERSISTENT ASTHMA: Primary | ICD-10-CM

## 2022-07-28 DIAGNOSIS — R09.81 NASAL CONGESTION: ICD-10-CM

## 2022-07-28 PROCEDURE — 99213 OFFICE O/P EST LOW 20 MIN: CPT | Performed by: ALLERGY & IMMUNOLOGY

## 2022-07-28 ASSESSMENT — ENCOUNTER SYMPTOMS
JOINT SWELLING: 0
CHEST TIGHTNESS: 0
SINUS PRESSURE: 0
FATIGUE: 0
MYALGIAS: 0
HEADACHES: 0
ARTHRALGIAS: 0
FACIAL SWELLING: 0
EYE ITCHING: 0
DIARRHEA: 0
EYE DISCHARGE: 0
SHORTNESS OF BREATH: 0
FEVER: 0
WHEEZING: 0
ACTIVITY CHANGE: 0
VOMITING: 0
NAUSEA: 0
COUGH: 0
EYE REDNESS: 0
ADENOPATHY: 0
RHINORRHEA: 0

## 2022-07-28 ASSESSMENT — ASTHMA QUESTIONNAIRES: ACT_TOTALSCORE: 20

## 2022-07-28 NOTE — LETTER
7/28/2022         RE: Keith Spangler  [address] [facility] [address]        Dear Colleague,    Thank you for referring your patient, Keith Spangler, to the [facility]. Please see a copy of my visit note below.    Keith Spangler was seen in the Allergy Clinic at [facility].      Keith Spangler is a 42 year old  or  male who is seen today for a follow-up visit.    Taking Breo daily but still has to use albuterol - both inhaler and nebs. Not having cough, shortness of breath, chest tightness, or wheezing. Feels exercise helps with his asthma and he continues to run. Uses albuterol prior to exercise and is able to run faster. Using albuterol for nasal issues - feels it loosens up his nose. Has used a neti pot but doesn't think it works as well as using an albuterol neb in his nose. Not taking montelukast - hasn't noticed any difference in his symptoms since stopping the medication.    Using fluticasone nasal spray occasionally, more consistent with the sinus irrigation rinses.    Also complains of his nose feeling dried out. Feels the albuterol nebs helps with the dryness.        Past Medical History:   Diagnosis Date     COPD (chronic obstructive pulmonary disease) (H)      NO ACTIVE PROBLEMS      Uncomplicated asthma Sept 2014    Intermittent asthma     Family History   Problem Relation Age of Onset     Unknown/Adopted Mother         Diagnosed w/ALS June 2015     Sleep Apnea Brother      Gastrointestinal Disease Brother         colitis     C.A.D. No family hx of      Diabetes No family hx of      Hypertension No family hx of      Cerebrovascular Disease No family hx of      Breast Cancer No family hx of      Cancer - colorectal No family hx of      Prostate Cancer No family hx of      Asthma No family hx of      Social History     Tobacco Use     Smoking status: Never Smoker     Smokeless tobacco: Never Used     Tobacco comment:  Mom smoked his upbringing   Vaping Use     Vaping Use: Never used   Substance Use Topics     Alcohol use: Yes     Comment: Really rare now      Drug use: No     Social History     Social History Narrative    Dairy/d 1-3 servings/d.     Caffeine 1 servings/d    Exercise 5 x week boxer    Sunscreen used - No    Seatbelts used - Yes    Working smoke/CO detectors in the home - Yes    Guns stored in the home - No    Self Breast Exams - NOT APPLICABLE    Self Testicular Exam - Yes    Eye Exam up to date - Yes    Dental Exam up to date - Yes    Pap Smear up to date - NOT APPLICABLE    Mammogram up to date - NOT APPLICABLE    PSA up to date - NO    Dexa Scan up to date - NOT APPLICABLE    Flex Sig / Colonoscopy up to date - Yes    Immunizations up to date - Yes    Abuse: Current or Past(Physical, Sexual or Emotional)- No    Do you feel safe in your environment - Yes           Past medical, family, and social history were reviewed.    Review of Systems   Constitutional: Negative for activity change, fatigue and fever.   HENT: Positive for congestion. Negative for dental problem, ear pain, facial swelling, nosebleeds, postnasal drip, rhinorrhea, sinus pressure and sneezing.    Eyes: Negative for discharge, redness and itching.   Respiratory: Negative for cough, chest tightness, shortness of breath and wheezing.    Cardiovascular: Negative for chest pain.   Gastrointestinal: Negative for diarrhea, nausea and vomiting.   Musculoskeletal: Negative for arthralgias, joint swelling and myalgias.   Skin: Negative for rash.   Neurological: Negative for headaches.   Hematological: Negative for adenopathy.   Psychiatric/Behavioral: Negative for behavioral problems and self-injury.         Current Outpatient Medications:      albuterol (PROAIR HFA/PROVENTIL HFA/VENTOLIN HFA) 108 (90 Base) MCG/ACT inhaler, USE 2 INHALATIONS ORALLY   EVERY 6 HOURS AS NEEDED FORSHORTNESS OF BREATH/DYSPNEA, Disp: 18 g, Rfl: 2     albuterol (PROVENTIL) (2.5  MG/3ML) 0.083% neb solution, Route: Take 1 vial (2.5 mg) by nebulization every 6 hours as needed for shortness of breath / dyspnea or wheezing - Nebulization, Disp: 90 mL, Rfl: 5     Aspirin-Acetaminophen-Caffeine (EXCEDRIN PO), Take by mouth as needed , Disp: , Rfl:      fluticasone (FLONASE) 50 MCG/ACT nasal spray, USE 2 SPRAYS IN EACH       NOSTRIL DAILY, Disp: 48 g, Rfl: 3     fluticasone-vilanterol (BREO ELLIPTA) 200-25 MCG/INH inhaler, Inhale 1 puff into the lungs daily, Disp: 3 each, Rfl: 1     IBUPROFEN PO, Take by mouth every 4 hours as needed , Disp: , Rfl:      order for DME, Equipment being ordered: spacer for inhaler, Disp: 1 Inhaler, Rfl: 0     sildenafil (REVATIO) 20 MG tablet, 1-4 tablets 30 minutes to 2 hours before intercourse. Maximum 100 mg in 24 hours., Disp: 30 tablet, Rfl: 1     sodium chloride (OCEAN) 0.65 % nasal spray, Spray 2 sprays in nostril every 4 hours, Disp: 30 mL, Rfl: 3     cefdinir (OMNICEF) 300 MG capsule, Take 1 capsule (300 mg) by mouth 2 times daily for 10 days, Disp: 20 capsule, Rfl: 0     montelukast (SINGULAIR) 10 MG tablet, Take 10 mg by mouth At Bedtime, Disp: , Rfl:      predniSONE (DELTASONE) 20 MG tablet, Take 2 tablets (40 mg) by mouth daily for 5 days, Disp: 10 tablet, Rfl: 0  Allergies   Allergen Reactions     Augmentin Rash       EXAM:   /76 (BP Location: Right arm, Patient Position: Sitting, Cuff Size: Adult Regular)   Pulse 60   SpO2 100%   Physical Exam  Vitals and nursing note reviewed.   Constitutional:       Appearance: Normal appearance.   HENT:      Head: Normocephalic and atraumatic.      Right Ear: External ear normal.      Left Ear: External ear normal.      Nose: No mucosal edema or rhinorrhea.      Mouth/Throat:      Mouth: Mucous membranes are moist. No oral lesions.      Pharynx: Oropharynx is clear. Uvula midline. No posterior oropharyngeal erythema.   Eyes:      General: Lids are normal.      Extraocular Movements: Extraocular movements  intact.      Conjunctiva/sclera: Conjunctivae normal.   Neck:      Comments: No asymmetry, masses, or scars  Cardiovascular:      Rate and Rhythm: Normal rate and regular rhythm.      Heart sounds: Normal heart sounds, S1 normal and S2 normal.   Pulmonary:      Effort: Pulmonary effort is normal. No respiratory distress.      Breath sounds: Normal breath sounds and air entry.   Musculoskeletal:      Comments: No musculoskeletal defects appreciated   Skin:     General: Skin is warm and dry.      Findings: No lesion or rash.   Neurological:      General: No focal deficit present.      Mental Status: He is alert.   Psychiatric:         Mood and Affect: Mood and affect normal.       WORKUP:  None    ASSESSMENT/PLAN:  Keith Spangler is a 42 year old male here for a follow-up visit.    1. Uncontrolled mild persistent asthma - He reports that he continues to use albuterol daily. It was difficult to elicit from his history whether this was due to shortness of breath and asthma related vs whether he was using this for intranasal symptoms. We discussed the role of ICS/LABA therapy and albuterol in managing his asthma.    - take 2 to 4 puffs of albuterol HFA every 4 hours as needed  - fluticasone-vilanterol (BREO ELLIPTA) 200-25 MCG/INH inhaler; Inhale 1 puff into the lungs daily  Dispense: 3 each; Refill: 1    2. Nasal congestion    - recommend use of nasal saline spray or gel to help with dryness and discomfort  - continue to follow-up with ENT      Follow-up in 3-6 months, sooner if needed      Thank you for allowing me to participate in the care of Keith Spangler.      Andrei Kyle MD, FAAAAI  Allergy/Immunology  [facility]      Chart documentation done in part with Dragon Voice Recognition Software. Although reviewed after completion, some word and grammatical errors may remain.      Again, thank you for allowing me to participate in  the care of your patient.        Sincerely,        Andrei Kyle MD

## 2022-07-28 NOTE — PROGRESS NOTES
Keith Spangler was seen in the Allergy Clinic at [facility].      Keith Spangler is a 42 year old  or  male who is seen today for a follow-up visit.    Taking Breo daily but still has to use albuterol - both inhaler and nebs. Not having cough, shortness of breath, chest tightness, or wheezing. Feels exercise helps with his asthma and he continues to run. Uses albuterol prior to exercise and is able to run faster. Using albuterol for nasal issues - feels it loosens up his nose. Has used a neti pot but doesn't think it works as well as using an albuterol neb in his nose. Not taking montelukast - hasn't noticed any difference in his symptoms since stopping the medication.    Using fluticasone nasal spray occasionally, more consistent with the sinus irrigation rinses.    Also complains of his nose feeling dried out. Feels the albuterol nebs helps with the dryness.        Past Medical History:   Diagnosis Date     COPD (chronic obstructive pulmonary disease) (H)      NO ACTIVE PROBLEMS      Uncomplicated asthma Sept 2014    Intermittent asthma     Family History   Problem Relation Age of Onset     Unknown/Adopted Mother         Diagnosed w/ALS June 2015     Sleep Apnea Brother      Gastrointestinal Disease Brother         colitis     C.A.D. No family hx of      Diabetes No family hx of      Hypertension No family hx of      Cerebrovascular Disease No family hx of      Breast Cancer No family hx of      Cancer - colorectal No family hx of      Prostate Cancer No family hx of      Asthma No family hx of      Social History     Tobacco Use     Smoking status: Never Smoker     Smokeless tobacco: Never Used     Tobacco comment: Mom smoked his upbringing   Vaping Use     Vaping Use: Never used   Substance Use Topics     Alcohol use: Yes     Comment: Really rare now      Drug use: No     Social History     Social History Narrative    Dairy/d 1-3 servings/d.     Caffeine 1 servings/d     Exercise 5 x week boxer    Sunscreen used - No    Seatbelts used - Yes    Working smoke/CO detectors in the home - Yes    Guns stored in the home - No    Self Breast Exams - NOT APPLICABLE    Self Testicular Exam - Yes    Eye Exam up to date - Yes    Dental Exam up to date - Yes    Pap Smear up to date - NOT APPLICABLE    Mammogram up to date - NOT APPLICABLE    PSA up to date - NO    Dexa Scan up to date - NOT APPLICABLE    Flex Sig / Colonoscopy up to date - Yes    Immunizations up to date - Yes    Abuse: Current or Past(Physical, Sexual or Emotional)- No    Do you feel safe in your environment - Yes           Past medical, family, and social history were reviewed.    Review of Systems   Constitutional: Negative for activity change, fatigue and fever.   HENT: Positive for congestion. Negative for dental problem, ear pain, facial swelling, nosebleeds, postnasal drip, rhinorrhea, sinus pressure and sneezing.    Eyes: Negative for discharge, redness and itching.   Respiratory: Negative for cough, chest tightness, shortness of breath and wheezing.    Cardiovascular: Negative for chest pain.   Gastrointestinal: Negative for diarrhea, nausea and vomiting.   Musculoskeletal: Negative for arthralgias, joint swelling and myalgias.   Skin: Negative for rash.   Neurological: Negative for headaches.   Hematological: Negative for adenopathy.   Psychiatric/Behavioral: Negative for behavioral problems and self-injury.         Current Outpatient Medications:      albuterol (PROAIR HFA/PROVENTIL HFA/VENTOLIN HFA) 108 (90 Base) MCG/ACT inhaler, USE 2 INHALATIONS ORALLY   EVERY 6 HOURS AS NEEDED FORSHORTNESS OF BREATH/DYSPNEA, Disp: 18 g, Rfl: 2     albuterol (PROVENTIL) (2.5 MG/3ML) 0.083% neb solution, Route: Take 1 vial (2.5 mg) by nebulization every 6 hours as needed for shortness of breath / dyspnea or wheezing - Nebulization, Disp: 90 mL, Rfl: 5     Aspirin-Acetaminophen-Caffeine (EXCEDRIN PO), Take by mouth as needed , Disp:  , Rfl:      fluticasone (FLONASE) 50 MCG/ACT nasal spray, USE 2 SPRAYS IN EACH       NOSTRIL DAILY, Disp: 48 g, Rfl: 3     fluticasone-vilanterol (BREO ELLIPTA) 200-25 MCG/INH inhaler, Inhale 1 puff into the lungs daily, Disp: 3 each, Rfl: 1     IBUPROFEN PO, Take by mouth every 4 hours as needed , Disp: , Rfl:      order for DME, Equipment being ordered: spacer for inhaler, Disp: 1 Inhaler, Rfl: 0     sildenafil (REVATIO) 20 MG tablet, 1-4 tablets 30 minutes to 2 hours before intercourse. Maximum 100 mg in 24 hours., Disp: 30 tablet, Rfl: 1     sodium chloride (OCEAN) 0.65 % nasal spray, Spray 2 sprays in nostril every 4 hours, Disp: 30 mL, Rfl: 3     cefdinir (OMNICEF) 300 MG capsule, Take 1 capsule (300 mg) by mouth 2 times daily for 10 days, Disp: 20 capsule, Rfl: 0     montelukast (SINGULAIR) 10 MG tablet, Take 10 mg by mouth At Bedtime, Disp: , Rfl:      predniSONE (DELTASONE) 20 MG tablet, Take 2 tablets (40 mg) by mouth daily for 5 days, Disp: 10 tablet, Rfl: 0  Allergies   Allergen Reactions     Augmentin Rash       EXAM:   /76 (BP Location: Right arm, Patient Position: Sitting, Cuff Size: Adult Regular)   Pulse 60   SpO2 100%   Physical Exam  Vitals and nursing note reviewed.   Constitutional:       Appearance: Normal appearance.   HENT:      Head: Normocephalic and atraumatic.      Right Ear: External ear normal.      Left Ear: External ear normal.      Nose: No mucosal edema or rhinorrhea.      Mouth/Throat:      Mouth: Mucous membranes are moist. No oral lesions.      Pharynx: Oropharynx is clear. Uvula midline. No posterior oropharyngeal erythema.   Eyes:      General: Lids are normal.      Extraocular Movements: Extraocular movements intact.      Conjunctiva/sclera: Conjunctivae normal.   Neck:      Comments: No asymmetry, masses, or scars  Cardiovascular:      Rate and Rhythm: Normal rate and regular rhythm.      Heart sounds: Normal heart sounds, S1 normal and S2 normal.   Pulmonary:       Effort: Pulmonary effort is normal. No respiratory distress.      Breath sounds: Normal breath sounds and air entry.   Musculoskeletal:      Comments: No musculoskeletal defects appreciated   Skin:     General: Skin is warm and dry.      Findings: No lesion or rash.   Neurological:      General: No focal deficit present.      Mental Status: He is alert.   Psychiatric:         Mood and Affect: Mood and affect normal.       WORKUP:  None    ASSESSMENT/PLAN:  Keith Spangler is a 42 year old male here for a follow-up visit.    1. Uncontrolled mild persistent asthma - He reports that he continues to use albuterol daily. It was difficult to elicit from his history whether this was due to shortness of breath and asthma related vs whether he was using this for intranasal symptoms. We discussed the role of ICS/LABA therapy and albuterol in managing his asthma.    - take 2 to 4 puffs of albuterol HFA every 4 hours as needed  - fluticasone-vilanterol (BREO ELLIPTA) 200-25 MCG/INH inhaler; Inhale 1 puff into the lungs daily  Dispense: 3 each; Refill: 1    2. Nasal congestion    - recommend use of nasal saline spray or gel to help with dryness and discomfort  - continue to follow-up with ENT      Follow-up in 3-6 months, sooner if needed      Thank you for allowing me to participate in the care of Keith Spangler.      Andrei Kyle MD, FAAAAI  Allergy/Immunology  [facility] - [facility]      Chart documentation done in part with Dragon Voice Recognition Software. Although reviewed after completion, some word and grammatical errors may remain.

## 2022-08-03 ENCOUNTER — OFFICE VISIT (OUTPATIENT)
Dept: OTOLARYNGOLOGY | Facility: CLINIC | Age: 42
End: 2022-08-03
Payer: COMMERCIAL

## 2022-08-03 VITALS
WEIGHT: 181.2 LBS | HEART RATE: 60 BPM | SYSTOLIC BLOOD PRESSURE: 121 MMHG | BODY MASS INDEX: 26.84 KG/M2 | DIASTOLIC BLOOD PRESSURE: 78 MMHG | HEIGHT: 69 IN | TEMPERATURE: 97.9 F

## 2022-08-03 DIAGNOSIS — S02.2XXS CLOSED FRACTURE OF NASAL BONE, SEQUELA: ICD-10-CM

## 2022-08-03 DIAGNOSIS — M95.0 ACQUIRED NASAL DEFORMITY: Primary | ICD-10-CM

## 2022-08-03 PROCEDURE — 99213 OFFICE O/P EST LOW 20 MIN: CPT | Performed by: OTOLARYNGOLOGY

## 2022-08-03 ASSESSMENT — PAIN SCALES - GENERAL: PAINLEVEL: NO PAIN (0)

## 2022-08-03 NOTE — PROGRESS NOTES
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 This was the bone that was severely displaced and fractured to the right. He had osteotomies here, but these, although they mobilized the bone a good bit, were not able to fully reduce the fracture.    He will need percutaneous osteotomies on the right to further mobilize this bone. We discussed this today and discussed that I would like to avoid going intranasally due to the scar.     He also has some residual right nasal obstruction. Although he has a middle body prominence of the cartilage use to support his nose on the right, the cause of the obstruction appears to be from the nasal valve. Supporting this with a Qtip and mobilizing the lateral nasal wall resolves his symptoms of obstruction. We discussed treatment for this and he will consider the obstruction in light of the possible resulting surgical changes.

## 2022-08-03 NOTE — LETTER
8/3/2022       RE: Keith Spangler  [address] [facility] [address]     Dear Colleague,    Thank you for referring your patient, Keith Spangler, to the [facility] EAR NOSE AND THROAT CLINIC [place] at [facility]. Please see a copy of my visit note below.    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 This was the bone that was severely displaced and fractured to the right. He had osteotomies here, but these, although they mobilized the bone a good bit, were not able to fully reduce the fracture.    He will need percutaneous osteotomies on the right to further mobilize this bone. We discussed this today and discussed that I would like to avoid going intranasally due to the scar.     He also has some residual right nasal obstruction. Although he has a middle body prominence of the cartilage use to support his nose on the right, the cause of the obstruction appears to be from the nasal valve. Supporting this with a Qtip and mobilizing the lateral nasal wall resolves his symptoms of obstruction. We discussed treatment for this and he will consider the obstruction in light of the possible resulting surgical changes.           Again, thank you for allowing me to participate in the care of your patient.      Sincerely,    Becki Johnson MD

## 2022-08-08 DIAGNOSIS — M95.0 ACQUIRED NASAL DEFORMITY: Primary | ICD-10-CM

## 2022-08-08 DIAGNOSIS — Z87.81 HISTORY OF FRACTURE OF NOSE: ICD-10-CM

## 2022-08-08 RX ORDER — CLINDAMYCIN PHOSPHATE 900 MG/50ML
900 INJECTION, SOLUTION INTRAVENOUS
Status: CANCELLED | OUTPATIENT
Start: 2022-08-08

## 2022-08-08 RX ORDER — CLINDAMYCIN PHOSPHATE 900 MG/50ML
900 INJECTION, SOLUTION INTRAVENOUS SEE ADMIN INSTRUCTIONS
Status: CANCELLED | OUTPATIENT
Start: 2022-08-08

## 2022-08-08 NOTE — NURSING NOTE
Updated photodocumentation obtained.    Will work to obtain PA for Rhinoplasty with Osteotomies.    Patricia Sherwood RN  8/8/2022 4:29 PM

## 2022-08-21 ENCOUNTER — OFFICE VISIT (OUTPATIENT)
Dept: URGENT CARE | Facility: URGENT CARE | Age: 42
End: 2022-08-21
Payer: COMMERCIAL

## 2022-08-21 VITALS
WEIGHT: 184.8 LBS | HEART RATE: 62 BPM | OXYGEN SATURATION: 97 % | BODY MASS INDEX: 27.29 KG/M2 | SYSTOLIC BLOOD PRESSURE: 125 MMHG | TEMPERATURE: 97.9 F | DIASTOLIC BLOOD PRESSURE: 74 MMHG | RESPIRATION RATE: 20 BRPM

## 2022-08-21 DIAGNOSIS — J01.90 ACUTE SINUSITIS WITH SYMPTOMS > 10 DAYS: Primary | ICD-10-CM

## 2022-08-21 DIAGNOSIS — J45.30 MILD PERSISTENT ASTHMA WITHOUT COMPLICATION: ICD-10-CM

## 2022-08-21 PROCEDURE — 99214 OFFICE O/P EST MOD 30 MIN: CPT | Performed by: FAMILY MEDICINE

## 2022-08-21 RX ORDER — CEFDINIR 300 MG/1
300 CAPSULE ORAL 2 TIMES DAILY
Qty: 20 CAPSULE | Refills: 0 | Status: SHIPPED | OUTPATIENT
Start: 2022-08-21 | End: 2022-08-26

## 2022-08-21 RX ORDER — MONTELUKAST SODIUM 10 MG/1
10 TABLET ORAL AT BEDTIME
COMMUNITY
End: 2022-10-21

## 2022-08-21 RX ORDER — PREDNISONE 20 MG/1
40 TABLET ORAL DAILY
Qty: 10 TABLET | Refills: 0 | Status: SHIPPED | OUTPATIENT
Start: 2022-08-21 | End: 2022-08-26

## 2022-08-21 NOTE — PROGRESS NOTES
Chief complaint: sinus     Has had some sinus symptoms nasal congestion been going on the past 10 days   Patient has asthma and patient using breo daily   Feels very plugged  Colds, sinus congestion, facial pain  Cough No  Greenish discharge  Fever No     No concerns about covid   Rapid covid is negative     Problem list and histories reviewed & adjusted, as indicated.  Additional history: as documented    Problem list, Medication list, Allergies, and Medical/Social/Surgical histories reviewed in [facility] and updated as appropriate.    ROS:  Constitutional, HEENT, cardiovascular, pulmonary, gi and gu systems are negative, except as otherwise noted.    OBJECTIVE:                                                    /74   Pulse 62   Temp 97.9  F (36.6  C) (Tympanic)   Resp 20   Wt 83.8 kg (184 lb 12.8 oz)   SpO2 97%   BMI 27.29 kg/m    Body mass index is 27.29 kg/m .  GENERAL: healthy, alert and no distress  EYES: Eyes grossly normal to inspection, PERRL and conjunctivae and sclerae normal  HENT: ear canals and TM's normal, nose and mouth without ulcers or lesions  Sinuses: turbinates erythematous left maxillary sinus tenderness   NECK: no adenopathy, no asymmetry, masses, or scars and thyroid normal to palpation  RESP: lungs clear to auscultation - no rales, rhonchi or wheezes   CV: regular rate and rhythm, normal S1 S2, no S3 or S4, no murmur, click or rub, no peripheral edema and peripheral pulses strong  MS: no gross musculoskeletal defects noted, no edema  SKIN: no suspicious lesions or rashes  NEURO: Normal strength and tone, mentation intact and speech normal  PSYCH: mentation appears normal, affect normal/bright    Diagnostic Test Results:  No results found for this or any previous visit (from the past 24 hour(s)).     ASSESSMENT/PLAN:                                                        ICD-10-CM    1. Acute sinusitis with symptoms > 10 days  J01.90 cefdinir (OMNICEF) 300 MG capsule     predniSONE  (DELTASONE) 20 MG tablet   2. Mild persistent asthma without complication  J45.30 cefdinir (OMNICEF) 300 MG capsule     predniSONE (DELTASONE) 20 MG tablet     Prescribed with above  Warned about possible cross-reactivity/allergy of cephalosporins with amoxicillin. Patient did not have any life-threatening reaction to amoxicillin and will be monitoring for any reaction.  Has tolerated cephalosporins in the past.   Prescribed with prednisone as well. No clear signs or symptoms of asthma excacerbation however he states when he uses his nebulizer it doesn't open him up like it usually does he feels because of the nasal obstruction.  Recommend follow up with primary care provider if no relief in 3 days, sooner if worse  Adverse reactions of medications discussed.  Over the counter medications discussed.   Aware to come back in if with worsening symptoms or if no relief despite treatment plan  Patient voiced understanding and had no further questions.     MD Laina Woods MD  [facility] CARE [place]

## 2022-08-26 ENCOUNTER — OFFICE VISIT (OUTPATIENT)
Dept: URGENT CARE | Facility: URGENT CARE | Age: 42
End: 2022-08-26
Payer: COMMERCIAL

## 2022-08-26 VITALS
DIASTOLIC BLOOD PRESSURE: 88 MMHG | TEMPERATURE: 99.3 F | WEIGHT: 186.8 LBS | HEART RATE: 58 BPM | BODY MASS INDEX: 27.59 KG/M2 | OXYGEN SATURATION: 98 % | SYSTOLIC BLOOD PRESSURE: 132 MMHG

## 2022-08-26 DIAGNOSIS — J01.10 ACUTE NON-RECURRENT FRONTAL SINUSITIS: Primary | ICD-10-CM

## 2022-08-26 PROCEDURE — 99213 OFFICE O/P EST LOW 20 MIN: CPT | Performed by: PHYSICIAN ASSISTANT

## 2022-08-26 RX ORDER — AZITHROMYCIN 250 MG/1
TABLET, FILM COATED ORAL
Qty: 6 TABLET | Refills: 0 | Status: SHIPPED | OUTPATIENT
Start: 2022-08-26 | End: 2022-08-31

## 2022-08-26 RX ORDER — PSEUDOEPHEDRINE HCL 120 MG/1
120 TABLET, FILM COATED, EXTENDED RELEASE ORAL EVERY 12 HOURS
Qty: 20 TABLET | Refills: 0 | Status: SHIPPED | OUTPATIENT
Start: 2022-08-26 | End: 2022-09-05

## 2022-08-27 NOTE — PROGRESS NOTES
Assessment & Plan        1. Acute non-recurrent frontal sinusitis    - pseudoePHEDrine (SUDAFED) 120 MG 12 hr tablet; Take 1 tablet (120 mg) by mouth every 12 hours for 10 days  Dispense: 20 tablet; Refill: 0  - azithromycin (ZITHROMAX) 250 MG tablet; Take 2 tablets (500 mg) by mouth daily for 1 day, THEN 1 tablet (250 mg) daily for 4 days.  Dispense: 6 tablet; Refill: 0    Stop cefdinir, start azithromycin, and sudafed. Continue the rest of current medications. Follow up if not improving over the next week at PCP clinic.                 CAROLIN Flores [facility]    Noemí Parker is a 42 year old male who presents to clinic today for the following health issues:  Chief Complaint   Patient presents with     Sinus Problem     Was seen on Sunday for the Sinus infection, and taking the meds, but nothing seems to be working.Seems to be in the chest as well.     HPI    Seen 5 days ago for sinus infection and given cefdinir. Given prednisone as well since nebulizer did not open him up like it usually does. Symptoms started 2 weeks ago with facial pressure. Cough started 2 days ago and when he received prednisone he was not coughing. No wheezing or chest tightness and some shortness of breath. He takes albuterol neb for the shortness of breath. It helps a little bit. Nasal congestion continues. He uses nasal steroid, brad pot, and mucinex.           Review of Systems        Objective    /88   Pulse 58   Temp 99.3  F (37.4  C) (Tympanic)   Wt 84.7 kg (186 lb 12.8 oz)   SpO2 98%   BMI 27.59 kg/m    Physical Exam  Vitals and nursing note reviewed.   Constitutional:       General: He is not in acute distress.     Appearance: He is well-developed. He is not diaphoretic.   HENT:      Head: Normocephalic and atraumatic.      Right Ear: Tympanic membrane and external ear normal.      Left Ear: Tympanic membrane and external ear normal.   Eyes:      Pupils: Pupils are equal,  round, and reactive to light.   Cardiovascular:      Rate and Rhythm: Normal rate and regular rhythm.   Pulmonary:      Effort: Pulmonary effort is normal. No respiratory distress.      Breath sounds: Normal breath sounds.   Musculoskeletal:      Cervical back: Normal range of motion and neck supple.   Lymphadenopathy:      Cervical: No cervical adenopathy.   Skin:     General: Skin is warm and dry.   Neurological:      Mental Status: He is alert.      Cranial Nerves: No cranial nerve deficit.

## 2022-10-15 ENCOUNTER — HEALTH MAINTENANCE LETTER (OUTPATIENT)
Age: 42
End: 2022-10-15

## 2022-10-17 ENCOUNTER — TELEPHONE (OUTPATIENT)
Dept: OTOLARYNGOLOGY | Facility: CLINIC | Age: 42
End: 2022-10-17

## 2022-10-17 NOTE — TELEPHONE ENCOUNTER
Called pt to discuss his upcoming surgery with Dr. Johnson.    We discussed that this surgery will have a much better recovery than his last nasal surgery.  His previous nasal surgery was an open septorhinoplasty that took about 4 hrs.  Pt made aware that this procedure will be about 30 min, performed under mac and local, and Dr. Johnson plans to do the surgery intranasally (not an open approach).    Pt happy to hear that his recovery won't be as intense as it was last time.  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    We discussed how he will (likely) have a nasal cast on for a week post-op, and the pain should be more tolerable than his previous surgery.  Whether or not he attends the match will be up to him.    Pt verbalized understanding and denied having any further questions.    Patricia Sherwood RN  10/17/2022 11:51 AM

## 2022-10-21 ENCOUNTER — OFFICE VISIT (OUTPATIENT)
Dept: FAMILY MEDICINE | Facility: CLINIC | Age: 42
End: 2022-10-21
Payer: COMMERCIAL

## 2022-10-21 VITALS
SYSTOLIC BLOOD PRESSURE: 129 MMHG | RESPIRATION RATE: 20 BRPM | DIASTOLIC BLOOD PRESSURE: 78 MMHG | HEIGHT: 68 IN | WEIGHT: 184.8 LBS | TEMPERATURE: 97.6 F | OXYGEN SATURATION: 99 % | HEART RATE: 60 BPM | BODY MASS INDEX: 28.01 KG/M2

## 2022-10-21 DIAGNOSIS — J45.30 UNCONTROLLED MILD PERSISTENT ASTHMA: ICD-10-CM

## 2022-10-21 DIAGNOSIS — Z28.21 INFLUENZA VACCINE REFUSED: ICD-10-CM

## 2022-10-21 DIAGNOSIS — N52.9 ERECTILE DYSFUNCTION, UNSPECIFIED ERECTILE DYSFUNCTION TYPE: ICD-10-CM

## 2022-10-21 DIAGNOSIS — Z23 HIGH PRIORITY FOR 2019-NCOV VACCINE: ICD-10-CM

## 2022-10-21 DIAGNOSIS — Z01.818 PREOP GENERAL PHYSICAL EXAM: Primary | ICD-10-CM

## 2022-10-21 DIAGNOSIS — M95.0 ACQUIRED NASAL DEFORMITY: ICD-10-CM

## 2022-10-21 PROCEDURE — 91313 COVID-19,PF,MODERNA BIVALENT: CPT | Performed by: PREVENTIVE MEDICINE

## 2022-10-21 PROCEDURE — 0134A COVID-19,PF,MODERNA BIVALENT: CPT | Performed by: PREVENTIVE MEDICINE

## 2022-10-21 PROCEDURE — 99214 OFFICE O/P EST MOD 30 MIN: CPT | Performed by: PREVENTIVE MEDICINE

## 2022-10-21 RX ORDER — SILDENAFIL CITRATE 20 MG/1
TABLET ORAL
Qty: 30 TABLET | Refills: 3 | Status: SHIPPED | OUTPATIENT
Start: 2022-10-21 | End: 2023-06-21

## 2022-10-21 ASSESSMENT — PAIN SCALES - GENERAL: PAINLEVEL: NO PAIN (0)

## 2022-10-21 NOTE — H&P (VIEW-ONLY)
[address] [address]  Phone: [phone]  Primary Provider: Judi Cabrera  Pre-op Performing Provider: JUDI CABRERA      PREOPERATIVE EVALUATION:  Today's date: 10/21/2022    Keith Spangler is a 42 year old male who presents for a preoperative evaluation.    Surgical Information:  Surgery/Procedure: Rhinoplasty with Percutaneous Osteotomies  Surgery Location: [facility] [address] [place] [address]   Surgeon: Alex   Surgery Date: 10/27/22  Time of Surgery: 9 am   Where patient plans to recover: At home with family  Fax number for surgical facility: Note does not need to be faxed, will be available electronically in Epic.    Type of Anesthesia Anticipated: Local with MAC    Assessment & Plan     The proposed surgical procedure is considered INTERMEDIATE risk.    Preop general physical exam  -procedure scheduled for 10/27/22    Acquired nasal deformity  -history of fracture    Uncontrolled mild persistent asthma  -followed by Allergy/Asthma  -symptoms currently well controlled, no recent exacerbations     Influenza vaccine refused  -declined     Erectile dysfunction, unspecified erectile dysfunction type  -refills on medication provided   - sildenafil (REVATIO) 20 MG tablet  Dispense: 30 tablet; Refill: 3    High priority for 2019-nCoV vaccine  - COVID-19,PF,MODERNA BIVALENT 18+Yrs    Risks and Recommendations:  The patient has the following additional risks and recommendations for perioperative complications:  Pulmonary:    - Incentive spirometry post-op    Medication Instructions:  Patient is to take all scheduled medications on the day of surgery EXCEPT for modifications listed below:   - aspirin: Discontinue aspirin 7-10 days prior to procedure to reduce bleeding risk. It should be resumed postoperatively.     RECOMMENDATION:  APPROVAL GIVEN to proceed with proposed procedure, without further diagnostic  evaluation.      22 minutes spent on the date of the encounter doing chart review, history and exam, documentation and further activities per the note        Subjective     HPI related to upcoming procedure: 42 year old male with irregularity of the nasal bone on the right. History of severe displacement and fracture.       Preop Questions 10/21/2022   1. Have you ever had a heart attack or stroke? No   2. Have you ever had surgery on your heart or blood vessels, such as a stent placement, a coronary artery bypass, or surgery on an artery in your head, neck, heart, or legs? No   3. Do you have chest pain with activity? No   4. Do you have a history of  heart failure? No   5. Do you currently have a cold, bronchitis or symptoms of other infection? No   6. Do you have a cough, shortness of breath, or wheezing? No   7. Do you or anyone in your family have previous history of blood clots? No   8. Do you or does anyone in your family have a serious bleeding problem such as prolonged bleeding following surgeries or cuts? No   9. Have you ever had problems with anemia or been told to take iron pills? No   10. Have you had any abnormal blood loss such as black, tarry or bloody stools? No   11. Have you ever had a blood transfusion? No   12. Are you willing to have a blood transfusion if it is medically needed before, during, or after your surgery? Yes   13. Have you or any of your relatives ever had problems with anesthesia? No   14. Do you have sleep apnea, excessive snoring or daytime drowsiness? No   15. Do you have any artifical heart valves or other implanted medical devices like a pacemaker, defibrillator, or continuous glucose monitor? No   16. Do you have artificial joints? No   17. Are you allergic to latex? No     Runs about 30-40 miles per month  No chest pain  No sudden shortness of breath  No pedal edema    Health Care Directive:  Patient does not have a Health Care Directive or Living Will: Discussed advance  care planning with patient; however, patient declined at this time.    Preoperative Review of :   reviewed - no record of controlled substances prescribed.      Status of Chronic Conditions:  See problem list for active medical problems.  Problems all longstanding and stable, except as noted/documented.  See ROS for pertinent symptoms related to these conditions.      Review of Systems  CONSTITUTIONAL: NEGATIVE for fever, chills, change in weight  INTEGUMENTARY/SKIN: NEGATIVE for worrisome rashes, moles or lesions  EYES: NEGATIVE for vision changes or irritation  ENT/MOUTH: NEGATIVE for ear, mouth and throat problems  RESP: NEGATIVE for significant cough or SOB  CV: NEGATIVE for chest pain, palpitations or peripheral edema  GI: NEGATIVE for nausea, abdominal pain, heartburn, or change in bowel habits  : NEGATIVE for frequency, dysuria, or hematuria  MUSCULOSKELETAL: NEGATIVE for significant arthralgias or myalgia  NEURO: NEGATIVE for weakness, dizziness or paresthesias  ENDOCRINE: NEGATIVE for temperature intolerance, skin/hair changes  HEME: NEGATIVE for bleeding problems  PSYCHIATRIC: NEGATIVE for changes in mood or affect    Patient Active Problem List    Diagnosis Date Noted     History of closed fracture of nasal bones 04/06/2022     Priority: Medium     Nasal deformity 04/16/2021     Priority: Medium     History of fracture of nose 02/19/2021     Priority: Medium     Added automatically from request for surgery [id number]       Deviated nasal septum 02/19/2021     Priority: Medium     Added automatically from request for surgery [id number]       Acquired nasal deformity 02/17/2021     Priority: Medium     Nasal obstruction 02/17/2021     Priority: Medium     Nasal valve collapse 02/17/2021     Priority: Medium     Refractory obstruction of nasal airway 08/17/2020     Priority: Medium     Added automatically from request for surgery [id number]       Mild persistent asthma without complication 03/21/2017      Priority: Medium     Acute bronchitis with bronchospasm 09/01/2015     Priority: Medium     Has this every year. Probably intermittent asthma. Use Flovent when flares.        CARDIOVASCULAR SCREENING; LDL GOAL LESS THAN 160 06/11/2010     Priority: Medium      Past Medical History:   Diagnosis Date     COPD (chronic obstructive pulmonary disease) (H)      NO ACTIVE PROBLEMS      Uncomplicated asthma Sept 2014    Intermittent asthma     Past Surgical History:   Procedure Laterality Date     APPENDECTOMY  Oct. 1991     EYE SURGERY  2013 and 2014    PRK laser correction     HERNIA REPAIR  2002     SEPTORHINOPLASTY N/A 8/19/2021    Procedure: RHINOSEPTOPLASTY, CADAVERIC RIB GRAFT,  CONCHAL CARTILAGE GRAFT;  Surgeon: Becki Johnson MD;  Location: [facility] OR     SURGICAL HISTORY OF -       Thumb 2005-Pins placed      SURGICAL HISTORY OF -       ER visit, left upper eyelid laceration repair     SURGICAL HISTORY OF -       Septo/rhinoplasty of nose secondary to boxing injury     SURGICAL HISTORY OF -       Left hernia      Current Outpatient Medications   Medication Sig Dispense Refill     albuterol (PROAIR HFA/PROVENTIL HFA/VENTOLIN HFA) 108 (90 Base) MCG/ACT inhaler USE 2 INHALATIONS ORALLY   EVERY 6 HOURS AS NEEDED FORSHORTNESS OF BREATH/DYSPNEA 18 g 2     albuterol (PROVENTIL) (2.5 MG/3ML) 0.083% neb solution Route: Take 1 vial (2.5 mg) by nebulization every 6 hours as needed for shortness of breath / dyspnea or wheezing - Nebulization 90 mL 5     Aspirin-Acetaminophen-Caffeine (EXCEDRIN PO) Take by mouth as needed        fluticasone (FLONASE) 50 MCG/ACT nasal spray USE 2 SPRAYS IN EACH       NOSTRIL DAILY 48 g 3     fluticasone-vilanterol (BREO ELLIPTA) 200-25 MCG/INH inhaler Inhale 1 puff into the lungs daily 3 each 1     sildenafil (REVATIO) 20 MG tablet 1-4 tablets 30 minutes to 2 hours before intercourse. Maximum 100 mg in 24 hours. 30 tablet 3     sodium chloride (OCEAN) 0.65 % nasal spray Spray 2 sprays in  "nostril every 4 hours 30 mL 3       Allergies   Allergen Reactions     Augmentin Rash        Social History     Tobacco Use     Smoking status: Never     Smokeless tobacco: Never     Tobacco comments:     Mom smoked his upbringing   Substance Use Topics     Alcohol use: Yes     Comment: Really rare now      Family History   Problem Relation Age of Onset     Unknown/Adopted Mother         Diagnosed w/ALS June 2015     Sleep Apnea Brother      Gastrointestinal Disease Brother         colitis     C.A.D. No family hx of      Diabetes No family hx of      Hypertension No family hx of      Cerebrovascular Disease No family hx of      Breast Cancer No family hx of      Cancer - colorectal No family hx of      Prostate Cancer No family hx of      Asthma No family hx of      History   Drug Use No         Objective     /78 (BP Location: Left arm, Patient Position: Sitting, Cuff Size: Adult Regular)   Pulse 60   Temp 97.6  F (36.4  C) (Oral)   Resp 20   Ht 1.731 m (5' 8.15\")   Wt 83.8 kg (184 lb 12.8 oz)   SpO2 99%   BMI 27.98 kg/m      Physical Exam  GENERAL APPEARANCE: healthy, alert and no distress  EYES: Eyes grossly normal to inspection and conjunctivae and sclerae normal  HENT: mouth without ulcers or lesions  NECK: no adenopathy and trachea midline and normal to palpation, I did not hear any carotid bruits   RESP: lungs clear to auscultation - no rales, rhonchi or wheezes  CV: regular rates and rhythm, normal S1 S2, no S3 or S4 and no murmur, click or rub  ABDOMEN: soft, non-tender and no rebound or guarding   MS: extremities normal- no gross deformities noted and peripheral pulses normal  SKIN: no suspicious lesions or rashes  NEURO: Normal strength and tone, mentation intact and speech normal  PSYCH: mentation appears normal      No results for input(s): HGB, PLT, INR, NA, POTASSIUM, CR, A1C in the last 13945 hours.     Diagnostics:  No labs were ordered during this visit.   No EKG required, no history of " coronary heart disease, significant arrhythmia, peripheral arterial disease or other structural heart disease.    Revised Cardiac Risk Index (RCRI):  The patient has the following serious cardiovascular risks for perioperative complications:   - No serious cardiac risks = 0 points     RCRI Interpretation: 0 points: Class I (very low risk - 0.4% complication rate)           Signed Electronically by: Serina Monet MD MPH    Copy of this evaluation report is provided to requesting physician.

## 2022-10-21 NOTE — PROGRESS NOTES
[address] [address]  Phone: [phone]  Primary Provider: Judi Cabrera  Pre-op Performing Provider: JUDI CABRERA      PREOPERATIVE EVALUATION:  Today's date: 10/21/2022    Keith Spangler is a 42 year old male who presents for a preoperative evaluation.    Surgical Information:  Surgery/Procedure: Rhinoplasty with Percutaneous Osteotomies  Surgery Location: [facility] center [address] [place] [address]   Surgeon: Alex   Surgery Date: 10/27/22  Time of Surgery: 9 am   Where patient plans to recover: At home with family  Fax number for surgical facility: Note does not need to be faxed, will be available electronically in Epic.    Type of Anesthesia Anticipated: Local with MAC    Assessment & Plan     The proposed surgical procedure is considered INTERMEDIATE risk.    Preop general physical exam  -procedure scheduled for 10/27/22    Acquired nasal deformity  -history of fracture    Uncontrolled mild persistent asthma  -followed by Allergy/Asthma  -symptoms currently well controlled, no recent exacerbations     Influenza vaccine refused  -declined     Erectile dysfunction, unspecified erectile dysfunction type  -refills on medication provided   - sildenafil (REVATIO) 20 MG tablet  Dispense: 30 tablet; Refill: 3    High priority for 2019-nCoV vaccine  - COVID-19,PF,MODERNA BIVALENT 18+Yrs    Risks and Recommendations:  The patient has the following additional risks and recommendations for perioperative complications:  Pulmonary:    - Incentive spirometry post-op    Medication Instructions:  Patient is to take all scheduled medications on the day of surgery EXCEPT for modifications listed below:   - aspirin: Discontinue aspirin 7-10 days prior to procedure to reduce bleeding risk. It should be resumed postoperatively.     RECOMMENDATION:  APPROVAL GIVEN to proceed with proposed procedure, without further diagnostic  evaluation.      22 minutes spent on the date of the encounter doing chart review, history and exam, documentation and further activities per the note        Subjective     HPI related to upcoming procedure: 42 year old male with irregularity of the nasal bone on the right. History of severe displacement and fracture.       Preop Questions 10/21/2022   1. Have you ever had a heart attack or stroke? No   2. Have you ever had surgery on your heart or blood vessels, such as a stent placement, a coronary artery bypass, or surgery on an artery in your head, neck, heart, or legs? No   3. Do you have chest pain with activity? No   4. Do you have a history of  heart failure? No   5. Do you currently have a cold, bronchitis or symptoms of other infection? No   6. Do you have a cough, shortness of breath, or wheezing? No   7. Do you or anyone in your family have previous history of blood clots? No   8. Do you or does anyone in your family have a serious bleeding problem such as prolonged bleeding following surgeries or cuts? No   9. Have you ever had problems with anemia or been told to take iron pills? No   10. Have you had any abnormal blood loss such as black, tarry or bloody stools? No   11. Have you ever had a blood transfusion? No   12. Are you willing to have a blood transfusion if it is medically needed before, during, or after your surgery? Yes   13. Have you or any of your relatives ever had problems with anesthesia? No   14. Do you have sleep apnea, excessive snoring or daytime drowsiness? No   15. Do you have any artifical heart valves or other implanted medical devices like a pacemaker, defibrillator, or continuous glucose monitor? No   16. Do you have artificial joints? No   17. Are you allergic to latex? No     Runs about 30-40 miles per month  No chest pain  No sudden shortness of breath  No pedal edema    Health Care Directive:  Patient does not have a Health Care Directive or Living Will: Discussed advance  care planning with patient; however, patient declined at this time.    Preoperative Review of :   reviewed - no record of controlled substances prescribed.      Status of Chronic Conditions:  See problem list for active medical problems.  Problems all longstanding and stable, except as noted/documented.  See ROS for pertinent symptoms related to these conditions.      Review of Systems  CONSTITUTIONAL: NEGATIVE for fever, chills, change in weight  INTEGUMENTARY/SKIN: NEGATIVE for worrisome rashes, moles or lesions  EYES: NEGATIVE for vision changes or irritation  ENT/MOUTH: NEGATIVE for ear, mouth and throat problems  RESP: NEGATIVE for significant cough or SOB  CV: NEGATIVE for chest pain, palpitations or peripheral edema  GI: NEGATIVE for nausea, abdominal pain, heartburn, or change in bowel habits  : NEGATIVE for frequency, dysuria, or hematuria  MUSCULOSKELETAL: NEGATIVE for significant arthralgias or myalgia  NEURO: NEGATIVE for weakness, dizziness or paresthesias  ENDOCRINE: NEGATIVE for temperature intolerance, skin/hair changes  HEME: NEGATIVE for bleeding problems  PSYCHIATRIC: NEGATIVE for changes in mood or affect    Patient Active Problem List    Diagnosis Date Noted     History of closed fracture of nasal bones 04/06/2022     Priority: Medium     Nasal deformity 04/16/2021     Priority: Medium     History of fracture of nose 02/19/2021     Priority: Medium     Added automatically from request for surgery [id number]       Deviated nasal septum 02/19/2021     Priority: Medium     Added automatically from request for surgery [id number]       Acquired nasal deformity 02/17/2021     Priority: Medium     Nasal obstruction 02/17/2021     Priority: Medium     Nasal valve collapse 02/17/2021     Priority: Medium     Refractory obstruction of nasal airway 08/17/2020     Priority: Medium     Added automatically from request for surgery [id number]       Mild persistent asthma without complication 03/21/2017      Priority: Medium     Acute bronchitis with bronchospasm 09/01/2015     Priority: Medium     Has this every year. Probably intermittent asthma. Use Flovent when flares.        CARDIOVASCULAR SCREENING; LDL GOAL LESS THAN 160 06/11/2010     Priority: Medium      Past Medical History:   Diagnosis Date     COPD (chronic obstructive pulmonary disease) (H)      NO ACTIVE PROBLEMS      Uncomplicated asthma Sept 2014    Intermittent asthma     Past Surgical History:   Procedure Laterality Date     APPENDECTOMY  Oct. 1991     EYE SURGERY  2013 and 2014    PRK laser correction     HERNIA REPAIR  2002     SEPTORHINOPLASTY N/A 8/19/2021    Procedure: RHINOSEPTOPLASTY, CADAVERIC RIB GRAFT,  CONCHAL CARTILAGE GRAFT;  Surgeon: Becki Johnson MD;  Location: [facility] OR     SURGICAL HISTORY OF -       Thumb 2005-Pins placed      SURGICAL HISTORY OF -       ER visit, left upper eyelid laceration repair     SURGICAL HISTORY OF -       Septo/rhinoplasty of nose secondary to boxing injury     SURGICAL HISTORY OF -       Left hernia      Current Outpatient Medications   Medication Sig Dispense Refill     albuterol (PROAIR HFA/PROVENTIL HFA/VENTOLIN HFA) 108 (90 Base) MCG/ACT inhaler USE 2 INHALATIONS ORALLY   EVERY 6 HOURS AS NEEDED FORSHORTNESS OF BREATH/DYSPNEA 18 g 2     albuterol (PROVENTIL) (2.5 MG/3ML) 0.083% neb solution Route: Take 1 vial (2.5 mg) by nebulization every 6 hours as needed for shortness of breath / dyspnea or wheezing - Nebulization 90 mL 5     Aspirin-Acetaminophen-Caffeine (EXCEDRIN PO) Take by mouth as needed        fluticasone (FLONASE) 50 MCG/ACT nasal spray USE 2 SPRAYS IN EACH       NOSTRIL DAILY 48 g 3     fluticasone-vilanterol (BREO ELLIPTA) 200-25 MCG/INH inhaler Inhale 1 puff into the lungs daily 3 each 1     sildenafil (REVATIO) 20 MG tablet 1-4 tablets 30 minutes to 2 hours before intercourse. Maximum 100 mg in 24 hours. 30 tablet 3     sodium chloride (OCEAN) 0.65 % nasal spray Spray 2 sprays in  "nostril every 4 hours 30 mL 3       Allergies   Allergen Reactions     Augmentin Rash        Social History     Tobacco Use     Smoking status: Never     Smokeless tobacco: Never     Tobacco comments:     Mom smoked his upbringing   Substance Use Topics     Alcohol use: Yes     Comment: Really rare now      Family History   Problem Relation Age of Onset     Unknown/Adopted Mother         Diagnosed w/ALS June 2015     Sleep Apnea Brother      Gastrointestinal Disease Brother         colitis     C.A.D. No family hx of      Diabetes No family hx of      Hypertension No family hx of      Cerebrovascular Disease No family hx of      Breast Cancer No family hx of      Cancer - colorectal No family hx of      Prostate Cancer No family hx of      Asthma No family hx of      History   Drug Use No         Objective     /78 (BP Location: Left arm, Patient Position: Sitting, Cuff Size: Adult Regular)   Pulse 60   Temp 97.6  F (36.4  C) (Oral)   Resp 20   Ht 1.731 m (5' 8.15\")   Wt 83.8 kg (184 lb 12.8 oz)   SpO2 99%   BMI 27.98 kg/m      Physical Exam  GENERAL APPEARANCE: healthy, alert and no distress  EYES: Eyes grossly normal to inspection and conjunctivae and sclerae normal  HENT: mouth without ulcers or lesions  NECK: no adenopathy and trachea midline and normal to palpation, I did not hear any carotid bruits   RESP: lungs clear to auscultation - no rales, rhonchi or wheezes  CV: regular rates and rhythm, normal S1 S2, no S3 or S4 and no murmur, click or rub  ABDOMEN: soft, non-tender and no rebound or guarding   MS: extremities normal- no gross deformities noted and peripheral pulses normal  SKIN: no suspicious lesions or rashes  NEURO: Normal strength and tone, mentation intact and speech normal  PSYCH: mentation appears normal      No results for input(s): HGB, PLT, INR, NA, POTASSIUM, CR, A1C in the last 76803 hours.     Diagnostics:  No labs were ordered during this visit.   No EKG required, no history of " coronary heart disease, significant arrhythmia, peripheral arterial disease or other structural heart disease.    Revised Cardiac Risk Index (RCRI):  The patient has the following serious cardiovascular risks for perioperative complications:   - No serious cardiac risks = 0 points     RCRI Interpretation: 0 points: Class I (very low risk - 0.4% complication rate)           Signed Electronically by: Serina Monet MD MPH    Copy of this evaluation report is provided to requesting physician.

## 2022-10-21 NOTE — PATIENT INSTRUCTIONS
Preparing for Your Surgery  Getting started  A nurse will call you to review your health history and instructions. They will give you an arrival time based on your scheduled surgery time. Please be ready to share:    Your doctor's clinic name and phone number    Your medical, surgical and anesthesia history    A list of allergies and sensitivities    A list of medicines, including herbal treatments and over-the-counter drugs    Whether the patient has a legal guardian (ask how to send us the papers in advance)  Please tell us if you're pregnant--or if there's any chance you might be pregnant. Some surgeries may injure a fetus (unborn baby), so they require a pregnancy test. Surgeries that are safe for a fetus don't always need a test, and you can choose whether to have one.   If you have a child who's having surgery, please ask for a copy of Preparing for Your Child's Surgery.    Preparing for surgery    Within 10 to 30 days of surgery: Have a pre-op exam (sometimes called an H&P, or History and Physical). This can be done at a clinic or pre-operative center.  ? If you're having a , you may not need this exam. Talk to your care team.    At your pre-op exam, talk to your care team about all medicines you take. If you need to stop any medicines before surgery, ask when to start taking them again.  ? We do this for your safety. Many medicines can make you bleed too much during surgery. Some change how well surgery (anesthesia) drugs work.    Call your insurance company to let them know you're having surgery. (If you don't have insurance, call [phone].)    Call your clinic if there's any change in your health. This includes signs of a cold or flu (sore throat, runny nose, cough, rash, fever). It also includes a scrape or scratch near the surgery site.    If you have questions on the day of surgery, call your hospital or surgery center.  COVID testing  You may need to be tested for COVID-19 before having  surgery. If so, we will give you instructions (or click here).  Eating and drinking guidelines  For your safety: Unless your surgeon tells you otherwise, follow the guidelines below.    Eat and drink as usual until 8 hours before surgery. After that, no food or milk.    Drink clear liquids until 2 hours before surgery. These are liquids you can see through, like water, Gatorade and Propel Water. You may also have black coffee and tea (no cream or milk).    Nothing by mouth within 2 hours of surgery. This includes gum, candy and breath mints.    If you drink alcohol: Stop drinking it the night before surgery.    If your care team tells you to take medicine on the morning of surgery, it's okay to take it with a sip of water.  Preventing infection    Shower or bathe the night before and morning of your surgery. Follow the instructions your clinic gave you. (If no instructions, use regular soap.)    Don't shave or clip hair near your surgery site. We'll remove the hair if needed.    Don't smoke or vape the morning of surgery. You may chew nicotine gum up to 2 hours before surgery. A nicotine patch is okay.  ? Note: Some surgeries require you to completely quit smoking and nicotine. Check with your surgeon.    Your care team will make every effort to keep you safe from infection. We will:  ? Clean our hands often with soap and water (or an alcohol-based hand rub).  ? Clean the skin at your surgery site with a special soap that kills germs.  ? Give you a special gown to keep you warm. (Cold raises the risk of infection.)  ? Wear special hair covers, masks, gowns and gloves during surgery.  ? Give antibiotic medicine, if prescribed. Not all surgeries need antibiotics.  What to bring on the day of surgery    Photo ID and insurance card    Copy of your health care directive, if you have one    Glasses and hearing aides (bring cases)  ? You can't wear contacts during surgery    Inhaler and eye drops, if you use them (tell us  about these when you arrive)    CPAP machine or breathing device, if you use them    A few personal items, if spending the night    If you have . . .  ? A pacemaker, ICD (cardiac defibrillator) or other implant: Bring the ID card.  ? An implanted stimulator: Bring the remote control.  ? A legal guardian: Bring a copy of the certified (court-stamped) guardianship papers.  Please remove any jewelry, including body piercings. Leave jewelry and other valuables at home.  If you're going home the day of surgery    You must have a responsible adult drive you home. They should stay with you overnight as well.    If you don't have someone to stay with you, and you aren't safe to go home alone, we may keep you overnight. Insurance often won't pay for this.  After surgery  If it's hard to control your pain or you need more pain medicine, please call your surgeon's office.  Questions?   If you have any questions for your care team, list them here: _________________________________________________________________________________________________________________________________________________________________________ ____________________________________ ____________________________________ ____________________________________  For informational purposes only. Not to replace the advice of your health care provider. Copyright   2003, 2019 [facility]. All rights reserved. Clinically reviewed by Sunitha Contreras MD. [email or web address] [id number] - REV 07/22.

## 2022-10-26 ENCOUNTER — ANESTHESIA EVENT (OUTPATIENT)
Dept: SURGERY | Facility: AMBULATORY SURGERY CENTER | Age: 42
End: 2022-10-26
Payer: COMMERCIAL

## 2022-10-27 ENCOUNTER — HOSPITAL ENCOUNTER (OUTPATIENT)
Facility: AMBULATORY SURGERY CENTER | Age: 42
Discharge: HOME | End: 2022-10-27
Attending: OTOLARYNGOLOGY
Payer: COMMERCIAL

## 2022-10-27 ENCOUNTER — ANESTHESIA (OUTPATIENT)
Dept: SURGERY | Facility: AMBULATORY SURGERY CENTER | Age: 42
End: 2022-10-27
Payer: COMMERCIAL

## 2022-10-27 VITALS
OXYGEN SATURATION: 99 % | WEIGHT: 180 LBS | BODY MASS INDEX: 27.28 KG/M2 | RESPIRATION RATE: 14 BRPM | DIASTOLIC BLOOD PRESSURE: 82 MMHG | HEIGHT: 68 IN | HEART RATE: 52 BPM | TEMPERATURE: 97 F | SYSTOLIC BLOOD PRESSURE: 135 MMHG

## 2022-10-27 DIAGNOSIS — Z87.81 HISTORY OF FRACTURE OF NOSE: ICD-10-CM

## 2022-10-27 DIAGNOSIS — Z87.81 HISTORY OF CLOSED FRACTURE OF NASAL BONES: Primary | ICD-10-CM

## 2022-10-27 PROCEDURE — 30435 REVISION OF NOSE: CPT

## 2022-10-27 RX ORDER — CLINDAMYCIN PHOSPHATE 900 MG/50ML
900 INJECTION, SOLUTION INTRAVENOUS SEE ADMIN INSTRUCTIONS
Status: DISCONTINUED | OUTPATIENT
Start: 2022-10-27 | End: 2022-10-27 | Stop reason: HOSPADM

## 2022-10-27 RX ORDER — LIDOCAINE 40 MG/G
CREAM TOPICAL
Status: DISCONTINUED | OUTPATIENT
Start: 2022-10-27 | End: 2022-10-27 | Stop reason: HOSPADM

## 2022-10-27 RX ORDER — LIDOCAINE HYDROCHLORIDE AND EPINEPHRINE 10; 10 MG/ML; UG/ML
INJECTION, SOLUTION INFILTRATION; PERINEURAL PRN
Status: DISCONTINUED | OUTPATIENT
Start: 2022-10-27 | End: 2022-10-27 | Stop reason: HOSPADM

## 2022-10-27 RX ORDER — PROPOFOL 10 MG/ML
INJECTION, EMULSION INTRAVENOUS PRN
Status: DISCONTINUED | OUTPATIENT
Start: 2022-10-27 | End: 2022-10-27

## 2022-10-27 RX ORDER — OXYCODONE HYDROCHLORIDE 5 MG/1
5 TABLET ORAL EVERY 4 HOURS PRN
Status: DISCONTINUED | OUTPATIENT
Start: 2022-10-27 | End: 2022-10-28 | Stop reason: HOSPADM

## 2022-10-27 RX ORDER — IBUPROFEN 200 MG
200 TABLET ORAL EVERY 6 HOURS PRN
Qty: 50 TABLET | Refills: 0 | Status: SHIPPED | OUTPATIENT
Start: 2022-10-27

## 2022-10-27 RX ORDER — FENTANYL CITRATE 50 UG/ML
INJECTION, SOLUTION INTRAMUSCULAR; INTRAVENOUS PRN
Status: DISCONTINUED | OUTPATIENT
Start: 2022-10-27 | End: 2022-10-27

## 2022-10-27 RX ORDER — SODIUM CHLORIDE, SODIUM LACTATE, POTASSIUM CHLORIDE, CALCIUM CHLORIDE 600; 310; 30; 20 MG/100ML; MG/100ML; MG/100ML; MG/100ML
INJECTION, SOLUTION INTRAVENOUS CONTINUOUS
Status: DISCONTINUED | OUTPATIENT
Start: 2022-10-27 | End: 2022-10-28 | Stop reason: HOSPADM

## 2022-10-27 RX ORDER — FENTANYL CITRATE 50 UG/ML
25 INJECTION, SOLUTION INTRAMUSCULAR; INTRAVENOUS
Status: DISCONTINUED | OUTPATIENT
Start: 2022-10-27 | End: 2022-10-28 | Stop reason: HOSPADM

## 2022-10-27 RX ORDER — DEXAMETHASONE SODIUM PHOSPHATE 4 MG/ML
INJECTION, SOLUTION INTRA-ARTICULAR; INTRALESIONAL; INTRAMUSCULAR; INTRAVENOUS; SOFT TISSUE PRN
Status: DISCONTINUED | OUTPATIENT
Start: 2022-10-27 | End: 2022-10-27

## 2022-10-27 RX ORDER — PROPOFOL 10 MG/ML
INJECTION, EMULSION INTRAVENOUS CONTINUOUS PRN
Status: DISCONTINUED | OUTPATIENT
Start: 2022-10-27 | End: 2022-10-27

## 2022-10-27 RX ORDER — MEPERIDINE HYDROCHLORIDE 25 MG/ML
12.5 INJECTION INTRAMUSCULAR; INTRAVENOUS; SUBCUTANEOUS
Status: DISCONTINUED | OUTPATIENT
Start: 2022-10-27 | End: 2022-10-28 | Stop reason: HOSPADM

## 2022-10-27 RX ORDER — SODIUM CHLORIDE, SODIUM LACTATE, POTASSIUM CHLORIDE, CALCIUM CHLORIDE 600; 310; 30; 20 MG/100ML; MG/100ML; MG/100ML; MG/100ML
INJECTION, SOLUTION INTRAVENOUS CONTINUOUS
Status: DISCONTINUED | OUTPATIENT
Start: 2022-10-27 | End: 2022-10-27 | Stop reason: HOSPADM

## 2022-10-27 RX ORDER — ONDANSETRON 2 MG/ML
4 INJECTION INTRAMUSCULAR; INTRAVENOUS EVERY 30 MIN PRN
Status: DISCONTINUED | OUTPATIENT
Start: 2022-10-27 | End: 2022-10-28 | Stop reason: HOSPADM

## 2022-10-27 RX ORDER — IBUPROFEN 200 MG
200 TABLET ORAL EVERY 6 HOURS PRN
Status: DISCONTINUED | OUTPATIENT
Start: 2022-10-27 | End: 2022-10-28 | Stop reason: HOSPADM

## 2022-10-27 RX ORDER — CLINDAMYCIN PHOSPHATE 900 MG/50ML
900 INJECTION, SOLUTION INTRAVENOUS
Status: COMPLETED | OUTPATIENT
Start: 2022-10-27 | End: 2022-10-27

## 2022-10-27 RX ORDER — GABAPENTIN 300 MG/1
300 CAPSULE ORAL
Status: COMPLETED | OUTPATIENT
Start: 2022-10-27 | End: 2022-10-27

## 2022-10-27 RX ORDER — FENTANYL CITRATE 50 UG/ML
25 INJECTION, SOLUTION INTRAMUSCULAR; INTRAVENOUS EVERY 5 MIN PRN
Status: DISCONTINUED | OUTPATIENT
Start: 2022-10-27 | End: 2022-10-27 | Stop reason: HOSPADM

## 2022-10-27 RX ORDER — HYDROMORPHONE HYDROCHLORIDE 1 MG/ML
0.2 INJECTION, SOLUTION INTRAMUSCULAR; INTRAVENOUS; SUBCUTANEOUS EVERY 5 MIN PRN
Status: DISCONTINUED | OUTPATIENT
Start: 2022-10-27 | End: 2022-10-27 | Stop reason: HOSPADM

## 2022-10-27 RX ORDER — ACETAMINOPHEN 325 MG/1
650 TABLET ORAL EVERY 4 HOURS PRN
Qty: 50 TABLET | Refills: 0 | Status: SHIPPED | OUTPATIENT
Start: 2022-10-27

## 2022-10-27 RX ORDER — ONDANSETRON 4 MG/1
4 TABLET, ORALLY DISINTEGRATING ORAL EVERY 30 MIN PRN
Status: DISCONTINUED | OUTPATIENT
Start: 2022-10-27 | End: 2022-10-28 | Stop reason: HOSPADM

## 2022-10-27 RX ORDER — GLYCOPYRROLATE 0.2 MG/ML
INJECTION, SOLUTION INTRAMUSCULAR; INTRAVENOUS PRN
Status: DISCONTINUED | OUTPATIENT
Start: 2022-10-27 | End: 2022-10-27

## 2022-10-27 RX ORDER — KETOROLAC TROMETHAMINE 30 MG/ML
INJECTION, SOLUTION INTRAMUSCULAR; INTRAVENOUS PRN
Status: DISCONTINUED | OUTPATIENT
Start: 2022-10-27 | End: 2022-10-27

## 2022-10-27 RX ORDER — ONDANSETRON 2 MG/ML
INJECTION INTRAMUSCULAR; INTRAVENOUS PRN
Status: DISCONTINUED | OUTPATIENT
Start: 2022-10-27 | End: 2022-10-27

## 2022-10-27 RX ORDER — ACETAMINOPHEN 325 MG/1
975 TABLET ORAL ONCE
Status: COMPLETED | OUTPATIENT
Start: 2022-10-27 | End: 2022-10-27

## 2022-10-27 RX ORDER — LIDOCAINE HYDROCHLORIDE 20 MG/ML
INJECTION, SOLUTION INFILTRATION; PERINEURAL PRN
Status: DISCONTINUED | OUTPATIENT
Start: 2022-10-27 | End: 2022-10-27

## 2022-10-27 RX ADMIN — ACETAMINOPHEN 975 MG: 325 TABLET ORAL at 10:41

## 2022-10-27 RX ADMIN — FENTANYL CITRATE 25 MCG: 50 INJECTION, SOLUTION INTRAMUSCULAR; INTRAVENOUS at 12:00

## 2022-10-27 RX ADMIN — KETOROLAC TROMETHAMINE 30 MG: 30 INJECTION, SOLUTION INTRAMUSCULAR; INTRAVENOUS at 11:22

## 2022-10-27 RX ADMIN — HYDROMORPHONE HYDROCHLORIDE 0.2 MG: 1 INJECTION, SOLUTION INTRAMUSCULAR; INTRAVENOUS; SUBCUTANEOUS at 12:12

## 2022-10-27 RX ADMIN — GLYCOPYRROLATE 0.2 MG: 0.2 INJECTION, SOLUTION INTRAMUSCULAR; INTRAVENOUS at 10:52

## 2022-10-27 RX ADMIN — LIDOCAINE HYDROCHLORIDE 100 MG: 20 INJECTION, SOLUTION INFILTRATION; PERINEURAL at 10:56

## 2022-10-27 RX ADMIN — PROPOFOL 150 MCG/KG/MIN: 10 INJECTION, EMULSION INTRAVENOUS at 10:56

## 2022-10-27 RX ADMIN — GABAPENTIN 300 MG: 300 CAPSULE ORAL at 10:41

## 2022-10-27 RX ADMIN — CLINDAMYCIN PHOSPHATE 900 MG: 900 INJECTION, SOLUTION INTRAVENOUS at 10:45

## 2022-10-27 RX ADMIN — FENTANYL CITRATE 50 MCG: 50 INJECTION, SOLUTION INTRAMUSCULAR; INTRAVENOUS at 11:06

## 2022-10-27 RX ADMIN — SODIUM CHLORIDE, SODIUM LACTATE, POTASSIUM CHLORIDE, CALCIUM CHLORIDE: 600; 310; 30; 20 INJECTION, SOLUTION INTRAVENOUS at 10:45

## 2022-10-27 RX ADMIN — ONDANSETRON 4 MG: 2 INJECTION INTRAMUSCULAR; INTRAVENOUS at 11:04

## 2022-10-27 RX ADMIN — PROPOFOL 200 MG: 10 INJECTION, EMULSION INTRAVENOUS at 10:56

## 2022-10-27 RX ADMIN — FENTANYL CITRATE 25 MCG: 50 INJECTION, SOLUTION INTRAMUSCULAR; INTRAVENOUS at 12:05

## 2022-10-27 RX ADMIN — OXYCODONE HYDROCHLORIDE 5 MG: 5 TABLET ORAL at 12:12

## 2022-10-27 RX ADMIN — DEXAMETHASONE SODIUM PHOSPHATE 4 MG: 4 INJECTION, SOLUTION INTRA-ARTICULAR; INTRALESIONAL; INTRAMUSCULAR; INTRAVENOUS; SOFT TISSUE at 11:03

## 2022-10-27 RX ADMIN — FENTANYL CITRATE 50 MCG: 50 INJECTION, SOLUTION INTRAMUSCULAR; INTRAVENOUS at 10:52

## 2022-10-27 ASSESSMENT — COPD QUESTIONNAIRES: COPD: 1

## 2022-10-27 NOTE — INTERVAL H&P NOTE
"I have reviewed the surgical (or preoperative) H&P that is linked to this encounter, and examined the patient. There are no significant changes    Clinical Conditions Present on Arrival:  Clinically Significant Risk Factors Present on Admission                    # Overweight: Estimated body mass index is 27.37 kg/m  as calculated from the following:    Height as of this encounter: 1.727 m (5' 8\").    Weight as of this encounter: 81.6 kg (180 lb).       "

## 2022-10-27 NOTE — DISCHARGE INSTRUCTIONS
[facility]  Home Care Following Anesthesia  For 24 hours after surgery:  Get plenty of rest.  A responsible adult must stay with you for at least 24 hours after you leave the surgery center.  Do not drive or use heavy equipment.  If you have weakness or tingling, don't drive or use heavy equipment until this feeling goes away.   Do not drink alcohol.   Avoid strenuous or risky activities.  Ask for help when climbing stairs.  You may feel lightheaded.  IF so, sit for a few minutes before standing.  Have someone help you get up.   If you have nausea (feel sick to your stomach): Drink only clear liquids such as apple juice, ginger ale, broth or 7-Up.  Rest may also help.  Be sure to drink enough fluids.  Move to a regular diet as you feel able.   You may have a slight fever.  Call the doctor if your fever is over 100 F (37.7 C) (taken under the tongue) or lasts longer than 24 hours.  You may have a dry mouth, a sore throat, muscle aches or trouble sleeping. These should go away after 24 hours.  Do not make important or legal decisions.   It is recommended to avoid smoking.               Tips for taking pain medications  To get the best pain relief possible, remember these points:  Take pain medications as directed, before pain becomes severe.  Pain medication can upset your stomach: taking it with food may help.  Constipation is a common side effect of pain medication. Drink plenty of  fluids.  Eat foods high in fiber. Take a stool softener if recommended by your doctor or pharmacist.  Do not drink alcohol, drive or operate machinery while taking pain medications.  Ask about other ways to control pain, such as with heat, ice or relaxation.    Tylenol/Acetaminophen Consumption  To help encourage the safe use of acetaminophen, the makers of TYLENOL  have lowered the maximum daily dose for single-ingredient Extra Strength TYLENOL  (acetaminophen) products sold in the [place] from 8 pills  per day (4,000 mg) to 6 pills per day (3,000 mg). The dosing interval has also changed from 2 pills every 4-6 hours to 2 pills every 6 hours.  If you feel your pain relief is insufficient, you may take Tylenol/Acetaminophen in addition to your narcotic pain medication.   Be careful not to exceed 3,000 mg of Tylenol/Acetaminophen in a 24 hour period from all sources.  If you are taking extra strength Tylenol/acetaminophen (500 mg), the maximum dose is 6 tablets in 24 hours.  If you are taking regular strength acetaminophen (325 mg), the maximum dose is 9 tablets in 24 hours.    Call a doctor for any of the following:  Signs of infection (fever, growing tenderness at the surgery site, a large amount of drainage or bleeding, severe pain, foul-smelling drainage, redness, swelling).  It has been over 8 to 10 hours since surgery and you are still not able to urinate (pass water).  Headache for over 24 hours.  Numbness, tingling or weakness the day after surgery (if you had spinal anesthesia).  Signs of Covid-19 infection (temperature over 100 degrees, shortness of breath, cough, loss of taste/smell, generalized body aches, persistent headache, chills, sore throat, nausea/vomiting/diarrhea)  Your doctor is:  Dr. Becki Johnson, Otolaryngology: [phone]                    Or dial [phone] and ask for the resident on call for:  ENT Otolaryngology  For emergency care, call the:  [facility]:  [phone] (TTY for hearing impaired: [phone])

## 2022-10-27 NOTE — ANESTHESIA POSTPROCEDURE EVALUATION
Patient: Keith Spangler    Procedure: Procedure(s):  Rhinoplasty with Percutaneous Osteotomies       Anesthesia Type:  No value filed.    Note:  Disposition: Outpatient   Postop Pain Control: Uneventful            Sign Out: Well controlled pain   PONV: No   Neuro/Psych: Uneventful            Sign Out: Acceptable/Baseline neuro status   Airway/Respiratory: Uneventful            Sign Out: Acceptable/Baseline resp. status   CV/Hemodynamics: Uneventful            Sign Out: Acceptable CV status   Other NRE: NONE   DID A NON-ROUTINE EVENT OCCUR? No           Last vitals:  Vitals Value Taken Time   /91 10/27/22 1215   Temp 36  C (96.8  F) 10/27/22 1215   Pulse 47 10/27/22 1217   Resp 0 10/27/22 1217   SpO2 95 % 10/27/22 1217   Vitals shown include unvalidated device data.    Electronically Signed By: Maxwell Downing MD  October 27, 2022  2:21 PM

## 2022-10-27 NOTE — BRIEF OP NOTE
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  Complications: None.  Implants: * No implants in log *

## 2022-10-27 NOTE — ANESTHESIA CARE TRANSFER NOTE
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    Anesthesia Type:   No value filed.     Note:    Oropharynx: oropharynx clear of all foreign objects and spontaneously breathing  Level of Consciousness: awake and drowsy  Oxygen Supplementation: face mask    Independent Airway: airway patency satisfactory and stable  Dentition: dentition unchanged  Vital Signs Stable: post-procedure vital signs reviewed and stable  Report to RN Given: handoff report given  Patient transferred to: PACU    Handoff Report: Identifed the Patient, Identified the Reponsible Provider, Reviewed the pertinent medical history, Discussed the surgical course, Reviewed Intra-OP anesthesia mangement and issues during anesthesia, Set expectations for post-procedure period and Allowed opportunity for questions and acknowledgement of understanding      Vitals:  Vitals Value Taken Time   /75 10/27/22 1144   Temp 96.8    Pulse 66 10/27/22 1144   Resp 20 10/27/22 1145   SpO2 99 % 10/27/22 1145   Vitals shown include unvalidated device data.    Electronically Signed By: EFRAÍN Webb CRNA  October 27, 2022  11:46 AM

## 2022-10-27 NOTE — ANESTHESIA PREPROCEDURE EVALUATION
Anesthesia Pre-Procedure Evaluation    Patient: Keith Spangler   MRN: [id number] : 1980        Procedure : Procedure(s):  Rhinoplasty with Percutaneous Osteotomies          Past Medical History:   Diagnosis Date     COPD (chronic obstructive pulmonary disease) (H)      NO ACTIVE PROBLEMS      Uncomplicated asthma 2014    Intermittent asthma      Past Surgical History:   Procedure Laterality Date     APPENDECTOMY  Oct. 1991     EYE SURGERY   and     PRK laser correction     HERNIA REPAIR  2002     SEPTORHINOPLASTY N/A 2021    Procedure: RHINOSEPTOPLASTY, CADAVERIC RIB GRAFT,  CONCHAL CARTILAGE GRAFT;  Surgeon: Becki Johnson MD;  Location: [facility] OR     SURGICAL HISTORY OF -       Thumb 2005-Pins placed      SURGICAL HISTORY OF -       ER visit, left upper eyelid laceration repair     SURGICAL HISTORY OF -       Septo/rhinoplasty of nose secondary to boxing injury     SURGICAL HISTORY OF -       Left hernia       Allergies   Allergen Reactions     Augmentin Rash      Social History     Tobacco Use     Smoking status: Never     Smokeless tobacco: Never     Tobacco comments:     Mom smoked his upbringing   Substance Use Topics     Alcohol use: Yes     Comment: Really rare now       Wt Readings from Last 1 Encounters:   10/27/22 81.6 kg (180 lb)        Anesthesia Evaluation            ROS/MED HX  ENT/Pulmonary:  - neg pulmonary ROS   (+) asthma COPD,     Neurologic:  - neg neurologic ROS     Cardiovascular:  - neg cardiovascular ROS     METS/Exercise Tolerance:     Hematologic:  - neg hematologic  ROS     Musculoskeletal:  - neg musculoskeletal ROS     GI/Hepatic:  - neg GI/hepatic ROS     Renal/Genitourinary:  - neg Renal ROS     Endo:  - neg endo ROS     Psychiatric/Substance Use:  - neg psychiatric ROS     Infectious Disease:  - neg infectious disease ROS     Malignancy:  - neg malignancy ROS     Other:  - neg other ROS          Physical Exam    Airway  airway exam normal       Mallampati: II   TM distance: > 3 FB   Neck ROM: full   Mouth opening: > 3 cm    Respiratory Devices and Support         Dental  no notable dental history         Cardiovascular          Rhythm and rate: regular and normal     Pulmonary   pulmonary exam normal        breath sounds clear to auscultation           OUTSIDE LABS:  CBC:   Lab Results   Component Value Date    WBC 16.3 (H) 09/09/2019    HGB 16.0 09/09/2019    HGB 14.8 04/04/2014    HCT 46.2 09/09/2019     09/09/2019     BMP:   Lab Results   Component Value Date     03/20/2020    POTASSIUM 4.9 03/20/2020    CHLORIDE 106 03/20/2020    CO2 30 03/20/2020    BUN 17 03/20/2020    CR 0.98 03/20/2020     (H) 04/29/2022     (H) 03/20/2020     COAGS: No results found for: PTT, INR, FIBR  POC: No results found for: BGM, HCG, HCGS  HEPATIC: No results found for: ALBUMIN, PROTTOTAL, ALT, AST, GGT, ALKPHOS, BILITOTAL, BILIDIRECT, ROSEMARY  OTHER:   Lab Results   Component Value Date    RICHARD 9.4 03/20/2020    TSH 3.63 04/04/2014       Anesthesia Plan    ASA Status:  2   NPO Status:  NPO Appropriate    Anesthesia Type: General.     - Airway: ETT   Induction: Intravenous.   Maintenance: Balanced.        Consents    Anesthesia Plan(s) and associated risks, benefits, and realistic alternatives discussed. Questions answered and patient/representative(s) expressed understanding.    - Discussed:     - Discussed with:  Patient      - Extended Intubation/Ventilatory Support Discussed: No.      - Patient is DNR/DNI Status: No    Use of blood products discussed: No .     Postoperative Care    Pain management: IV analgesics, Oral pain medications, Multi-modal analgesia.   PONV prophylaxis: Ondansetron (or other 5HT-3), Background Propofol Infusion     Comments:                Maxwell Downing MD

## 2022-10-29 NOTE — OP NOTE
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  He previously had undergone a full rhinoplasty to restore contour and function to his nose.  He did perceive significant benefit from the surgery but was still bothered by deformity of the his nasal bones, which have been fractured presumably multiple times prior.  Risks and benefits of the above surgery were discussed and the patient elected to proceed to the operating room.    ANESTHESIA: General; endotracheal intubation     FINDINGS: External bony vault asymmetry, palpable step-off, improved after osteotomy with reduction    EBL: 5 mL    SPECIMENS: None    COMPLICATIONS: None    DESCRIPTION OF PROCEDURE: The patient was taken the operating room and positioned supine in the operating table.  The patient was induced under general anesthesia and the airway was secured with a laryngeal mask airway.  The bed was turned 90 degrees away from the anesthesia team.  The palpable bony deformities were marked with a marking pen.  A timeout was performed.  A small amount of 1% lidocaine with 1 100,000 epinephrine was infiltrated in the right bony vault of the nasal dorsum.  A stab incision was made with an 11 blade on the right bony dorsum so as to access the areas of bony deflection with an osteotome.  The osteotome was advanced through the incision and multiple blows with the mallet were required to fracture the nasal bones which were quite osteitic.  A second  stab incision was made with an 11 blade slightly further down on the bony dorsum to access further areas of asymmetry.  The right nasal bone was able to be comminuted so as to be able to be deflected more medially and provide a more symmetric contour and improve the palpable step-off.  At the conclusion of the osteotomies the stab incisions were closed with 6-0 plain suture and the Betadine was wiped free.  Mastisol and Steri-Strips were applied to the nasal dorsum.  The drapes were taken down and the patient was turned back towards the anesthesia team.  He was awoken and extubated without issue.  He was taken to PACU in good condition.    DISPOSITION: Home    Jackson Nowak MD    I was present, scrubbed for the entire procedure and performed key aspects. I agree with the note.     LILLIAN SÁNCHEZ MD

## 2022-11-02 ENCOUNTER — ALLIED HEALTH/NURSE VISIT (OUTPATIENT)
Dept: OTOLARYNGOLOGY | Facility: CLINIC | Age: 42
End: 2022-11-02
Payer: COMMERCIAL

## 2022-11-02 DIAGNOSIS — Z98.890 POSTOPERATIVE STATE: Primary | ICD-10-CM

## 2022-11-02 PROCEDURE — 99207 PR NO CHARGE NURSE ONLY: CPT

## 2022-11-04 NOTE — PROGRESS NOTES
Pt. Comes in POD # 6 s/p Rhinoplasty with Percutaneous Osteotomies.    Significant bilateral periorbital bruising present.  Right nasal dorsum tender, swollen and bruised.    Reminded pt that the swelling and bruising would improve over the next couple of weeks.    Removed absorbable suture tail from right dorsum.    F/u in one month.    Patricia Sherwood RN  11/4/2022 2:34 PM

## 2022-11-30 ENCOUNTER — ALLIED HEALTH/NURSE VISIT (OUTPATIENT)
Dept: OTOLARYNGOLOGY | Facility: CLINIC | Age: 42
End: 2022-11-30
Payer: COMMERCIAL

## 2022-11-30 DIAGNOSIS — Z98.890 POSTOPERATIVE STATE: Primary | ICD-10-CM

## 2022-11-30 PROCEDURE — 99207 PR NO CHARGE NURSE ONLY: CPT

## 2022-12-06 NOTE — PROGRESS NOTES
Pt comes in PO 10/27/22 s/p Rhinoplasty with Percutaneous Osteotomies.    Pt's bruising and swelling has significantly improved since last appt.  Mild swelling  present on right nasal dorsum.    Pt to f/u with Dr. Johnson in 2 mos.    Patricia Sherwood RN  12/6/2022 10:52 AM

## 2023-01-05 ENCOUNTER — OFFICE VISIT (OUTPATIENT)
Dept: ALLERGY | Facility: CLINIC | Age: 43
End: 2023-01-05
Payer: COMMERCIAL

## 2023-01-05 VITALS — HEART RATE: 70 BPM | SYSTOLIC BLOOD PRESSURE: 132 MMHG | DIASTOLIC BLOOD PRESSURE: 75 MMHG | OXYGEN SATURATION: 96 %

## 2023-01-05 DIAGNOSIS — J45.40 UNCONTROLLED MODERATE PERSISTENT ASTHMA: Primary | ICD-10-CM

## 2023-01-05 PROBLEM — J45.909 MODERATE ASTHMA: Status: ACTIVE | Noted: 2017-03-21

## 2023-01-05 PROBLEM — R06.02 SOB (SHORTNESS OF BREATH): Status: ACTIVE | Noted: 2023-01-05

## 2023-01-05 PROCEDURE — 99214 OFFICE O/P EST MOD 30 MIN: CPT | Performed by: ALLERGY & IMMUNOLOGY

## 2023-01-05 RX ORDER — FLUTICASONE FUROATE AND VILANTEROL 200; 25 UG/1; UG/1
1 POWDER RESPIRATORY (INHALATION) DAILY
Qty: 3 EACH | Refills: 0 | Status: SHIPPED | OUTPATIENT
Start: 2023-01-05 | End: 2023-01-05

## 2023-01-05 RX ORDER — ALBUTEROL SULFATE 0.83 MG/ML
2.5 SOLUTION RESPIRATORY (INHALATION) EVERY 4 HOURS PRN
Qty: 120 ML | Refills: 3 | Status: SHIPPED | OUTPATIENT
Start: 2023-01-05 | End: 2023-01-19

## 2023-01-05 RX ORDER — PREDNISONE 20 MG/1
20 TABLET ORAL DAILY
Qty: 10 TABLET | Refills: 0 | Status: SHIPPED | OUTPATIENT
Start: 2023-01-05 | End: 2023-02-09

## 2023-01-05 RX ORDER — BUDESONIDE 0.5 MG/2ML
INHALANT ORAL
COMMUNITY
Start: 2022-11-13 | End: 2023-03-09

## 2023-01-05 RX ORDER — FLUTICASONE FUROATE AND VILANTEROL 200; 25 UG/1; UG/1
1 POWDER RESPIRATORY (INHALATION) DAILY
Qty: 3 EACH | Refills: 0 | Status: SHIPPED | OUTPATIENT
Start: 2023-01-05 | End: 2023-05-08

## 2023-01-05 RX ORDER — ALBUTEROL SULFATE 90 UG/1
2 AEROSOL, METERED RESPIRATORY (INHALATION) EVERY 4 HOURS PRN
Qty: 18 G | Refills: 2 | Status: SHIPPED | OUTPATIENT
Start: 2023-01-05 | End: 2023-12-19

## 2023-01-05 ASSESSMENT — ENCOUNTER SYMPTOMS
FACIAL SWELLING: 0
ADENOPATHY: 0
ACTIVITY CHANGE: 0
EYE REDNESS: 0
JOINT SWELLING: 0
MYALGIAS: 0
COUGH: 1
EYE DISCHARGE: 0
FEVER: 0
FATIGUE: 0
DIARRHEA: 0
SINUS PRESSURE: 1
NAUSEA: 0
WHEEZING: 0
RHINORRHEA: 0
HEADACHES: 0
VOMITING: 0
SHORTNESS OF BREATH: 1
ARTHRALGIAS: 0
EYE ITCHING: 0
CHEST TIGHTNESS: 0

## 2023-01-05 ASSESSMENT — ASTHMA QUESTIONNAIRES: ACT_TOTALSCORE: 10

## 2023-01-05 NOTE — PROGRESS NOTES
"Keith Spangler was seen in the Allergy Clinic at [facility].      Keith Spangler is a 42 year old  or  male who is seen today for a follow-up visit.    Had influenza last November. Went to the ED. Had a fever and chills at the time. Tested for influenza and COVID. Was told that his lungs were clear though he had taken albuterol prior to going in. Feels he continues to have fluid in his lungs that hasn't resolved. Continues to have coughing, using albuterol 2-3 times per day. Using budenoside via nebulizer. Only takes the Breo when things are \"in control.\" Generally does well in the summer and has more issues in the fall and winter.    Previous allergy testing in 2017 was negative for allergic sensitization. Followed by Dr. Johnson for sinus issues and has had surgery - most recently in 10/2022.      Past Medical History:   Diagnosis Date     COPD (chronic obstructive pulmonary disease) (H)      NO ACTIVE PROBLEMS      Uncomplicated asthma Sept 2014    Intermittent asthma     Family History   Problem Relation Age of Onset     Unknown/Adopted Mother         Diagnosed w/ALS June 2015     Sleep Apnea Brother      Gastrointestinal Disease Brother         colitis     C.A.D. No family hx of      Diabetes No family hx of      Hypertension No family hx of      Cerebrovascular Disease No family hx of      Breast Cancer No family hx of      Cancer - colorectal No family hx of      Prostate Cancer No family hx of      Asthma No family hx of      Social History     Tobacco Use     Smoking status: Never     Smokeless tobacco: Never     Tobacco comments:     Mom smoked his upbringing   Vaping Use     Vaping Use: Never used   Substance Use Topics     Alcohol use: Yes     Comment: Really rare now      Drug use: No     Social History     Social History Narrative    Dairy/d 1-3 servings/d.     Caffeine 1 servings/d    Exercise 5 x week boxer    Sunscreen used - No    Seatbelts used - Yes "    Working smoke/CO detectors in the home - Yes    Guns stored in the home - No    Self Breast Exams - NOT APPLICABLE    Self Testicular Exam - Yes    Eye Exam up to date - Yes    Dental Exam up to date - Yes    Pap Smear up to date - NOT APPLICABLE    Mammogram up to date - NOT APPLICABLE    PSA up to date - NO    Dexa Scan up to date - NOT APPLICABLE    Flex Sig / Colonoscopy up to date - Yes    Immunizations up to date - Yes    Abuse: Current or Past(Physical, Sexual or Emotional)- No    Do you feel safe in your environment - Yes           Past medical, family, and social history were reviewed.    Review of Systems   Constitutional: Negative for activity change, fatigue and fever.   HENT: Positive for sinus pressure. Negative for congestion, dental problem, ear pain, facial swelling, nosebleeds, postnasal drip, rhinorrhea and sneezing.    Eyes: Negative for discharge, redness and itching.   Respiratory: Positive for cough and shortness of breath. Negative for chest tightness and wheezing.    Cardiovascular: Negative for chest pain.   Gastrointestinal: Negative for diarrhea, nausea and vomiting.   Musculoskeletal: Negative for arthralgias, joint swelling and myalgias.   Skin: Negative for rash.   Neurological: Negative for headaches.   Hematological: Negative for adenopathy.   Psychiatric/Behavioral: Negative for behavioral problems and self-injury.         Current Outpatient Medications:      acetaminophen (TYLENOL) 325 MG tablet, Take 2 tablets (650 mg) by mouth every 4 hours as needed for mild pain, Disp: 50 tablet, Rfl: 0     albuterol (PROAIR HFA/PROVENTIL HFA/VENTOLIN HFA) 108 (90 Base) MCG/ACT inhaler, Inhale 2 puffs into the lungs every 4 hours as needed for shortness of breath, wheezing or cough, Disp: 18 g, Rfl: 2     albuterol (PROVENTIL) (2.5 MG/3ML) 0.083% neb solution, Take 1 vial (2.5 mg) by nebulization every 4 hours as needed for shortness of breath, wheezing or cough, Disp: 120 mL, Rfl: 3      Aspirin-Acetaminophen-Caffeine (EXCEDRIN PO), Take by mouth as needed , Disp: , Rfl:      fluticasone (FLONASE) 50 MCG/ACT nasal spray, USE 2 SPRAYS IN EACH       NOSTRIL DAILY, Disp: 48 g, Rfl: 3     fluticasone-vilanterol (BREO ELLIPTA) 200-25 MCG/ACT inhaler, Inhale 1 puff into the lungs daily, Disp: 3 each, Rfl: 0     ibuprofen (ADVIL/MOTRIN) 200 MG tablet, Take 1 tablet (200 mg) by mouth every 6 hours as needed for moderate pain (Use up to every 6 hours as needed for pain, alternate with tylenol), Disp: 50 tablet, Rfl: 0     predniSONE (DELTASONE) 20 MG tablet, Take 1 tablet (20 mg) by mouth daily, Disp: 10 tablet, Rfl: 0     sildenafil (REVATIO) 20 MG tablet, 1-4 tablets 30 minutes to 2 hours before intercourse. Maximum 100 mg in 24 hours., Disp: 30 tablet, Rfl: 3     sodium chloride (OCEAN) 0.65 % nasal spray, Spray 2 sprays in nostril every 4 hours, Disp: 30 mL, Rfl: 3     budesonide (PULMICORT) 0.5 MG/2ML neb solution, NEBULIZE 1 VIAL 2 TIMES DAILY FOR 14 DAYS, Disp: , Rfl:   Allergies   Allergen Reactions     Augmentin Rash       EXAM:   /75 (BP Location: Right arm, Patient Position: Sitting, Cuff Size: Adult Regular)   Pulse 70   SpO2 96%   Physical Exam  Vitals and nursing note reviewed.   Constitutional:       Appearance: Normal appearance.   HENT:      Head: Normocephalic and atraumatic.      Right Ear: External ear normal.      Left Ear: External ear normal.      Nose: No mucosal edema or rhinorrhea.      Mouth/Throat:      Mouth: Mucous membranes are moist. No oral lesions.      Pharynx: Oropharynx is clear. Uvula midline. No posterior oropharyngeal erythema.   Eyes:      General: Lids are normal.      Extraocular Movements: Extraocular movements intact.      Conjunctiva/sclera: Conjunctivae normal.   Neck:      Comments: No asymmetry, masses, or scars  Cardiovascular:      Rate and Rhythm: Normal rate and regular rhythm.      Heart sounds: Normal heart sounds, S1 normal and S2 normal.    Pulmonary:      Effort: Pulmonary effort is normal. No respiratory distress.      Breath sounds: Normal breath sounds and air entry.   Musculoskeletal:      Comments: No musculoskeletal defects appreciated   Skin:     General: Skin is warm and dry.      Findings: No lesion or rash.   Neurological:      General: No focal deficit present.      Mental Status: He is alert.   Psychiatric:         Mood and Affect: Mood and affect normal.           WORKUP:  None    ASSESSMENT/PLAN:  Keith Spangler is a 42 year old male here for a follow-up visit.    1. Uncontrolled moderate persistent asthma - Reports continued symptoms of cough and shortness of breath since influenza infection last November. Some improvement with daily budesonide nebs though continues to require albuterol multiple times throughout the day. Not regularly taking Breo at this time. Counseled regarding maintenance vs rescue asthma medications and when medications should be taken.    - discontinue budesonide nebulizer treatments  - Resume Breo 200-25mcg - 1 puff daily  - predniSONE (DELTASONE) 20 MG tablet; Take 1 tablet (20 mg) by mouth daily  Dispense: 10 tablet; Refill: 0  - albuterol (PROAIR HFA/PROVENTIL HFA/VENTOLIN HFA) 108 (90 Base) MCG/ACT inhaler; Inhale 2 puffs into the lungs every 4 hours as needed for shortness of breath, wheezing or cough  Dispense: 18 g; Refill: 2  - albuterol (PROVENTIL) (2.5 MG/3ML) 0.083% neb solution; Take 1 vial (2.5 mg) by nebulization every 4 hours as needed for shortness of breath, wheezing or cough  Dispense: 120 mL; Refill: 3  - fluticasone-vilanterol (BREO ELLIPTA) 200-25 MCG/ACT inhaler; Inhale 1 puff into the lungs daily  Dispense: 3 each; Refill: 0      Follow-up in 4-6 weeks, sooner if needed      Thank you for allowing me to participate in the care of Keith Spangler.      Andrei Kyle MD, FAAAAI  Allergy/Immunology  [facility]      Chart documentation done in part with Dragon Voice Recognition Software. Although reviewed after completion, some word and grammatical errors may remain.

## 2023-01-05 NOTE — PATIENT INSTRUCTIONS
If you have any questions regarding your allergies, asthma, or what we discussed during your visit today please call the allergy clinic or contact us via [employer].    [facility] Allergy RN Line: [phone] - call this number with any questions during or after business/clinic hours  [facility] Scheduling Line: [phone]  [facility] Scheduling Line: [phone] - this number is ONLY for scheduling at the [facility] and should not be used to get in touch with the allergy team    All visits for food challenges, medication/drug allergy testing, and drug challenges MUST be scheduled through the allergy clinic nurse. Please call the nurse at [phone] or send a [employer] message for scheduling. Appointments for these visits that are made through the schedulers or via [employer] may be cancelled or rescheduled.    Clinic Schedule:   [place] - Monday, Tuesday, and Thursday  [address] [place] [address]    [facility] - Wednesday  [address] [place] [address]      Take the Breo every day - regardless of how you are feeling. This is a daily maintenance medication.  Stop the budesonide nebulizer treatments  Take the prednisone twice a day x 5 days - take it with food and not too late in the day (after 5PM) as it can keep you up at night  Use the albuterol via nebulizer or inhaler every 4 hours as needed

## 2023-01-05 NOTE — LETTER
"    1/5/2023         RE: Keith Spangler  [address] [place] [facility] [address]        Dear Colleague,    Thank you for referring your patient, Keith Spangler, to the [facility]. Please see a copy of my visit note below.    Keith Spangler was seen in the Allergy Clinic at [facility].      Keith Spangler is a 42 year old  or  male who is seen today for a follow-up visit.    Had influenza last November. Went to the ED. Had a fever and chills at the time. Tested for influenza and COVID. Was told that his lungs were clear though he had taken albuterol prior to going in. Feels he continues to have fluid in his lungs that hasn't resolved. Continues to have coughing, using albuterol 2-3 times per day. Using budenoside via nebulizer. Only takes the Breo when things are \"in control.\" Generally does well in the summer and has more issues in the fall and winter.    Previous allergy testing in 2017 was negative for allergic sensitization. Followed by Dr. Johnson for sinus issues and has had surgery - most recently in 10/2022.      Past Medical History:   Diagnosis Date     COPD (chronic obstructive pulmonary disease) (H)      NO ACTIVE PROBLEMS      Uncomplicated asthma Sept 2014    Intermittent asthma     Family History   Problem Relation Age of Onset     Unknown/Adopted Mother         Diagnosed w/ALS June 2015     Sleep Apnea Brother      Gastrointestinal Disease Brother         colitis     C.A.D. No family hx of      Diabetes No family hx of      Hypertension No family hx of      Cerebrovascular Disease No family hx of      Breast Cancer No family hx of      Cancer - colorectal No family hx of      Prostate Cancer No family hx of      Asthma No family hx of      Social History     Tobacco Use     Smoking status: Never     Smokeless tobacco: Never     Tobacco comments:     Mom smoked his upbringing   Vaping Use     Vaping Use: Never used "   Substance Use Topics     Alcohol use: Yes     Comment: Really rare now      Drug use: No     Social History     Social History Narrative    Dairy/d 1-3 servings/d.     Caffeine 1 servings/d    Exercise 5 x week boxer    Sunscreen used - No    Seatbelts used - Yes    Working smoke/CO detectors in the home - Yes    Guns stored in the home - No    Self Breast Exams - NOT APPLICABLE    Self Testicular Exam - Yes    Eye Exam up to date - Yes    Dental Exam up to date - Yes    Pap Smear up to date - NOT APPLICABLE    Mammogram up to date - NOT APPLICABLE    PSA up to date - NO    Dexa Scan up to date - NOT APPLICABLE    Flex Sig / Colonoscopy up to date - Yes    Immunizations up to date - Yes    Abuse: Current or Past(Physical, Sexual or Emotional)- No    Do you feel safe in your environment - Yes           Past medical, family, and social history were reviewed.    Review of Systems   Constitutional: Negative for activity change, fatigue and fever.   HENT: Positive for sinus pressure. Negative for congestion, dental problem, ear pain, facial swelling, nosebleeds, postnasal drip, rhinorrhea and sneezing.    Eyes: Negative for discharge, redness and itching.   Respiratory: Positive for cough and shortness of breath. Negative for chest tightness and wheezing.    Cardiovascular: Negative for chest pain.   Gastrointestinal: Negative for diarrhea, nausea and vomiting.   Musculoskeletal: Negative for arthralgias, joint swelling and myalgias.   Skin: Negative for rash.   Neurological: Negative for headaches.   Hematological: Negative for adenopathy.   Psychiatric/Behavioral: Negative for behavioral problems and self-injury.         Current Outpatient Medications:      acetaminophen (TYLENOL) 325 MG tablet, Take 2 tablets (650 mg) by mouth every 4 hours as needed for mild pain, Disp: 50 tablet, Rfl: 0     albuterol (PROAIR HFA/PROVENTIL HFA/VENTOLIN HFA) 108 (90 Base) MCG/ACT inhaler, Inhale 2 puffs into the lungs every 4 hours  as needed for shortness of breath, wheezing or cough, Disp: 18 g, Rfl: 2     albuterol (PROVENTIL) (2.5 MG/3ML) 0.083% neb solution, Take 1 vial (2.5 mg) by nebulization every 4 hours as needed for shortness of breath, wheezing or cough, Disp: 120 mL, Rfl: 3     Aspirin-Acetaminophen-Caffeine (EXCEDRIN PO), Take by mouth as needed , Disp: , Rfl:      fluticasone (FLONASE) 50 MCG/ACT nasal spray, USE 2 SPRAYS IN EACH       NOSTRIL DAILY, Disp: 48 g, Rfl: 3     fluticasone-vilanterol (BREO ELLIPTA) 200-25 MCG/ACT inhaler, Inhale 1 puff into the lungs daily, Disp: 3 each, Rfl: 0     ibuprofen (ADVIL/MOTRIN) 200 MG tablet, Take 1 tablet (200 mg) by mouth every 6 hours as needed for moderate pain (Use up to every 6 hours as needed for pain, alternate with tylenol), Disp: 50 tablet, Rfl: 0     predniSONE (DELTASONE) 20 MG tablet, Take 1 tablet (20 mg) by mouth daily, Disp: 10 tablet, Rfl: 0     sildenafil (REVATIO) 20 MG tablet, 1-4 tablets 30 minutes to 2 hours before intercourse. Maximum 100 mg in 24 hours., Disp: 30 tablet, Rfl: 3     sodium chloride (OCEAN) 0.65 % nasal spray, Spray 2 sprays in nostril every 4 hours, Disp: 30 mL, Rfl: 3     budesonide (PULMICORT) 0.5 MG/2ML neb solution, NEBULIZE 1 VIAL 2 TIMES DAILY FOR 14 DAYS, Disp: , Rfl:   Allergies   Allergen Reactions     Augmentin Rash       EXAM:   /75 (BP Location: Right arm, Patient Position: Sitting, Cuff Size: Adult Regular)   Pulse 70   SpO2 96%   Physical Exam  Vitals and nursing note reviewed.   Constitutional:       Appearance: Normal appearance.   HENT:      Head: Normocephalic and atraumatic.      Right Ear: External ear normal.      Left Ear: External ear normal.      Nose: No mucosal edema or rhinorrhea.      Mouth/Throat:      Mouth: Mucous membranes are moist. No oral lesions.      Pharynx: Oropharynx is clear. Uvula midline. No posterior oropharyngeal erythema.   Eyes:      General: Lids are normal.      Extraocular Movements:  Extraocular movements intact.      Conjunctiva/sclera: Conjunctivae normal.   Neck:      Comments: No asymmetry, masses, or scars  Cardiovascular:      Rate and Rhythm: Normal rate and regular rhythm.      Heart sounds: Normal heart sounds, S1 normal and S2 normal.   Pulmonary:      Effort: Pulmonary effort is normal. No respiratory distress.      Breath sounds: Normal breath sounds and air entry.   Musculoskeletal:      Comments: No musculoskeletal defects appreciated   Skin:     General: Skin is warm and dry.      Findings: No lesion or rash.   Neurological:      General: No focal deficit present.      Mental Status: He is alert.   Psychiatric:         Mood and Affect: Mood and affect normal.           WORKUP:  None    ASSESSMENT/PLAN:  Keith Spangler is a 42 year old male here for a follow-up visit.    1. Uncontrolled moderate persistent asthma - Reports continued symptoms of cough and shortness of breath since influenza infection last November. Some improvement with daily budesonide nebs though continues to require albuterol multiple times throughout the day. Not regularly taking Breo at this time. Counseled regarding maintenance vs rescue asthma medications and when medications should be taken.    - discontinue budesonide nebulizer treatments  - Resume Breo 200-25mcg - 1 puff daily  - predniSONE (DELTASONE) 20 MG tablet; Take 1 tablet (20 mg) by mouth daily  Dispense: 10 tablet; Refill: 0  - albuterol (PROAIR HFA/PROVENTIL HFA/VENTOLIN HFA) 108 (90 Base) MCG/ACT inhaler; Inhale 2 puffs into the lungs every 4 hours as needed for shortness of breath, wheezing or cough  Dispense: 18 g; Refill: 2  - albuterol (PROVENTIL) (2.5 MG/3ML) 0.083% neb solution; Take 1 vial (2.5 mg) by nebulization every 4 hours as needed for shortness of breath, wheezing or cough  Dispense: 120 mL; Refill: 3  - fluticasone-vilanterol (BREO ELLIPTA) 200-25 MCG/ACT inhaler; Inhale 1 puff into the lungs daily  Dispense: 3 each; Refill:  0      Follow-up in 4-6 weeks, sooner if needed      Thank you for allowing me to participate in the care of Keith Spangler.      Andrei Kyle MD, FAAAAI  Allergy/Immunology  [facility]      Chart documentation done in part with Dragon Voice Recognition Software. Although reviewed after completion, some word and grammatical errors may remain.      Again, thank you for allowing me to participate in the care of your patient.        Sincerely,        Andrei Kyle MD

## 2023-01-10 ENCOUNTER — MYC MEDICAL ADVICE (OUTPATIENT)
Dept: ALLERGY | Facility: CLINIC | Age: 43
End: 2023-01-10
Payer: COMMERCIAL

## 2023-01-10 DIAGNOSIS — J45.40 UNCONTROLLED MODERATE PERSISTENT ASTHMA: ICD-10-CM

## 2023-01-11 ENCOUNTER — TELEPHONE (OUTPATIENT)
Dept: FAMILY MEDICINE | Facility: CLINIC | Age: 43
End: 2023-01-11

## 2023-01-11 NOTE — TELEPHONE ENCOUNTER
Patient Quality Outreach    Patient is due for the following:   Asthma  -  ACT needed    Next Steps:   Patient has upcoming appointment, these items will be addressed at that time. Appointment scheduled for 2/9/23.    Type of outreach:    Chart review performed, no outreach needed.      Questions for provider review:    None     Ondina Childress MA

## 2023-01-11 NOTE — CONFIDENTIAL NOTE
Called and spoke with [facility] pharmacy reports that albuterol neb solution needs to be prescribed as 90 day supply for insurance coverage.     Melida Crooks requires prior authorization. Preferred alternatives are being faxed allergy at [phone].    Carla Breen, BSN, RN

## 2023-01-19 RX ORDER — ALBUTEROL SULFATE 0.83 MG/ML
2.5 SOLUTION RESPIRATORY (INHALATION) EVERY 4 HOURS PRN
Qty: 360 ML | Refills: 0 | Status: SHIPPED | OUTPATIENT
Start: 2023-01-19 | End: 2023-05-08

## 2023-01-19 NOTE — TELEPHONE ENCOUNTER
Returned call to [facility] today and spoke with Anny. She clarified that patient does not need a prior authorization for his Breo Ellipta inhaler. This was shipped to him on 1/7/2023.    Patient does need to RX for albuterol neb solution sent to pharmacy for 90 day supply in order to be covered by insurance. Please send new RX.    Carmen BECERRA MA

## 2023-01-29 ENCOUNTER — OFFICE VISIT (OUTPATIENT)
Dept: URGENT CARE | Facility: URGENT CARE | Age: 43
End: 2023-01-29
Payer: COMMERCIAL

## 2023-01-29 ENCOUNTER — ANCILLARY PROCEDURE (OUTPATIENT)
Dept: GENERAL RADIOLOGY | Facility: CLINIC | Age: 43
End: 2023-01-29
Attending: PHYSICIAN ASSISTANT
Payer: COMMERCIAL

## 2023-01-29 ENCOUNTER — TELEPHONE (OUTPATIENT)
Dept: NURSING | Facility: CLINIC | Age: 43
End: 2023-01-29

## 2023-01-29 VITALS
SYSTOLIC BLOOD PRESSURE: 118 MMHG | DIASTOLIC BLOOD PRESSURE: 77 MMHG | WEIGHT: 189.6 LBS | TEMPERATURE: 97.2 F | HEART RATE: 68 BPM | OXYGEN SATURATION: 97 % | BODY MASS INDEX: 28.83 KG/M2

## 2023-01-29 DIAGNOSIS — R05.3 PERSISTENT COUGH FOR 3 WEEKS OR LONGER: ICD-10-CM

## 2023-01-29 DIAGNOSIS — J20.9 ACUTE BRONCHITIS WITH SYMPTOMS > 10 DAYS: Primary | ICD-10-CM

## 2023-01-29 DIAGNOSIS — J45.901 EXACERBATION OF ASTHMA, UNSPECIFIED ASTHMA SEVERITY, UNSPECIFIED WHETHER PERSISTENT: ICD-10-CM

## 2023-01-29 PROCEDURE — 71046 X-RAY EXAM CHEST 2 VIEWS: CPT | Mod: TC | Performed by: RADIOLOGY

## 2023-01-29 PROCEDURE — 99214 OFFICE O/P EST MOD 30 MIN: CPT | Performed by: PHYSICIAN ASSISTANT

## 2023-01-29 RX ORDER — DOXYCYCLINE 100 MG/1
100 CAPSULE ORAL 2 TIMES DAILY
Qty: 20 CAPSULE | Refills: 0 | Status: SHIPPED | OUTPATIENT
Start: 2023-01-29 | End: 2023-02-09

## 2023-01-29 RX ORDER — DOXYCYCLINE 100 MG/1
100 CAPSULE ORAL 2 TIMES DAILY
Qty: 20 CAPSULE | Refills: 0 | Status: SHIPPED | OUTPATIENT
Start: 2023-01-29 | End: 2023-01-29

## 2023-01-29 RX ORDER — METHYLPREDNISOLONE 4 MG
TABLET, DOSE PACK ORAL
Qty: 21 TABLET | Refills: 0 | Status: SHIPPED | OUTPATIENT
Start: 2023-01-29 | End: 2023-01-29

## 2023-01-29 RX ORDER — METHYLPREDNISOLONE 4 MG
TABLET, DOSE PACK ORAL
Qty: 21 TABLET | Refills: 0 | Status: SHIPPED | OUTPATIENT
Start: 2023-01-29 | End: 2023-02-09

## 2023-01-29 NOTE — TELEPHONE ENCOUNTER
Telephone call   Patient calling to report the pharmacy he asked the medication to be prescribed to is closed today resending to [facility] on [facility]. For the doxycycline and the methylprednisolone.  Routing to provider as a rahat Clark RN   [facility] Nurse Advisor  1:31 PM 1/29/2023

## 2023-01-29 NOTE — PROGRESS NOTES
Chief Complaint   Patient presents with     Breathing Problem     Hard to breath, tightness. Pt has been doing neb treatment.      URI     Head congestion                    ASSESSMENT:     ICD-10-CM    1. Acute bronchitis with symptoms > 10 days  J20.9       2. Exacerbation of asthma, unspecified asthma severity, unspecified whether persistent  J45.901 XR Chest 2 Views     methylPREDNISolone (MEDROL DOSEPAK) 4 MG tablet therapy pack     doxycycline hyclate (VIBRAMYCIN) 100 MG capsule      3. Persistent cough for 3 weeks or longer  R05.3 XR Chest 2 Views     methylPREDNISolone (MEDROL DOSEPAK) 4 MG tablet therapy pack     doxycycline hyclate (VIBRAMYCIN) 100 MG capsule              PLAN:  I have discussed clinical findings with patient.  Side effects of medications discussed.  Symptomatic care is discussed.  I have discussed the possibility of  worsening symptoms and indication to RTC or go to the ER if they occur.  All questions are answered, patient indicates understanding of these issues and is in agreement with plan.   Patient care instructions are discussed/given at the end of visit.   Lots of rest and fluids.      Vilma Issa PA-C      SUBJECTIVE:  42-year-old male presents for persistent cough for 3 months.  Asthma was well maintained on Breo inhaler but then in November he contacted influenza.  Since then he has had a chronic cough.  Has been using albuterol nebulizer.  1 month ago his provider put him on 10-day course of prednisone which helped while on it.  He feels he is at the point where he has bronchitis or pneumonia and would really like an antibiotic if I feel it is indicated.    Allergies   Allergen Reactions     Augmentin Rash       Past Medical History:   Diagnosis Date     COPD (chronic obstructive pulmonary disease) (H)      NO ACTIVE PROBLEMS      Uncomplicated asthma Sept 2014    Intermittent asthma       acetaminophen (TYLENOL) 325 MG tablet, Take 2 tablets (650 mg) by mouth every 4  ----- Message from Ramon Prince sent at 7/13/2020  9:39 AM EDT -----  Regarding: NP [place]/Telephone  General Message/Vendor Calls    Caller's first and last name:      Reason for call: In office appointment     Callback required yes/no and why:  Yes, to schedule in office appointment follow up for labs. Declined VV. Best contact number(s):  [phone]    Details to clarify the request:      Ramon Prince    . Lori Hughes [facility] to give the office a call back to schedule appointment. hours as needed for mild pain  albuterol (PROAIR HFA/PROVENTIL HFA/VENTOLIN HFA) 108 (90 Base) MCG/ACT inhaler, Inhale 2 puffs into the lungs every 4 hours as needed for shortness of breath, wheezing or cough  albuterol (PROVENTIL) (2.5 MG/3ML) 0.083% neb solution, Take 1 vial (2.5 mg) by nebulization every 4 hours as needed for shortness of breath, wheezing or cough  Aspirin-Acetaminophen-Caffeine (EXCEDRIN PO), Take by mouth as needed   budesonide (PULMICORT) 0.5 MG/2ML neb solution, NEBULIZE 1 VIAL 2 TIMES DAILY FOR 14 DAYS  fluticasone (FLONASE) 50 MCG/ACT nasal spray, USE 2 SPRAYS IN EACH       NOSTRIL DAILY  fluticasone-vilanterol (BREO ELLIPTA) 200-25 MCG/ACT inhaler, Inhale 1 puff into the lungs daily  ibuprofen (ADVIL/MOTRIN) 200 MG tablet, Take 1 tablet (200 mg) by mouth every 6 hours as needed for moderate pain (Use up to every 6 hours as needed for pain, alternate with tylenol)  predniSONE (DELTASONE) 20 MG tablet, Take 1 tablet (20 mg) by mouth daily  sildenafil (REVATIO) 20 MG tablet, 1-4 tablets 30 minutes to 2 hours before intercourse. Maximum 100 mg in 24 hours.  sodium chloride (OCEAN) 0.65 % nasal spray, Spray 2 sprays in nostril every 4 hours    No current facility-administered medications on file prior to visit.      Social History     Tobacco Use     Smoking status: Never     Smokeless tobacco: Never     Tobacco comments:     Mom smoked his upbringing   Substance Use Topics     Alcohol use: Yes     Comment: Really rare now        ROS:  CONSTITUTIONAL: Negative for fatigue or fever.  EYES: Negative for eye problems.  ENT: As above.  RESP: As above.  CV: Negative for chest pains.  GI: Negative for vomiting.  MUSCULOSKELETAL:  Negative for significant muscle or joint pains.  NEUROLOGIC: Negative for headaches.  SKIN: Negative for rash.  PSYCH: Normal mentation for age.    OBJECTIVE:  /77 (BP Location: Left arm, Patient Position: Sitting, Cuff Size: Adult Regular)   Pulse 68   Temp 97.2  F  (36.2  C) (Tympanic)   Wt 86 kg (189 lb 9.6 oz)   SpO2 97%   BMI 28.83 kg/m    GENERAL APPEARANCE: Healthy, alert and no distress.  EYES:Conjunctiva/sclera clear.  EARS: No cerumen.   Ear canals w/o erythema.  TM's intact w/o erythema.    NOSE/MOUTH: Nose without ulcers, erythema or lesions.  SINUSES: No maxillary sinus tenderness.  THROAT: No erythema w/o tonsillar enlargement . No exudates.  NECK: Supple, nontender, no lymphadenopathy.  RESP: Lungs with wheezing throughout.  Used nebulizer 2 hours ago.  CV: Regular rate and rhythm, normal S1 S2, no murmur noted.  NEURO: Awake, alert    SKIN: No rashes        Vilma Issa PA-C

## 2023-02-08 ENCOUNTER — OFFICE VISIT (OUTPATIENT)
Dept: OTOLARYNGOLOGY | Facility: CLINIC | Age: 43
End: 2023-02-08
Payer: COMMERCIAL

## 2023-02-08 VITALS
DIASTOLIC BLOOD PRESSURE: 77 MMHG | SYSTOLIC BLOOD PRESSURE: 121 MMHG | HEART RATE: 70 BPM | WEIGHT: 187 LBS | HEIGHT: 68 IN | BODY MASS INDEX: 28.34 KG/M2

## 2023-02-08 DIAGNOSIS — Z98.890 POSTOPERATIVE STATE: Primary | ICD-10-CM

## 2023-02-08 PROCEDURE — 99212 OFFICE O/P EST SF 10 MIN: CPT | Performed by: OTOLARYNGOLOGY

## 2023-02-08 ASSESSMENT — PAIN SCALES - GENERAL: PAINLEVEL: NO PAIN (0)

## 2023-02-08 NOTE — LETTER
2/8/2023       RE: Keith Spangler  [address] [facility] [address]     Dear Colleague,    Thank you for referring your patient, Keith Spangler, to the [facility] [place] at [facility]. Please see a copy of my visit note below.    Facial Plastic and Reconstructive Surgery      Keith Spangler is doing well.  His bony nasal dorsum is in better position. He still has some swelling on the right. He has been performing nasal irrigations and has been using nasal saline. His breathing is good.    I am happy with the reconstruction and his current nasal functioning. Keith will come back to see me as needed at this point.       Again, thank you for allowing me to participate in the care of your patient.      Sincerely,    Becki Johnson MD

## 2023-02-08 NOTE — PROGRESS NOTES
Facial Plastic and Reconstructive Surgery      Keith Spangler is doing well.  His bony nasal dorsum is in better position. He still has some swelling on the right. He has been performing nasal irrigations and has been using nasal saline. His breathing is good.    I am happy with the reconstruction and his current nasal functioning. Keith will come back to see me as needed at this point.

## 2023-02-09 ENCOUNTER — OFFICE VISIT (OUTPATIENT)
Dept: ALLERGY | Facility: CLINIC | Age: 43
End: 2023-02-09
Payer: COMMERCIAL

## 2023-02-09 VITALS
HEART RATE: 59 BPM | OXYGEN SATURATION: 99 % | WEIGHT: 188 LBS | DIASTOLIC BLOOD PRESSURE: 80 MMHG | SYSTOLIC BLOOD PRESSURE: 119 MMHG | BODY MASS INDEX: 28.59 KG/M2 | RESPIRATION RATE: 16 BRPM

## 2023-02-09 DIAGNOSIS — J45.40 MODERATE PERSISTENT ASTHMA WITHOUT COMPLICATION: Primary | ICD-10-CM

## 2023-02-09 PROCEDURE — 99213 OFFICE O/P EST LOW 20 MIN: CPT | Performed by: ALLERGY & IMMUNOLOGY

## 2023-02-09 ASSESSMENT — ASTHMA QUESTIONNAIRES
QUESTION_1 LAST FOUR WEEKS HOW MUCH OF THE TIME DID YOUR ASTHMA KEEP YOU FROM GETTING AS MUCH DONE AT WORK, SCHOOL OR AT HOME: MOST OF THE TIME
ACT_TOTALSCORE: 11
ACT_TOTALSCORE: 11
QUESTION_4 LAST FOUR WEEKS HOW OFTEN HAVE YOU USED YOUR RESCUE INHALER OR NEBULIZER MEDICATION (SUCH AS ALBUTEROL): ONE OR TWO TIMES PER DAY
QUESTION_2 LAST FOUR WEEKS HOW OFTEN HAVE YOU HAD SHORTNESS OF BREATH: THREE TO SIX TIMES A WEEK
QUESTION_3 LAST FOUR WEEKS HOW OFTEN DID YOUR ASTHMA SYMPTOMS (WHEEZING, COUGHING, SHORTNESS OF BREATH, CHEST TIGHTNESS OR PAIN) WAKE YOU UP AT NIGHT OR EARLIER THAN USUAL IN THE MORNING: TWO OR THREE NIGHTS A WEEK
QUESTION_5 LAST FOUR WEEKS HOW WOULD YOU RATE YOUR ASTHMA CONTROL: POORLY CONTROLLED

## 2023-02-09 ASSESSMENT — PAIN SCALES - GENERAL: PAINLEVEL: NO PAIN (0)

## 2023-02-09 NOTE — LETTER
2/9/2023         RE: Keith Spangler  [address] [facility] [address]        Dear Colleague,    Thank you for referring your patient, Keith Spangler, to the [facility]. Please see a copy of my visit note below.    Keith Spangler was seen in the Allergy Clinic at [facility].      Keith Spangler is a 42 year old  or  male who is seen today for a follow-up visit.    Seen on 1/5/23 and prescribed prednisone. Returned to urgent care on 1/29/23 and methylprednisolone and doxycycline were prescribed. Currently feeling better but not quite 100%. Has started Breo and is taking this every morning. Denies having sore throat or hoarseness. Using albuterol nebs once every few days and not really using his inhaler. Sleeping well at night. Still has an occasional cough and is bringing up clear sputum.      Past Medical History:   Diagnosis Date     COPD (chronic obstructive pulmonary disease) (H)      NO ACTIVE PROBLEMS      Uncomplicated asthma Sept 2014    Intermittent asthma     Family History   Problem Relation Age of Onset     Unknown/Adopted Mother         Diagnosed w/ALS June 2015     Sleep Apnea Brother      Gastrointestinal Disease Brother         colitis     C.A.D. No family hx of      Diabetes No family hx of      Hypertension No family hx of      Cerebrovascular Disease No family hx of      Breast Cancer No family hx of      Cancer - colorectal No family hx of      Prostate Cancer No family hx of      Asthma No family hx of      Social History     Tobacco Use     Smoking status: Never     Smokeless tobacco: Never     Tobacco comments:     Mom smoked his upbringing   Vaping Use     Vaping Use: Never used   Substance Use Topics     Alcohol use: Yes     Comment: Really rare now      Drug use: No     Social History     Social History Narrative    Dairy/d 1-3 servings/d.     Caffeine 1 servings/d    Exercise 5 x week boxer    Sunscreen  used - No    Seatbelts used - Yes    Working smoke/CO detectors in the home - Yes    Guns stored in the home - No    Self Breast Exams - NOT APPLICABLE    Self Testicular Exam - Yes    Eye Exam up to date - Yes    Dental Exam up to date - Yes    Pap Smear up to date - NOT APPLICABLE    Mammogram up to date - NOT APPLICABLE    PSA up to date - NO    Dexa Scan up to date - NOT APPLICABLE    Flex Sig / Colonoscopy up to date - Yes    Immunizations up to date - Yes    Abuse: Current or Past(Physical, Sexual or Emotional)- No    Do you feel safe in your environment - Yes           Past medical, family, and social history were reviewed.    Review of Systems      Current Outpatient Medications:      acetaminophen (TYLENOL) 325 MG tablet, Take 2 tablets (650 mg) by mouth every 4 hours as needed for mild pain, Disp: 50 tablet, Rfl: 0     albuterol (PROAIR HFA/PROVENTIL HFA/VENTOLIN HFA) 108 (90 Base) MCG/ACT inhaler, Inhale 2 puffs into the lungs every 4 hours as needed for shortness of breath, wheezing or cough, Disp: 18 g, Rfl: 2     albuterol (PROVENTIL) (2.5 MG/3ML) 0.083% neb solution, Take 1 vial (2.5 mg) by nebulization every 4 hours as needed for shortness of breath, wheezing or cough, Disp: 360 mL, Rfl: 0     Aspirin-Acetaminophen-Caffeine (EXCEDRIN PO), Take by mouth as needed , Disp: , Rfl:      budesonide (PULMICORT) 0.5 MG/2ML neb solution, NEBULIZE 1 VIAL 2 TIMES DAILY FOR 14 DAYS, Disp: , Rfl:      doxycycline hyclate (VIBRAMYCIN) 100 MG capsule, Take 1 capsule (100 mg) by mouth 2 times daily, Disp: 20 capsule, Rfl: 0     fluticasone (FLONASE) 50 MCG/ACT nasal spray, USE 2 SPRAYS IN EACH       NOSTRIL DAILY, Disp: 48 g, Rfl: 3     fluticasone-vilanterol (BREO ELLIPTA) 200-25 MCG/ACT inhaler, Inhale 1 puff into the lungs daily, Disp: 3 each, Rfl: 0     ibuprofen (ADVIL/MOTRIN) 200 MG tablet, Take 1 tablet (200 mg) by mouth every 6 hours as needed for moderate pain (Use up to every 6 hours as needed for pain,  alternate with tylenol), Disp: 50 tablet, Rfl: 0     methylPREDNISolone (MEDROL DOSEPAK) 4 MG tablet therapy pack, Follow Package Directions, Disp: 21 tablet, Rfl: 0     predniSONE (DELTASONE) 20 MG tablet, Take 1 tablet (20 mg) by mouth daily, Disp: 10 tablet, Rfl: 0     sildenafil (REVATIO) 20 MG tablet, 1-4 tablets 30 minutes to 2 hours before intercourse. Maximum 100 mg in 24 hours., Disp: 30 tablet, Rfl: 3     sodium chloride (OCEAN) 0.65 % nasal spray, Spray 2 sprays in nostril every 4 hours, Disp: 30 mL, Rfl: 3  Allergies   Allergen Reactions     Augmentin Rash       EXAM:   /80   Pulse 59   Resp 16   Wt 85.3 kg (188 lb)   SpO2 99%   BMI 28.59 kg/m    Physical Exam  Vitals and nursing note reviewed.   Constitutional:       Appearance: Normal appearance.   HENT:      Head: Normocephalic and atraumatic.      Right Ear: External ear normal.      Left Ear: External ear normal.      Nose: No mucosal edema or rhinorrhea.      Mouth/Throat:      Mouth: Mucous membranes are moist. No oral lesions.      Pharynx: Oropharynx is clear. Uvula midline. No posterior oropharyngeal erythema.   Eyes:      General: Lids are normal.      Extraocular Movements: Extraocular movements intact.      Conjunctiva/sclera: Conjunctivae normal.   Neck:      Comments: No asymmetry, masses, or scars  Cardiovascular:      Rate and Rhythm: Normal rate and regular rhythm.      Heart sounds: Normal heart sounds, S1 normal and S2 normal.   Pulmonary:      Effort: Pulmonary effort is normal. No respiratory distress.      Breath sounds: Normal breath sounds and air entry.   Musculoskeletal:      Comments: No musculoskeletal defects appreciated   Skin:     General: Skin is warm and dry.      Findings: No lesion or rash.   Neurological:      General: No focal deficit present.      Mental Status: He is alert.   Psychiatric:         Mood and Affect: Mood and affect normal.           WORKUP:  None    ASSESSMENT/PLAN:  Keith mendosa  42 year old male here for a follow-up visit.    1. Moderate persistent asthma without complication - Improved after oral steroids x 2 and a course of doxycyline last month. Has resumed Breo and is taking it daily. Advised to continue with daily maintenance medication and use of albuterol as needed.    - continue Breo 200-25mcg - 1 puff daily  - take 2 to 4 puffs of albuterol HFA and/or use nebulizer every 4 hours as needed      Follow-up in 3 months, sooner if needed      Thank you for allowing me to participate in the care of Keith MARADIAGA Aníbal.      Andrei Kyle MD, FAAAAI  Allergy/Immunology  [facility]      Chart documentation done in part with Dragon Voice Recognition Software. Although reviewed after completion, some word and grammatical errors may remain.      Again, thank you for allowing me to participate in the care of your patient.        Sincerely,        Andrei Kyle MD

## 2023-02-09 NOTE — PROGRESS NOTES
Keith Spangler was seen in the Allergy Clinic at [facility].      Keith Spangler is a 42 year old  or  male who is seen today for a follow-up visit.    Seen on 1/5/23 and prescribed prednisone. Returned to urgent care on 1/29/23 and methylprednisolone and doxycycline were prescribed. Currently feeling better but not quite 100%. Has started Breo and is taking this every morning. Denies having sore throat or hoarseness. Using albuterol nebs once every few days and not really using his inhaler. Sleeping well at night. Still has an occasional cough and is bringing up clear sputum.      Past Medical History:   Diagnosis Date     COPD (chronic obstructive pulmonary disease) (H)      NO ACTIVE PROBLEMS      Uncomplicated asthma Sept 2014    Intermittent asthma     Family History   Problem Relation Age of Onset     Unknown/Adopted Mother         Diagnosed w/ALS June 2015     Sleep Apnea Brother      Gastrointestinal Disease Brother         colitis     C.A.D. No family hx of      Diabetes No family hx of      Hypertension No family hx of      Cerebrovascular Disease No family hx of      Breast Cancer No family hx of      Cancer - colorectal No family hx of      Prostate Cancer No family hx of      Asthma No family hx of      Social History     Tobacco Use     Smoking status: Never     Smokeless tobacco: Never     Tobacco comments:     Mom smoked his upbringing   Vaping Use     Vaping Use: Never used   Substance Use Topics     Alcohol use: Yes     Comment: Really rare now      Drug use: No     Social History     Social History Narrative    Dairy/d 1-3 servings/d.     Caffeine 1 servings/d    Exercise 5 x week boxer    Sunscreen used - No    Seatbelts used - Yes    Working smoke/CO detectors in the home - Yes    Guns stored in the home - No    Self Breast Exams - NOT APPLICABLE    Self Testicular Exam - Yes    Eye Exam up to date - Yes    Dental Exam up to date - Yes    Pap Smear up  to date - NOT APPLICABLE    Mammogram up to date - NOT APPLICABLE    PSA up to date - NO    Dexa Scan up to date - NOT APPLICABLE    Flex Sig / Colonoscopy up to date - Yes    Immunizations up to date - Yes    Abuse: Current or Past(Physical, Sexual or Emotional)- No    Do you feel safe in your environment - Yes           Past medical, family, and social history were reviewed.    Review of Systems      Current Outpatient Medications:      acetaminophen (TYLENOL) 325 MG tablet, Take 2 tablets (650 mg) by mouth every 4 hours as needed for mild pain, Disp: 50 tablet, Rfl: 0     albuterol (PROAIR HFA/PROVENTIL HFA/VENTOLIN HFA) 108 (90 Base) MCG/ACT inhaler, Inhale 2 puffs into the lungs every 4 hours as needed for shortness of breath, wheezing or cough, Disp: 18 g, Rfl: 2     albuterol (PROVENTIL) (2.5 MG/3ML) 0.083% neb solution, Take 1 vial (2.5 mg) by nebulization every 4 hours as needed for shortness of breath, wheezing or cough, Disp: 360 mL, Rfl: 0     Aspirin-Acetaminophen-Caffeine (EXCEDRIN PO), Take by mouth as needed , Disp: , Rfl:      budesonide (PULMICORT) 0.5 MG/2ML neb solution, NEBULIZE 1 VIAL 2 TIMES DAILY FOR 14 DAYS, Disp: , Rfl:      doxycycline hyclate (VIBRAMYCIN) 100 MG capsule, Take 1 capsule (100 mg) by mouth 2 times daily, Disp: 20 capsule, Rfl: 0     fluticasone (FLONASE) 50 MCG/ACT nasal spray, USE 2 SPRAYS IN EACH       NOSTRIL DAILY, Disp: 48 g, Rfl: 3     fluticasone-vilanterol (BREO ELLIPTA) 200-25 MCG/ACT inhaler, Inhale 1 puff into the lungs daily, Disp: 3 each, Rfl: 0     ibuprofen (ADVIL/MOTRIN) 200 MG tablet, Take 1 tablet (200 mg) by mouth every 6 hours as needed for moderate pain (Use up to every 6 hours as needed for pain, alternate with tylenol), Disp: 50 tablet, Rfl: 0     methylPREDNISolone (MEDROL DOSEPAK) 4 MG tablet therapy pack, Follow Package Directions, Disp: 21 tablet, Rfl: 0     predniSONE (DELTASONE) 20 MG tablet, Take 1 tablet (20 mg) by mouth daily, Disp: 10 tablet,  Rfl: 0     sildenafil (REVATIO) 20 MG tablet, 1-4 tablets 30 minutes to 2 hours before intercourse. Maximum 100 mg in 24 hours., Disp: 30 tablet, Rfl: 3     sodium chloride (OCEAN) 0.65 % nasal spray, Spray 2 sprays in nostril every 4 hours, Disp: 30 mL, Rfl: 3  Allergies   Allergen Reactions     Augmentin Rash       EXAM:   /80   Pulse 59   Resp 16   Wt 85.3 kg (188 lb)   SpO2 99%   BMI 28.59 kg/m    Physical Exam  Vitals and nursing note reviewed.   Constitutional:       Appearance: Normal appearance.   HENT:      Head: Normocephalic and atraumatic.      Right Ear: External ear normal.      Left Ear: External ear normal.      Nose: No mucosal edema or rhinorrhea.      Mouth/Throat:      Mouth: Mucous membranes are moist. No oral lesions.      Pharynx: Oropharynx is clear. Uvula midline. No posterior oropharyngeal erythema.   Eyes:      General: Lids are normal.      Extraocular Movements: Extraocular movements intact.      Conjunctiva/sclera: Conjunctivae normal.   Neck:      Comments: No asymmetry, masses, or scars  Cardiovascular:      Rate and Rhythm: Normal rate and regular rhythm.      Heart sounds: Normal heart sounds, S1 normal and S2 normal.   Pulmonary:      Effort: Pulmonary effort is normal. No respiratory distress.      Breath sounds: Normal breath sounds and air entry.   Musculoskeletal:      Comments: No musculoskeletal defects appreciated   Skin:     General: Skin is warm and dry.      Findings: No lesion or rash.   Neurological:      General: No focal deficit present.      Mental Status: He is alert.   Psychiatric:         Mood and Affect: Mood and affect normal.           WORKUP:  None    ASSESSMENT/PLAN:  Keith Spangler is a 42 year old male here for a follow-up visit.    1. Moderate persistent asthma without complication - Improved after oral steroids x 2 and a course of doxycyline last month. Has resumed Breo and is taking it daily. Advised to continue with daily maintenance  medication and use of albuterol as needed.    - continue Breo 200-25mcg - 1 puff daily  - take 2 to 4 puffs of albuterol HFA and/or use nebulizer every 4 hours as needed      Follow-up in 3 months, sooner if needed      Thank you for allowing me to participate in the care of Keith Spangler.      Andrei Kyle MD, FAAAAI  Allergy/Immunology  [facility]      Chart documentation done in part with Dragon Voice Recognition Software. Although reviewed after completion, some word and grammatical errors may remain.

## 2023-03-07 ENCOUNTER — TRANSFERRED RECORDS (OUTPATIENT)
Dept: HEALTH INFORMATION MANAGEMENT | Facility: CLINIC | Age: 43
End: 2023-03-07

## 2023-03-08 ENCOUNTER — TELEPHONE (OUTPATIENT)
Dept: FAMILY MEDICINE | Facility: CLINIC | Age: 43
End: 2023-03-08

## 2023-03-08 NOTE — TELEPHONE ENCOUNTER
"Shonda with [facility] calling in regards to patient. She states patient is in to see them today because he swallowed his permanent metal retainer on 3/6/23, patient is unsure if he has been able to pass the retainer in his stool. Shonda would like to get patient set up with primary to get X-rays ordered to see if the retainer is still in his GI tract. RN informed Shonda that an appointment is needed to evaluate patient and to determine which views are needed for X-ray. Ultimately patient should be evaluated today and there are no in office appointments today so UC would be advised. Shonda would like patient to see PCP, virtual appointment scheduled.    RN called patient to triage further, patient states he swallowed permanent retainer that was 3cm in length, on Monday night. Initially he states he was not feeling well, he experienced abdominal discomfort but no pain. He did have a bowel movement that evening, he states he sifted though the stool and did not see retainer, but he did see some light colored blood. He has not had blood in his stool since.     He was advised by his dentist yesterday to be evaluated and have x-rays completed. Patient states he went to [facility] ER in [place] \"I waited for an hour and half and they couldn't decide where to put me. If I should go to their urgent care or be admitted to ER so I left\". Patient states he has not experienced any abdominal pain or discomfort since Monday, has not had blood in stools apart from the one occurrence, his abdomen is not rigid or tender, and he continues to have normal bowel movements. RN advised a visit to [facility] would be helpful to determine if the metal retainer is still in his GI tract, patient declined stating \"I'm too busy to go in [facility], I will probably be working until midnight. I would prefer to just keep the virtual appointment\".     RN advised patient to go to ED if he develops acute abdominal " pain, his abdomen is rigid, he vomits blood or has blood in his stool. Patient verbalized understanding and agreed to plan.    Routing to provider as an FYI.    Ary Smith RN    [facility]

## 2023-03-09 ENCOUNTER — ANCILLARY PROCEDURE (OUTPATIENT)
Dept: GENERAL RADIOLOGY | Facility: CLINIC | Age: 43
End: 2023-03-09
Attending: PREVENTIVE MEDICINE
Payer: COMMERCIAL

## 2023-03-09 ENCOUNTER — TELEPHONE (OUTPATIENT)
Dept: FAMILY MEDICINE | Facility: CLINIC | Age: 43
End: 2023-03-09

## 2023-03-09 ENCOUNTER — MYC MEDICAL ADVICE (OUTPATIENT)
Dept: ALLERGY | Facility: CLINIC | Age: 43
End: 2023-03-09

## 2023-03-09 DIAGNOSIS — T18.9XXA SWALLOWED FOREIGN BODY, INITIAL ENCOUNTER: ICD-10-CM

## 2023-03-09 DIAGNOSIS — R05.2 SUBACUTE COUGH: ICD-10-CM

## 2023-03-09 PROCEDURE — 71046 X-RAY EXAM CHEST 2 VIEWS: CPT | Mod: TC | Performed by: RADIOLOGY

## 2023-03-09 PROCEDURE — 70360 X-RAY EXAM OF NECK: CPT | Mod: TC | Performed by: RADIOLOGY

## 2023-03-09 PROCEDURE — 74019 RADEX ABDOMEN 2 VIEWS: CPT | Mod: TC | Performed by: RADIOLOGY

## 2023-03-09 ASSESSMENT — ASTHMA QUESTIONNAIRES: ACT_TOTALSCORE: 13

## 2023-03-09 NOTE — TELEPHONE ENCOUNTER
"Patient called to clinic regarding his x-rays results from today.  Had a virtual visit with PCP today. Swallowed a metal object. Had x-rays done to see if object was in GI tract or causing any problems.  Patient viewed x-ray result from Abdomen on his [facility] page this afternoon. Result was released prior to any provider giving interpretation.    IMPRESSION: 2.7 cm in length linear radiodense foreign body in the  stomach. Bowel gas pattern is nonobstructed. No free intraperitoneal  Air.    Per provider discussion with patient during visit today, is noted that -\"if object is in GI track, would need to have endoscopy done for retrieval\"    Patient is wondering what he needs to do now. Wondering if needs to have procedure or surgery to remove object? What are next steps and how quickly does anything need to occur, if retrieval is required? Patient is not sure how urgent of a situation this is.    Patient declined having any abdominal pain or discomfort, vomiting, distention, rectal bleeding.     Routing to provider to review and advise on what patient should do at this time. Unclear of urgency of situation, given patient is not having any symptoms at this time.        Damaris Monaco RN  Clinical Triage/Primary Care  [facility]      "

## 2023-03-10 ENCOUNTER — TELEPHONE (OUTPATIENT)
Dept: GASTROENTEROLOGY | Facility: CLINIC | Age: 43
End: 2023-03-10
Payer: COMMERCIAL

## 2023-03-10 ENCOUNTER — TELEPHONE (OUTPATIENT)
Dept: GASTROENTEROLOGY | Facility: CLINIC | Age: 43
End: 2023-03-10

## 2023-03-10 NOTE — TELEPHONE ENCOUNTER
Writer notes that MyC message from Dr. Monet was read on 3/9/23 at 10:00 PM, with instructions on how to schedule endoscopy and red flag symptoms to go to ED. Clinic number also given in My[facility] message to contact clinic with any other questions. Will close encounter, as patient has received message.      HUDSON OrtegaN, RN  [facility]

## 2023-03-10 NOTE — TELEPHONE ENCOUNTER
Pre assessment questions completed for upcoming Upper endoscopy (EGD) procedure scheduled on 03.14.2023    COVID policy reviewed.     Pre-op exam? N/A    Reviewed procedural arrival time 1230, procedure time 1315 and facility location [facility]; [address], [place] [address]    Designated  policy reviewed. Instructed to have someone stay 6 hours post procedure.     Anticoagulation/blood thinners? No    Electronic implanted devices? No    Diabetic? No    Procedure indication: Swallowed foreign body, initial encounter    Reviewed procedure prep instructions.     Prep instructions sent via "[employer].     Patient verbalized understanding and had no questions or concerns at this time.    Jenae Crowe RN  Endoscopy Procedure Pre Assessment RN

## 2023-03-10 NOTE — TELEPHONE ENCOUNTER
Ok to wait for Dr. Monet's response tomorrow.  No evidence of obstruction.  If any symptoms to go to emergency department

## 2023-03-10 NOTE — RESULT ENCOUNTER NOTE
Keith,     X rays of the abdomen are showing that the swallowed object is in the stomach. You would need to undergo an Endoscopy to have this removed. I have placed an emergent referral for you to undergo an Endoscopy.       Diagnoses: Swallowed foreign body, initial encounter  Order: Adult Gi  Referral - Procedure Only     Comment: Please be aware that coverage of these services is subject to the terms and limitations of your health insurance plan.  Call member services at your health plan with any benefit or coverage questions.  [facility] will call you to coordinate your care as prescribed by the provider.  If you don't hear from a representative within 2 business days, please call [phone].    If you are not able to be seen within [place] in 3-5 days, I would recommend seeing a GI clinic outside of [place], such as [facility] and see if they can get you in sooner.    If you develop any abdominal pain or distension, vomiting, fever, rectal bleeding, please be seen in the emergency room.     Please do not hesitate to call us at [phone] if you have any questions or concerns.    Thank you,    Serina Monet MD MPH

## 2023-03-10 NOTE — RESULT ENCOUNTER NOTE
Keith,     Chest X ray is not showing any pneumonias or fluid in the lungs.     Please do not hesitate to call us at [phone] if you have any questions or concerns.    Thank you,    Serina Monet MD MPH

## 2023-03-10 NOTE — RESULT ENCOUNTER NOTE
Keith,     X rays of the neck did not show any foreign bodies.     Please do not hesitate to call us at [phone] if you have any questions or concerns.    Thank you,    Serina Monet MD MPH

## 2023-03-10 NOTE — TELEPHONE ENCOUNTER
My Chart message with results and next steps sent.  Referral for Endoscopy placed.  Thank you,  Serina Monet MD MPH

## 2023-03-10 NOTE — TELEPHONE ENCOUNTER
Called and spoke with patient. Provider response relayed to patient. Reviewed signs and symptoms that warranted ED visit.  Patient voiced good understanding and agreed with plan.  Will wait for return call from PCP office tomorrow with any further instructions.        Damaris Monaco RN  Clinical Triage/Primary Care  [facility]

## 2023-03-10 NOTE — TELEPHONE ENCOUNTER
Screening Questions  BLUE  KIND OF PREP RED  LOCATION [review exclusion criteria] GREEN  SEDATION TYPE        y Are you active on mychart?       [place] Ordering/Referring Provider?        BCBS What type of coverage do you have?      n Have you had a positive covid test in the last 14 days?     28.1 1. BMI  [BMI 40+ - review exclusion criteria]    y  2. Are you able to give consent for your medical care? [IF NO,RN REVIEW]          n  3. Are you taking any prescription pain medications on a routine schedule   (ex narcotics: oxycodone, roxicodone, oxycontin,  and percocet)? [RN Review]        n  3a. EXTENDED PREP What kind of prescription?     n 4. Do you have any chemical dependencies such as alcohol, street drugs, or methadone?        **If yes 3- 5 , please schedule with MAC sedation.**          IF YES TO ANY 6 - 10 - HOSPITAL SETTING ONLY.     n 6.   Do you need assistance transferring?     n 7.   Have you had a heart or lung transplant?    n 8.   Are you currently on dialysis?   n 9.   Do you use daily home oxygen?   n 10. Do you take nitroglycerin?   10a. n If yes, how often?     11. [FEMALES]   Are you currently pregnant?    11a.  If yes, how many weeks? [ Greater than 12 weeks, OR NEEDED]    n 12. Do you have Pulmonary Hypertension? *NEED PAC APPT AT [facility] w/ MAC*     n 13. [review exclusion criteria]  Do you have any implantable devices in your body (pacemaker, defib, LVAD)?    n 14. In the past 6 months, have you had any heart related issues including cardiomyopathy or heart attack?     14a. n If yes, did it require cardiac stenting if so when?     n 15. Have you had a stroke or Transient ischemic attack (TIA - aka  mini stroke ) within 6 months?      n 16. Do you have mod to severe Obstructive Sleep Apnea?  [Hospital only]    n 17. Do you have SEVERE AND UNCONTROLLED asthma? *NEED PAC APPT AT [facility] w/MAC*     18. Are you currently taking any blood thinners?     18a. No. Continue to 19.   18b.     n 19. Do  "you take the medication Phentermine?    19a. If yes, \"Hold for 7 days before procedure.  Please consult your prescribing provider if you have questions about holding this medication.\"       20. Do you have chronic kidney disease?        21. Do you have a diagnosis of diabetes?       22. On a regular basis do you go 3-5 days between bowel movements?      23. Preferred LOCAL Pharmacy for Pre Prescription    [ LIST ONLY ONE PHARMACY]        [facility] - [facility] AT PORTAL TO [facility] SITES  [place], [place] - [address] [place] [address] [phone]  [id number] - [place], [place] - [address] AT [facility] [address]        - CLOSING REMINDERS -    Informed patient they will need an adult    Cannot take any type of public or medical transportation alone    Conscious Sedation- Needs  for 6 hours after the procedure       MAC/General-Needs  for 24 hours after procedure    Pre-Procedure Covid test to be completed [[facility] PCR Testing Required]    Confirmed Nurse will call to complete assessment       - SCHEDULING DETAILS -  n Hospital Setting Required? If yes, what is the exclusion?:    Brandyn  Surgeon    3/14  Date of Procedure  Upper Endoscopy [EGD]  Type of Procedure Scheduled  [facility]        CS Sedation Type     n PAC / Pre-op Required                 "

## 2023-03-12 ENCOUNTER — MYC MEDICAL ADVICE (OUTPATIENT)
Dept: FAMILY MEDICINE | Facility: CLINIC | Age: 43
End: 2023-03-12
Payer: COMMERCIAL

## 2023-03-12 DIAGNOSIS — R05.2 SUBACUTE COUGH: Primary | ICD-10-CM

## 2023-03-13 RX ORDER — AZITHROMYCIN 250 MG/1
TABLET, FILM COATED ORAL
Qty: 6 TABLET | Refills: 0 | Status: SHIPPED | OUTPATIENT
Start: 2023-03-13 | End: 2023-03-18

## 2023-03-14 ENCOUNTER — ANCILLARY PROCEDURE (OUTPATIENT)
Dept: GENERAL RADIOLOGY | Facility: CLINIC | Age: 43
End: 2023-03-14
Attending: INTERNAL MEDICINE
Payer: COMMERCIAL

## 2023-03-14 ENCOUNTER — TELEPHONE (OUTPATIENT)
Dept: GASTROENTEROLOGY | Facility: CLINIC | Age: 43
End: 2023-03-14

## 2023-03-14 ENCOUNTER — HOSPITAL ENCOUNTER (OUTPATIENT)
Facility: AMBULATORY SURGERY CENTER | Age: 43
Discharge: HOME | End: 2023-03-14
Attending: INTERNAL MEDICINE | Admitting: INTERNAL MEDICINE
Payer: COMMERCIAL

## 2023-03-14 VITALS
OXYGEN SATURATION: 94 % | SYSTOLIC BLOOD PRESSURE: 126 MMHG | TEMPERATURE: 97 F | DIASTOLIC BLOOD PRESSURE: 82 MMHG | RESPIRATION RATE: 16 BRPM | HEART RATE: 57 BPM

## 2023-03-14 DIAGNOSIS — T18.9XXD FOREIGN BODY IN DIGESTIVE TRACT, SUBSEQUENT ENCOUNTER: Primary | ICD-10-CM

## 2023-03-14 DIAGNOSIS — T18.9XXD FOREIGN BODY IN DIGESTIVE TRACT, SUBSEQUENT ENCOUNTER: ICD-10-CM

## 2023-03-14 LAB — UPPER GI ENDOSCOPY: NORMAL

## 2023-03-14 PROCEDURE — G8907 PT DOC NO EVENTS ON DISCHARG: HCPCS

## 2023-03-14 PROCEDURE — 43235 EGD DIAGNOSTIC BRUSH WASH: CPT

## 2023-03-14 PROCEDURE — G8918 PT W/O PREOP ORDER IV AB PRO: HCPCS

## 2023-03-14 PROCEDURE — 74019 RADEX ABDOMEN 2 VIEWS: CPT | Mod: GC | Performed by: RADIOLOGY

## 2023-03-14 RX ORDER — FENTANYL CITRATE 50 UG/ML
INJECTION, SOLUTION INTRAMUSCULAR; INTRAVENOUS PRN
Status: DISCONTINUED | OUTPATIENT
Start: 2023-03-14 | End: 2023-03-14 | Stop reason: HOSPADM

## 2023-03-14 RX ORDER — LIDOCAINE 40 MG/G
CREAM TOPICAL
Status: DISCONTINUED | OUTPATIENT
Start: 2023-03-14 | End: 2023-03-15 | Stop reason: HOSPADM

## 2023-03-14 RX ORDER — NALOXONE HYDROCHLORIDE 0.4 MG/ML
0.2 INJECTION, SOLUTION INTRAMUSCULAR; INTRAVENOUS; SUBCUTANEOUS
Status: DISCONTINUED | OUTPATIENT
Start: 2023-03-14 | End: 2023-03-15 | Stop reason: HOSPADM

## 2023-03-14 RX ORDER — PROCHLORPERAZINE MALEATE 10 MG
10 TABLET ORAL EVERY 6 HOURS PRN
Status: DISCONTINUED | OUTPATIENT
Start: 2023-03-14 | End: 2023-03-15 | Stop reason: HOSPADM

## 2023-03-14 RX ORDER — FLUMAZENIL 0.1 MG/ML
0.2 INJECTION, SOLUTION INTRAVENOUS
Status: DISCONTINUED | OUTPATIENT
Start: 2023-03-14 | End: 2023-03-15 | Stop reason: HOSPADM

## 2023-03-14 RX ORDER — ONDANSETRON 4 MG/1
4 TABLET, ORALLY DISINTEGRATING ORAL EVERY 6 HOURS PRN
Status: DISCONTINUED | OUTPATIENT
Start: 2023-03-14 | End: 2023-03-15 | Stop reason: HOSPADM

## 2023-03-14 RX ORDER — NALOXONE HYDROCHLORIDE 0.4 MG/ML
0.4 INJECTION, SOLUTION INTRAMUSCULAR; INTRAVENOUS; SUBCUTANEOUS
Status: DISCONTINUED | OUTPATIENT
Start: 2023-03-14 | End: 2023-03-15 | Stop reason: HOSPADM

## 2023-03-14 RX ORDER — ONDANSETRON 2 MG/ML
4 INJECTION INTRAMUSCULAR; INTRAVENOUS
Status: DISCONTINUED | OUTPATIENT
Start: 2023-03-14 | End: 2023-03-15 | Stop reason: HOSPADM

## 2023-03-14 RX ORDER — ONDANSETRON 2 MG/ML
4 INJECTION INTRAMUSCULAR; INTRAVENOUS EVERY 6 HOURS PRN
Status: DISCONTINUED | OUTPATIENT
Start: 2023-03-14 | End: 2023-03-15 | Stop reason: HOSPADM

## 2023-03-14 NOTE — TELEPHONE ENCOUNTER
Called and spoke with patient re:x-ray results - object now in duodenum - not visualized on endoscopy today.  Will plan for serial imaging - repeat AXR in 3-5 days.  Again discussed return precautions for ER.

## 2023-03-14 NOTE — H&P
[facility] Pre-op History and Physical    Keith Spangler MRN# [id number]   Age: 42 year old YOB: 1980     Date of Exam 3/14/2023           Primary care provider: Serina Monet         Chief Complaint and/or Reason for Procedure:     Gastric foreign body - swallowed retainer 7 days ago         Active problem list:     Patient Active Problem List    Diagnosis Date Noted     History of closed fracture of nasal bones 04/06/2022     Priority: Medium     Nasal deformity 04/16/2021     Priority: Medium     History of fracture of nose 02/19/2021     Priority: Medium     Added automatically from request for surgery [id number]       Deviated nasal septum 02/19/2021     Priority: Medium     Added automatically from request for surgery [id number]       Acquired nasal deformity 02/17/2021     Priority: Medium     Nasal obstruction 02/17/2021     Priority: Medium     Nasal valve collapse 02/17/2021     Priority: Medium     Refractory obstruction of nasal airway 08/17/2020     Priority: Medium     Added automatically from request for surgery [id number]       Moderate asthma 03/21/2017     Priority: Medium     Acute bronchitis with bronchospasm 09/01/2015     Priority: Medium     Has this every year. Probably intermittent asthma. Use Flovent when flares.        CARDIOVASCULAR SCREENING; LDL GOAL LESS THAN 160 06/11/2010     Priority: Medium            Medications (include herbals and vitamins):   Any Plavix use in the last 7 days? No     Current Outpatient Medications   Medication Sig     albuterol (PROAIR HFA/PROVENTIL HFA/VENTOLIN HFA) 108 (90 Base) MCG/ACT inhaler Inhale 2 puffs into the lungs every 4 hours as needed for shortness of breath, wheezing or cough     albuterol (PROVENTIL) (2.5 MG/3ML) 0.083% neb solution Take 1 vial (2.5 mg) by nebulization every 4 hours as needed for shortness of breath, wheezing or cough     azithromycin (ZITHROMAX) 250 MG tablet Take 2 tablets (500 mg) by mouth daily  for 1 day, THEN 1 tablet (250 mg) daily for 4 days.     fluticasone-vilanterol (BREO ELLIPTA) 200-25 MCG/ACT inhaler Inhale 1 puff into the lungs daily     acetaminophen (TYLENOL) 325 MG tablet Take 2 tablets (650 mg) by mouth every 4 hours as needed for mild pain     Aspirin-Acetaminophen-Caffeine (EXCEDRIN PO) Take by mouth as needed      fluticasone (FLONASE) 50 MCG/ACT nasal spray USE 2 SPRAYS IN EACH       NOSTRIL DAILY     ibuprofen (ADVIL/MOTRIN) 200 MG tablet Take 1 tablet (200 mg) by mouth every 6 hours as needed for moderate pain (Use up to every 6 hours as needed for pain, alternate with tylenol)     sildenafil (REVATIO) 20 MG tablet 1-4 tablets 30 minutes to 2 hours before intercourse. Maximum 100 mg in 24 hours.     sodium chloride (OCEAN) 0.65 % nasal spray Spray 2 sprays in nostril every 4 hours     Current Facility-Administered Medications   Medication     flumazenil (ROMAZICON) injection 0.2 mg     lidocaine (LMX4) kit     lidocaine 1 % 0.1-1 mL     naloxone (NARCAN) injection 0.2 mg     naloxone (NARCAN) injection 0.2 mg     naloxone (NARCAN) injection 0.4 mg     naloxone (NARCAN) injection 0.4 mg     ondansetron (ZOFRAN ODT) ODT tab 4 mg    Or     ondansetron (ZOFRAN) injection 4 mg     ondansetron (ZOFRAN) injection 4 mg     prochlorperazine (COMPAZINE) injection 10 mg    Or     prochlorperazine (COMPAZINE) tablet 10 mg     sodium chloride (PF) 0.9% PF flush 3 mL     sodium chloride (PF) 0.9% PF flush 3 mL             Allergies:      Allergies   Allergen Reactions     Augmentin Rash     Allergy to Latex? No  Allergy to tape?   No  Intolerances:             Physical Exam:   All vitals have been reviewed  Patient Vitals for the past 8 hrs:   BP Temp Temp src Pulse Resp SpO2   03/14/23 1300 (!) 129/97 97  F (36.1  C) Temporal 57 16 93 %     No intake/output data recorded.  Lungs:   No increased work of breathing, good air exchange, clear to auscultation bilaterally, no crackles or wheezing      Cardiovascular:   normal S1 and S2             Lab / Radiology Results:            Anesthetic risk and/or ASA classification:   2    Beth Ahumada DO

## 2023-03-24 ENCOUNTER — ANCILLARY PROCEDURE (OUTPATIENT)
Dept: GENERAL RADIOLOGY | Facility: CLINIC | Age: 43
End: 2023-03-24
Attending: INTERNAL MEDICINE
Payer: COMMERCIAL

## 2023-03-24 DIAGNOSIS — T18.9XXD FOREIGN BODY IN DIGESTIVE TRACT, SUBSEQUENT ENCOUNTER: ICD-10-CM

## 2023-03-24 PROCEDURE — 74019 RADEX ABDOMEN 2 VIEWS: CPT | Mod: TC | Performed by: RADIOLOGY

## 2023-03-27 DIAGNOSIS — T18.9XXD FOREIGN BODY IN DIGESTIVE TRACT, SUBSEQUENT ENCOUNTER: Primary | ICD-10-CM

## 2023-04-04 DIAGNOSIS — J45.40 UNCONTROLLED MODERATE PERSISTENT ASTHMA: ICD-10-CM

## 2023-04-04 RX ORDER — ALBUTEROL SULFATE 0.83 MG/ML
2.5 SOLUTION RESPIRATORY (INHALATION) EVERY 4 HOURS PRN
Qty: 360 ML | Refills: 0 | OUTPATIENT
Start: 2023-04-04

## 2023-04-18 ENCOUNTER — TELEPHONE (OUTPATIENT)
Dept: ALLERGY | Facility: CLINIC | Age: 43
End: 2023-04-18
Payer: COMMERCIAL

## 2023-04-18 DIAGNOSIS — J45.40 UNCONTROLLED MODERATE PERSISTENT ASTHMA: ICD-10-CM

## 2023-04-18 NOTE — TELEPHONE ENCOUNTER
Medication Question or Refill        What medication are you calling about (include dose and sig)?: albuterol    Preferred Pharmacy:   [facility] - [facility] AT Portal to [facility]  [place] [address]  Phone: [phone] Fax: [phone]          Controlled Substance Agreement on file:   CSA -- Patient Level:    CSA: None found at the patient level.       Who prescribed the medication?: tory    Do you need a refill? Yes    When did you use the medication last? n/a    Patient offered an appointment? No    Do you have any questions or concerns?  No      Could we send this information to you in [employer] or would you prefer to receive a phone call?:   No preference   Okay to leave a detailed message?: No at Other phone number: Pharmacy does not need call back

## 2023-04-19 ENCOUNTER — MYC MEDICAL ADVICE (OUTPATIENT)
Dept: ALLERGY | Facility: CLINIC | Age: 43
End: 2023-04-19
Payer: COMMERCIAL

## 2023-04-19 DIAGNOSIS — J45.40 UNCONTROLLED MODERATE PERSISTENT ASTHMA: ICD-10-CM

## 2023-04-19 NOTE — TELEPHONE ENCOUNTER
[employer] message sent to patient to verify if albuterol refill requested is for the inhaler or the nebulizer solution.  Carmen BECERRA MA

## 2023-04-20 ENCOUNTER — PATIENT OUTREACH (OUTPATIENT)
Dept: CARE COORDINATION | Facility: CLINIC | Age: 43
End: 2023-04-20
Payer: COMMERCIAL

## 2023-04-27 RX ORDER — ALBUTEROL SULFATE 0.83 MG/ML
2.5 SOLUTION RESPIRATORY (INHALATION) EVERY 4 HOURS PRN
Qty: 360 ML | Refills: 0 | Status: CANCELLED | OUTPATIENT
Start: 2023-04-27

## 2023-04-28 ENCOUNTER — OFFICE VISIT (OUTPATIENT)
Dept: PLASTIC SURGERY | Facility: CLINIC | Age: 43
End: 2023-04-28

## 2023-04-28 ENCOUNTER — ANCILLARY PROCEDURE (OUTPATIENT)
Dept: GENERAL RADIOLOGY | Facility: CLINIC | Age: 43
End: 2023-04-28
Attending: INTERNAL MEDICINE
Payer: COMMERCIAL

## 2023-04-28 DIAGNOSIS — Z41.1 ENCOUNTER FOR COSMETIC PROCEDURE: Primary | ICD-10-CM

## 2023-04-28 DIAGNOSIS — T18.9XXD FOREIGN BODY IN DIGESTIVE TRACT, SUBSEQUENT ENCOUNTER: ICD-10-CM

## 2023-04-28 PROCEDURE — 74019 RADEX ABDOMEN 2 VIEWS: CPT | Mod: TC | Performed by: RADIOLOGY

## 2023-04-28 NOTE — LETTER
April 28, 2023  Re: Keith M Aníbal  1980    Dear Dr. Nayak,    Thank you so much for referring Keith Spangler to the [facility]. I had the pleasure of visiting with Keith today.     Attached you will find a copy of my note. Please feel free to reach out to me with any questions, [phone].     Facial Plastic and Reconstructive Surgery    Procedure: Chemodenervation with Botulinum Toxin A  Indication: Undesirable Dynamic Rhytids  Injector: Becki Johnson MD      Informed consent was obtained.  The skin was cleaned with antimicrobial solution and a topical ice was placed.     The patient was asked to systematically engage the muscles in the area to be injected. The tuberculin needles were used for subdermal injection and hemostasis was obtained with light digital pressure when needed. The skin was cleaned.     A total of 30 units were injected.  Botulinum toxin A type: Botox    Please see procedure log.    The patient tolerated the procedure well and there were no complications. Post procedure care instructions were given to the patient.                Your trust in our practice and care is much appreciated.    Sincerely,    Becki Johnson MD

## 2023-04-28 NOTE — LETTER
4/28/2023       RE: Keith Spangler  [address] [facility] [address]       Dear Colleague,    Thank you for referring your patient, Keith Spangler, to the [facility]. Please see a copy of my visit note below.    Facial Plastic and Reconstructive Surgery    Procedure: Chemodenervation with Botulinum Toxin A  Indication: Undesirable Dynamic Rhytids  Injector: Becki Johnson MD    Informed consent was obtained.  The skin was cleaned with antimicrobial solution and a topical ice was placed.     The patient was asked to systematically engage the muscles in the area to be injected. The tuberculin needles were used for subdermal injection and hemostasis was obtained with light digital pressure when needed. The skin was cleaned.     A total of 30 units were injected.  Botulinum toxin A type: Botox    Please see procedure log.    The patient tolerated the procedure well and there were no complications. Post procedure care instructions were given to the patient.    Again, thank you for allowing me to participate in the care of your patient.      Sincerely,    Becki Johnson MD

## 2023-04-28 NOTE — PROGRESS NOTES
Facial Plastic and Reconstructive Surgery    Procedure: Chemodenervation with Botulinum Toxin A  Indication: Undesirable Dynamic Rhytids  Injector: Becki Johnson MD      Informed consent was obtained.  The skin was cleaned with antimicrobial solution and a topical ice was placed.     The patient was asked to systematically engage the muscles in the area to be injected. The tuberculin needles were used for subdermal injection and hemostasis was obtained with light digital pressure when needed. The skin was cleaned.     A total of 30 units were injected.  Botulinum toxin A type: Botox    Please see procedure log.    The patient tolerated the procedure well and there were no complications. Post procedure care instructions were given to the patient.

## 2023-05-01 DIAGNOSIS — T18.9XXD FOREIGN BODY IN DIGESTIVE TRACT, SUBSEQUENT ENCOUNTER: Primary | ICD-10-CM

## 2023-05-03 ENCOUNTER — ANCILLARY PROCEDURE (OUTPATIENT)
Dept: CT IMAGING | Facility: CLINIC | Age: 43
End: 2023-05-03
Attending: INTERNAL MEDICINE
Payer: COMMERCIAL

## 2023-05-03 DIAGNOSIS — T18.9XXD FOREIGN BODY IN DIGESTIVE TRACT, SUBSEQUENT ENCOUNTER: Primary | ICD-10-CM

## 2023-05-03 DIAGNOSIS — T18.9XXD FOREIGN BODY IN DIGESTIVE TRACT, SUBSEQUENT ENCOUNTER: ICD-10-CM

## 2023-05-03 PROCEDURE — 74176 CT ABD & PELVIS W/O CONTRAST: CPT | Mod: GC | Performed by: RADIOLOGY

## 2023-05-08 ENCOUNTER — OFFICE VISIT (OUTPATIENT)
Dept: ALLERGY | Facility: CLINIC | Age: 43
End: 2023-05-08
Payer: COMMERCIAL

## 2023-05-08 VITALS — OXYGEN SATURATION: 96 % | SYSTOLIC BLOOD PRESSURE: 124 MMHG | HEART RATE: 67 BPM | DIASTOLIC BLOOD PRESSURE: 72 MMHG

## 2023-05-08 DIAGNOSIS — J45.40 MODERATE PERSISTENT ASTHMA WITHOUT COMPLICATION: Primary | ICD-10-CM

## 2023-05-08 LAB
FEF 25/75: NORMAL
FEV-1: NORMAL
FEV1/FVC: NORMAL
FVC: NORMAL

## 2023-05-08 PROCEDURE — 99213 OFFICE O/P EST LOW 20 MIN: CPT | Mod: 25 | Performed by: ALLERGY & IMMUNOLOGY

## 2023-05-08 PROCEDURE — 94010 BREATHING CAPACITY TEST: CPT | Performed by: ALLERGY & IMMUNOLOGY

## 2023-05-08 RX ORDER — FLUTICASONE FUROATE AND VILANTEROL 200; 25 UG/1; UG/1
1 POWDER RESPIRATORY (INHALATION) DAILY
Qty: 3 EACH | Refills: 1 | Status: SHIPPED | OUTPATIENT
Start: 2023-05-08 | End: 2023-11-09

## 2023-05-08 RX ORDER — ALBUTEROL SULFATE 0.83 MG/ML
2.5 SOLUTION RESPIRATORY (INHALATION) EVERY 4 HOURS PRN
Qty: 360 ML | Refills: 0 | Status: SHIPPED | OUTPATIENT
Start: 2023-05-08 | End: 2023-06-21

## 2023-05-08 ASSESSMENT — ENCOUNTER SYMPTOMS
ADENOPATHY: 0
NAUSEA: 0
VOMITING: 0
SHORTNESS OF BREATH: 0
EYE ITCHING: 0
ACTIVITY CHANGE: 0
DIARRHEA: 0
FEVER: 0
MYALGIAS: 0
FATIGUE: 0
EYE REDNESS: 0
COUGH: 0
RHINORRHEA: 0
JOINT SWELLING: 0
CHEST TIGHTNESS: 0
HEADACHES: 0
ARTHRALGIAS: 0
SINUS PRESSURE: 0
EYE DISCHARGE: 0
WHEEZING: 0
FACIAL SWELLING: 0

## 2023-05-08 ASSESSMENT — ASTHMA QUESTIONNAIRES: ACT_TOTALSCORE: 21

## 2023-05-08 NOTE — PROGRESS NOTES
Keith Spangler was seen in the Allergy Clinic at [facility].      Keith Spangler is a 42 year old  or  male who is seen today for a follow-up visit.    Taking Breo every morning. Using albuterol about twice per week. Feeling better now that winter is over. Has also been running and not having difficulty with exercise.    Past Medical History:   Diagnosis Date     COPD (chronic obstructive pulmonary disease) (H)      NO ACTIVE PROBLEMS      Uncomplicated asthma Sept 2014    Intermittent asthma     Family History   Problem Relation Age of Onset     Unknown/Adopted Mother         Diagnosed w/ALS June 2015     Sleep Apnea Brother      Gastrointestinal Disease Brother         colitis     C.A.D. No family hx of      Diabetes No family hx of      Hypertension No family hx of      Cerebrovascular Disease No family hx of      Breast Cancer No family hx of      Cancer - colorectal No family hx of      Prostate Cancer No family hx of      Asthma No family hx of      Social History     Tobacco Use     Smoking status: Never     Smokeless tobacco: Never     Tobacco comments:     Mom smoked his upbringing   Vaping Use     Vaping status: Never Used     Passive vaping exposure: Yes   Substance Use Topics     Alcohol use: Yes     Comment: Really rare now      Drug use: No     Social History     Social History Narrative    Dairy/d 1-3 servings/d.     Caffeine 1 servings/d    Exercise 5 x week boxer    Sunscreen used - No    Seatbelts used - Yes    Working smoke/CO detectors in the home - Yes    Guns stored in the home - No    Self Breast Exams - NOT APPLICABLE    Self Testicular Exam - Yes    Eye Exam up to date - Yes    Dental Exam up to date - Yes    Pap Smear up to date - NOT APPLICABLE    Mammogram up to date - NOT APPLICABLE    PSA up to date - NO    Dexa Scan up to date - NOT APPLICABLE    Flex Sig / Colonoscopy up to date - Yes    Immunizations up to date - Yes    Abuse: Current or  Past(Physical, Sexual or Emotional)- No    Do you feel safe in your environment - Yes           Past medical, family, and social history were reviewed.    Review of Systems   Constitutional: Negative for activity change, fatigue and fever.   HENT: Negative for congestion, dental problem, ear pain, facial swelling, nosebleeds, postnasal drip, rhinorrhea, sinus pressure and sneezing.    Eyes: Negative for discharge, redness and itching.   Respiratory: Negative for cough, chest tightness, shortness of breath and wheezing.    Cardiovascular: Negative for chest pain.   Gastrointestinal: Negative for diarrhea, nausea and vomiting.   Musculoskeletal: Negative for arthralgias, joint swelling and myalgias.   Skin: Negative for rash.   Neurological: Negative for headaches.   Hematological: Negative for adenopathy.   Psychiatric/Behavioral: Negative for behavioral problems and self-injury.         Current Outpatient Medications:      acetaminophen (TYLENOL) 325 MG tablet, Take 2 tablets (650 mg) by mouth every 4 hours as needed for mild pain, Disp: 50 tablet, Rfl: 0     albuterol (PROAIR HFA/PROVENTIL HFA/VENTOLIN HFA) 108 (90 Base) MCG/ACT inhaler, Inhale 2 puffs into the lungs every 4 hours as needed for shortness of breath, wheezing or cough, Disp: 18 g, Rfl: 2     albuterol (PROVENTIL) (2.5 MG/3ML) 0.083% neb solution, Take 1 vial (2.5 mg) by nebulization every 4 hours as needed for shortness of breath, wheezing or cough, Disp: 360 mL, Rfl: 0     Aspirin-Acetaminophen-Caffeine (EXCEDRIN PO), Take by mouth as needed , Disp: , Rfl:      fluticasone (FLONASE) 50 MCG/ACT nasal spray, USE 2 SPRAYS IN EACH       NOSTRIL DAILY, Disp: 48 g, Rfl: 3     fluticasone-vilanterol (BREO ELLIPTA) 200-25 MCG/ACT inhaler, Inhale 1 puff into the lungs daily, Disp: 3 each, Rfl: 1     ibuprofen (ADVIL/MOTRIN) 200 MG tablet, Take 1 tablet (200 mg) by mouth every 6 hours as needed for moderate pain (Use up to every 6 hours as needed for pain,  alternate with tylenol), Disp: 50 tablet, Rfl: 0     sildenafil (REVATIO) 20 MG tablet, 1-4 tablets 30 minutes to 2 hours before intercourse. Maximum 100 mg in 24 hours., Disp: 30 tablet, Rfl: 3     sodium chloride (OCEAN) 0.65 % nasal spray, Spray 2 sprays in nostril every 4 hours, Disp: 30 mL, Rfl: 3  Allergies   Allergen Reactions     Amoxicillin-Pot Clavulanate Rash       EXAM:   /72 (BP Location: Left arm, Patient Position: Sitting, Cuff Size: Adult Regular)   Pulse 67   SpO2 96%   Physical Exam  Vitals and nursing note reviewed.   Constitutional:       Appearance: Normal appearance.   HENT:      Head: Normocephalic and atraumatic.      Right Ear: External ear normal.      Left Ear: External ear normal.      Nose: No mucosal edema or rhinorrhea.      Mouth/Throat:      Mouth: Mucous membranes are moist. No oral lesions.      Pharynx: Oropharynx is clear. Uvula midline. No posterior oropharyngeal erythema.   Eyes:      General: Lids are normal.      Extraocular Movements: Extraocular movements intact.      Conjunctiva/sclera: Conjunctivae normal.   Neck:      Comments: No asymmetry, masses, or scars  Cardiovascular:      Rate and Rhythm: Normal rate and regular rhythm.      Heart sounds: Normal heart sounds, S1 normal and S2 normal.   Pulmonary:      Effort: Pulmonary effort is normal. No respiratory distress.      Breath sounds: Normal breath sounds and air entry.   Musculoskeletal:      Comments: No musculoskeletal defects appreciated   Skin:     General: Skin is warm and dry.      Findings: No lesion or rash.   Neurological:      General: No focal deficit present.      Mental Status: He is alert.   Psychiatric:         Mood and Affect: Mood and affect normal.           WORKUP:  Spirometry    SPIROMETRY       FVC 4.12L (84% of predicted).     FEV1 3.53L (91% of predicted).     FEV1/FVC 86%      I have reviewed and interpreted these results. Testing meets criteria for acceptability and reproducibility.  Values are consistent with normal lung function.    Asthma Control Test (ACT) total score: 21     ASSESSMENT/PLAN:  Keith Spangler is a 42 year old male here for a follow-up visit.    1. Moderate persistent asthma without complication - Improved from his last visit. Using albuterol once or twice per week and continues to take Breo daily. No exacerbations or steroids since last January. Spirometry obtained today is normal.    - Spirometry, Breathing Capacity: Normal Order, Clinic Performed  - albuterol (PROVENTIL) (2.5 MG/3ML) 0.083% neb solution; Take 1 vial (2.5 mg) by nebulization every 4 hours as needed for shortness of breath, wheezing or cough  Dispense: 360 mL; Refill: 0  - fluticasone-vilanterol (BREO ELLIPTA) 200-25 MCG/ACT inhaler; Inhale 1 puff into the lungs daily  Dispense: 3 each; Refill: 1      Follow-up in 6 months, sooner if needed      Thank you for allowing me to participate in the care of Keith Spangler.      Andrei Kyle MD, FAAAAI  Allergy/Immunology  [facility]      Chart documentation done in part with Dragon Voice Recognition Software. Although reviewed after completion, some word and grammatical errors may remain.

## 2023-05-08 NOTE — LETTER
5/8/2023         RE: Keith Spangler  [address] [facility] [address]        Dear Colleague,    Thank you for referring your patient, Keith Spangler, to the [facility]. Please see a copy of my visit note below.    Keith Spangler was seen in the Allergy Clinic at [facility].      Keith Spangler is a 42 year old  or  male who is seen today for a follow-up visit.    Taking Breo every morning. Using albuterol about twice per week. Feeling better now that winter is over. Has also been running and not having difficulty with exercise.    Past Medical History:   Diagnosis Date     COPD (chronic obstructive pulmonary disease) (H)      NO ACTIVE PROBLEMS      Uncomplicated asthma Sept 2014    Intermittent asthma     Family History   Problem Relation Age of Onset     Unknown/Adopted Mother         Diagnosed w/ALS June 2015     Sleep Apnea Brother      Gastrointestinal Disease Brother         colitis     C.A.D. No family hx of      Diabetes No family hx of      Hypertension No family hx of      Cerebrovascular Disease No family hx of      Breast Cancer No family hx of      Cancer - colorectal No family hx of      Prostate Cancer No family hx of      Asthma No family hx of      Social History     Tobacco Use     Smoking status: Never     Smokeless tobacco: Never     Tobacco comments:     Mom smoked his upbringing   Vaping Use     Vaping status: Never Used     Passive vaping exposure: Yes   Substance Use Topics     Alcohol use: Yes     Comment: Really rare now      Drug use: No     Social History     Social History Narrative    Dairy/d 1-3 servings/d.     Caffeine 1 servings/d    Exercise 5 x week boxer    Sunscreen used - No    Seatbelts used - Yes    Working smoke/CO detectors in the home - Yes    Guns stored in the home - No    Self Breast Exams - NOT APPLICABLE    Self Testicular Exam - Yes    Eye Exam up to date - Yes    Dental Exam up to  date - Yes    Pap Smear up to date - NOT APPLICABLE    Mammogram up to date - NOT APPLICABLE    PSA up to date - NO    Dexa Scan up to date - NOT APPLICABLE    Flex Sig / Colonoscopy up to date - Yes    Immunizations up to date - Yes    Abuse: Current or Past(Physical, Sexual or Emotional)- No    Do you feel safe in your environment - Yes           Past medical, family, and social history were reviewed.    Review of Systems   Constitutional: Negative for activity change, fatigue and fever.   HENT: Negative for congestion, dental problem, ear pain, facial swelling, nosebleeds, postnasal drip, rhinorrhea, sinus pressure and sneezing.    Eyes: Negative for discharge, redness and itching.   Respiratory: Negative for cough, chest tightness, shortness of breath and wheezing.    Cardiovascular: Negative for chest pain.   Gastrointestinal: Negative for diarrhea, nausea and vomiting.   Musculoskeletal: Negative for arthralgias, joint swelling and myalgias.   Skin: Negative for rash.   Neurological: Negative for headaches.   Hematological: Negative for adenopathy.   Psychiatric/Behavioral: Negative for behavioral problems and self-injury.         Current Outpatient Medications:      acetaminophen (TYLENOL) 325 MG tablet, Take 2 tablets (650 mg) by mouth every 4 hours as needed for mild pain, Disp: 50 tablet, Rfl: 0     albuterol (PROAIR HFA/PROVENTIL HFA/VENTOLIN HFA) 108 (90 Base) MCG/ACT inhaler, Inhale 2 puffs into the lungs every 4 hours as needed for shortness of breath, wheezing or cough, Disp: 18 g, Rfl: 2     albuterol (PROVENTIL) (2.5 MG/3ML) 0.083% neb solution, Take 1 vial (2.5 mg) by nebulization every 4 hours as needed for shortness of breath, wheezing or cough, Disp: 360 mL, Rfl: 0     Aspirin-Acetaminophen-Caffeine (EXCEDRIN PO), Take by mouth as needed , Disp: , Rfl:      fluticasone (FLONASE) 50 MCG/ACT nasal spray, USE 2 SPRAYS IN EACH       NOSTRIL DAILY, Disp: 48 g, Rfl: 3     fluticasone-vilanterol (BREO  ELLIPTA) 200-25 MCG/ACT inhaler, Inhale 1 puff into the lungs daily, Disp: 3 each, Rfl: 1     ibuprofen (ADVIL/MOTRIN) 200 MG tablet, Take 1 tablet (200 mg) by mouth every 6 hours as needed for moderate pain (Use up to every 6 hours as needed for pain, alternate with tylenol), Disp: 50 tablet, Rfl: 0     sildenafil (REVATIO) 20 MG tablet, 1-4 tablets 30 minutes to 2 hours before intercourse. Maximum 100 mg in 24 hours., Disp: 30 tablet, Rfl: 3     sodium chloride (OCEAN) 0.65 % nasal spray, Spray 2 sprays in nostril every 4 hours, Disp: 30 mL, Rfl: 3  Allergies   Allergen Reactions     Amoxicillin-Pot Clavulanate Rash       EXAM:   /72 (BP Location: Left arm, Patient Position: Sitting, Cuff Size: Adult Regular)   Pulse 67   SpO2 96%   Physical Exam  Vitals and nursing note reviewed.   Constitutional:       Appearance: Normal appearance.   HENT:      Head: Normocephalic and atraumatic.      Right Ear: External ear normal.      Left Ear: External ear normal.      Nose: No mucosal edema or rhinorrhea.      Mouth/Throat:      Mouth: Mucous membranes are moist. No oral lesions.      Pharynx: Oropharynx is clear. Uvula midline. No posterior oropharyngeal erythema.   Eyes:      General: Lids are normal.      Extraocular Movements: Extraocular movements intact.      Conjunctiva/sclera: Conjunctivae normal.   Neck:      Comments: No asymmetry, masses, or scars  Cardiovascular:      Rate and Rhythm: Normal rate and regular rhythm.      Heart sounds: Normal heart sounds, S1 normal and S2 normal.   Pulmonary:      Effort: Pulmonary effort is normal. No respiratory distress.      Breath sounds: Normal breath sounds and air entry.   Musculoskeletal:      Comments: No musculoskeletal defects appreciated   Skin:     General: Skin is warm and dry.      Findings: No lesion or rash.   Neurological:      General: No focal deficit present.      Mental Status: He is alert.   Psychiatric:         Mood and Affect: Mood and affect  normal.           WORKUP:  Spirometry    SPIROMETRY       FVC 4.12L (84% of predicted).     FEV1 3.53L (91% of predicted).     FEV1/FVC 86%      I have reviewed and interpreted these results. Testing meets criteria for acceptability and reproducibility. Values are consistent with normal lung function.    Asthma Control Test (ACT) total score: 21     ASSESSMENT/PLAN:  Keith Spangler is a 42 year old male here for a follow-up visit.    1. Moderate persistent asthma without complication - Improved from his last visit. Using albuterol once or twice per week and continues to take Breo daily. No exacerbations or steroids since last January. Spirometry obtained today is normal.    - Spirometry, Breathing Capacity: Normal Order, Clinic Performed  - albuterol (PROVENTIL) (2.5 MG/3ML) 0.083% neb solution; Take 1 vial (2.5 mg) by nebulization every 4 hours as needed for shortness of breath, wheezing or cough  Dispense: 360 mL; Refill: 0  - fluticasone-vilanterol (BREO ELLIPTA) 200-25 MCG/ACT inhaler; Inhale 1 puff into the lungs daily  Dispense: 3 each; Refill: 1      Follow-up in 6 months, sooner if needed      Thank you for allowing me to participate in the care of Keith Spangler.      Andrei Kyle MD, FAAAAI  Allergy/Immunology  [facility]      Chart documentation done in part with Dragon Voice Recognition Software. Although reviewed after completion, some word and grammatical errors may remain.      Again, thank you for allowing me to participate in the care of your patient.        Sincerely,        Andrei Kyle MD

## 2023-05-18 ENCOUNTER — TELEPHONE (OUTPATIENT)
Dept: GASTROENTEROLOGY | Facility: CLINIC | Age: 43
End: 2023-05-18
Payer: COMMERCIAL

## 2023-05-18 NOTE — TELEPHONE ENCOUNTER
Call back to Dr. Chapman who is requesting that Dr. Ahumada call him to discuss a few things. Writer will forward this request to provider.    Edyta Rutledge RN

## 2023-05-18 NOTE — TELEPHONE ENCOUNTER
[facility] Call Center    Phone Message    May a detailed message be left on voicemail: yes     Reason for Call: Other: Dr. Harpreet Chapman is requesting team please reach out back out to discuss the foreign object. He states it is [employer]ma [employer] and is not megnetic so Pt would be able to have an MRI. Please reach out to discuss. Thank you!     Action Taken: Message routed to:  [facility] ([facility] GI    Travel Screening: Not Applicable

## 2023-05-19 ENCOUNTER — TELEPHONE (OUTPATIENT)
Dept: FAMILY MEDICINE | Facility: CLINIC | Age: 43
End: 2023-05-19

## 2023-05-19 ENCOUNTER — OFFICE VISIT (OUTPATIENT)
Dept: SURGERY | Facility: CLINIC | Age: 43
End: 2023-05-19
Payer: COMMERCIAL

## 2023-05-19 DIAGNOSIS — T18.9XXD FOREIGN BODY IN DIGESTIVE TRACT, SUBSEQUENT ENCOUNTER: ICD-10-CM

## 2023-05-19 PROCEDURE — 99243 OFF/OP CNSLTJ NEW/EST LOW 30: CPT | Performed by: SPECIALIST

## 2023-05-19 NOTE — LETTER
5/19/2023         RE: Keith Spangler  [address] [facility] [address]        Dear Colleague,    Thank you for referring your patient, Keith Spangler, to the [facility] [place]. Please see a copy of my visit note below.        [facility] Surgery Consult    HPI:    43 year old year old male who I have been consulted by Serina Monet for evaluation of Consult For (Foreign Object )  Swallowed a dental bar.  He is here today with his dentist showing the the actual device which is well.  He has had multiple CTs of last months time.  We did recently be involved with 3 pound weight extremities in the mesentery of the small bowel intraperitoneal.  We discussed next steps and what options he has.    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 Probably intermittent asthma. Use Flovent when flares.      CARDIOVASCULAR SCREENING; LDL GOAL LESS THAN 160 6/11/2010     COPD (chronic obstructive pulmonary disease) (H)      Deviated nasal septum 2/19/2021    Added automatically from request for surgery [id number]     History of closed fracture of nasal bones 4/6/2022     History of fracture of nose 2/19/2021    Added automatically from request for surgery [id number]     Moderate asthma 3/21/2017     Nasal deformity 4/16/2021     Nasal obstruction 2/17/2021     Nasal valve collapse 2/17/2021     NO ACTIVE PROBLEMS      Refractory obstruction of nasal airway 8/17/2020    Added automatically from request for surgery [id number]     Uncomplicated asthma Sept 2014    Intermittent asthma       Past Surgical History:   Procedure Laterality Date     APPENDECTOMY  Oct. 1991     COMBINED ESOPHAGOSCOPY, GASTROSCOPY, DUODENOSCOPY (EGD) WITH CO2 INSUFFLATION N/A 3/14/2023    Procedure: Combined Esophagoscopy, Gastroscopy, Duodenoscopy  (Egd) With Co2 Insufflation;  Surgeon: Beth Ahumada DO;  Location:  OR     EYE SURGERY  2013 and 2014    PRK laser correction     HERNIA REPAIR  2002     RHINOPLASTY N/A 10/27/2022    Procedure: Rhinoplasty with Percutaneous Osteotomies;  Surgeon: Becki Johnson MD;  [place] [facility] [place]     SEPTORHINOPLASTY N/A 8/19/2021    Procedure: RHINOSEPTOPLASTY, CADAVERIC RIB GRAFT,  CONCHAL CARTILAGE GRAFT;  Surgeon: Becki Johnson MD;  [place] [facility] [place]     SURGICAL HISTORY OF -       Thumb 2005-Pins placed      SURGICAL HISTORY OF -       ER visit, left upper eyelid laceration repair     SURGICAL HISTORY OF -       Septo/rhinoplasty of nose secondary to boxing injury     SURGICAL HISTORY OF -       Left hernia        CURRENT MEDS:  Current Outpatient Medications   Medication     acetaminophen (TYLENOL) 325 MG tablet     albuterol (PROAIR HFA/PROVENTIL HFA/VENTOLIN HFA) 108 (90 Base) MCG/ACT inhaler     albuterol (PROVENTIL) (2.5 MG/3ML) 0.083% neb solution     Aspirin-Acetaminophen-Caffeine (EXCEDRIN PO)     fluticasone (FLONASE) 50 MCG/ACT nasal spray     fluticasone-vilanterol (BREO ELLIPTA) 200-25 MCG/ACT inhaler     ibuprofen (ADVIL/MOTRIN) 200 MG tablet     sildenafil (REVATIO) 20 MG tablet     sodium chloride (OCEAN) 0.65 % nasal spray     No current facility-administered medications for this visit.       Family History   Problem Relation Age of Onset     Unknown/Adopted Mother         Diagnosed w/ALS June 2015     Sleep Apnea Brother      Gastrointestinal Disease Brother         colitis     C.A.D. No family hx of      Diabetes No family hx of      Hypertension No family hx of      Cerebrovascular Disease No family hx of      Breast Cancer No family hx of      Cancer - colorectal No family hx of      Prostate Cancer No family hx of      Asthma No family hx of         reports that he has never smoked. He has never used smokeless tobacco. He reports current alcohol use. He reports that he does not use  drugs.    Review of Systems:  Negative except continued issues with swallowing Faiser he states he has no symptoms of pain and inflammation seen normally and having no trouble standpoint.  No fevers or chills has not been septic or sick.Otherwise twelve system of review is negative.      OBJECTIVE:  There were no vitals filed for this visit.  There is no height or weight on file to calculate BMI.    EXAM:  GENERAL: This is a well-developed 43 year old male who appears his stated age  HEAD: normocephalic  HEENT: Pupils equal and reactive bilaterally  MOUTH: mucus membranes intact  CARDIAC: RRR without murmur  CHEST/LUNG:  Clear to auscultation  ABDOMEN: Soft, nontender, nondistended, no masses  EXTREMITIES: Grossly normal, warm,   NEUROLOGIC: Focally intact, nonfocal  INTEGUMENT: No open lesions or ulcers  VASCULAR: Pulses intact, symmetrical upper and lower extremities.    LABS:  Lab Results   Component Value Date    WBC 16.3 09/09/2019    HGB 16.0 09/09/2019    HCT 46.2 09/09/2019    MCV 89 09/09/2019     09/09/2019       Images:     Reviewed the CT images with the patient today to discuss results JointFlex the retroperitoneal extra bowel metal object.    Exam Information    Exam Date Exam Time Accession # Performing Department Results    5/3/23  7:54 AM [id number] [facility]      PACS Images     Show images for CT Abdomen Pelvis w/o Contrast     Study Result    Narrative & Impression   EXAMINATION: CT ABDOMEN PELVIS W/O CONTRAST, 5/3/2023 7:54 AM     TECHNIQUE:  Helical CT images from the lung bases through the pubic  symphysis were obtained  without IV contrast.      COMPARISON: XR abdomen 4/28/2022      HISTORY: Foreign body in digestive tract, subsequent encounter     FINDINGS:     Abdomen and pelvis: A linear metallic density foreign body is seen  extending outside the small bowel loops best seen in series 5, image  59. It appears to be completely extruded from the  bowel lumen lying in  the mesentery. This is consistent with known history of metallic  foreign body being swallowed (lingual bar). This seems to have exited  the bowel is now within the central mesenteric fat with minimal  adjacent fat stranding. There is no definite fluid collection or  evidence of free air to suggest ongoing bowel perforation.     The stomach appears normal. Remaining visualized small and large bowel  loops appear normal.  There is no definite mesenteric lymphadenopathy.  Liver appears normal. No abnormal lesion or ductal dilatation seen on  noncontrast CT scan. Gallbladder appears unremarkable.  The pancreas and spleen appear normal on noncontrast exam. Calcified  granuloma incidentally noted.  No evidence of hydronephrosis or renal calculi.  Visualized abdominal vasculature appears unremarkable on noncontrast  exam.     Lung bases:  Both lung bases appear normal. No pleural effusion.     Bones and soft tissues: The visualized bones and soft tissues are  normal.                                                                      IMPRESSION: Metallic foreign body appears to be extruded from the  small bowel loops with minimal adjacent fat stranding. No fluid  collection or evidence of free air. No other abnormality noted.      I have personally reviewed the examination and initial interpretation  and I agree with the findings.     HUY MURGUIA MD             Assessment/Plan:   Swallowed a foreign object dental bridge    Follow-up CT looks to be extraperitoneal.  Patient had an upper scope with only the stomach not at the small bowel.  This would like secondary to portion of the duodenum.    I recommend a scope by Dr. Minaya with upper endoscopy to get into secondary for surgery and seen in any for body or reaction or need to be done.  If this is -1 to consider laparoscopy to peak also initiated he has no reaction abdomen CT scan was explored itself over the bowel and probably centimeter at  this point.  But I would probably offer to him at this point laparoscopy if the endoscopy is negative.  I do not know recommend for retroperitoneal exploration there is in part report and vital structures in the area.        No follow-ups on file.    Eleno Coronado MD  General Surgery [phone]  Vascular Surgery [phone]          Again, thank you for allowing me to participate in the care of your patient.        Sincerely,        Eleno Coronado MD

## 2023-05-19 NOTE — TELEPHONE ENCOUNTER
Reason for Call:  Other appointment    Detailed comments: patient called and would like to schedule a physical with Chiki Price at [facility] in June.    Insurance expires June 30.    Please contact patient.  Thank you.    Phone Number Patient can be reached at: Cell number on file:    Telephone Information:   Mobile [phone]       Best Time: any    Can we leave a detailed message on this number? YES    Call taken on 5/19/2023 at 3:32 PM by Nan Skinner

## 2023-05-22 DIAGNOSIS — J45.40 MODERATE PERSISTENT ASTHMA WITHOUT COMPLICATION: ICD-10-CM

## 2023-05-22 RX ORDER — FLUTICASONE FUROATE AND VILANTEROL 200; 25 UG/1; UG/1
1 POWDER RESPIRATORY (INHALATION) DAILY
Qty: 3 EACH | Refills: 1 | OUTPATIENT
Start: 2023-05-22

## 2023-05-22 NOTE — TELEPHONE ENCOUNTER
Refused Prescriptions:                       Disp   Refills    fluticasone-vilanterol (BREO ELLIPTA) 200-*3 each 1        Sig: Inhale 1 puff into the lungs daily  Refused By: CARLA DOSS  Reason for Refusal: Should already have refills on file    90 day supply with 1 refill sent 5/8/23.    Carla Doss, BSN, RN

## 2023-05-22 NOTE — TELEPHONE ENCOUNTER
Pending Prescriptions:                       Disp   Refills    fluticasone-vilanterol (BREO ELLIPTA) 200*3 each 1            Sig: Inhale 1 puff into the lungs daily

## 2023-05-25 ENCOUNTER — TRANSFERRED RECORDS (OUTPATIENT)
Dept: HEALTH INFORMATION MANAGEMENT | Facility: CLINIC | Age: 43
End: 2023-05-25
Payer: COMMERCIAL

## 2023-06-11 ENCOUNTER — HEALTH MAINTENANCE LETTER (OUTPATIENT)
Age: 43
End: 2023-06-11

## 2023-06-12 NOTE — PROGRESS NOTES
[facility] Surgery Consult    HPI:    43 year old year old male who I have been consulted by Serina Monet for evaluation of Consult For (Foreign Object )  Swallowed a dental bar.  He is here today with his dentist showing the the actual device which is well.  He has had multiple CTs of last months time.  We did recently be involved with 3 pound weight extremities in the mesentery of the small bowel intraperitoneal.  We discussed next steps and what options he has.    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 Probably intermittent asthma. Use Flovent when flares.      CARDIOVASCULAR SCREENING; LDL GOAL LESS THAN 160 6/11/2010     COPD (chronic obstructive pulmonary disease) (H)      Deviated nasal septum 2/19/2021    Added automatically from request for surgery [id number]     History of closed fracture of nasal bones 4/6/2022     History of fracture of nose 2/19/2021    Added automatically from request for surgery [id number]     Moderate asthma 3/21/2017     Nasal deformity 4/16/2021     Nasal obstruction 2/17/2021     Nasal valve collapse 2/17/2021     NO ACTIVE PROBLEMS      Refractory obstruction of nasal airway 8/17/2020    Added automatically from request for surgery [id number]     Uncomplicated asthma Sept 2014    Intermittent asthma       Past Surgical History:   Procedure Laterality Date     APPENDECTOMY  Oct. 1991     COMBINED ESOPHAGOSCOPY, GASTROSCOPY, DUODENOSCOPY (EGD) WITH CO2 INSUFFLATION N/A 3/14/2023    Procedure: Combined Esophagoscopy, Gastroscopy, Duodenoscopy (Egd) With Co2 Insufflation;  Surgeon: Beth Ahumada DO;  Location:  OR     EYE SURGERY  2013 and 2014    PRK laser correction     HERNIA REPAIR  2002     RHINOPLASTY N/A 10/27/2022    Procedure: Rhinoplasty with Percutaneous Osteotomies;  Surgeon: Becki Johnson MD;  Location:  [facility] OR     SEPTORHINOPLASTY N/A 8/19/2021    Procedure: RHINOSEPTOPLASTY, CADAVERIC RIB GRAFT,  CONCHAL CARTILAGE GRAFT;  Surgeon: Becki Johnson MD;  Location: [facility] OR     SURGICAL HISTORY OF -       Thumb 2005-Pins placed      SURGICAL HISTORY OF -       ER visit, left upper eyelid laceration repair     SURGICAL HISTORY OF -       Septo/rhinoplasty of nose secondary to boxing injury     SURGICAL HISTORY OF -       Left hernia        CURRENT MEDS:  Current Outpatient Medications   Medication     acetaminophen (TYLENOL) 325 MG tablet     albuterol (PROAIR HFA/PROVENTIL HFA/VENTOLIN HFA) 108 (90 Base) MCG/ACT inhaler     albuterol (PROVENTIL) (2.5 MG/3ML) 0.083% neb solution     Aspirin-Acetaminophen-Caffeine (EXCEDRIN PO)     fluticasone (FLONASE) 50 MCG/ACT nasal spray     fluticasone-vilanterol (BREO ELLIPTA) 200-25 MCG/ACT inhaler     ibuprofen (ADVIL/MOTRIN) 200 MG tablet     sildenafil (REVATIO) 20 MG tablet     sodium chloride (OCEAN) 0.65 % nasal spray     No current facility-administered medications for this visit.       Family History   Problem Relation Age of Onset     Unknown/Adopted Mother         Diagnosed w/ALS June 2015     Sleep Apnea Brother      Gastrointestinal Disease Brother         colitis     C.A.D. No family hx of      Diabetes No family hx of      Hypertension No family hx of      Cerebrovascular Disease No family hx of      Breast Cancer No family hx of      Cancer - colorectal No family hx of      Prostate Cancer No family hx of      Asthma No family hx of         reports that he has never smoked. He has never used smokeless tobacco. He reports current alcohol use. He reports that he does not use drugs.    Review of Systems:  Negative except continued issues with swallowing Faiser he states he has no symptoms of pain and inflammation seen normally and having no trouble standpoint.  No fevers or chills has not been septic or sick.Otherwise twelve system of review is  negative.      OBJECTIVE:  There were no vitals filed for this visit.  There is no height or weight on file to calculate BMI.    EXAM:  GENERAL: This is a well-developed 43 year old male who appears his stated age  HEAD: normocephalic  HEENT: Pupils equal and reactive bilaterally  MOUTH: mucus membranes intact  CARDIAC: RRR without murmur  CHEST/LUNG:  Clear to auscultation  ABDOMEN: Soft, nontender, nondistended, no masses  EXTREMITIES: Grossly normal, warm,   NEUROLOGIC: Focally intact, nonfocal  INTEGUMENT: No open lesions or ulcers  VASCULAR: Pulses intact, symmetrical upper and lower extremities.    LABS:  Lab Results   Component Value Date    WBC 16.3 09/09/2019    HGB 16.0 09/09/2019    HCT 46.2 09/09/2019    MCV 89 09/09/2019     09/09/2019       Images:     Reviewed the CT images with the patient today to discuss results JointFlex the retroperitoneal extra bowel metal object.    Exam Information    Exam Date Exam Time Accession # Performing Department Results    5/3/23  7:54 AM [id number] [facility]      PACS Images     Show images for CT Abdomen Pelvis w/o Contrast     Study Result    Narrative & Impression   EXAMINATION: CT ABDOMEN PELVIS W/O CONTRAST, 5/3/2023 7:54 AM     TECHNIQUE:  Helical CT images from the lung bases through the pubic  symphysis were obtained  without IV contrast.      COMPARISON: XR abdomen 4/28/2022      HISTORY: Foreign body in digestive tract, subsequent encounter     FINDINGS:     Abdomen and pelvis: A linear metallic density foreign body is seen  extending outside the small bowel loops best seen in series 5, image  59. It appears to be completely extruded from the bowel lumen lying in  the mesentery. This is consistent with known history of metallic  foreign body being swallowed (lingual bar). This seems to have exited  the bowel is now within the central mesenteric fat with minimal  adjacent fat stranding. There is no definite fluid  collection or  evidence of free air to suggest ongoing bowel perforation.     The stomach appears normal. Remaining visualized small and large bowel  loops appear normal.  There is no definite mesenteric lymphadenopathy.  Liver appears normal. No abnormal lesion or ductal dilatation seen on  noncontrast CT scan. Gallbladder appears unremarkable.  The pancreas and spleen appear normal on noncontrast exam. Calcified  granuloma incidentally noted.  No evidence of hydronephrosis or renal calculi.  Visualized abdominal vasculature appears unremarkable on noncontrast  exam.     Lung bases:  Both lung bases appear normal. No pleural effusion.     Bones and soft tissues: The visualized bones and soft tissues are  normal.                                                                      IMPRESSION: Metallic foreign body appears to be extruded from the  small bowel loops with minimal adjacent fat stranding. No fluid  collection or evidence of free air. No other abnormality noted.      I have personally reviewed the examination and initial interpretation  and I agree with the findings.     HUY MURGUIA MD             Assessment/Plan:   Swallowed a foreign object dental bridge    Follow-up CT looks to be extraperitoneal.  Patient had an upper scope with only the stomach not at the small bowel.  This would like secondary to portion of the duodenum.    I recommend a scope by Dr. Minaya with upper endoscopy to get into secondary for surgery and seen in any for body or reaction or need to be done.  If this is -1 to consider laparoscopy to peak also initiated he has no reaction abdomen CT scan was explored itself over the bowel and probably centimeter at this point.  But I would probably offer to him at this point laparoscopy if the endoscopy is negative.  I do not know recommend for retroperitoneal exploration there is in part report and vital structures in the area.        No follow-ups on file.    Eleno WILSON  MD Ivonne  General Surgery [phone]  Vascular Surgery [phone]

## 2023-06-21 ENCOUNTER — OFFICE VISIT (OUTPATIENT)
Dept: FAMILY MEDICINE | Facility: CLINIC | Age: 43
End: 2023-06-21
Payer: COMMERCIAL

## 2023-06-21 VITALS
OXYGEN SATURATION: 100 % | SYSTOLIC BLOOD PRESSURE: 130 MMHG | TEMPERATURE: 98.2 F | WEIGHT: 180 LBS | HEIGHT: 68 IN | DIASTOLIC BLOOD PRESSURE: 82 MMHG | BODY MASS INDEX: 27.28 KG/M2 | HEART RATE: 54 BPM | RESPIRATION RATE: 20 BRPM

## 2023-06-21 DIAGNOSIS — J45.40 MODERATE PERSISTENT ASTHMA WITHOUT COMPLICATION: ICD-10-CM

## 2023-06-21 DIAGNOSIS — Z00.00 ROUTINE GENERAL MEDICAL EXAMINATION AT A HEALTH CARE FACILITY: Primary | ICD-10-CM

## 2023-06-21 DIAGNOSIS — N52.9 ERECTILE DYSFUNCTION, UNSPECIFIED ERECTILE DYSFUNCTION TYPE: ICD-10-CM

## 2023-06-21 LAB
CHOLEST SERPL-MCNC: 228 MG/DL
HDLC SERPL-MCNC: 58 MG/DL
LDLC SERPL CALC-MCNC: 153 MG/DL
NONHDLC SERPL-MCNC: 170 MG/DL
TRIGL SERPL-MCNC: 83 MG/DL

## 2023-06-21 PROCEDURE — 99396 PREV VISIT EST AGE 40-64: CPT | Performed by: PREVENTIVE MEDICINE

## 2023-06-21 PROCEDURE — 80061 LIPID PANEL: CPT | Performed by: PREVENTIVE MEDICINE

## 2023-06-21 PROCEDURE — 36415 COLL VENOUS BLD VENIPUNCTURE: CPT | Performed by: PREVENTIVE MEDICINE

## 2023-06-21 PROCEDURE — 80048 BASIC METABOLIC PNL TOTAL CA: CPT | Performed by: PREVENTIVE MEDICINE

## 2023-06-21 RX ORDER — SILDENAFIL CITRATE 20 MG/1
TABLET ORAL
Qty: 30 TABLET | Refills: 3 | Status: SHIPPED | OUTPATIENT
Start: 2023-06-21 | End: 2023-08-22

## 2023-06-21 RX ORDER — ALBUTEROL SULFATE 0.83 MG/ML
2.5 SOLUTION RESPIRATORY (INHALATION) EVERY 4 HOURS PRN
Qty: 360 ML | Refills: 1 | Status: SHIPPED | OUTPATIENT
Start: 2023-06-21 | End: 2023-12-19

## 2023-06-21 ASSESSMENT — ENCOUNTER SYMPTOMS
SORE THROAT: 0
HEMATOCHEZIA: 0
PALPITATIONS: 0
MYALGIAS: 0
HEARTBURN: 0
WEAKNESS: 0
COUGH: 1
HEMATURIA: 0
DIZZINESS: 0
FREQUENCY: 0
HEADACHES: 0
CHILLS: 0
CONSTIPATION: 0
ABDOMINAL PAIN: 0
JOINT SWELLING: 0
FEVER: 0
ARTHRALGIAS: 0
EYE PAIN: 0
PARESTHESIAS: 0
NERVOUS/ANXIOUS: 0
NAUSEA: 0
SHORTNESS OF BREATH: 0
DYSURIA: 0
DIARRHEA: 0

## 2023-06-21 ASSESSMENT — PAIN SCALES - GENERAL: PAINLEVEL: NO PAIN (0)

## 2023-06-21 NOTE — PROGRESS NOTES
SUBJECTIVE:   CC: Keith is an 43 year old who presents for preventative health visit.       6/21/2023    12:48 PM   Additional Questions   Roomed by catrachita shelton   Accompanied by none         6/21/2023    12:48 PM   Patient Reported Additional Medications   Patient reports taking the following new medications none     Healthy Habits:    Getting at least 3 servings of Calcium per day:  Yes    Bi-annual eye exam:  Yes    Dental care twice a year:  Yes    Sleep apnea or symptoms of sleep apnea:  None    Diet:  Regular (no restrictions)    Frequency of exercise:  4-5 days/week    Duration of exercise:  30-45 minutes    Taking medications regularly:  Yes    Barriers to taking medications:  None    Medication side effects:  None    PHQ-2 Total Score:    Additional concerns today:  Yes    Has had 2 EGD so far, were not able to find the retainer  Met with General Surgery as well  No abdominal pain  No bleeding  No bowel changes    Got laid off for work and will not have medical insurance    Seen by [facility] for asthma    Snoring+  Evaluated for sleep apnea in 2020 and negative work up    Sometimes will have erectile dysfunction+ , no ETOH use, problems getting and maintaining an erection, once a week, no pain, no urine symptoms. No relationship concerns. No breathing problems during intercourse.     Had a cough since 11/22  Has had 2 courses of antibiotics  No fever   No night sweats       Social History     Tobacco Use     Smoking status: Never     Smokeless tobacco: Never     Tobacco comments:     Mom smoked his upbringing   Substance Use Topics     Alcohol use: Yes     Comment: Really rare now              6/21/2023    12:48 PM   Alcohol Use   Prescreen: >3 drinks/day or >7 drinks/week? No     Last PSA: No results found for: PSA    Reviewed orders with patient. Reviewed health maintenance and updated orders accordingly - Yes  Lab work is in process  Labs reviewed in EPIC  BP Readings from Last 3 Encounters:   06/21/23  130/82   05/08/23 124/72   03/14/23 126/82    Wt Readings from Last 3 Encounters:   06/21/23 81.6 kg (180 lb)   02/09/23 85.3 kg (188 lb)   02/08/23 84.8 kg (187 lb)                  Patient Active Problem List   Diagnosis     CARDIOVASCULAR SCREENING; LDL GOAL LESS THAN 160     Acute bronchitis with bronchospasm     Moderate asthma     Refractory obstruction of nasal airway     Acquired nasal deformity     Nasal obstruction     Nasal valve collapse     History of fracture of nose     Deviated nasal septum     Nasal deformity     History of closed fracture of nasal bones     Past Surgical History:   Procedure Laterality Date     APPENDECTOMY  Oct. 1991     COMBINED ESOPHAGOSCOPY, GASTROSCOPY, DUODENOSCOPY (EGD) WITH CO2 INSUFFLATION N/A 3/14/2023    Procedure: Combined Esophagoscopy, Gastroscopy, Duodenoscopy (Egd) With Co2 Insufflation;  Surgeon: Beth Ahumada DO;  Location:  OR     EYE SURGERY  2013 and 2014    PRK laser correction     HERNIA REPAIR  2002     RHINOPLASTY N/A 10/27/2022    Procedure: Rhinoplasty with Percutaneous Osteotomies;  Surgeon: Becki Johnson MD;  [place] [facility] [place]     SEPTORHINOPLASTY N/A 8/19/2021    Procedure: RHINOSEPTOPLASTY, CADAVERIC RIB GRAFT,  CONCHAL CARTILAGE GRAFT;  Surgeon: Becki Johnson MD;  [place] [facility] [place]     SURGICAL HISTORY OF -       Thumb 2005-Pins placed      SURGICAL HISTORY OF -       ER visit, left upper eyelid laceration repair     SURGICAL HISTORY OF -       Septo/rhinoplasty of nose secondary to boxing injury     SURGICAL HISTORY OF -       Left hernia        Social History     Tobacco Use     Smoking status: Never     Smokeless tobacco: Never     Tobacco comments:     Mom smoked his upbringing   Substance Use Topics     Alcohol use: Yes     Comment: Really rare now      Family History   Problem Relation Age of Onset     Unknown/Adopted Mother         Diagnosed w/ALS June 2015     Sleep Apnea Brother      Gastrointestinal Disease Brother          colitis     C.A.D. No family hx of      Diabetes No family hx of      Hypertension No family hx of      Cerebrovascular Disease No family hx of      Breast Cancer No family hx of      Cancer - colorectal No family hx of      Prostate Cancer No family hx of      Asthma No family hx of          Current Outpatient Medications   Medication Sig Dispense Refill     acetaminophen (TYLENOL) 325 MG tablet Take 2 tablets (650 mg) by mouth every 4 hours as needed for mild pain 50 tablet 0     albuterol (PROAIR HFA/PROVENTIL HFA/VENTOLIN HFA) 108 (90 Base) MCG/ACT inhaler Inhale 2 puffs into the lungs every 4 hours as needed for shortness of breath, wheezing or cough 18 g 2     albuterol (PROVENTIL) (2.5 MG/3ML) 0.083% neb solution Take 1 vial (2.5 mg) by nebulization every 4 hours as needed for shortness of breath, wheezing or cough 360 mL 1     Aspirin-Acetaminophen-Caffeine (EXCEDRIN PO) Take by mouth as needed        fluticasone (FLONASE) 50 MCG/ACT nasal spray USE 2 SPRAYS IN EACH       NOSTRIL DAILY 48 g 3     fluticasone-vilanterol (BREO ELLIPTA) 200-25 MCG/ACT inhaler Inhale 1 puff into the lungs daily 3 each 1     ibuprofen (ADVIL/MOTRIN) 200 MG tablet Take 1 tablet (200 mg) by mouth every 6 hours as needed for moderate pain (Use up to every 6 hours as needed for pain, alternate with tylenol) 50 tablet 0     sildenafil (REVATIO) 20 MG tablet 1-4 tablets 30 minutes to 2 hours before intercourse. Maximum 100 mg in 24 hours. 30 tablet 3     sodium chloride (OCEAN) 0.65 % nasal spray Spray 2 sprays in nostril every 4 hours 30 mL 3     Allergies   Allergen Reactions     Amoxicillin-Pot Clavulanate Rash       Reviewed and updated as needed this visit by clinical staff   Tobacco  Allergies  Meds  Problems  Med Hx  Surg Hx  Fam Hx          Reviewed and updated as needed this visit by Provider   Tobacco  Allergies  Meds  Problems  Med Hx  Surg Hx  Fam Hx         Past Medical History:   Diagnosis Date     Acquired  nasal deformity 2/17/2021     Acute bronchitis with bronchospasm 9/1/2015    Has this every year. Probably intermittent asthma. Use Flovent when flares.      CARDIOVASCULAR SCREENING; LDL GOAL LESS THAN 160 6/11/2010     COPD (chronic obstructive pulmonary disease) (H)      Deviated nasal septum 2/19/2021    Added automatically from request for surgery [id number]     History of closed fracture of nasal bones 4/6/2022     History of fracture of nose 2/19/2021    Added automatically from request for surgery [id number]     Moderate asthma 3/21/2017     Nasal deformity 4/16/2021     Nasal obstruction 2/17/2021     Nasal valve collapse 2/17/2021     NO ACTIVE PROBLEMS      Refractory obstruction of nasal airway 8/17/2020    Added automatically from request for surgery [id number]     Uncomplicated asthma Sept 2014    Intermittent asthma      Past Surgical History:   Procedure Laterality Date     APPENDECTOMY  Oct. 1991     COMBINED ESOPHAGOSCOPY, GASTROSCOPY, DUODENOSCOPY (EGD) WITH CO2 INSUFFLATION N/A 3/14/2023    Procedure: Combined Esophagoscopy, Gastroscopy, Duodenoscopy (Egd) With Co2 Insufflation;  Surgeon: Beth Ahumada DO;  Location:  OR     EYE SURGERY  2013 and 2014    PRK laser correction     HERNIA REPAIR  2002     RHINOPLASTY N/A 10/27/2022    Procedure: Rhinoplasty with Percutaneous Osteotomies;  Surgeon: Becki Johnson MD;  [place]: [facility] [place]     SEPTORHINOPLASTY N/A 8/19/2021    Procedure: RHINOSEPTOPLASTY, CADAVERIC RIB GRAFT,  CONCHAL CARTILAGE GRAFT;  Surgeon: Becki Johnson MD;  Location: [facility] [place]     SURGICAL HISTORY OF -       Thumb 2005-Pins placed      SURGICAL HISTORY OF -       ER visit, left upper eyelid laceration repair     SURGICAL HISTORY OF -       Septo/rhinoplasty of nose secondary to boxing injury     SURGICAL HISTORY OF -       Left hernia        Review of Systems   Constitutional: Negative for chills and fever.   HENT: Negative for congestion, ear pain, hearing loss and sore  "throat.    Eyes: Negative for pain and visual disturbance.   Respiratory: Positive for cough. Negative for shortness of breath.    Cardiovascular: Negative for chest pain, palpitations and peripheral edema.   Gastrointestinal: Negative for abdominal pain, constipation, diarrhea, heartburn, hematochezia and nausea.   Genitourinary: Positive for impotence. Negative for dysuria, frequency, genital sores, hematuria, penile discharge and urgency.   Musculoskeletal: Negative for arthralgias, joint swelling and myalgias.   Skin: Negative for rash.   Neurological: Negative for dizziness, weakness, headaches and paresthesias.   Psychiatric/Behavioral: Negative for mood changes. The patient is not nervous/anxious.        OBJECTIVE:   /82   Pulse 54   Temp 98.2  F (36.8  C) (Oral)   Resp 20   Ht 1.727 m (5' 8\")   Wt 81.6 kg (180 lb)   SpO2 100%   BMI 27.37 kg/m      Physical Exam  GENERAL APPEARANCE: healthy, alert and no distress  EYES: Eyes grossly normal to inspection and conjunctivae and sclerae normal  NECK: no adenopathy and trachea midline and normal to palpation  RESP: lungs clear to auscultation - no rales, rhonchi or wheezes  CV: regular rates and rhythm, normal S1 S2, no S3 or S4 and no murmur, click or rub  ABDOMEN: soft, non-tender and no rebound or guarding   MS: extremities normal- no gross deformities noted and peripheral pulses normal  SKIN: no suspicious lesions or rashes  NEURO: Normal strength and tone, mentation intact and speech normal  PSYCH: mentation appears normal      Diagnostic Test Results:  Labs reviewed in Epic  No results found for any visits on 06/21/23.    ASSESSMENT/PLAN:   Keith was seen today for physical.    Diagnoses and all orders for this visit:    Routine general medical examination at a health care facility  -     REVIEW OF HEALTH MAINTENANCE PROTOCOL ORDERS  -Preventive guidelines reviewed  -     Lipid panel reflex to direct LDL Fasting    Moderate persistent asthma " "without complication  -     Basic metabolic panel; Future  -     albuterol (PROVENTIL) (2.5 MG/3ML) 0.083% neb solution; Take 1 vial (2.5 mg) by nebulization every 4 hours as needed for shortness of breath, wheezing or cough  -     Basic metabolic panel  -refills on medication provided     Erectile dysfunction, unspecified erectile dysfunction type  -     May need to self pay if medication is not covered by insurance   -     sildenafil (REVATIO) 20 MG tablet; 1-4 tablets 30 minutes to 2 hours before intercourse. Maximum 100 mg in 24 hours.  -     Basic metabolic panel            COUNSELING:   Reviewed preventive health counseling, as reflected in patient instructions       Regular exercise       Healthy diet/nutrition       Vision screening      BMI:   Estimated body mass index is 27.37 kg/m  as calculated from the following:    Height as of this encounter: 1.727 m (5' 8\").    Weight as of this encounter: 81.6 kg (180 lb).   Weight management plan: Discussed healthy diet and exercise guidelines      He reports that he has never smoked. He has never used smokeless tobacco.            Serina Monet MD MPH    [facility]  "

## 2023-06-21 NOTE — PATIENT INSTRUCTIONS
At [facility], we strive to deliver an exceptional experience to you, every time we see you. If you receive a survey, please complete it as we do value your feedback.  If you have MyChart, you can expect to receive results automatically within 24 hours of their completion.  Your provider will send a note interpreting your results as well.   If you do not have MyChart, you should receive your results in about a week by mail.    Your care team:                            Family Medicine Internal Medicine   MD Lj Gregory MD Shantel Branch-Fleming, MD Srinivasa Vaka, MD Katya Belousova, CAROLIN CalderonHillEFRAÍN Triplett CNP, MD Pediatrics   Rancho Sawant, MD Amanda Medina MD Amelia Massimini APRN CNP   Marisa Delcid, APRN CNP MD Param Roland MD          Clinic hours: Monday - Thursday 7 am-6 pm; Fridays 7 am-5 pm.   Urgent care: Monday - Friday 10 am- 8 pm; Saturday and Sunday 9 am-5 pm.    Clinic: [phone]       [facility]: Monday - Thursday 8 am - 7 pm; Friday 8 am - 6 pm  [facility]: [phone]     Preventive Health Recommendations  Male Ages 40 to 49    Yearly exam:             See your health care provider every year in order to  o   Review health changes.   o   Discuss preventive care.    o   Review your medicines if your doctor has prescribed any.    You should be tested each year for STDs (sexually transmitted diseases) if you re at risk.     Have a cholesterol test every 5 years.     Have a colonoscopy (test for colon cancer) if someone in your family has had colon cancer or polyps before age 50.     After age 45, have a diabetes test (fasting glucose). If you are at risk for diabetes, you should have this test every 3 years.      Talk with your health care provider about whether or not a prostate cancer screening test (PSA) is  right for you.    Shots: Get a flu shot each year. Get a tetanus shot every 10 years.     Nutrition:    Eat at least 5 servings of fruits and vegetables daily.     Eat whole-grain bread, whole-wheat pasta and brown rice instead of white grains and rice.     Get adequate Calcium and Vitamin D.     Lifestyle    Exercise for at least 150 minutes a week (30 minutes a day, 5 days a week). This will help you control your weight and prevent disease.     Limit alcohol to one drink per day.     No smoking.     Wear sunscreen to prevent skin cancer.     See your dentist every six months for an exam and cleaning.

## 2023-06-22 LAB
ANION GAP SERPL CALCULATED.3IONS-SCNC: 13 MMOL/L (ref 7–15)
BUN SERPL-MCNC: 15.7 MG/DL (ref 6–20)
CALCIUM SERPL-MCNC: 10.1 MG/DL (ref 8.6–10)
CHLORIDE SERPL-SCNC: 100 MMOL/L (ref 98–107)
CREAT SERPL-MCNC: 0.94 MG/DL (ref 0.67–1.17)
DEPRECATED HCO3 PLAS-SCNC: 25 MMOL/L (ref 22–29)
GFR SERPL CREATININE-BSD FRML MDRD: >90 ML/MIN/1.73M2
GLUCOSE SERPL-MCNC: 89 MG/DL (ref 70–99)
POTASSIUM SERPL-SCNC: 4.1 MMOL/L (ref 3.4–5.3)
SODIUM SERPL-SCNC: 138 MMOL/L (ref 136–145)

## 2023-06-22 NOTE — RESULT ENCOUNTER NOTE
Keith, your test results were within normal limits.    Electrolytes, glucose and kidney function are normal.  LDL cholesterol has increased from last check, but is at goal (less than 160). I would encourage 150 minutes of moderate physical activity per week.     Please do not hesitate to call us at [phone] if you have any questions or concerns.    Thank you,    Serina Monet MD MPH

## 2023-06-23 ENCOUNTER — MYC MEDICAL ADVICE (OUTPATIENT)
Dept: FAMILY MEDICINE | Facility: CLINIC | Age: 43
End: 2023-06-23
Payer: COMMERCIAL

## 2023-06-26 ENCOUNTER — TELEPHONE (OUTPATIENT)
Dept: FAMILY MEDICINE | Facility: CLINIC | Age: 43
End: 2023-06-26
Payer: COMMERCIAL

## 2023-06-26 NOTE — TELEPHONE ENCOUNTER
[employer] message sent for prior authorization.  Faxed information not received in clinic, this is alternative for PA.        They said the fax was sent to that number 3 days ago. So, not sure what happened.     [employer] representative stated that you can go to  [email or web address]     And enter key code below     Key code: [id number]     To locate PA (prior authorization) request         I m also requesting [employer] to send another fax to the number below     [phone]      Kristina Kjellberg, MSN, RN

## 2023-06-27 NOTE — TELEPHONE ENCOUNTER
Received faxed PA request from [facility]. Placed a copy in Dr Monet's box and sent a copy to Xray to enter in.  Ana Cristina Dueñas MA  [facility]   Primary Care

## 2023-06-28 NOTE — TELEPHONE ENCOUNTER
I thought PA requests needed to go to the PA team.   Cam this request please be sent to them.  Thank you.  Serina Monet MD MPH

## 2023-07-20 ENCOUNTER — MYC MEDICAL ADVICE (OUTPATIENT)
Dept: FAMILY MEDICINE | Facility: CLINIC | Age: 43
End: 2023-07-20
Payer: COMMERCIAL

## 2023-07-25 NOTE — TELEPHONE ENCOUNTER
ZPAK script not indicated. As indicated by nursing staff, best to be seen in Urgent care.   Will close this encounter.  Serina Monet MD MPH

## 2023-08-22 DIAGNOSIS — N52.9 ERECTILE DYSFUNCTION, UNSPECIFIED ERECTILE DYSFUNCTION TYPE: ICD-10-CM

## 2023-08-22 RX ORDER — SILDENAFIL CITRATE 20 MG/1
TABLET ORAL
Qty: 30 TABLET | Refills: 3 | Status: SHIPPED | OUTPATIENT
Start: 2023-08-22 | End: 2023-12-19

## 2023-11-09 ENCOUNTER — OFFICE VISIT (OUTPATIENT)
Dept: ALLERGY | Facility: CLINIC | Age: 43
End: 2023-11-09
Payer: COMMERCIAL

## 2023-11-09 VITALS — SYSTOLIC BLOOD PRESSURE: 123 MMHG | HEART RATE: 68 BPM | OXYGEN SATURATION: 96 % | DIASTOLIC BLOOD PRESSURE: 66 MMHG

## 2023-11-09 DIAGNOSIS — J45.40 MODERATE PERSISTENT ASTHMA WITHOUT COMPLICATION: Primary | ICD-10-CM

## 2023-11-09 DIAGNOSIS — R09.81 CHRONIC NASAL CONGESTION: ICD-10-CM

## 2023-11-09 PROCEDURE — 99213 OFFICE O/P EST LOW 20 MIN: CPT | Performed by: ALLERGY & IMMUNOLOGY

## 2023-11-09 RX ORDER — FLUTICASONE FUROATE AND VILANTEROL 200; 25 UG/1; UG/1
1 POWDER RESPIRATORY (INHALATION) DAILY
Qty: 3 EACH | Refills: 0 | Status: SHIPPED | OUTPATIENT
Start: 2023-11-09 | End: 2023-12-19

## 2023-11-09 RX ORDER — FLUTICASONE PROPIONATE 50 MCG
2 SPRAY, SUSPENSION (ML) NASAL DAILY
Qty: 48 G | Refills: 0 | Status: SHIPPED | OUTPATIENT
Start: 2023-11-09 | End: 2023-12-19

## 2023-11-09 ASSESSMENT — ASTHMA QUESTIONNAIRES: ACT_TOTALSCORE: 15

## 2023-11-09 NOTE — PROGRESS NOTES
Keith Spangler was seen in the Allergy Clinic at [facility].      Keith Spangler is a 43 year old  or  male who is seen today for a follow-up visit.    He was laid off, hasn't had medication for months as he lost his insurance. Hasn't taken Breo or fluticasone nasal spray for months. Does have an albuterol inhaler and some nebulizer solution. Feels his nose is dried out and irritated.  Using a humidifier at night. Also taking an electrolyte supplement to help with hydration.    Since October of last year he has had a cough. [place] pharmacy there and reports the cough then resolved.    Past Medical History:   Diagnosis Date    Acquired nasal deformity 2/17/2021    Acute bronchitis with bronchospasm 9/1/2015    Has this every year. Probably intermittent asthma. Use Flovent when flares.     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 No family hx of     Diabetes No family hx of     Hypertension No family hx of     Cerebrovascular Disease No family hx of     Breast Cancer No family hx of     Cancer - colorectal No family hx of      Prostate Cancer No family hx of     Asthma No family hx of      Social History     Tobacco Use    Smoking status: Never    Smokeless tobacco: Never    Tobacco comments:     Mom smoked his upbringing   Vaping Use    Vaping Use: Never used   Substance Use Topics    Alcohol use: Yes     Comment: Really rare now     Drug use: No     Social History     Social History Narrative    Dairy/d 1-3 servings/d.     Caffeine 1 servings/d    Exercise 5 x week boxer    Sunscreen used - No    Seatbelts used - Yes    Working smoke/CO detectors in the home - Yes    Guns stored in the home - No    Self Breast Exams - NOT APPLICABLE    Self Testicular Exam - Yes    Eye Exam up to date - Yes    Dental Exam up to date - Yes    Pap Smear up to date - NOT APPLICABLE    Mammogram up to date - NOT APPLICABLE    PSA up to date - NO    Dexa Scan up to date - NOT APPLICABLE    Flex Sig / Colonoscopy up to date - Yes    Immunizations up to date - Yes    Abuse: Current or Past(Physical, Sexual or Emotional)- No    Do you feel safe in your environment - Yes           Past medical, family, and social history were reviewed.        Current Outpatient Medications:     acetaminophen (TYLENOL) 325 MG tablet, Take 2 tablets (650 mg) by mouth every 4 hours as needed for mild pain, Disp: 50 tablet, Rfl: 0    albuterol (PROAIR HFA/PROVENTIL HFA/VENTOLIN HFA) 108 (90 Base) MCG/ACT inhaler, Inhale 2 puffs into the lungs every 4 hours as needed for shortness of breath, wheezing or cough, Disp: 18 g, Rfl: 2    albuterol (PROVENTIL) (2.5 MG/3ML) 0.083% neb solution, Take 1 vial (2.5 mg) by nebulization every 4 hours as needed for shortness of breath, wheezing or cough, Disp: 360 mL, Rfl: 1    Aspirin-Acetaminophen-Caffeine (EXCEDRIN PO), Take by mouth as needed , Disp: , Rfl:     fluticasone (FLONASE) 50 MCG/ACT nasal spray, Spray 2 sprays into both nostrils daily, Disp: 48 g, Rfl: 0    fluticasone-vilanterol (BREO ELLIPTA) 200-25 MCG/ACT inhaler, Inhale 1 puff  into the lungs daily, Disp: 3 each, Rfl: 0    ibuprofen (ADVIL/MOTRIN) 200 MG tablet, Take 1 tablet (200 mg) by mouth every 6 hours as needed for moderate pain (Use up to every 6 hours as needed for pain, alternate with tylenol), Disp: 50 tablet, Rfl: 0    sildenafil (REVATIO) 20 MG tablet, TAKE 1 TO 4 TABLETS BY MOUTH 30 MINUTES TO 2 HOURS BEFORE INTERCOURSE. MAXIMUM OF 100MG IN 24 HOURS, Disp: 30 tablet, Rfl: 3    sodium chloride (OCEAN) 0.65 % nasal spray, Spray 2 sprays in nostril every 4 hours, Disp: 30 mL, Rfl: 3  Allergies   Allergen Reactions    Amoxicillin-Pot Clavulanate Rash       EXAM:   /66 (BP Location: Left arm, Patient Position: Sitting, Cuff Size: Adult Regular)   Pulse 68   SpO2 96%   Physical Exam  Vitals and nursing note reviewed.   Constitutional:       Appearance: Normal appearance.   HENT:      Head: Normocephalic and atraumatic.      Right Ear: External ear normal.      Left Ear: External ear normal.      Nose: No mucosal edema or rhinorrhea.      Mouth/Throat:      Mouth: Mucous membranes are moist. No oral lesions.      Pharynx: Oropharynx is clear. Uvula midline. No posterior oropharyngeal erythema.   Eyes:      General: Lids are normal.      Extraocular Movements: Extraocular movements intact.      Conjunctiva/sclera: Conjunctivae normal.   Neck:      Comments: No asymmetry, masses, or scars  Cardiovascular:      Rate and Rhythm: Normal rate and regular rhythm.      Heart sounds: S1 normal and S2 normal. No murmur heard.  Pulmonary:      Effort: Pulmonary effort is normal. No respiratory distress.      Breath sounds: Normal breath sounds and air entry.   Musculoskeletal:      Comments: No musculoskeletal defects appreciated   Skin:     General: Skin is warm and dry.      Findings: No lesion or rash.   Neurological:      General: No focal deficit present.      Mental Status: He is alert.   Psychiatric:         Mood and Affect: Mood and affect normal.           WORKUP:   None    ASSESSMENT/PLAN:  Keith Spangler is a 43 year old male here for a follow-up visit.    1. Moderate persistent asthma without complication - He has been out of medication for several months however now has new insurance and would like to resume his usual treatment plan.    - fluticasone-vilanterol (BREO ELLIPTA) 200-25 MCG/ACT inhaler; Inhale 1 puff into the lungs daily  Dispense: 3 each; Refill: 0  - Adult Allergy Clinic Follow-Up Order; Future    2. Chronic nasal congestion    - fluticasone (FLONASE) 50 MCG/ACT nasal spray; Spray 2 sprays into both nostrils daily  Dispense: 48 g; Refill: 0  - Adult Allergy Clinic Follow-Up Order; Future      Follow-up in 3 months, sooner if needed      Thank you for allowing me to participate in the care of Keith Spangler.      Andrei Kyle MD, FAAAAI  Allergy/Immunology  [facility]      Chart documentation done in part with Dragon Voice Recognition Software. Although reviewed after completion, some word and grammatical errors may remain.

## 2023-11-09 NOTE — LETTER
11/9/2023         RE: Keith Spangler  [address] [facility] [address]        Dear Colleague,    Thank you for referring your patient, Keith Spangler, to the [facility]. Please see a copy of my visit note below.    Keith Spangler was seen in the Allergy Clinic at [facility].      Keith Spangler is a 43 year old  or  male who is seen today for a follow-up visit.    He was laid off, hasn't had medication for months as he lost his insurance. Hasn't taken Breo or fluticasone nasal spray for months. Does have an albuterol inhaler and some nebulizer solution. Feels his nose is dried out and irritated.  Using a humidifier at night. Also taking an electrolyte supplement to help with hydration.    Since October of last year he has had a cough. [place] at the pharmacy there and reports the cough then resolved.    Past Medical History:   Diagnosis Date     Acquired nasal deformity 2/17/2021     Acute bronchitis with bronchospasm 9/1/2015    Has this every year. Probably intermittent asthma. Use Flovent when flares.      CARDIOVASCULAR SCREENING; LDL GOAL LESS THAN 160 6/11/2010     COPD (chronic obstructive pulmonary disease) (H)      Deviated nasal septum 2/19/2021    Added automatically from request for surgery [id number]     History of closed fracture of nasal bones 4/6/2022     History of fracture of nose 2/19/2021    Added automatically from request for surgery [id number]     Moderate asthma 3/21/2017     Nasal deformity 4/16/2021     Nasal obstruction 2/17/2021     Nasal valve collapse 2/17/2021     NO ACTIVE PROBLEMS      Refractory obstruction of nasal airway 8/17/2020    Added automatically from request for surgery [id number]     Uncomplicated asthma Sept 2014    Intermittent asthma     Family History   Problem Relation Age of Onset     Unknown/Adopted Mother         Diagnosed w/ALS June 2015     Sleep Apnea  Brother      Gastrointestinal Disease Brother         colitis     C.A.D. No family hx of      Diabetes No family hx of      Hypertension No family hx of      Cerebrovascular Disease No family hx of      Breast Cancer No family hx of      Cancer - colorectal No family hx of      Prostate Cancer No family hx of      Asthma No family hx of      Social History     Tobacco Use     Smoking status: Never     Smokeless tobacco: Never     Tobacco comments:     Mom smoked his upbringing   Vaping Use     Vaping Use: Never used   Substance Use Topics     Alcohol use: Yes     Comment: Really rare now      Drug use: No     Social History     Social History Narrative    Dairy/d 1-3 servings/d.     Caffeine 1 servings/d    Exercise 5 x week boxer    Sunscreen used - No    Seatbelts used - Yes    Working smoke/CO detectors in the home - Yes    Guns stored in the home - No    Self Breast Exams - NOT APPLICABLE    Self Testicular Exam - Yes    Eye Exam up to date - Yes    Dental Exam up to date - Yes    Pap Smear up to date - NOT APPLICABLE    Mammogram up to date - NOT APPLICABLE    PSA up to date - NO    Dexa Scan up to date - NOT APPLICABLE    Flex Sig / Colonoscopy up to date - Yes    Immunizations up to date - Yes    Abuse: Current or Past(Physical, Sexual or Emotional)- No    Do you feel safe in your environment - Yes           Past medical, family, and social history were reviewed.        Current Outpatient Medications:      acetaminophen (TYLENOL) 325 MG tablet, Take 2 tablets (650 mg) by mouth every 4 hours as needed for mild pain, Disp: 50 tablet, Rfl: 0     albuterol (PROAIR HFA/PROVENTIL HFA/VENTOLIN HFA) 108 (90 Base) MCG/ACT inhaler, Inhale 2 puffs into the lungs every 4 hours as needed for shortness of breath, wheezing or cough, Disp: 18 g, Rfl: 2     albuterol (PROVENTIL) (2.5 MG/3ML) 0.083% neb solution, Take 1 vial (2.5 mg) by nebulization every 4 hours as needed for shortness of breath, wheezing or cough, Disp: 360  mL, Rfl: 1     Aspirin-Acetaminophen-Caffeine (EXCEDRIN PO), Take by mouth as needed , Disp: , Rfl:      fluticasone (FLONASE) 50 MCG/ACT nasal spray, Spray 2 sprays into both nostrils daily, Disp: 48 g, Rfl: 0     fluticasone-vilanterol (BREO ELLIPTA) 200-25 MCG/ACT inhaler, Inhale 1 puff into the lungs daily, Disp: 3 each, Rfl: 0     ibuprofen (ADVIL/MOTRIN) 200 MG tablet, Take 1 tablet (200 mg) by mouth every 6 hours as needed for moderate pain (Use up to every 6 hours as needed for pain, alternate with tylenol), Disp: 50 tablet, Rfl: 0     sildenafil (REVATIO) 20 MG tablet, TAKE 1 TO 4 TABLETS BY MOUTH 30 MINUTES TO 2 HOURS BEFORE INTERCOURSE. MAXIMUM OF 100MG IN 24 HOURS, Disp: 30 tablet, Rfl: 3     sodium chloride (OCEAN) 0.65 % nasal spray, Spray 2 sprays in nostril every 4 hours, Disp: 30 mL, Rfl: 3  Allergies   Allergen Reactions     Amoxicillin-Pot Clavulanate Rash       EXAM:   /66 (BP Location: Left arm, Patient Position: Sitting, Cuff Size: Adult Regular)   Pulse 68   SpO2 96%   Physical Exam  Vitals and nursing note reviewed.   Constitutional:       Appearance: Normal appearance.   HENT:      Head: Normocephalic and atraumatic.      Right Ear: External ear normal.      Left Ear: External ear normal.      Nose: No mucosal edema or rhinorrhea.      Mouth/Throat:      Mouth: Mucous membranes are moist. No oral lesions.      Pharynx: Oropharynx is clear. Uvula midline. No posterior oropharyngeal erythema.   Eyes:      General: Lids are normal.      Extraocular Movements: Extraocular movements intact.      Conjunctiva/sclera: Conjunctivae normal.   Neck:      Comments: No asymmetry, masses, or scars  Cardiovascular:      Rate and Rhythm: Normal rate and regular rhythm.      Heart sounds: S1 normal and S2 normal. No murmur heard.  Pulmonary:      Effort: Pulmonary effort is normal. No respiratory distress.      Breath sounds: Normal breath sounds and air entry.   Musculoskeletal:      Comments: No  musculoskeletal defects appreciated   Skin:     General: Skin is warm and dry.      Findings: No lesion or rash.   Neurological:      General: No focal deficit present.      Mental Status: He is alert.   Psychiatric:         Mood and Affect: Mood and affect normal.           WORKUP:  None    ASSESSMENT/PLAN:  Keith Spangler is a 43 year old male here for a follow-up visit.    1. Moderate persistent asthma without complication - He has been out of medication for several months however now has new insurance and would like to resume his usual treatment plan.    - fluticasone-vilanterol (BREO ELLIPTA) 200-25 MCG/ACT inhaler; Inhale 1 puff into the lungs daily  Dispense: 3 each; Refill: 0  - Adult Allergy Clinic Follow-Up Order; Future    2. Chronic nasal congestion    - fluticasone (FLONASE) 50 MCG/ACT nasal spray; Spray 2 sprays into both nostrils daily  Dispense: 48 g; Refill: 0  - Adult Allergy Clinic Follow-Up Order; Future      Follow-up in 3 months, sooner if needed      Thank you for allowing me to participate in the care of Keith Spangler.      Andrei Kyle MD, FAAAAI  Allergy/Immunology  [facility]      Chart documentation done in part with Dragon Voice Recognition Software. Although reviewed after completion, some word and grammatical errors may remain.      Again, thank you for allowing me to participate in the care of your patient.        Sincerely,        Andrei Kyle MD

## 2023-11-14 ENCOUNTER — OFFICE VISIT (OUTPATIENT)
Dept: PLASTIC SURGERY | Facility: CLINIC | Age: 43
End: 2023-11-14

## 2023-11-14 DIAGNOSIS — Z41.1 ENCOUNTER FOR COSMETIC PROCEDURE: Primary | ICD-10-CM

## 2023-11-14 NOTE — PROGRESS NOTES
Facial Plastic and Reconstructive Surgery    Procedure: Chemodenervation with Botulinum Toxin A  Indication: Undesirable Dynamic Rhytids  Injector: Dr. Yashira Slater MD      Informed consent was obtained.  The skin was cleaned with antimicrobial solution and a topical ice was placed.     The patient was asked to systematically engage the muscles in the area to be injected. The tuberculin needles were used for subdermal injection and hemostasis was obtained with light digital pressure when needed. The skin was cleaned.     A total of 30 units were injected.  Botulinum toxin A type: Botox  Glabella: 15u (2:1)  Forehead: 7.5u (2:1)  Lateral orbic: 7.5u (2:1)    Please see procedure log.    The patient tolerated the procedure well and there were no complications. Post procedure care instructions were given to the patient.

## 2023-12-19 ENCOUNTER — OFFICE VISIT (OUTPATIENT)
Dept: FAMILY MEDICINE | Facility: CLINIC | Age: 43
End: 2023-12-19
Payer: COMMERCIAL

## 2023-12-19 VITALS
TEMPERATURE: 97.5 F | DIASTOLIC BLOOD PRESSURE: 87 MMHG | HEIGHT: 68 IN | WEIGHT: 190.6 LBS | OXYGEN SATURATION: 97 % | RESPIRATION RATE: 16 BRPM | HEART RATE: 67 BPM | SYSTOLIC BLOOD PRESSURE: 135 MMHG | BODY MASS INDEX: 28.89 KG/M2

## 2023-12-19 DIAGNOSIS — J45.40 MODERATE PERSISTENT ASTHMA WITHOUT COMPLICATION: ICD-10-CM

## 2023-12-19 DIAGNOSIS — N52.9 ERECTILE DYSFUNCTION, UNSPECIFIED ERECTILE DYSFUNCTION TYPE: ICD-10-CM

## 2023-12-19 DIAGNOSIS — R09.81 CHRONIC NASAL CONGESTION: ICD-10-CM

## 2023-12-19 PROCEDURE — 99214 OFFICE O/P EST MOD 30 MIN: CPT | Performed by: PHYSICIAN ASSISTANT

## 2023-12-19 RX ORDER — ALBUTEROL SULFATE 0.83 MG/ML
2.5 SOLUTION RESPIRATORY (INHALATION) EVERY 4 HOURS PRN
Qty: 360 ML | Refills: 2 | Status: SHIPPED | OUTPATIENT
Start: 2023-12-19

## 2023-12-19 RX ORDER — ALBUTEROL SULFATE 90 UG/1
2 AEROSOL, METERED RESPIRATORY (INHALATION) EVERY 4 HOURS PRN
Qty: 18 G | Refills: 3 | Status: SHIPPED | OUTPATIENT
Start: 2023-12-19

## 2023-12-19 RX ORDER — FLUTICASONE PROPIONATE 50 MCG
2 SPRAY, SUSPENSION (ML) NASAL DAILY
Qty: 48 G | Refills: 3 | Status: SHIPPED | OUTPATIENT
Start: 2023-12-19

## 2023-12-19 RX ORDER — SILDENAFIL CITRATE 20 MG/1
TABLET ORAL
Qty: 30 TABLET | Refills: 3 | Status: SHIPPED | OUTPATIENT
Start: 2023-12-19 | End: 2024-05-20

## 2023-12-19 RX ORDER — FLUTICASONE FUROATE AND VILANTEROL 200; 25 UG/1; UG/1
1 POWDER RESPIRATORY (INHALATION) DAILY
Qty: 60 EACH | Refills: 5 | Status: SHIPPED | OUTPATIENT
Start: 2023-12-19 | End: 2024-02-12

## 2023-12-19 ASSESSMENT — ASTHMA QUESTIONNAIRES: ACT_TOTALSCORE: 21

## 2023-12-19 NOTE — PROGRESS NOTES
"  Assessment & Plan   Problem List Items Addressed This Visit          Respiratory    Moderate asthma    Relevant Medications    albuterol (PROAIR HFA/PROVENTIL HFA/VENTOLIN HFA) 108 (90 Base) MCG/ACT inhaler    fluticasone-vilanterol (BREO ELLIPTA) 200-25 MCG/ACT inhaler    fluticasone (FLONASE) 50 MCG/ACT nasal spray    albuterol (PROVENTIL) (2.5 MG/3ML) 0.083% neb solution     Other Visit Diagnoses       Chronic nasal congestion        Relevant Medications    fluticasone (FLONASE) 50 MCG/ACT nasal spray    Erectile dysfunction, unspecified erectile dysfunction type        Relevant Medications    sildenafil (REVATIO) 20 MG tablet           Asthma has improved   Continue Breo Ellipta 1 puff daily   Albuterol 2 puffs every 4 hours or neb inhalation as needed   Flonase nasal spray 2 sprays daily     Continue Sildenafil as needed 30 mins-4 hours before intercourse      20 minutes spent by me on the date of the encounter doing chart review, history and exam, documentation and further activities per the note       BMI:   Estimated body mass index is 28.98 kg/m  as calculated from the following:    Height as of this encounter: 1.727 m (5' 8\").    Weight as of this encounter: 86.5 kg (190 lb 9.6 oz).   Weight management plan: Discussed healthy diet and exercise guidelines        Eveline Garcia PA-C  [facility]    Noemí Parker is a 43 year old, presenting for the following health issues:  Refill Request      History of Present Illness     Asthma:  He presents for follow up of asthma.  He has no cough, no wheezing, and no shortness of breath.  He is using a relief medication a few times a week. He does not miss any doses of his controller medication throughout the week. Patient is aware of the following triggers: upper respiratory infections. The patient has not had a visit to the Emergency Room, Urgent Care or Hospital due to asthma since the last clinic visit.     He eats 2-3 " "servings of fruits and vegetables daily.He consumes 1 sweetened beverage(s) daily.He exercises with enough effort to increase his heart rate 30 to 60 minutes per day.  He exercises with enough effort to increase his heart rate 5 days per week. He is missing 1 dose(s) of medications per week.  He is not taking prescribed medications regularly due to remembering to take.         Asthma Follow-Up    Was ACT completed today?  Yes        12/19/2023     7:32 AM   ACT Total Scores   ACT TOTAL SCORE (Goal Greater than or Equal to 20) 21   In the past 12 months, how many times did you visit the emergency room for your asthma without being admitted to the hospital? 0   In the past 12 months, how many times were you hospitalized overnight because of your asthma? 0        How many days per week do you miss taking your asthma controller medication?  0  Please describe any recent triggers for your asthma: cold air  Have you had any Emergency Room Visits, Urgent Care Visits, or Hospital Admissions since your last office visit?          Review of Systems   Constitutional, HEENT, cardiovascular, pulmonary, gi and gu systems are negative, except as otherwise noted.      Objective    /87 (BP Location: Right arm, Patient Position: Sitting, Cuff Size: Adult Regular)   Pulse 67   Temp 97.5  F (36.4  C) (Oral)   Resp 16   Ht 1.727 m (5' 8\")   Wt 86.5 kg (190 lb 9.6 oz)   SpO2 97%   BMI 28.98 kg/m    Body mass index is 28.98 kg/m .  Physical Exam   GENERAL: healthy, alert and no distress  NECK: no adenopathy, no asymmetry, masses, or scars and thyroid normal to palpation  RESP: lungs clear to auscultation - no rales, rhonchi or wheezes  CV: regular rate and rhythm, normal S1 S2, no S3 or S4, no murmur, click or rub, no peripheral edema and peripheral pulses strong  ABDOMEN: soft, nontender, no hepatosplenomegaly, no masses and bowel sounds normal  MS: no gross musculoskeletal defects noted, no edema  PSYCH: mentation appears " normal, affect normal/bright    Office Visit on 06/21/2023   Component Date Value Ref Range Status    Sodium 06/21/2023 138  136 - 145 mmol/L Final    Potassium 06/21/2023 4.1  3.4 - 5.3 mmol/L Final    Chloride 06/21/2023 100  98 - 107 mmol/L Final    Carbon Dioxide (CO2) 06/21/2023 25  22 - 29 mmol/L Final    Anion Gap 06/21/2023 13  7 - 15 mmol/L Final    Urea Nitrogen 06/21/2023 15.7  6.0 - 20.0 mg/dL Final    Creatinine 06/21/2023 0.94  0.67 - 1.17 mg/dL Final    Calcium 06/21/2023 10.1 (H)  8.6 - 10.0 mg/dL Final    Glucose 06/21/2023 89  70 - 99 mg/dL Final    GFR Estimate 06/21/2023 >90  >60 mL/min/1.73m2 Final    Cholesterol 06/21/2023 228 (H)  <200 mg/dL Final    Triglycerides 06/21/2023 83  <150 mg/dL Final    Direct Measure HDL 06/21/2023 58  >=40 mg/dL Final    LDL Cholesterol Calculated 06/21/2023 153 (H)  <=100 mg/dL Final    Non HDL Cholesterol 06/21/2023 170 (H)  <130 mg/dL Final

## 2023-12-19 NOTE — PATIENT INSTRUCTIONS
At [facility], we strive to deliver an exceptional experience to you, every time we see you. If you receive a survey, please complete it as we do value your feedback.  If you have MyChart, you can expect to receive results automatically within 24 hours of their completion.  Your provider will send a note interpreting your results as well.   If you do not have MyChart, you should receive your results in about a week by mail.    Your care team:                            Family Medicine Internal Medicine   MD Lj Gregory MD Shantel Branch-Fleming, MD Srinivasa Vaka, MD Katya Belousova, PABRAYDEN Morrison, MD Pediatrics   Rancho Sawant, PAMD Amanda Hernandez MD Amelia Massimini APRN CNP   EFRAÍN Rebolledo CNP, MD Charanya Pasupathi, MD Kathleen Widmer, NP coming October 2023 Same-Day (No follow up visit)    CAROLIN Smith PA coming Oct 2023     Clinic hours: Monday - Thursday 7 am-6 pm; Fridays 7 am-5 pm.   Urgent care: Monday - Friday 10 am- 8 pm; Saturday and Sunday 9 am-5 pm.    Clinic: [phone]       [facility]: Monday - Thursday 8 am - 7 pm; Friday 8 am - 6 pm  [facility]: [phone]

## 2024-02-12 ENCOUNTER — VIRTUAL VISIT (OUTPATIENT)
Dept: ALLERGY | Facility: CLINIC | Age: 44
End: 2024-02-12
Attending: ALLERGY & IMMUNOLOGY
Payer: COMMERCIAL

## 2024-02-12 DIAGNOSIS — J45.40 MODERATE PERSISTENT ASTHMA WITHOUT COMPLICATION: ICD-10-CM

## 2024-02-12 PROCEDURE — 99213 OFFICE O/P EST LOW 20 MIN: CPT | Mod: 95 | Performed by: ALLERGY & IMMUNOLOGY

## 2024-02-12 RX ORDER — FLUTICASONE FUROATE AND VILANTEROL 200; 25 UG/1; UG/1
1 POWDER RESPIRATORY (INHALATION) DAILY
Qty: 1 EACH | Refills: 3 | Status: SHIPPED | OUTPATIENT
Start: 2024-02-12 | End: 2024-06-24

## 2024-02-12 ASSESSMENT — ASTHMA QUESTIONNAIRES
QUESTION_4 LAST FOUR WEEKS HOW OFTEN HAVE YOU USED YOUR RESCUE INHALER OR NEBULIZER MEDICATION (SUCH AS ALBUTEROL): ONCE A WEEK OR LESS
ACT_TOTALSCORE: 20
QUESTION_2 LAST FOUR WEEKS HOW OFTEN HAVE YOU HAD SHORTNESS OF BREATH: ONCE OR TWICE A WEEK
QUESTION_1 LAST FOUR WEEKS HOW MUCH OF THE TIME DID YOUR ASTHMA KEEP YOU FROM GETTING AS MUCH DONE AT WORK, SCHOOL OR AT HOME: A LITTLE OF THE TIME
ACT_TOTALSCORE: 20
QUESTION_5 LAST FOUR WEEKS HOW WOULD YOU RATE YOUR ASTHMA CONTROL: WELL CONTROLLED
QUESTION_3 LAST FOUR WEEKS HOW OFTEN DID YOUR ASTHMA SYMPTOMS (WHEEZING, COUGHING, SHORTNESS OF BREATH, CHEST TIGHTNESS OR PAIN) WAKE YOU UP AT NIGHT OR EARLIER THAN USUAL IN THE MORNING: ONCE OR TWICE

## 2024-02-12 NOTE — LETTER
2/12/2024         RE: Keith Spangler  [address] [facility] [address]        Dear Colleague,    Thank you for referring your patient, Keith Spangler, to the [facility]. Please see a copy of my visit note below.    Keith Spangler is a 43 year old who is being evaluated today via a billable video visit    How would you like to obtain your AVS? MyChart  If the video visit is dropped, the invitation should be resent by: Text to cell phone: [phone]  Will anyone else be joining your video visit? No      Video Start Time: 11:35 AM    Video-Visit Details    Type of service:  Video Visit    Video End Time:11:41 AM    Originating Location (pt. Location): Home    Distant Location (provider location):  [facility]    Platform used for Video Visit: Rolf    Keith Spangler was seen in the Allergy Clinic at [facility].      Keith Spangler is a 43 year old  or  male who is seen today for a follow-up visit.    Tested positive for COVID recently. Still recovering, has a cough. Overall feels he is getting better quickly. Has been using albuterol via nebulizer once or twice a day.    Asthma has been pretty good since his last visit. Taking Breo daily - has 4 puffs left. Reports no side effects including hoarseness or sore throat. Prior to getting ill he was rarely using albuterol. Has been exercising regularly and sleeping well.    Past Medical History:   Diagnosis Date     Acquired nasal deformity 2/17/2021     Acute bronchitis with bronchospasm 9/1/2015    Has this every year. Probably intermittent asthma. Use Flovent when flares.      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 No family hx of      Diabetes No family hx of      Hypertension No family hx of      Cerebrovascular Disease No family hx of      Breast Cancer No family hx of      Cancer - colorectal No family hx of      Prostate Cancer No family hx of      Asthma No family hx of      Social History     Tobacco Use     Smoking status: Never     Smokeless tobacco: Never     Tobacco comments:     Mom smoked his upbringing   Vaping Use     Vaping Use: Never used   Substance Use Topics     Alcohol use: Yes     Comment: Really rare now      Drug use: No     Social History     Social History Narrative    Dairy/d 1-3 servings/d.     Caffeine 1 servings/d    Exercise 5 x week boxer    Sunscreen used - No    Seatbelts used - Yes    Working smoke/CO detectors in the home - Yes    Guns stored in the home - No    Self Breast Exams - NOT APPLICABLE    Self Testicular Exam - Yes    Eye Exam up to date - Yes    Dental Exam up to date - Yes    Pap Smear up to date - NOT APPLICABLE    Mammogram up to date - NOT APPLICABLE    PSA up to date - NO    Dexa Scan up to date - NOT APPLICABLE    Flex Sig / Colonoscopy up to date - Yes    Immunizations up to date - Yes    Abuse: Current or Past(Physical, Sexual or Emotional)- No    Do you feel safe in your environment - Yes           Past medical, family, and social history were reviewed.      Current Outpatient Medications:       acetaminophen (TYLENOL) 325 MG tablet, Take 2 tablets (650 mg) by mouth every 4 hours as needed for mild pain, Disp: 50 tablet, Rfl: 0     albuterol (PROAIR HFA/PROVENTIL HFA/VENTOLIN HFA) 108 (90 Base) MCG/ACT inhaler, Inhale 2 puffs into the lungs every 4 hours as needed for shortness of breath, wheezing or cough, Disp: 18 g, Rfl: 3     albuterol (PROVENTIL) (2.5 MG/3ML) 0.083% neb solution, Take 1 vial (2.5 mg) by nebulization every 4 hours as needed for shortness of breath, wheezing or cough, Disp: 360 mL, Rfl: 2     Aspirin-Acetaminophen-Caffeine (EXCEDRIN PO), Take by mouth as needed , Disp: , Rfl:      fluticasone (FLONASE) 50 MCG/ACT nasal spray, Spray 2 sprays into both nostrils daily, Disp: 48 g, Rfl: 3     fluticasone-vilanterol (BREO ELLIPTA) 200-25 MCG/ACT inhaler, Inhale 1 puff into the lungs daily, Disp: 60 each, Rfl: 5     ibuprofen (ADVIL/MOTRIN) 200 MG tablet, Take 1 tablet (200 mg) by mouth every 6 hours as needed for moderate pain (Use up to every 6 hours as needed for pain, alternate with tylenol), Disp: 50 tablet, Rfl: 0     sildenafil (REVATIO) 20 MG tablet, TAKE 1 TO 4 TABLETS BY MOUTH 30 MINUTES TO 2 HOURS BEFORE INTERCOURSE. MAXIMUM OF 100MG IN 24 HOURS, Disp: 30 tablet, Rfl: 3     sodium chloride (OCEAN) 0.65 % nasal spray, Spray 2 sprays in nostril every 4 hours, Disp: 30 mL, Rfl: 3  Allergies   Allergen Reactions     Amoxicillin-Pot Clavulanate Rash       EXAM:   There were no vitals taken for this visit.  Physical Exam  HENT:      Head: Normocephalic and atraumatic.      Right Ear: External ear normal.      Left Ear: External ear normal.      Nose: No rhinorrhea.   Pulmonary:      Effort: Pulmonary effort is normal. No respiratory distress.      Comments: Speaking comfortably in full sentences  Neurological:      General: No focal deficit present.      Mental Status: He is alert.   Psychiatric:         Mood and Affect: Mood and affect normal.           WORKUP:   None    ASSESSMENT/PLAN:  Keith Spangler is a 43 year old male seen for a follow-up visit.    1. Moderate persistent asthma without complication - Has been well controlled, recent increase in symptoms due to COVID infection. Doing well with albuterol when needed. No side effects from ICS/LABA therapy.    - Adult Allergy Clinic Follow-Up Order  - fluticasone-vilanterol (BREO ELLIPTA) 200-25 MCG/ACT inhaler; Inhale 1 puff into the lungs daily  Dispense: 1 each; Refill: 3  - take 2 to 4 puffs of albuterol HFA and/or use nebulizer every 4 hours as needed  - Adult Allergy Clinic Follow-Up Order; Future      Follow-up in 6 months, sooner if needed      Thank you for allowing me to participate in the care of Keith Spangler.      Andrei Kyle MD, [facility]  Allergy/Immunology  [facility]      Chart documentation done in part with Dragon Voice Recognition Software. Although reviewed after completion, some word and grammatical errors may remain.      Again, thank you for allowing me to participate in the care of your patient.        Sincerely,        Andrei Kyle MD

## 2024-02-12 NOTE — PROGRESS NOTES
Keith Spangler is a 43 year old who is being evaluated today via a billable video visit    How would you like to obtain your AVS? MyChart  If the video visit is dropped, the invitation should be resent by: Text to cell phone: [phone]  Will anyone else be joining your video visit? No      Video Start Time: 11:35 AM    Video-Visit Details    Type of service:  Video Visit    Video End Time:11:41 AM    Originating Location (pt. Location): Home    Distant Location (provider location):  [facility]    Platform used for Video Visit: AleeSerjio    Keith Spangler was seen in the Allergy Clinic at [facility].      Keith Spangler is a 43 year old  or  male who is seen today for a follow-up visit.    Tested positive for COVID recently. Still recovering, has a cough. Overall feels he is getting better quickly. Has been using albuterol via nebulizer once or twice a day.    Asthma has been pretty good since his last visit. Taking Breo daily - has 4 puffs left. Reports no side effects including hoarseness or sore throat. Prior to getting ill he was rarely using albuterol. Has been exercising regularly and sleeping well.    Past Medical History:   Diagnosis Date    Acquired nasal deformity 2/17/2021    Acute bronchitis with bronchospasm 9/1/2015    Has this every year. Probably intermittent asthma. Use Flovent when flares.     CARDIOVASCULAR SCREENING; LDL GOAL LESS THAN 160 6/11/2010    COPD (chronic obstructive pulmonary disease) (H)     Deviated nasal septum 2/19/2021    Added automatically from request for surgery [id number]    History of closed fracture of nasal bones 4/6/2022    History of fracture of nose 2/19/2021    Added automatically from request for surgery [id number]    Moderate asthma 3/21/2017    Nasal deformity 4/16/2021    Nasal obstruction 2/17/2021    Nasal valve collapse 2/17/2021    NO ACTIVE PROBLEMS     Refractory obstruction of nasal airway  8/17/2020    Added automatically from request for surgery [id number]    Uncomplicated asthma Sept 2014    Intermittent asthma     Family History   Problem Relation Age of Onset    Unknown/Adopted Mother         Diagnosed w/ALS June 2015    Sleep Apnea Brother     Gastrointestinal Disease Brother         colitis    C.A.D. No family hx of     Diabetes No family hx of     Hypertension No family hx of     Cerebrovascular Disease No family hx of     Breast Cancer No family hx of     Cancer - colorectal No family hx of     Prostate Cancer No family hx of     Asthma No family hx of      Social History     Tobacco Use    Smoking status: Never    Smokeless tobacco: Never    Tobacco comments:     Mom smoked his upbringing   Vaping Use    Vaping Use: Never used   Substance Use Topics    Alcohol use: Yes     Comment: Really rare now     Drug use: No     Social History     Social History Narrative    Dairy/d 1-3 servings/d.     Caffeine 1 servings/d    Exercise 5 x week boxer    Sunscreen used - No    Seatbelts used - Yes    Working smoke/CO detectors in the home - Yes    Guns stored in the home - No    Self Breast Exams - NOT APPLICABLE    Self Testicular Exam - Yes    Eye Exam up to date - Yes    Dental Exam up to date - Yes    Pap Smear up to date - NOT APPLICABLE    Mammogram up to date - NOT APPLICABLE    PSA up to date - NO    Dexa Scan up to date - NOT APPLICABLE    Flex Sig / Colonoscopy up to date - Yes    Immunizations up to date - Yes    Abuse: Current or Past(Physical, Sexual or Emotional)- No    Do you feel safe in your environment - Yes           Past medical, family, and social history were reviewed.      Current Outpatient Medications:     acetaminophen (TYLENOL) 325 MG tablet, Take 2 tablets (650 mg) by mouth every 4 hours as needed for mild pain, Disp: 50 tablet, Rfl: 0    albuterol (PROAIR HFA/PROVENTIL HFA/VENTOLIN HFA) 108 (90 Base) MCG/ACT inhaler, Inhale 2 puffs into the lungs every 4 hours as needed for  shortness of breath, wheezing or cough, Disp: 18 g, Rfl: 3    albuterol (PROVENTIL) (2.5 MG/3ML) 0.083% neb solution, Take 1 vial (2.5 mg) by nebulization every 4 hours as needed for shortness of breath, wheezing or cough, Disp: 360 mL, Rfl: 2    Aspirin-Acetaminophen-Caffeine (EXCEDRIN PO), Take by mouth as needed , Disp: , Rfl:     fluticasone (FLONASE) 50 MCG/ACT nasal spray, Spray 2 sprays into both nostrils daily, Disp: 48 g, Rfl: 3    fluticasone-vilanterol (BREO ELLIPTA) 200-25 MCG/ACT inhaler, Inhale 1 puff into the lungs daily, Disp: 60 each, Rfl: 5    ibuprofen (ADVIL/MOTRIN) 200 MG tablet, Take 1 tablet (200 mg) by mouth every 6 hours as needed for moderate pain (Use up to every 6 hours as needed for pain, alternate with tylenol), Disp: 50 tablet, Rfl: 0    sildenafil (REVATIO) 20 MG tablet, TAKE 1 TO 4 TABLETS BY MOUTH 30 MINUTES TO 2 HOURS BEFORE INTERCOURSE. MAXIMUM OF 100MG IN 24 HOURS, Disp: 30 tablet, Rfl: 3    sodium chloride (OCEAN) 0.65 % nasal spray, Spray 2 sprays in nostril every 4 hours, Disp: 30 mL, Rfl: 3  Allergies   Allergen Reactions    Amoxicillin-Pot Clavulanate Rash       EXAM:   There were no vitals taken for this visit.  Physical Exam  HENT:      Head: Normocephalic and atraumatic.      Right Ear: External ear normal.      Left Ear: External ear normal.      Nose: No rhinorrhea.   Pulmonary:      Effort: Pulmonary effort is normal. No respiratory distress.      Comments: Speaking comfortably in full sentences  Neurological:      General: No focal deficit present.      Mental Status: He is alert.   Psychiatric:         Mood and Affect: Mood and affect normal.           WORKUP:  None    ASSESSMENT/PLAN:  Keith Spangler is a 43 year old male seen for a follow-up visit.    1. Moderate persistent asthma without complication - Has been well controlled, recent increase in symptoms due to COVID infection. Doing well with albuterol when needed. No side effects from ICS/LABA therapy.    -  Adult Allergy Clinic Follow-Up Order  - fluticasone-vilanterol (BREO ELLIPTA) 200-25 MCG/ACT inhaler; Inhale 1 puff into the lungs daily  Dispense: 1 each; Refill: 3  - take 2 to 4 puffs of albuterol HFA and/or use nebulizer every 4 hours as needed  - Adult Allergy Clinic Follow-Up Order; Future      Follow-up in 6 months, sooner if needed      Thank you for allowing me to participate in the care of Keith Spangler.      Andrei Kyle MD, FAAAAI  Allergy/Immunology  [facility]      Chart documentation done in part with Dragon Voice Recognition Software. Although reviewed after completion, some word and grammatical errors may remain.

## 2024-02-15 ENCOUNTER — MYC MEDICAL ADVICE (OUTPATIENT)
Dept: ALLERGY | Facility: CLINIC | Age: 44
End: 2024-02-15
Payer: COMMERCIAL

## 2024-02-27 ENCOUNTER — OFFICE VISIT (OUTPATIENT)
Dept: PLASTIC SURGERY | Facility: CLINIC | Age: 44
End: 2024-02-27

## 2024-02-27 DIAGNOSIS — Z41.1 ENCOUNTER FOR COSMETIC PROCEDURE: Primary | ICD-10-CM

## 2024-02-27 NOTE — PROGRESS NOTES
Facial Plastic and Reconstructive Surgery    Procedure: Chemodenervation with Botulinum Toxin A  Indication: Undesirable Dynamic Rhytids  Injector: Dr. Yashira Slater MD      Informed consent was obtained.  The skin was cleaned with antimicrobial solution and a topical ice was placed.     The patient was asked to systematically engage the muscles in the area to be injected. The tuberculin needles were used for subdermal injection and hemostasis was obtained with light digital pressure when needed. The skin was cleaned.     A total of 30 units were injected.  Botulinum toxin A type: Botox  G: 15u (2:1)  F: 7.5u (2:1)  C: 7.5u (2:1)    Please see procedure log.    The patient tolerated the procedure well and there were no complications. Post procedure care instructions were given to the patient.

## 2024-03-20 NOTE — NURSING NOTE
"Chief Complaint   Patient presents with     Consult     Refractory obstruction of nasal airway       Temperature 97.1  F (36.2  C), temperature source Temporal, height 1.727 m (5' 8\"), weight 87.1 kg (192 lb).    aMrgoth Almazan, EMT  " Diane Bravo'

## 2024-05-20 DIAGNOSIS — N52.9 ERECTILE DYSFUNCTION, UNSPECIFIED ERECTILE DYSFUNCTION TYPE: ICD-10-CM

## 2024-05-20 RX ORDER — SILDENAFIL CITRATE 20 MG/1
TABLET ORAL
Qty: 30 TABLET | Refills: 3 | Status: SHIPPED | OUTPATIENT
Start: 2024-05-20

## 2024-05-21 ENCOUNTER — OFFICE VISIT (OUTPATIENT)
Dept: PLASTIC SURGERY | Facility: CLINIC | Age: 44
End: 2024-05-21

## 2024-05-21 DIAGNOSIS — Z41.1 ENCOUNTER FOR COSMETIC PROCEDURE: Primary | ICD-10-CM

## 2024-06-24 DIAGNOSIS — J45.40 MODERATE PERSISTENT ASTHMA WITHOUT COMPLICATION: ICD-10-CM

## 2024-06-24 RX ORDER — FLUTICASONE FUROATE AND VILANTEROL 200; 25 UG/1; UG/1
1 POWDER RESPIRATORY (INHALATION) DAILY
Qty: 1 EACH | Refills: 3 | Status: SHIPPED | OUTPATIENT
Start: 2024-06-24

## 2024-06-24 NOTE — TELEPHONE ENCOUNTER
Requested Prescriptions   Pending Prescriptions Disp Refills    fluticasone-vilanterol (BREO ELLIPTA) 200-25 MCG/ACT inhaler 1 each 3     Sig: Inhale 1 puff into the lungs daily       Inhaled Steroids Protocol Passed - 6/24/2024  9:49 AM        Passed - Patient is age 12 or older        Passed - Asthma control assessment score within normal limits in last 6 months     Please review ACT score.           Passed - Medication is active on med list        Passed - Recent (6 mo) or future (90 days) visit within the authorizing provider's specialty     The patient must have completed an in-person or virtual visit within the past 6 months or has a future visit scheduled within the next 90 days with the authorizing provider s specialty.  Urgent care and e-visits do not quality as an office visit for this protocol.          Passed - Medication indicated for associated diagnosis     Medication is associated with one or more of the following diagnoses:    Allergies   Asthma   COPD   Nasal Congestion   Nasal Polyps   Rhinitis             RN refilled medication per [facility] Refill Protocol.     HUDSON SamsonN, RN, PHN 6/24/2024 11:11 AM

## 2024-06-24 NOTE — TELEPHONE ENCOUNTER
Requested Prescriptions   Pending Prescriptions Disp Refills    fluticasone-vilanterol (BREO ELLIPTA) 200-25 MCG/ACT inhaler 1 each 3     Sig: Inhale 1 puff into the lungs daily       There is no refill protocol information for this order

## 2024-08-04 ENCOUNTER — HEALTH MAINTENANCE LETTER (OUTPATIENT)
Age: 44
End: 2024-08-04

## 2024-08-07 ASSESSMENT — ASTHMA QUESTIONNAIRES
QUESTION_4 LAST FOUR WEEKS HOW OFTEN HAVE YOU USED YOUR RESCUE INHALER OR NEBULIZER MEDICATION (SUCH AS ALBUTEROL): ONCE A WEEK OR LESS
QUESTION_3 LAST FOUR WEEKS HOW OFTEN DID YOUR ASTHMA SYMPTOMS (WHEEZING, COUGHING, SHORTNESS OF BREATH, CHEST TIGHTNESS OR PAIN) WAKE YOU UP AT NIGHT OR EARLIER THAN USUAL IN THE MORNING: NOT AT ALL
QUESTION_1 LAST FOUR WEEKS HOW MUCH OF THE TIME DID YOUR ASTHMA KEEP YOU FROM GETTING AS MUCH DONE AT WORK, SCHOOL OR AT HOME: NONE OF THE TIME
ACT_TOTALSCORE: 24
QUESTION_2 LAST FOUR WEEKS HOW OFTEN HAVE YOU HAD SHORTNESS OF BREATH: NOT AT ALL
QUESTION_5 LAST FOUR WEEKS HOW WOULD YOU RATE YOUR ASTHMA CONTROL: COMPLETELY CONTROLLED

## 2024-08-12 ENCOUNTER — OFFICE VISIT (OUTPATIENT)
Dept: ALLERGY | Facility: CLINIC | Age: 44
End: 2024-08-12
Attending: ALLERGY & IMMUNOLOGY
Payer: COMMERCIAL

## 2024-08-12 VITALS — OXYGEN SATURATION: 95 % | DIASTOLIC BLOOD PRESSURE: 81 MMHG | HEART RATE: 55 BPM | SYSTOLIC BLOOD PRESSURE: 122 MMHG

## 2024-08-12 DIAGNOSIS — J45.40 MODERATE PERSISTENT ASTHMA WITHOUT COMPLICATION: ICD-10-CM

## 2024-08-12 LAB
FEF 25/75: NORMAL
FEV-1: NORMAL
FEV1/FVC: NORMAL
FVC: NORMAL

## 2024-08-12 PROCEDURE — 99213 OFFICE O/P EST LOW 20 MIN: CPT | Mod: 25 | Performed by: ALLERGY & IMMUNOLOGY

## 2024-08-12 PROCEDURE — 94010 BREATHING CAPACITY TEST: CPT | Performed by: ALLERGY & IMMUNOLOGY

## 2024-08-12 RX ORDER — FLUTICASONE FUROATE AND VILANTEROL 100; 25 UG/1; UG/1
1 POWDER RESPIRATORY (INHALATION) DAILY
Qty: 3 EACH | Refills: 1 | Status: SHIPPED | OUTPATIENT
Start: 2024-08-12

## 2024-08-12 ASSESSMENT — ASTHMA QUESTIONNAIRES: ACT_TOTALSCORE: 24

## 2024-08-12 NOTE — PROGRESS NOTES
Keith Spangler was seen in the Allergy Clinic at [facility].      Keith Spangler is a 44 year old  or  male who is seen today for a follow-up visit.    Reports that he has been doing well.  Continues to take Breo once daily.  Has rarely needed albuterol either via inhaler or nebulizer.  No exacerbations or oral steroid use in the past 6 months.  He is interested in discussing whether his medications can be stopped today.      Past Medical History:   Diagnosis Date    Acquired nasal deformity 2/17/2021    Acute bronchitis with bronchospasm 9/1/2015    Has this every year. Probably intermittent asthma. Use Flovent when flares.     CARDIOVASCULAR SCREENING; LDL GOAL LESS THAN 160 6/11/2010    COPD (chronic obstructive pulmonary disease) (H)     Deviated nasal septum 2/19/2021    Added automatically from request for surgery [id number]    History of closed fracture of nasal bones 4/6/2022    History of fracture of nose 2/19/2021    Added automatically from request for surgery [id number]    Moderate asthma 3/21/2017    Nasal deformity 4/16/2021    Nasal obstruction 2/17/2021    Nasal valve collapse 2/17/2021    NO ACTIVE PROBLEMS     Refractory obstruction of nasal airway 8/17/2020    Added automatically from request for surgery [id number]    Uncomplicated asthma Sept 2014    Intermittent asthma     Family History   Problem Relation Age of Onset    Unknown/Adopted Mother         Diagnosed w/ALS June 2015    Sleep Apnea Brother     Gastrointestinal Disease Brother         colitis    C.A.D. No family hx of     Diabetes No family hx of     Hypertension No family hx of     Cerebrovascular Disease No family hx of     Breast Cancer No family hx of     Cancer - colorectal No family hx of     Prostate Cancer No family hx of     Asthma No family hx of      Social History     Tobacco Use    Smoking status: Never    Smokeless tobacco: Never    Tobacco comments:     Mom smoked his upbringing   Vaping  Use    Vaping status: Never Used   Substance Use Topics    Alcohol use: Yes     Comment: Really rare now     Drug use: No     Social History     Social History Narrative    Dairy/d 1-3 servings/d.     Caffeine 1 servings/d    Exercise 5 x week boxer    Sunscreen used - No    Seatbelts used - Yes    Working smoke/CO detectors in the home - Yes    Guns stored in the home - No    Self Breast Exams - NOT APPLICABLE    Self Testicular Exam - Yes    Eye Exam up to date - Yes    Dental Exam up to date - Yes    Pap Smear up to date - NOT APPLICABLE    Mammogram up to date - NOT APPLICABLE    PSA up to date - NO    Dexa Scan up to date - NOT APPLICABLE    Flex Sig / Colonoscopy up to date - Yes    Immunizations up to date - Yes    Abuse: Current or Past(Physical, Sexual or Emotional)- No    Do you feel safe in your environment - Yes           Past medical, family, and social history were reviewed.        Current Outpatient Medications:     acetaminophen (TYLENOL) 325 MG tablet, Take 2 tablets (650 mg) by mouth every 4 hours as needed for mild pain, Disp: 50 tablet, Rfl: 0    albuterol (PROAIR HFA/PROVENTIL HFA/VENTOLIN HFA) 108 (90 Base) MCG/ACT inhaler, Inhale 2 puffs into the lungs every 4 hours as needed for shortness of breath, wheezing or cough, Disp: 18 g, Rfl: 3    albuterol (PROVENTIL) (2.5 MG/3ML) 0.083% neb solution, Take 1 vial (2.5 mg) by nebulization every 4 hours as needed for shortness of breath, wheezing or cough, Disp: 360 mL, Rfl: 2    Aspirin-Acetaminophen-Caffeine (EXCEDRIN PO), Take by mouth as needed , Disp: , Rfl:     fluticasone (FLONASE) 50 MCG/ACT nasal spray, Spray 2 sprays into both nostrils daily, Disp: 48 g, Rfl: 3    fluticasone-vilanterol (BREO ELLIPTA) 100-25 MCG/ACT inhaler, Inhale 1 puff into the lungs daily, Disp: 3 each, Rfl: 1    fluticasone-vilanterol (BREO ELLIPTA) 200-25 MCG/ACT inhaler, Inhale 1 puff into the lungs daily, Disp: 1 each, Rfl: 3    ibuprofen (ADVIL/MOTRIN) 200 MG  tablet, Take 1 tablet (200 mg) by mouth every 6 hours as needed for moderate pain (Use up to every 6 hours as needed for pain, alternate with tylenol), Disp: 50 tablet, Rfl: 0    sildenafil (REVATIO) 20 MG tablet, TAKE 1-4 TABLETS BY MOUTH 30 MINUTES TO 2 HOURS BEFORE INTERCOURSE. MAX OF 5 TABLETS IN 24 HOURS, Disp: 30 tablet, Rfl: 3    sodium chloride (OCEAN) 0.65 % nasal spray, Spray 2 sprays in nostril every 4 hours, Disp: 30 mL, Rfl: 3  Allergies   Allergen Reactions    Amoxicillin-Pot Clavulanate Rash       EXAM:   /81 (BP Location: Right arm, Patient Position: Sitting, Cuff Size: Adult Large)   Pulse 55   SpO2 95%   Physical Exam  Vitals and nursing note reviewed.   Constitutional:       Appearance: Normal appearance.   HENT:      Head: Normocephalic and atraumatic.      Right Ear: External ear normal.      Left Ear: External ear normal.      Nose: No rhinorrhea.      Mouth/Throat:      Mouth: Mucous membranes are moist. No oral lesions.      Pharynx: Oropharynx is clear. Uvula midline. No posterior oropharyngeal erythema.   Eyes:      General: Lids are normal.      Extraocular Movements: Extraocular movements intact.      Conjunctiva/sclera: Conjunctivae normal.   Neck:      Comments: No asymmetry, masses, or scars  Cardiovascular:      Rate and Rhythm: Normal rate and regular rhythm.      Heart sounds: S1 normal and S2 normal. No murmur heard.  Pulmonary:      Effort: Pulmonary effort is normal. No respiratory distress.      Breath sounds: Normal breath sounds and air entry.   Musculoskeletal:      Comments: No musculoskeletal defects appreciated   Skin:     General: Skin is warm and dry.      Findings: No lesion or rash.   Neurological:      General: No focal deficit present.      Mental Status: He is alert.   Psychiatric:         Mood and Affect: Mood and affect normal.           WORKUP:  Spirometry  SPIROMETRY       FVC 4.16L (86% of predicted).     FEV1 3.60L (94% of predicted).     FEV1/FVC  86%      I have reviewed and interpreted these results. Testing meets criteria for acceptability and reproducibility. Values are consistent with normal lung function.    Asthma Control Test (ACT) total score: 24     ASSESSMENT/PLAN:  Keith Spangler is a 44 year old male here for a follow-up visit.    1. Moderate persistent asthma without complication - Well controlled, no exacerbations or oral steroid use in the past 6 months. Spirometry today is normal. Discussed weaning his ICS/LABA dose today given that he continues to do do well and he was in agreement with this plan.    - Adult Allergy Clinic Follow-Up Order  - fluticasone-vilanterol (BREO ELLIPTA) 100-25 MCG/ACT inhaler; Inhale 1 puff into the lungs daily  Dispense: 3 each; Refill: 1  - Adult Allergy Clinic Follow-Up Order; Future      Follow-up in 4 months, sooner if needed      Thank you for allowing me to participate in the care of Keith Spangler.      Andrei Kyle MD, FAAAAI  Allergy/Immunology  [facility]      Consent was obtained from the patient to use an AI documentation tool in the creation of this note.    Chart documentation done in part with Dragon Voice Recognition Software. Although reviewed after completion, some word and grammatical errors may remain.

## 2024-08-12 NOTE — LETTER
8/12/2024      Keith Spangler  [address] [facility] [address]      Dear Colleague,    Thank you for referring your patient, Keith Spangler, to the [facility]. Please see a copy of my visit note below.    Keith Spangler was seen in the Allergy Clinic at [facility].      Keith Spangler is a 44 year old  or  male who is seen today for a follow-up visit.    Reports that he has been doing well.  Continues to take Breo once daily.  Has rarely needed albuterol either via inhaler or nebulizer.  No exacerbations or oral steroid use in the past 6 months.  He is interested in discussing whether his medications can be stopped today.      Past Medical History:   Diagnosis Date     Acquired nasal deformity 2/17/2021     Acute bronchitis with bronchospasm 9/1/2015    Has this every year. Probably intermittent asthma. Use Flovent when flares.      CARDIOVASCULAR SCREENING; LDL GOAL LESS THAN 160 6/11/2010     COPD (chronic obstructive pulmonary disease) (H)      Deviated nasal septum 2/19/2021    Added automatically from request for surgery [id number]     History of closed fracture of nasal bones 4/6/2022     History of fracture of nose 2/19/2021    Added automatically from request for surgery [id number]     Moderate asthma 3/21/2017     Nasal deformity 4/16/2021     Nasal obstruction 2/17/2021     Nasal valve collapse 2/17/2021     NO ACTIVE PROBLEMS      Refractory obstruction of nasal airway 8/17/2020    Added automatically from request for surgery [id number]     Uncomplicated asthma Sept 2014    Intermittent asthma     Family History   Problem Relation Age of Onset     Unknown/Adopted Mother         Diagnosed w/ALS June 2015     Sleep Apnea Brother      Gastrointestinal Disease Brother         colitis     C.A.D. No family hx of      Diabetes No family hx of      Hypertension No family hx of      Cerebrovascular Disease No family hx of      Breast  Cancer No family hx of      Cancer - colorectal No family hx of      Prostate Cancer No family hx of      Asthma No family hx of      Social History     Tobacco Use     Smoking status: Never     Smokeless tobacco: Never     Tobacco comments:     Mom smoked his upbringing   Vaping Use     Vaping status: Never Used   Substance Use Topics     Alcohol use: Yes     Comment: Really rare now      Drug use: No     Social History     Social History Narrative    Dairy/d 1-3 servings/d.     Caffeine 1 servings/d    Exercise 5 x week boxer    Sunscreen used - No    Seatbelts used - Yes    Working smoke/CO detectors in the home - Yes    Guns stored in the home - No    Self Breast Exams - NOT APPLICABLE    Self Testicular Exam - Yes    Eye Exam up to date - Yes    Dental Exam up to date - Yes    Pap Smear up to date - NOT APPLICABLE    Mammogram up to date - NOT APPLICABLE    PSA up to date - NO    Dexa Scan up to date - NOT APPLICABLE    Flex Sig / Colonoscopy up to date - Yes    Immunizations up to date - Yes    Abuse: Current or Past(Physical, Sexual or Emotional)- No    Do you feel safe in your environment - Yes           Past medical, family, and social history were reviewed.        Current Outpatient Medications:      acetaminophen (TYLENOL) 325 MG tablet, Take 2 tablets (650 mg) by mouth every 4 hours as needed for mild pain, Disp: 50 tablet, Rfl: 0     albuterol (PROAIR HFA/PROVENTIL HFA/VENTOLIN HFA) 108 (90 Base) MCG/ACT inhaler, Inhale 2 puffs into the lungs every 4 hours as needed for shortness of breath, wheezing or cough, Disp: 18 g, Rfl: 3     albuterol (PROVENTIL) (2.5 MG/3ML) 0.083% neb solution, Take 1 vial (2.5 mg) by nebulization every 4 hours as needed for shortness of breath, wheezing or cough, Disp: 360 mL, Rfl: 2     Aspirin-Acetaminophen-Caffeine (EXCEDRIN PO), Take by mouth as needed , Disp: , Rfl:      fluticasone (FLONASE) 50 MCG/ACT nasal spray, Spray 2 sprays into both nostrils daily, Disp: 48 g,  Rfl: 3     fluticasone-vilanterol (BREO ELLIPTA) 100-25 MCG/ACT inhaler, Inhale 1 puff into the lungs daily, Disp: 3 each, Rfl: 1     fluticasone-vilanterol (BREO ELLIPTA) 200-25 MCG/ACT inhaler, Inhale 1 puff into the lungs daily, Disp: 1 each, Rfl: 3     ibuprofen (ADVIL/MOTRIN) 200 MG tablet, Take 1 tablet (200 mg) by mouth every 6 hours as needed for moderate pain (Use up to every 6 hours as needed for pain, alternate with tylenol), Disp: 50 tablet, Rfl: 0     sildenafil (REVATIO) 20 MG tablet, TAKE 1-4 TABLETS BY MOUTH 30 MINUTES TO 2 HOURS BEFORE INTERCOURSE. MAX OF 5 TABLETS IN 24 HOURS, Disp: 30 tablet, Rfl: 3     sodium chloride (OCEAN) 0.65 % nasal spray, Spray 2 sprays in nostril every 4 hours, Disp: 30 mL, Rfl: 3  Allergies   Allergen Reactions     Amoxicillin-Pot Clavulanate Rash       EXAM:   /81 (BP Location: Right arm, Patient Position: Sitting, Cuff Size: Adult Large)   Pulse 55   SpO2 95%   Physical Exam  Vitals and nursing note reviewed.   Constitutional:       Appearance: Normal appearance.   HENT:      Head: Normocephalic and atraumatic.      Right Ear: External ear normal.      Left Ear: External ear normal.      Nose: No rhinorrhea.      Mouth/Throat:      Mouth: Mucous membranes are moist. No oral lesions.      Pharynx: Oropharynx is clear. Uvula midline. No posterior oropharyngeal erythema.   Eyes:      General: Lids are normal.      Extraocular Movements: Extraocular movements intact.      Conjunctiva/sclera: Conjunctivae normal.   Neck:      Comments: No asymmetry, masses, or scars  Cardiovascular:      Rate and Rhythm: Normal rate and regular rhythm.      Heart sounds: S1 normal and S2 normal. No murmur heard.  Pulmonary:      Effort: Pulmonary effort is normal. No respiratory distress.      Breath sounds: Normal breath sounds and air entry.   Musculoskeletal:      Comments: No musculoskeletal defects appreciated   Skin:     General: Skin is warm and dry.      Findings: No lesion  or rash.   Neurological:      General: No focal deficit present.      Mental Status: He is alert.   Psychiatric:         Mood and Affect: Mood and affect normal.           WORKUP:  Spirometry  SPIROMETRY       FVC 4.16L (86% of predicted).     FEV1 3.60L (94% of predicted).     FEV1/FVC 86%      I have reviewed and interpreted these results. Testing meets criteria for acceptability and reproducibility. Values are consistent with normal lung function.    Asthma Control Test (ACT) total score: 24     ASSESSMENT/PLAN:  Keith Spangler is a 44 year old male here for a follow-up visit.    1. Moderate persistent asthma without complication - Well controlled, no exacerbations or oral steroid use in the past 6 months. Spirometry today is normal. Discussed weaning his ICS/LABA dose today given that he continues to do do well and he was in agreement with this plan.    - Adult Allergy Clinic Follow-Up Order  - fluticasone-vilanterol (BREO ELLIPTA) 100-25 MCG/ACT inhaler; Inhale 1 puff into the lungs daily  Dispense: 3 each; Refill: 1  - Adult Allergy Clinic Follow-Up Order; Future      Follow-up in 4 months, sooner if needed      Thank you for allowing me to participate in the care of Keith Spangler.      Andrei Kyle MD, FAAAAI  Allergy/Immunology  [facility]      Consent was obtained from the patient to use an AI documentation tool in the creation of this note.    Chart documentation done in part with Dragon Voice Recognition Software. Although reviewed after completion, some word and grammatical errors may remain.      Again, thank you for allowing me to participate in the care of your patient.        Sincerely,        Andrei Kyle MD

## 2024-08-23 ENCOUNTER — OFFICE VISIT (OUTPATIENT)
Dept: PLASTIC SURGERY | Facility: CLINIC | Age: 44
End: 2024-08-23

## 2024-08-23 DIAGNOSIS — Z41.1 ENCOUNTER FOR COSMETIC PROCEDURE: Primary | ICD-10-CM

## 2024-08-26 ENCOUNTER — OFFICE VISIT (OUTPATIENT)
Dept: FAMILY MEDICINE | Facility: CLINIC | Age: 44
End: 2024-08-26
Payer: COMMERCIAL

## 2024-08-26 VITALS
HEIGHT: 68 IN | WEIGHT: 183.5 LBS | HEART RATE: 64 BPM | DIASTOLIC BLOOD PRESSURE: 81 MMHG | RESPIRATION RATE: 18 BRPM | TEMPERATURE: 98 F | OXYGEN SATURATION: 100 % | SYSTOLIC BLOOD PRESSURE: 127 MMHG | BODY MASS INDEX: 27.81 KG/M2

## 2024-08-26 DIAGNOSIS — Z00.00 ROUTINE GENERAL MEDICAL EXAMINATION AT A HEALTH CARE FACILITY: Primary | ICD-10-CM

## 2024-08-26 DIAGNOSIS — Z12.11 SCREEN FOR COLON CANCER: ICD-10-CM

## 2024-08-26 DIAGNOSIS — Z13.220 LIPID SCREENING: ICD-10-CM

## 2024-08-26 LAB
ANION GAP SERPL CALCULATED.3IONS-SCNC: 13 MMOL/L (ref 7–15)
BUN SERPL-MCNC: 18 MG/DL (ref 6–20)
CALCIUM SERPL-MCNC: 9.7 MG/DL (ref 8.8–10.4)
CHLORIDE SERPL-SCNC: 102 MMOL/L (ref 98–107)
CHOLEST SERPL-MCNC: 194 MG/DL
CREAT SERPL-MCNC: 0.91 MG/DL (ref 0.67–1.17)
EGFRCR SERPLBLD CKD-EPI 2021: >90 ML/MIN/1.73M2
ERYTHROCYTE [DISTWIDTH] IN BLOOD BY AUTOMATED COUNT: 11.9 % (ref 10–15)
FASTING STATUS PATIENT QL REPORTED: YES
FASTING STATUS PATIENT QL REPORTED: YES
GLUCOSE SERPL-MCNC: 107 MG/DL (ref 70–99)
HCO3 SERPL-SCNC: 23 MMOL/L (ref 22–29)
HCT VFR BLD AUTO: 44.7 % (ref 40–53)
HDLC SERPL-MCNC: 50 MG/DL
HGB BLD-MCNC: 15.2 G/DL (ref 13.3–17.7)
LDLC SERPL CALC-MCNC: 132 MG/DL
MCH RBC QN AUTO: 30.3 PG (ref 26.5–33)
MCHC RBC AUTO-ENTMCNC: 34 G/DL (ref 31.5–36.5)
MCV RBC AUTO: 89 FL (ref 78–100)
NONHDLC SERPL-MCNC: 144 MG/DL
PLATELET # BLD AUTO: 221 10E3/UL (ref 150–450)
POTASSIUM SERPL-SCNC: 4.5 MMOL/L (ref 3.4–5.3)
RBC # BLD AUTO: 5.02 10E6/UL (ref 4.4–5.9)
SODIUM SERPL-SCNC: 138 MMOL/L (ref 135–145)
TRIGL SERPL-MCNC: 58 MG/DL
WBC # BLD AUTO: 7.3 10E3/UL (ref 4–11)

## 2024-08-26 PROCEDURE — 85027 COMPLETE CBC AUTOMATED: CPT | Performed by: PHYSICIAN ASSISTANT

## 2024-08-26 PROCEDURE — 80048 BASIC METABOLIC PNL TOTAL CA: CPT | Performed by: PHYSICIAN ASSISTANT

## 2024-08-26 PROCEDURE — 36415 COLL VENOUS BLD VENIPUNCTURE: CPT | Performed by: PHYSICIAN ASSISTANT

## 2024-08-26 PROCEDURE — 99396 PREV VISIT EST AGE 40-64: CPT | Performed by: PHYSICIAN ASSISTANT

## 2024-08-26 PROCEDURE — 80061 LIPID PANEL: CPT | Performed by: PHYSICIAN ASSISTANT

## 2024-08-26 SDOH — HEALTH STABILITY: PHYSICAL HEALTH: ON AVERAGE, HOW MANY MINUTES DO YOU ENGAGE IN EXERCISE AT THIS LEVEL?: 80 MIN

## 2024-08-26 SDOH — HEALTH STABILITY: PHYSICAL HEALTH: ON AVERAGE, HOW MANY DAYS PER WEEK DO YOU ENGAGE IN MODERATE TO STRENUOUS EXERCISE (LIKE A BRISK WALK)?: 4 DAYS

## 2024-08-26 ASSESSMENT — ASTHMA QUESTIONNAIRES
QUESTION_1 LAST FOUR WEEKS HOW MUCH OF THE TIME DID YOUR ASTHMA KEEP YOU FROM GETTING AS MUCH DONE AT WORK, SCHOOL OR AT HOME: NONE OF THE TIME
QUESTION_3 LAST FOUR WEEKS HOW OFTEN DID YOUR ASTHMA SYMPTOMS (WHEEZING, COUGHING, SHORTNESS OF BREATH, CHEST TIGHTNESS OR PAIN) WAKE YOU UP AT NIGHT OR EARLIER THAN USUAL IN THE MORNING: NOT AT ALL
QUESTION_2 LAST FOUR WEEKS HOW OFTEN HAVE YOU HAD SHORTNESS OF BREATH: NOT AT ALL
QUESTION_5 LAST FOUR WEEKS HOW WOULD YOU RATE YOUR ASTHMA CONTROL: WELL CONTROLLED
ACT_TOTALSCORE: 24
QUESTION_4 LAST FOUR WEEKS HOW OFTEN HAVE YOU USED YOUR RESCUE INHALER OR NEBULIZER MEDICATION (SUCH AS ALBUTEROL): NOT AT ALL
ACT_TOTALSCORE: 24

## 2024-08-26 ASSESSMENT — SOCIAL DETERMINANTS OF HEALTH (SDOH): HOW OFTEN DO YOU GET TOGETHER WITH FRIENDS OR RELATIVES?: ONCE A WEEK

## 2024-08-26 ASSESSMENT — PAIN SCALES - GENERAL: PAINLEVEL: NO PAIN (0)

## 2024-08-26 NOTE — LETTER
My Asthma Action Plan    Name: Keith Spangler   YOB: 1980  Date: 8/26/2024   My doctor: Eveline Garcia PA-C   My clinic: [facility]        My Control Medicine: Fluticasone furoate + vilanterol (Breo Ellipta)  -  100/25mcg    My Rescue Medicine: Albuterol (Proair/Ventolin/Proventil HFA) 2-4 puffs EVERY 4 HOURS as needed. Use a spacer if recommended by your provider.  My Oral Steroid Medicine: none My Asthma Severity:   Mild Persistent  Know your asthma triggers: cold air  cold air            GREEN ZONE   Good Control  I feel good  No cough or wheeze  Can work, sleep and play without asthma symptoms       Take your asthma control medicine every day.     If exercise triggers your asthma, take your rescue medication  15 minutes before exercise or sports, and  During exercise if you have asthma symptoms  Spacer to use with inhaler: If you have a spacer, make sure to use it with your inhaler             YELLOW ZONE Getting Worse  I have ANY of these:  I do not feel good  Cough or wheeze  Chest feels tight  Wake up at night   Keep taking your Green Zone medications  Start taking your rescue medicine:  every 20 minutes for up to 1 hour. Then every 4 hours for 24-48 hours.  If you stay in the Yellow Zone for more than 12-24 hours, contact your doctor.  If you do not return to the Green Zone in 12-24 hours or you get worse, start taking your oral steroid medicine if prescribed by your provider.           RED ZONE Medical Alert - Get Help  I have ANY of these:  I feel awful  Medicine is not helping  Breathing getting harder  Trouble walking or talking  Nose opens wide to breathe       Take your rescue medicine NOW  If your provider has prescribed an oral steroid medicine, start taking it NOW  Call your doctor NOW  If you are still in the Red Zone after 20 minutes and you have not reached your doctor:  Take your rescue medicine again and  Call 911 or go to the emergency room  right away    See your regular doctor within 2 weeks of an Emergency Room or Urgent Care visit for follow-up treatment.          Annual Reminders:  Meet with Asthma Educator,  Flu Shot in the Fall, consider Pneumonia Vaccination for patients with asthma (aged 19 and older).    Pharmacy:    [facility] - [place] - [address] AT PORTAL TO REGISTERED [facility] SITES  Former[facility] - [place], [place] - [phone]  [id number] - [place], [place] - [address]  AT [facility] & [address] [place] [address] DRUG STORE #[id number] - [place], [place] - [address] AT [facility][id number] - [place], [place] - [address] [place] [address]    Electronically signed by Eveline Garcia PA-C   Date: 08/26/24                      Asthma Triggers  How To Control Things That Make Your Asthma Worse    Triggers are things that make your asthma worse.  Look at the list below to help you find your triggers and what you can do about them.  You can help prevent asthma flare-ups by staying away from your triggers.      Trigger                                                          What you can do   Cigarette Smoke  Tobacco smoke can make asthma worse. Do not allow smoking in your home, car or around you.  Be sure no one smokes at a child s day care or school.  If you smoke, ask your health care provider for ways to help you quit.  Ask family members to quit too.  Ask your health care provider for a referral to Quit Plan to help you quit smoking, or call 8-110-005-PLAN.     Colds, Flu, Bronchitis  These are common triggers of asthma. Wash your hands often.  Don t touch your eyes, nose or mouth.  Get a flu shot every year.     Dust Mites  These are tiny bugs that live in cloth or carpet. They are too small to see. Wash sheets and blankets in hot water every week.   Encase pillows and mattress in dust mite proof covers.  Avoid having  carpet if you can. If you have carpet, vacuum weekly.   Use a dust mask and HEPA vacuum.   Pollen and Outdoor Mold  Some people are allergic to trees, grass, or weed pollen, or molds. Try to keep your windows closed.  Limit time out doors when pollen count is high.   Ask you health care provider about taking medicine during allergy season.     Animal Dander  Some people are allergic to skin flakes, urine or saliva from pets with fur or feathers. Keep pets with fur or feathers out of your home.    If you can t keep the pet outdoors, then keep the pet out of your bedroom.  Keep the bedroom door closed.  Keep pets off cloth furniture and away from stuffed toys.     Mice, Rats, and Cockroaches   Some people are allergic to the waste from these pests.   Cover food and garbage.  Clean up spills and food crumbs.  Store grease in the refrigerator.   Keep food out of the bedroom.   Indoor Mold  This can be a trigger if your home has high moisture. Fix leaking faucets, pipes, or other sources of water.   Clean moldy surfaces.  Dehumidify basement if it is damp and smelly.   Smoke, Strong Odors, and Sprays  These can reduce air quality. Stay away from strong odors and sprays, such as perfume, powder, hair spray, paints, smoke incense, paint, cleaning products, candles and new carpet.   Exercise or Sports  Some people with asthma have this trigger. Be active!  Ask your doctor about taking medicine before sports or exercise to prevent symptoms.    Warm up for 5-10 minutes before and after sports or exercise.     Other Triggers of Asthma  Cold air:  Cover your nose and mouth with a scarf.  Sometimes laughing or crying can be a trigger.  Some medicines and food can trigger asthma.

## 2024-08-26 NOTE — PROGRESS NOTES
"Preventive Care Visit  [facility]  Aug 26, 2024      Assessment & Plan   Problem List Items Addressed This Visit    None  Visit Diagnoses       Routine general medical examination at a health care facility    -  Primary    Screen for colon cancer                 Patient has been advised of split billing requirements and indicates understanding: Yes       BMI  Estimated body mass index is 27.97 kg/m  as calculated from the following:    Height as of this encounter: 1.725 m (5' 7.91\").    Weight as of this encounter: 83.2 kg (183 lb 8 oz).   Weight management plan: Discussed healthy diet and exercise guidelines    Counseling  Appropriate preventive services were addressed with this patient via screening, questionnaire, or discussion as appropriate for fall prevention, nutrition, physical activity, Tobacco-use cessation, social engagement, weight loss and cognition.  Checklist reviewing preventive services available has been given to the patient.  Reviewed patient's diet, addressing concerns and/or questions.           Noemí Parker is a 44 year old, presenting for the following:  Physical        8/26/2024     8:51 AM   Additional Questions   [facility]  Patient does not have a Health Care Directive or Living Will: Patient states has Advance Directive and will bring in a copy to clinic.    HPI        Asthma Follow-Up    Was ACT completed today?  Yes        8/26/2024     8:07 AM   ACT Total Scores   ACT TOTAL SCORE (Goal Greater than or Equal to 20) 24   In the past 12 months, how many times did you visit the emergency room for your asthma without being admitted to the hospital? 0   In the past 12 months, how many times were you hospitalized overnight because of your asthma? 0        How many days per week do you miss taking your asthma controller medication?  0  Please describe any recent triggers for your " asthma: None  Have you had any Emergency Room Visits, Urgent Care Visits, or Hospital Admissions since your last office visit?  No      8/26/2024   General Health   How would you rate your overall physical health? Excellent   Feel stress (tense, anxious, or unable to sleep) Not at all            8/26/2024   Nutrition   Three or more servings of calcium each day? Yes   Diet: Other   If other, please elaborate: Keto   How many servings of fruit and vegetables per day? (!) 0-1   How many sweetened beverages each day? 0-1            8/26/2024   Exercise   Days per week of moderate/strenous exercise 4 days   Average minutes spent exercising at this level 80 min            8/26/2024   Social Factors   Frequency of gathering with friends or relatives Once a week   Worry food won't last until get money to buy more No   Food not last or not have enough money for food? No   Do you have housing? (Housing is defined as stable permanent housing and does not include staying ouside in a car, in a tent, in an abandoned building, in an overnight shelter, or couch-surfing.) Yes   Are you worried about losing your housing? No   Lack of transportation? No   Unable to get utilities (heat,electricity)? No            8/26/2024   Dental   Dentist two times every year? Yes               Today's PHQ-2 Score:       8/26/2024     8:06 AM   PHQ-2 ( 1999 [employer])   Q1: Little interest or pleasure in doing things 0   Q2: Feeling down, depressed or hopeless 0   PHQ-2 Score 0   Q1: Little interest or pleasure in doing things Not at all   Q2: Feeling down, depressed or hopeless Not at all   PHQ-2 Score 0           8/26/2024   Substance Use   Alcohol more than 3/day or more than 7/wk No   Do you use any other substances recreationally? (!) CANNABIS PRODUCTS        Social History     Tobacco Use    Smoking status: Never    Smokeless tobacco: Never    Tobacco comments:     Mom smoked his upbringing   Vaping Use    Vaping status: Never Used   Substance  Use Topics    Alcohol use: Yes     Comment: Really rare now     Drug use: No           8/26/2024   STI Screening   New sexual partner(s) since last STI/HIV test? No      ASCVD Risk   The 10-year ASCVD risk score (Kyle AGARWAL, et al., 2019) is: 2.1%    Values used to calculate the score:      Age: 44 years      Sex: Male      Is Non- : No      Diabetic: No      Tobacco smoker: No      Systolic Blood Pressure: 127 mmHg      Is BP treated: No      HDL Cholesterol: 58 mg/dL      Total Cholesterol: 228 mg/dL        8/26/2024   Contraception/Family Planning   Questions about contraception or family planning (!) DECLINE           Reviewed and updated as needed this visit by Provider   Tobacco  Allergies  Meds  Problems  Med Hx  Surg Hx  Fam Hx            Patient Active Problem List   Diagnosis    CARDIOVASCULAR SCREENING; LDL GOAL LESS THAN 160    Acute bronchitis with bronchospasm    Moderate asthma    Refractory obstruction of nasal airway    Acquired nasal deformity    Nasal obstruction    Nasal valve collapse    History of fracture of nose    Deviated nasal septum    Nasal deformity    History of closed fracture of nasal bones     Past Surgical History:   Procedure Laterality Date    APPENDECTOMY  Oct. 1991    COMBINED ESOPHAGOSCOPY, GASTROSCOPY, DUODENOSCOPY (EGD) WITH CO2 INSUFFLATION N/A 3/14/2023    Procedure: Combined Esophagoscopy, Gastroscopy, Duodenoscopy (Egd) With Co2 Insufflation;  Surgeon: Beth Ahumada DO;  Location:  OR    EYE SURGERY  2013 and 2014    PRK laser correction    HERNIA REPAIR  2002    RHINOPLASTY N/A 10/27/2022    Procedure: Rhinoplasty with Percutaneous Osteotomies;  Surgeon: Becki Johnson MD;  Location: [facility] OR    SEPTORHINOPLASTY N/A 8/19/2021    Procedure: RHINOSEPTOPLASTY, CADAVERIC RIB GRAFT,  CONCHAL CARTILAGE GRAFT;  Surgeon: Becki Johnson MD;  Location: [facility] OR    SURGICAL HISTORY OF -       Thumb 2005-Pins placed     SURGICAL  HISTORY OF -       ER visit, left upper eyelid laceration repair    SURGICAL HISTORY OF -       Septo/rhinoplasty of nose secondary to boxing injury    SURGICAL HISTORY OF -       Left hernia        Social History     Tobacco Use    Smoking status: Never    Smokeless tobacco: Never    Tobacco comments:     Mom smoked his upbringing   Substance Use Topics    Alcohol use: Yes     Comment: Really rare now      Family History   Problem Relation Age of Onset    Unknown/Adopted Mother         Diagnosed w/ALS June 2015    Sleep Apnea Brother     Gastrointestinal Disease Brother         colitis    C.A.D. No family hx of     Diabetes No family hx of     Hypertension No family hx of     Cerebrovascular Disease No family hx of     Breast Cancer No family hx of     Cancer - colorectal No family hx of     Prostate Cancer No family hx of     Asthma No family hx of          Current Outpatient Medications   Medication Sig Dispense Refill    acetaminophen (TYLENOL) 325 MG tablet Take 2 tablets (650 mg) by mouth every 4 hours as needed for mild pain 50 tablet 0    albuterol (PROAIR HFA/PROVENTIL HFA/VENTOLIN HFA) 108 (90 Base) MCG/ACT inhaler Inhale 2 puffs into the lungs every 4 hours as needed for shortness of breath, wheezing or cough 18 g 3    albuterol (PROVENTIL) (2.5 MG/3ML) 0.083% neb solution Take 1 vial (2.5 mg) by nebulization every 4 hours as needed for shortness of breath, wheezing or cough 360 mL 2    Aspirin-Acetaminophen-Caffeine (EXCEDRIN PO) Take by mouth as needed       fluticasone (FLONASE) 50 MCG/ACT nasal spray Spray 2 sprays into both nostrils daily 48 g 3    fluticasone-vilanterol (BREO ELLIPTA) 100-25 MCG/ACT inhaler Inhale 1 puff into the lungs daily 3 each 1    fluticasone-vilanterol (BREO ELLIPTA) 200-25 MCG/ACT inhaler Inhale 1 puff into the lungs daily 1 each 3    ibuprofen (ADVIL/MOTRIN) 200 MG tablet Take 1 tablet (200 mg) by mouth every 6 hours as needed for moderate pain (Use up to every 6 hours as  "needed for pain, alternate with tylenol) 50 tablet 0    sildenafil (REVATIO) 20 MG tablet TAKE 1-4 TABLETS BY MOUTH 30 MINUTES TO 2 HOURS BEFORE INTERCOURSE. MAX OF 5 TABLETS IN 24 HOURS 30 tablet 3    sodium chloride (OCEAN) 0.65 % nasal spray Spray 2 sprays in nostril every 4 hours 30 mL 3     Allergies   Allergen Reactions    Amoxicillin-Pot Clavulanate Rash         Review of Systems  Constitutional, HEENT, cardiovascular, pulmonary, gi and gu systems are negative, except as otherwise noted.     Objective    Exam  /81 (BP Location: Left arm, Patient Position: Sitting, Cuff Size: Adult Regular)   Pulse 64   Temp 98  F (36.7  C) (Oral)   Resp 18   Ht 1.725 m (5' 7.91\")   Wt 83.2 kg (183 lb 8 oz)   SpO2 100%   BMI 27.97 kg/m     Estimated body mass index is 27.97 kg/m  as calculated from the following:    Height as of this encounter: 1.725 m (5' 7.91\").    Weight as of this encounter: 83.2 kg (183 lb 8 oz).    Physical Exam  GENERAL: alert and no distress  EYES: Eyes grossly normal to inspection, PERRL and conjunctivae and sclerae normal  HENT: ear canals and TM's normal, nose and mouth without ulcers or lesions  NECK: no adenopathy, no asymmetry, masses, or scars  RESP: lungs clear to auscultation - no rales, rhonchi or wheezes  CV: regular rate and rhythm, normal S1 S2, no S3 or S4, no murmur, click or rub, no peripheral edema  ABDOMEN: soft, nontender, no hepatosplenomegaly, no masses and bowel sounds normal   (male): patient declined  MS: no gross musculoskeletal defects noted, no edema  SKIN: no suspicious lesions or rashes  NEURO: Normal strength and tone, mentation intact and speech normal  PSYCH: mentation appears normal, affect normal/bright        Signed Electronically by: Eveline Garcia PA-C    "

## 2024-08-26 NOTE — PATIENT INSTRUCTIONS
At [facility], we strive to deliver an exceptional experience to you, every time we see you. If you receive a survey, please let us know what we are doing well and/or what we could improve upon, as we do value your feedback.  If you have MyChart, you can expect to receive results automatically within 24 hours of their completion.  Your provider will send a note interpreting your results as well.   If you do not have MyChart, you should receive your results in about a week by mail.    Your care team:                            Family Medicine Internal Medicine   MD Lj Gregory, MD Rosalba Ordaz, MD Delta Mccoy, MD Meena Garcia, PAYvonneC    Carroll Morrison, MD Pediatrics   Serina Monet, MD Amanda León, MD Marisa Delcid, APRN CNP Suzy Martinez APRN CNP   Param Mills, MD Ilsa Gonzalez, MD Zeynep Jones, CNP     Fred Manzano, CNP Same-Day Provider (No follow-up visits)   EFRAÍN Parikh, DNP Christina Durant, PA-C   EFRAÍN Guidry, FNP, BC CAMILLE LandisC     Clinic hours: Monday - Thursday 7 am-6 pm; Fridays 7 am-5 pm.   Urgent care: Monday - Friday 10 am- 8 pm; Saturday and Sunday 9 am-5 pm.    Clinic: [phone]       [facility]: Monday - Thursday 8 am - 7 pm; Friday 8 am - 6 pm  [facility]: [phone]     Patient Education   Preventive Care Advice   This is general advice given by our system to help you stay healthy. However, your care team may have specific advice just for you. Please talk to your care team about your preventive care needs.  Nutrition  Eat 5 or more servings of fruits and vegetables each day.  Try wheat bread, brown rice and whole grain pasta (instead of white bread, rice, and pasta).  Get enough calcium and vitamin D. Check the label on foods and aim for 100% of the RDA (recommended daily allowance).  Lifestyle  Exercise at least 150  minutes each week  (30 minutes a day, 5 days a week).  Do muscle strengthening activities 2 days a week. These help control your weight and prevent disease.  No smoking.  Wear sunscreen to prevent skin cancer.  Have a dental exam and cleaning every 6 months.  Yearly exams  See your health care team every year to talk about:  Any changes in your health.  Any medicines your care team has prescribed.  Preventive care, family planning, and ways to prevent chronic diseases.  Shots (vaccines)   HPV shots (up to age 26), if you've never had them before.  Hepatitis B shots (up to age 59), if you've never had them before.  COVID-19 shot: Get this shot when it's due.  Flu shot: Get a flu shot every year.  Tetanus shot: Get a tetanus shot every 10 years.  Pneumococcal, hepatitis A, and RSV shots: Ask your care team if you need these based on your risk.  Shingles shot (for age 50 and up)  General health tests  Diabetes screening:  Starting at age 35, Get screened for diabetes at least every 3 years.  If you are younger than age 35, ask your care team if you should be screened for diabetes.  Cholesterol test: At age 39, start having a cholesterol test every 5 years, or more often if advised.  Bone density scan (DEXA): At age 50, ask your care team if you should have this scan for osteoporosis (brittle bones).  Hepatitis C: Get tested at least once in your life.  STIs (sexually transmitted infections)  Before age 24: Ask your care team if you should be screened for STIs.  After age 24: Get screened for STIs if you're at risk. You are at risk for STIs (including HIV) if:  You are sexually active with more than one person.  You don't use condoms every time.  You or a partner was diagnosed with a sexually transmitted infection.  If you are at risk for HIV, ask about PrEP medicine to prevent HIV.  Get tested for HIV at least once in your life, whether you are at risk for HIV or not.  Cancer screening tests  Cervical cancer screening:  If you have a cervix, begin getting regular cervical cancer screening tests starting at age 21.  Breast cancer scan (mammogram): If you've ever had breasts, begin having regular mammograms starting at age 40. This is a scan to check for breast cancer.  Colon cancer screening: It is important to start screening for colon cancer at age 45.  Have a colonoscopy test every 10 years (or more often if you're at risk) Or, ask your provider about stool tests like a FIT test every year or Cologuard test every 3 years.  To learn more about your testing options, visit:   .  For help making a decision, visit:   https://bit.ly/wn22197.  Prostate cancer screening test: If you have a prostate, ask your care team if a prostate cancer screening test (PSA) at age 55 is right for you.  Lung cancer screening: If you are a current or former smoker ages 50 to 80, ask your care team if ongoing lung cancer screenings are right for you.  For informational purposes only. Not to replace the advice of your health care provider. Copyright   2023 [facility]. All rights reserved. Clinically reviewed by the [facility] Transitions Program. [employer] [id number] - REV 01/24.  Substance Use Disorder: Care Instructions  Overview     You can improve your life and health by stopping your use of alcohol or drugs. When you don't drink or use drugs, you may feel and sleep better. You may get along better with your family, friends, and coworkers. There are medicines and programs that can help with substance use disorder.  How can you care for yourself at home?  Here are some ways to help you stay sober and prevent relapse.  If you have been given medicine to help keep you sober or reduce your cravings, be sure to take it exactly as prescribed.  Talk to your doctor about programs that can help you stop using drugs or drinking alcohol.  Do not keep alcohol or drugs in your home.  Plan ahead. Think about what you'll say if other people ask you  to drink or use drugs. Try not to spend time with people who drink or use drugs.  Use the time and money spent on drinking or drugs to do something that's important to you.  Preventing a relapse  Have a plan to deal with relapse. Learn to recognize changes in your thinking that lead you to drink or use drugs. Get help before you start to drink or use drugs again.  Try to stay away from situations, friends, or places that may lead you to drink or use drugs.  If you feel the need to drink alcohol or use drugs again, seek help right away. Call a trusted friend or family member. Some people get support from organizations such as Narcotics Anonymous or [employer] or from treatment facilities.  If you relapse, get help as soon as you can. Some people make a plan with another person that outlines what they want that person to do for them if they relapse. The plan usually includes how to handle the relapse and who to notify in case of relapse.  Don't give up. Remember that a relapse doesn't mean that you have failed. Use the experience to learn the triggers that lead you to drink or use drugs. Then quit again. Recovery is a lifelong process. Many people have several relapses before they are able to quit for good.  Follow-up care is a key part of your treatment and safety. Be sure to make and go to all appointments, and call your doctor if you are having problems. It's also a good idea to know your test results and keep a list of the medicines you take.  When should you call for help?   Call 911  anytime you think you may need emergency care. For example, call if you or someone else:    Has overdosed or has withdrawal signs. Be sure to tell the emergency workers that you are or someone else is using or trying to quit using drugs. Overdose or withdrawal signs may include:  Losing consciousness.  Seizure.  Seeing or hearing things that aren't there (hallucinations).     Is thinking or talking about suicide or harming  "others.   Where to get help 24 hours a day, 7 days a week   If you or someone you know talks about suicide, self-harm, a mental health crisis, a substance use crisis, or any other kind of emotional distress, get help right away. You can:    Call the Suicide and Crisis Lifeline at 988.     Call 1-088-641-TALK (1-942.260.1497).     Text HOME to 306446 to access the Crisis Text Line.   Consider saving these numbers in your phone.  Go to AVIS for more information or to chat online.  Call your doctor now or seek immediate medical care if:    You are having withdrawal symptoms. These may include nausea or vomiting, sweating, shakiness, and anxiety.   Watch closely for changes in your health, and be sure to contact your doctor if:    You have a relapse.     You need more help or support to stop.   Where can you learn more?  Go to https://www.[employer].net/patiented  Enter H573 in the search box to learn more about \"Substance Use Disorder: Care Instructions.\"  Current as of: November 15, 2023               Content Version: 14.0    0853-7276 [employer].   Care instructions adapted under license by your healthcare professional. If you have questions about a medical condition or this instruction, always ask your healthcare professional. [employer] disclaims any warranty or liability for your use of this information.         "

## 2024-09-27 ENCOUNTER — TELEPHONE (OUTPATIENT)
Dept: FAMILY MEDICINE | Facility: CLINIC | Age: 44
End: 2024-09-27
Payer: COMMERCIAL

## 2024-09-27 NOTE — TELEPHONE ENCOUNTER
Patient Quality Outreach    Patient is due for the following:   Colon Cancer Screening      Topic Date Due    Flu Vaccine (1) 09/01/2024    COVID-19 Vaccine (5 - 2024-25 season) 09/01/2024       Next Steps:   Schedule a office visit for items Due    Type of outreach:    Sent Sh[employer] message.      Questions for provider review:    None           Yin Earl MA

## 2024-09-30 ENCOUNTER — TELEPHONE (OUTPATIENT)
Dept: GASTROENTEROLOGY | Facility: CLINIC | Age: 44
End: 2024-09-30
Payer: COMMERCIAL

## 2024-09-30 NOTE — TELEPHONE ENCOUNTER
"Endoscopy Scheduling Screen    Have you had any respiratory illness or flu-like symptoms in the last 10 days?  No    What is your communication preference for Instructions and/or Bowel Prep?   MyChart    What insurance is in the chart?  Other:  BCBS    Ordering/Referring Provider: Eveline Garcia PA-C in [facility]/IM/PEDS   (If ordering provider performs procedure, schedule with ordering provider unless otherwise instructed. )    BMI: Estimated body mass index is 27.97 kg/m  as calculated from the following:    Height as of 8/26/24: 1.725 m (5' 7.91\").    Weight as of 8/26/24: 83.2 kg (183 lb 8 oz).     Sedation Ordered  moderate sedation.   If patient BMI > 50 do not schedule in [facility].    [facility] > 45 [facility]    Are you taking methadone or Suboxone?  NO, No RN review required.    Have you been diagnosed and are being treated for severe PTSD or severe anxiety?  NO, No RN review required.    Are you taking any prescription medications for pain 3 or more times per week?   NO, No RN review required.    Do you have a history of malignant hyperthermia?  No    (Females) Are you currently pregnant?   No     Have you been diagnosed or told you have pulmonary hypertension?   No    Do you have an LVAD?  No    Have you been told you have moderate to severe sleep apnea?  No.    Have you been told you have COPD, asthma, or any other lung disease?  Yes     What breathing problems do you have?  Asthma     Do you use home oxygen?  No    Have your breathing problems required an ED visit or hospitalization in the last year?  No.    Do you have any heart conditions?  No     Have you ever had or are you waiting for an organ transplant?  No. Continue scheduling, no site restrictions.    Have you had a stroke or transient ischemic attack (TIA aka \"mini stroke\" in the last 6 months?   No    Have you been diagnosed with or been told you have cirrhosis of the liver?   No.    Are you currently on dialysis?   No    Do " "you need assistance transferring?   No    BMI: Estimated body mass index is 27.97 kg/m  as calculated from the following:    Height as of 8/26/24: 1.725 m (5' 7.91\").    Weight as of 8/26/24: 83.2 kg (183 lb 8 oz).     Is patients BMI > 40 and scheduling location UP?  No    Do you take an injectable or oral medication for weight loss or diabetes (excluding insulin)?  No    Do you take the medication Naltrexone?  No    Do you take blood thinners?  No       Prep   Are you currently on dialysis or do you have chronic kidney disease?  No    Do you have a diagnosis of diabetes?  No    Do you have a diagnosis of cystic fibrosis (CF)?  No    On a regular basis do you go 3 -5 days between bowel movements?  No    BMI > 40?  No    Preferred Pharmacy:    [id number] - [place], [place] - [address] AT [facility] [address]  [place] [facility] [address]  Phone: [phone] Fax: [phone]    Final Scheduling Details     Procedure scheduled  Colonoscopy    Surgeon:  Keisha     Date of procedure:  11.4.24     Pre-OP / PAC:   No - Not required for this site.    Location  [facility] - [facility] - Patient preference.    Sedation   Moderate Sedation - Per order.      Patient Reminders:   You will receive a call from a Nurse to review instructions and health history.  This assessment must be completed prior to your procedure.  Failure to complete the Nurse assessment may result in the procedure being cancelled.      On the day of your procedure, please designate an adult(s) who can drive you home stay with you for the next 24 hours. The medicines used in the exam will make you sleepy. You will not be able to drive.      You cannot take public transportation, ride share services, or non-medical taxi service without a responsible caregiver.  Medical transport services are allowed with the requirement that a responsible caregiver will receive you at your destination.  We require that drivers and " caregivers are confirmed prior to your procedure.

## 2024-10-12 DIAGNOSIS — N52.9 ERECTILE DYSFUNCTION, UNSPECIFIED ERECTILE DYSFUNCTION TYPE: ICD-10-CM

## 2024-10-14 RX ORDER — SILDENAFIL CITRATE 20 MG/1
TABLET ORAL
Qty: 30 TABLET | Refills: 2 | Status: SHIPPED | OUTPATIENT
Start: 2024-10-14

## 2024-10-18 ENCOUNTER — TELEPHONE (OUTPATIENT)
Dept: GASTROENTEROLOGY | Facility: CLINIC | Age: 44
End: 2024-10-18

## 2024-10-18 NOTE — TELEPHONE ENCOUNTER
Called the Pt to discuss below. He did  but is traveling and cannot talk at the moment. He will call us back on Tuesday 10/22 for PA.     Flor Norris RN   Endoscopy Procedure Pre Assessment RN

## 2024-10-18 NOTE — TELEPHONE ENCOUNTER
Pre visit planning completed.      Procedure details:    Patient scheduled for Colonoscopy on 11/4/2024.     Arrival time: 1100. Procedure time 1145    Facility location: [facility]; [address] Floor, [place] [address]. Check in location: 2nd Floor at Surgery desk.    Sedation type: Conscious sedation     Pre op exam needed? No.    Indication for procedure: Screening       Chart review:     Electronic implanted devices? No    Recent diagnosis of diverticulitis within the last 6 weeks? No    Asthma-moderate well controlled.    Medication review:    Diabetic? No    Anticoagulants? No    Weight loss medication/injectable? No GLP-1 medication per patient's medication list.  RN will verify with pre-assessment call.    Other medication HOLDING recommendations:  Cannabis/Marijuana: Stop night before procedure.      Prep for procedure:     Bowel prep recommendation: Standard Miralax  Due to: standard bowel prep.    Prep instructions [facility] via [facility] RN  Endoscopy Procedure Pre Assessment RN  [phone] option 2

## 2024-10-22 NOTE — TELEPHONE ENCOUNTER
Pre assessment completed for upcoming procedure.   (Please see previous telephone encounter notes for complete details)    Patient  returned call.       Procedure details:    Arrival time and facility location reviewed.    Pre op exam needed? No.    Designated  policy reviewed. Instructed to have someone stay 6  hours post procedure.       Medication review:    Medications reviewed. Please see supporting documentation below. Holding recommendations discussed (if applicable).   N/A      Prep for procedure:     Procedure prep instructions reviewed.        Any additional information needed:  N/A      Patient  verbalized understanding and had no questions or concerns at this time.      Nisa Nagy RN  Endoscopy Procedure Pre Assessment   [phone] option 2

## 2024-11-04 ENCOUNTER — HOSPITAL ENCOUNTER (OUTPATIENT)
Facility: AMBULATORY SURGERY CENTER | Age: 44
Discharge: HOME | End: 2024-11-04
Attending: STUDENT IN AN ORGANIZED HEALTH CARE EDUCATION/TRAINING PROGRAM | Admitting: STUDENT IN AN ORGANIZED HEALTH CARE EDUCATION/TRAINING PROGRAM
Payer: COMMERCIAL

## 2024-11-04 VITALS
WEIGHT: 173 LBS | DIASTOLIC BLOOD PRESSURE: 82 MMHG | OXYGEN SATURATION: 97 % | RESPIRATION RATE: 18 BRPM | HEART RATE: 54 BPM | TEMPERATURE: 97.5 F | SYSTOLIC BLOOD PRESSURE: 127 MMHG | BODY MASS INDEX: 26.37 KG/M2

## 2024-11-04 LAB — COLONOSCOPY: NORMAL

## 2024-11-04 PROCEDURE — G8907 PT DOC NO EVENTS ON DISCHARG: HCPCS

## 2024-11-04 PROCEDURE — G8918 PT W/O PREOP ORDER IV AB PRO: HCPCS

## 2024-11-04 PROCEDURE — 45378 DIAGNOSTIC COLONOSCOPY: CPT

## 2024-11-04 RX ORDER — EPINEPHRINE 1 MG/ML
0.1 INJECTION, SOLUTION INTRAMUSCULAR; SUBCUTANEOUS
Status: DISCONTINUED | OUTPATIENT
Start: 2024-11-04 | End: 2024-11-05 | Stop reason: HOSPADM

## 2024-11-04 RX ORDER — NALOXONE HYDROCHLORIDE 0.4 MG/ML
0.2 INJECTION, SOLUTION INTRAMUSCULAR; INTRAVENOUS; SUBCUTANEOUS
Status: DISCONTINUED | OUTPATIENT
Start: 2024-11-04 | End: 2024-11-05 | Stop reason: HOSPADM

## 2024-11-04 RX ORDER — FENTANYL CITRATE 50 UG/ML
50-100 INJECTION, SOLUTION INTRAMUSCULAR; INTRAVENOUS EVERY 5 MIN PRN
Status: DISCONTINUED | OUTPATIENT
Start: 2024-11-04 | End: 2024-11-05 | Stop reason: HOSPADM

## 2024-11-04 RX ORDER — FLUMAZENIL 0.1 MG/ML
0.2 INJECTION, SOLUTION INTRAVENOUS
Status: DISCONTINUED | OUTPATIENT
Start: 2024-11-04 | End: 2024-11-05 | Stop reason: HOSPADM

## 2024-11-04 RX ORDER — LIDOCAINE 40 MG/G
CREAM TOPICAL
Status: DISCONTINUED | OUTPATIENT
Start: 2024-11-04 | End: 2024-11-05 | Stop reason: HOSPADM

## 2024-11-04 RX ORDER — SIMETHICONE 40MG/0.6ML
133 SUSPENSION, DROPS(FINAL DOSAGE FORM)(ML) ORAL
Status: DISCONTINUED | OUTPATIENT
Start: 2024-11-04 | End: 2024-11-05 | Stop reason: HOSPADM

## 2024-11-04 RX ORDER — DIPHENHYDRAMINE HYDROCHLORIDE 50 MG/ML
25-50 INJECTION INTRAMUSCULAR; INTRAVENOUS
Status: DISCONTINUED | OUTPATIENT
Start: 2024-11-04 | End: 2024-11-05 | Stop reason: HOSPADM

## 2024-11-04 RX ORDER — NALOXONE HYDROCHLORIDE 0.4 MG/ML
0.4 INJECTION, SOLUTION INTRAMUSCULAR; INTRAVENOUS; SUBCUTANEOUS
Status: DISCONTINUED | OUTPATIENT
Start: 2024-11-04 | End: 2024-11-05 | Stop reason: HOSPADM

## 2024-11-04 RX ORDER — ATROPINE SULFATE 0.1 MG/ML
1 INJECTION INTRAVENOUS
Status: DISCONTINUED | OUTPATIENT
Start: 2024-11-04 | End: 2024-11-05 | Stop reason: HOSPADM

## 2024-11-04 NOTE — H&P
Pre-Endoscopy History and Physical     Keith Spangler MRN# [id number]   YOB: 1980 Age: 44 year old     Date of Procedure: 11/04/24  Primary care provider: Johny - [facility]  Type of Endoscopy: Colonoscopy  Reason for Procedure: Screening  Type of Anesthesia Anticipated: Moderate Sedation    HPI:    Keith is a 44 year old male who will be undergoing the above procedure.      Prior colonoscopy: none    A history and physical has been performed, notable for none. The patient's medications and allergies have been reviewed. The risks and benefits of the procedure and the sedation options and risks were discussed with the patient.  All questions were answered and informed consent was obtained.      Symptoms:  Rectal bleeding: No  Irregular bowel habits: No  Abnormal weight loss: No  Changes in stool: No    He denies a personal or family history of anesthesia complications or bleeding disorders. Denies family history of CRC. Brother with IBD.    Allergies   Allergen Reactions    Amoxicillin-Pot Clavulanate Rash      Allergy to Latex: NO   Allergy to tape: NO   Intolerances: NO     Current Outpatient Medications   Medication Sig Dispense Refill    albuterol (PROAIR HFA/PROVENTIL HFA/VENTOLIN HFA) 108 (90 Base) MCG/ACT inhaler Inhale 2 puffs into the lungs every 4 hours as needed for shortness of breath, wheezing or cough 18 g 3    Aspirin-Acetaminophen-Caffeine (EXCEDRIN PO) Take by mouth as needed       fluticasone (FLONASE) 50 MCG/ACT nasal spray Spray 2 sprays into both nostrils daily 48 g 3    fluticasone-vilanterol (BREO ELLIPTA) 100-25 MCG/ACT inhaler Inhale 1 puff into the lungs daily 3 each 1    ibuprofen (ADVIL/MOTRIN) 200 MG tablet Take 1 tablet (200 mg) by mouth every 6 hours as needed for moderate pain (Use up to every 6 hours as needed for pain, alternate with tylenol) 50 tablet 0    sodium chloride (OCEAN) 0.65 % nasal spray Spray 2 sprays in nostril every 4 hours 30 mL  3    acetaminophen (TYLENOL) 325 MG tablet Take 2 tablets (650 mg) by mouth every 4 hours as needed for mild pain 50 tablet 0    albuterol (PROVENTIL) (2.5 MG/3ML) 0.083% neb solution Take 1 vial (2.5 mg) by nebulization every 4 hours as needed for shortness of breath, wheezing or cough 360 mL 2    fluticasone-vilanterol (BREO ELLIPTA) 200-25 MCG/ACT inhaler Inhale 1 puff into the lungs daily 1 each 3    sildenafil (REVATIO) 20 MG tablet TAKE 1-4 TABLETS BY MOUTH 30 MINUTES TO 2 HOURS BEFORE INTERCOURSE. MAX OF 5 TABLETS IN 24 HOURS 30 tablet 2     Current Facility-Administered Medications   Medication Dose Route Frequency Provider Last Rate Last Admin    atropine injection 1 mg  1 mg Intravenous Once PRN Nguyen Valdes MD        benzocaine 20% (HURRICAINE/TOPEX) 20 % spray 0.5 mL  1 spray Mouth/Throat Once PRN Nguyen Valdes MD        diphenhydrAMINE (BENADRYL) injection 25-50 mg  25-50 mg Intravenous Once PRN Nguyen Valdes MD        EPINEPHrine (Anaphylaxis) (ADRENALIN) injection (vial) 0.1 mg  0.1 mg Submucosal Once PRN Nguyen Valdes MD        fentaNYL (PF) (SUBLIMAZE) injection  mcg   mcg Intravenous Q5 Min PRN Nguyen Valdes MD        flumazenil (ROMAZICON) injection 0.2 mg  0.2 mg Intravenous q1 min prn Nguyen Valdes MD        glucagon injection 0.5 mg  0.5 mg Intravenous Once PRN Nguyen Valdes MD        lidocaine (LMX4) kit   Topical Q1H PRN Nguyen Valdes MD        lidocaine 1 % 0.1-1 mL  0.1-1 mL Other Q1H PRN Nguyen Valdes MD        midazolam (VERSED) injection 0.5-2 mg  0.5-2 mg Intravenous Q4 Min PRN Nguyen Valdes MD        naloxone (NARCAN) injection 0.2 mg  0.2 mg Intravenous Q2 Min PRN Nguyen Valdes MD        Or    naloxone (NARCAN) injection 0.4 mg  0.4 mg Intravenous Q2 Min PRN Nguyen Valdes MD        Or    naloxone (NARCAN) injection 0.2 mg  0.2 mg Intramuscular Q2 Min PRN Nguyen Valdes,  MD        Or    naloxone (NARCAN) injection 0.4 mg  0.4 mg Intramuscular Q2 Min PRN Nguyen Valdes MD        simethicone (MYLICON) suspension 133 mg  133 mg Oral Once PRN Nguyen Valdes MD        sodium chloride (PF) 0.9% PF flush 3 mL  3 mL Intracatheter Q8H Nguyen Valdes MD        sodium chloride (PF) 0.9% PF flush 3 mL  3 mL Intracatheter q1 min prn Nguyen Valdes MD        sodium chloride (PF) 0.9% PF flush 3 mL  3 mL Intravenous q1 min prn Nguyen Valdes MD        sodium chloride 0.9% BOLUS 500 mL  500 mL Intravenous Once PRN Nguyen Valdes MD           Patient Active Problem List   Diagnosis    CARDIOVASCULAR SCREENING; LDL GOAL LESS THAN 160    Acute bronchitis with bronchospasm    Moderate asthma    Refractory obstruction of nasal airway    Acquired nasal deformity    Nasal obstruction    Nasal valve collapse    History of fracture of nose    Deviated nasal septum    Nasal deformity    History of closed fracture of nasal bones        Past Medical History:   Diagnosis Date    Acquired nasal deformity 2/17/2021    Acute bronchitis with bronchospasm 9/1/2015    Has this every year. Probably intermittent asthma. Use Flovent when flares.     CARDIOVASCULAR SCREENING; LDL GOAL LESS THAN 160 6/11/2010    COPD (chronic obstructive pulmonary disease) (H)     Deviated nasal septum 2/19/2021    Added automatically from request for surgery [id number]    History of closed fracture of nasal bones 4/6/2022    History of fracture of nose 2/19/2021    Added automatically from request for surgery [id number]    Moderate asthma 3/21/2017    Nasal deformity 4/16/2021    Nasal obstruction 2/17/2021    Nasal valve collapse 2/17/2021    NO ACTIVE PROBLEMS     Refractory obstruction of nasal airway 8/17/2020    Added automatically from request for surgery [id number]    Uncomplicated asthma Sept 2014    Intermittent asthma        Past Surgical History:   Procedure Laterality Date    APPENDECTOMY   Oct. 1991    COMBINED ESOPHAGOSCOPY, GASTROSCOPY, DUODENOSCOPY (EGD) WITH CO2 INSUFFLATION N/A 3/14/2023    Procedure: Combined Esophagoscopy, Gastroscopy, Duodenoscopy (Egd) With Co2 Insufflation;  Surgeon: Beth Ahumada DO;  Location:  OR    EYE SURGERY  2013 and 2014    PRK laser correction    HERNIA REPAIR  2002    RHINOPLASTY N/A 10/27/2022    Procedure: Rhinoplasty with Percutaneous Osteotomies;  Surgeon: Becki Johnson MD;  [facility]    SEPTORHINOPLASTY N/A 8/19/2021    Procedure: RHINOSEPTOPLASTY, CADAVERIC RIB GRAFT,  CONCHAL CARTILAGE GRAFT;  Surgeon: Becki Johnson MD;  [facility]    SURGICAL HISTORY OF -       Thumb 2005-Pins placed     SURGICAL HISTORY OF -       ER visit, left upper eyelid laceration repair    SURGICAL HISTORY OF -       Septo/rhinoplasty of nose secondary to boxing injury    SURGICAL HISTORY OF -       Left hernia        Social History     Tobacco Use    Smoking status: Never    Smokeless tobacco: Never    Tobacco comments:     Mom smoked his upbringing   Substance Use Topics    Alcohol use: Yes     Comment: Few days/week  - couple drinks       Family History   Problem Relation Age of Onset    Unknown/Adopted Mother         Diagnosed w/ALS June 2015    Sleep Apnea Brother     Gastrointestinal Disease Brother         colitis    C.A.D. No family hx of     Diabetes No family hx of     Hypertension No family hx of     Cerebrovascular Disease No family hx of     Breast Cancer No family hx of     Cancer - colorectal No family hx of     Prostate Cancer No family hx of     Asthma No family hx of        REVIEW OF SYSTEMS:     5 point ROS negative except as noted above in HPI, including Gen., Resp., CV, GI &  system review.      PHYSICAL EXAM:   /79   Pulse 55   Temp 97.1  F (36.2  C) (Temporal)   Resp 18   Wt 78.5 kg (173 lb)   SpO2 98%   BMI 26.37 kg/m   Estimated body mass index is 26.37 kg/m  as calculated from the following:    Height as of  "8/26/24: 1.725 m (5' 7.91\").    Weight as of this encounter: 78.5 kg (173 lb).   All Vitals have been reviewed.    GENERAL APPEARANCE: healthy and alert  MENTAL STATUS: alert  AIRWAY EXAM: Mallampatti Class II (visualization of the soft palate, fauces, and uvula)  RESP: lungs clear to auscultation - no rales, rhonchi or wheezes  CV: regular rates and rhythm      IMPRESSION   ASA Class 2 - Mild systemic disease        PLAN:     Plan for colonoscopy. We discussed the risks, benefits and alternatives and the patient wished to proceed.    The above has been forwarded to the consulting provider.      FAZAL MILLER MD  [facility]  Phone: [phone]  Fax: [phone]  November 4, 2024    "

## 2024-11-27 DIAGNOSIS — N52.9 ERECTILE DYSFUNCTION, UNSPECIFIED ERECTILE DYSFUNCTION TYPE: ICD-10-CM

## 2024-11-27 RX ORDER — SILDENAFIL CITRATE 20 MG/1
TABLET ORAL
Qty: 30 TABLET | Refills: 2 | Status: SHIPPED | OUTPATIENT
Start: 2024-11-27

## 2024-12-16 ENCOUNTER — OFFICE VISIT (OUTPATIENT)
Dept: ALLERGY | Facility: CLINIC | Age: 44
End: 2024-12-16
Payer: COMMERCIAL

## 2024-12-16 VITALS — HEART RATE: 66 BPM | DIASTOLIC BLOOD PRESSURE: 83 MMHG | SYSTOLIC BLOOD PRESSURE: 144 MMHG | OXYGEN SATURATION: 97 %

## 2024-12-16 DIAGNOSIS — J45.40 MODERATE PERSISTENT ASTHMA WITHOUT COMPLICATION: ICD-10-CM

## 2024-12-16 PROCEDURE — 99213 OFFICE O/P EST LOW 20 MIN: CPT | Performed by: ALLERGY & IMMUNOLOGY

## 2024-12-16 ASSESSMENT — ASTHMA QUESTIONNAIRES: ACT_TOTALSCORE: 25

## 2024-12-16 NOTE — PROGRESS NOTES
Keith Spangler was seen in the Allergy Clinic at [facility].      Keith Spangler is a 44 year old  or  male who is seen today for a follow-up visit.    Weaned to 100mcg Breo strength for the last 2-3 months and it has worked well. Does forget to take the dose on occasion but not for more than 1-2 days in a row. Hasn't needed to use his albuterol inhaler or nebs for several months. Overall feels that he is doing well and his asthma has been controlled.      Past Medical History:   Diagnosis Date    Acquired nasal deformity 2/17/2021    Acute bronchitis with bronchospasm 9/1/2015    Has this every year. Probably intermittent asthma. Use Flovent when flares.     CARDIOVASCULAR SCREENING; LDL GOAL LESS THAN 160 6/11/2010    COPD (chronic obstructive pulmonary disease) (H)     Deviated nasal septum 2/19/2021    Added automatically from request for surgery [id number]    History of closed fracture of nasal bones 4/6/2022    History of fracture of nose 2/19/2021    Added automatically from request for surgery [id number]    Moderate asthma 3/21/2017    Nasal deformity 4/16/2021    Nasal obstruction 2/17/2021    Nasal valve collapse 2/17/2021    NO ACTIVE PROBLEMS     Refractory obstruction of nasal airway 8/17/2020    Added automatically from request for surgery [id number]    Uncomplicated asthma Sept 2014    Intermittent asthma     Family History   Problem Relation Age of Onset    Unknown/Adopted Mother         Diagnosed w/ALS June 2015    Sleep Apnea Brother     Gastrointestinal Disease Brother         colitis    C.A.D. No family hx of     Diabetes No family hx of     Hypertension No family hx of     Cerebrovascular Disease No family hx of     Breast Cancer No family hx of     Cancer - colorectal No family hx of     Prostate Cancer No family hx of     Asthma No family hx of      Social History     Tobacco Use    Smoking status: Never    Smokeless tobacco: Never    Tobacco comments:     Mom  smoked his upbringing   Vaping Use    Vaping status: Never Used   Substance Use Topics    Alcohol use: Yes     Comment: Few days/week  - couple drinks    Drug use: Yes     Types: Marijuana     Comment: Last edibles - 10/31/2024     Social History     Social History Narrative    Dairy/d 1-3 servings/d.     Caffeine 1 servings/d    Exercise 5 x week boxer    Sunscreen used - No    Seatbelts used - Yes    Working smoke/CO detectors in the home - Yes    Guns stored in the home - No    Self Breast Exams - NOT APPLICABLE    Self Testicular Exam - Yes    Eye Exam up to date - Yes    Dental Exam up to date - Yes    Pap Smear up to date - NOT APPLICABLE    Mammogram up to date - NOT APPLICABLE    PSA up to date - NO    Dexa Scan up to date - NOT APPLICABLE    Flex Sig / Colonoscopy up to date - Yes    Immunizations up to date - Yes    Abuse: Current or Past(Physical, Sexual or Emotional)- No    Do you feel safe in your environment - Yes           Past medical, family, and social history were reviewed.        Current Outpatient Medications:     acetaminophen (TYLENOL) 325 MG tablet, Take 2 tablets (650 mg) by mouth every 4 hours as needed for mild pain, Disp: 50 tablet, Rfl: 0    fluticasone (FLONASE) 50 MCG/ACT nasal spray, Spray 2 sprays into both nostrils daily, Disp: 48 g, Rfl: 3    fluticasone-vilanterol (BREO ELLIPTA) 100-25 MCG/ACT inhaler, Inhale 1 puff into the lungs daily, Disp: 3 each, Rfl: 1    ibuprofen (ADVIL/MOTRIN) 200 MG tablet, Take 1 tablet (200 mg) by mouth every 6 hours as needed for moderate pain (Use up to every 6 hours as needed for pain, alternate with tylenol), Disp: 50 tablet, Rfl: 0    sildenafil (REVATIO) 20 MG tablet, TAKE 1-4 TABLETS BY MOUTH 30 MINUTES TO 2 HOURS BEFORE INTERCOURSE. MAX OF 5 TABLETS IN 24 HOURS, Disp: 30 tablet, Rfl: 2    sodium chloride (OCEAN) 0.65 % nasal spray, Spray 2 sprays in nostril every 4 hours, Disp: 30 mL, Rfl: 3    albuterol (PROAIR HFA/PROVENTIL HFA/VENTOLIN HFA)  108 (90 Base) MCG/ACT inhaler, Inhale 2 puffs into the lungs every 4 hours as needed for shortness of breath, wheezing or cough (Patient not taking: Reported on 12/16/2024), Disp: 18 g, Rfl: 3    albuterol (PROVENTIL) (2.5 MG/3ML) 0.083% neb solution, Take 1 vial (2.5 mg) by nebulization every 4 hours as needed for shortness of breath, wheezing or cough (Patient not taking: Reported on 12/16/2024), Disp: 360 mL, Rfl: 2    Aspirin-Acetaminophen-Caffeine (EXCEDRIN PO), Take by mouth as needed , Disp: , Rfl:     fluticasone-vilanterol (BREO ELLIPTA) 200-25 MCG/ACT inhaler, Inhale 1 puff into the lungs daily (Patient not taking: Reported on 12/16/2024), Disp: 1 each, Rfl: 3  Allergies   Allergen Reactions    Amoxicillin-Pot Clavulanate Rash       EXAM:   BP (!) 144/83 (BP Location: Right arm, Patient Position: Sitting, Cuff Size: Adult Large)   Pulse 66   SpO2 97%   Physical Exam  Vitals and nursing note reviewed.   Constitutional:       Appearance: Normal appearance.   HENT:      Head: Normocephalic and atraumatic.      Right Ear: External ear normal.      Left Ear: External ear normal.      Nose: No rhinorrhea.      Mouth/Throat:      Mouth: Mucous membranes are moist. No oral lesions.      Pharynx: Oropharynx is clear. Uvula midline. No posterior oropharyngeal erythema.   Eyes:      General: Lids are normal.      Extraocular Movements: Extraocular movements intact.      Conjunctiva/sclera: Conjunctivae normal.   Neck:      Comments: No asymmetry, masses, or scars  Cardiovascular:      Rate and Rhythm: Normal rate and regular rhythm.      Heart sounds: S1 normal and S2 normal. No murmur heard.  Pulmonary:      Effort: Pulmonary effort is normal. No respiratory distress.      Breath sounds: Normal breath sounds and air entry.   Musculoskeletal:      Comments: No musculoskeletal defects appreciated   Skin:     General: Skin is warm and dry.      Findings: No lesion or rash.   Neurological:      General: No focal  deficit present.      Mental Status: He is alert.   Psychiatric:         Mood and Affect: Mood and affect normal.           WORKUP:  None    ASSESSMENT/PLAN:  Keith Spangler is a 44 year old male here for a follow-up visit.    1. Moderate persistent asthma without complication - Controlled. No exacerbations or oral steroid use in the past 6 months. Historically winter has been the worst season for his asthma. Recommend continuing with Breo daily through the winter and weaning in the spring. Advised to resume daily preventative therapy should symptoms return. Keith verbalized understanding and agreement with this plan.    - continue Breo 100/25mcg - 1 puff daily  - take 2 to 4 puffs of albuterol HFA and/or use nebulizer every 4 hours as needed  - Adult Allergy Clinic Follow-Up Order      Follow-up in 6 months or as needed      Thank you for allowing me to participate in the care of Keith Spangler.      Andrei Kyle MD, FAAAAI  Allergy/Immunology  [facility]      Consent was obtained from the patient to use an AI documentation tool in the creation of this note.    Chart documentation done in part with Dragon Voice Recognition Software. Although reviewed after completion, some word and grammatical errors may remain.

## 2024-12-16 NOTE — LETTER
12/16/2024      Keith Spangler  [address] [facility] [address]      Dear Colleague,    Thank you for referring your patient, Keith Spangler, to the [facility]. Please see a copy of my visit note below.    Keith Spangler was seen in the Allergy Clinic at [facility].      Keith Spangler is a 44 year old  or  male who is seen today for a follow-up visit.    Weaned to 100mcg Breo strength for the last 2-3 months and it has worked well. Does forget to take the dose on occasion but not for more than 1-2 days in a row. Hasn't needed to use his albuterol inhaler or nebs for several months. Overall feels that he is doing well and his asthma has been controlled.      Past Medical History:   Diagnosis Date     Acquired nasal deformity 2/17/2021     Acute bronchitis with bronchospasm 9/1/2015    Has this every year. Probably intermittent asthma. Use Flovent when flares.      CARDIOVASCULAR SCREENING; LDL GOAL LESS THAN 160 6/11/2010     COPD (chronic obstructive pulmonary disease) (H)      Deviated nasal septum 2/19/2021    Added automatically from request for surgery [id number]     History of closed fracture of nasal bones 4/6/2022     History of fracture of nose 2/19/2021    Added automatically from request for surgery [id number]     Moderate asthma 3/21/2017     Nasal deformity 4/16/2021     Nasal obstruction 2/17/2021     Nasal valve collapse 2/17/2021     NO ACTIVE PROBLEMS      Refractory obstruction of nasal airway 8/17/2020    Added automatically from request for surgery [id number]     Uncomplicated asthma Sept 2014    Intermittent asthma     Family History   Problem Relation Age of Onset     Unknown/Adopted Mother         Diagnosed w/ALS June 2015     Sleep Apnea Brother      Gastrointestinal Disease Brother         colitis     C.A.D. No family hx of      Diabetes No family hx of      Hypertension No family hx of      Cerebrovascular Disease  No family hx of      Breast Cancer No family hx of      Cancer - colorectal No family hx of      Prostate Cancer No family hx of      Asthma No family hx of      Social History     Tobacco Use     Smoking status: Never     Smokeless tobacco: Never     Tobacco comments:     Mom smoked his upbringing   Vaping Use     Vaping status: Never Used   Substance Use Topics     Alcohol use: Yes     Comment: Few days/week  - couple drinks     Drug use: Yes     Types: Marijuana     Comment: Last edibles - 10/31/2024     Social History     Social History Narrative    Dairy/d 1-3 servings/d.     Caffeine 1 servings/d    Exercise 5 x week boxer    Sunscreen used - No    Seatbelts used - Yes    Working smoke/CO detectors in the home - Yes    Guns stored in the home - No    Self Breast Exams - NOT APPLICABLE    Self Testicular Exam - Yes    Eye Exam up to date - Yes    Dental Exam up to date - Yes    Pap Smear up to date - NOT APPLICABLE    Mammogram up to date - NOT APPLICABLE    PSA up to date - NO    Dexa Scan up to date - NOT APPLICABLE    Flex Sig / Colonoscopy up to date - Yes    Immunizations up to date - Yes    Abuse: Current or Past(Physical, Sexual or Emotional)- No    Do you feel safe in your environment - Yes           Past medical, family, and social history were reviewed.        Current Outpatient Medications:      acetaminophen (TYLENOL) 325 MG tablet, Take 2 tablets (650 mg) by mouth every 4 hours as needed for mild pain, Disp: 50 tablet, Rfl: 0     fluticasone (FLONASE) 50 MCG/ACT nasal spray, Spray 2 sprays into both nostrils daily, Disp: 48 g, Rfl: 3     fluticasone-vilanterol (BREO ELLIPTA) 100-25 MCG/ACT inhaler, Inhale 1 puff into the lungs daily, Disp: 3 each, Rfl: 1     ibuprofen (ADVIL/MOTRIN) 200 MG tablet, Take 1 tablet (200 mg) by mouth every 6 hours as needed for moderate pain (Use up to every 6 hours as needed for pain, alternate with tylenol), Disp: 50 tablet, Rfl: 0     sildenafil (REVATIO) 20 MG  tablet, TAKE 1-4 TABLETS BY MOUTH 30 MINUTES TO 2 HOURS BEFORE INTERCOURSE. MAX OF 5 TABLETS IN 24 HOURS, Disp: 30 tablet, Rfl: 2     sodium chloride (OCEAN) 0.65 % nasal spray, Spray 2 sprays in nostril every 4 hours, Disp: 30 mL, Rfl: 3     albuterol (PROAIR HFA/PROVENTIL HFA/VENTOLIN HFA) 108 (90 Base) MCG/ACT inhaler, Inhale 2 puffs into the lungs every 4 hours as needed for shortness of breath, wheezing or cough (Patient not taking: Reported on 12/16/2024), Disp: 18 g, Rfl: 3     albuterol (PROVENTIL) (2.5 MG/3ML) 0.083% neb solution, Take 1 vial (2.5 mg) by nebulization every 4 hours as needed for shortness of breath, wheezing or cough (Patient not taking: Reported on 12/16/2024), Disp: 360 mL, Rfl: 2     Aspirin-Acetaminophen-Caffeine (EXCEDRIN PO), Take by mouth as needed , Disp: , Rfl:      fluticasone-vilanterol (BREO ELLIPTA) 200-25 MCG/ACT inhaler, Inhale 1 puff into the lungs daily (Patient not taking: Reported on 12/16/2024), Disp: 1 each, Rfl: 3  Allergies   Allergen Reactions     Amoxicillin-Pot Clavulanate Rash       EXAM:   BP (!) 144/83 (BP Location: Right arm, Patient Position: Sitting, Cuff Size: Adult Large)   Pulse 66   SpO2 97%   Physical Exam  Vitals and nursing note reviewed.   Constitutional:       Appearance: Normal appearance.   HENT:      Head: Normocephalic and atraumatic.      Right Ear: External ear normal.      Left Ear: External ear normal.      Nose: No rhinorrhea.      Mouth/Throat:      Mouth: Mucous membranes are moist. No oral lesions.      Pharynx: Oropharynx is clear. Uvula midline. No posterior oropharyngeal erythema.   Eyes:      General: Lids are normal.      Extraocular Movements: Extraocular movements intact.      Conjunctiva/sclera: Conjunctivae normal.   Neck:      Comments: No asymmetry, masses, or scars  Cardiovascular:      Rate and Rhythm: Normal rate and regular rhythm.      Heart sounds: S1 normal and S2 normal. No murmur heard.  Pulmonary:      Effort:  Pulmonary effort is normal. No respiratory distress.      Breath sounds: Normal breath sounds and air entry.   Musculoskeletal:      Comments: No musculoskeletal defects appreciated   Skin:     General: Skin is warm and dry.      Findings: No lesion or rash.   Neurological:      General: No focal deficit present.      Mental Status: He is alert.   Psychiatric:         Mood and Affect: Mood and affect normal.           WORKUP:  None    ASSESSMENT/PLAN:  Keith Spangler is a 44 year old male here for a follow-up visit.    1. Moderate persistent asthma without complication - Controlled. No exacerbations or oral steroid use in the past 6 months. Historically winter has been the worst season for his asthma. Recommend continuing with Breo daily through the winter and weaning in the spring. Advised to resume daily preventative therapy should symptoms return. Keith verbalized understanding and agreement with this plan.    - continue Breo 100/25mcg - 1 puff daily  - take 2 to 4 puffs of albuterol HFA and/or use nebulizer every 4 hours as needed  - Adult Allergy Clinic Follow-Up Order      Follow-up in 6 months or as needed      Thank you for allowing me to participate in the care of Keith Spangler.      Andrei Kyle MD, FAAAAI  Allergy/Immunology  [facility]      Consent was obtained from the patient to use an AI documentation tool in the creation of this note.    Chart documentation done in part with Dragon Voice Recognition Software. Although reviewed after completion, some word and grammatical errors may remain.      Again, thank you for allowing me to participate in the care of your patient.        Sincerely,        Andrei Kyle MD

## 2025-01-04 DIAGNOSIS — N52.9 ERECTILE DYSFUNCTION, UNSPECIFIED ERECTILE DYSFUNCTION TYPE: ICD-10-CM

## 2025-01-06 RX ORDER — SILDENAFIL CITRATE 20 MG/1
TABLET ORAL
Qty: 30 TABLET | Refills: 2 | Status: SHIPPED | OUTPATIENT
Start: 2025-01-06

## 2025-03-19 DIAGNOSIS — N52.9 ERECTILE DYSFUNCTION, UNSPECIFIED ERECTILE DYSFUNCTION TYPE: ICD-10-CM

## 2025-03-20 RX ORDER — SILDENAFIL CITRATE 20 MG/1
TABLET ORAL
Qty: 30 TABLET | Refills: 2 | Status: SHIPPED | OUTPATIENT
Start: 2025-03-20

## 2025-04-25 DIAGNOSIS — N52.9 ERECTILE DYSFUNCTION, UNSPECIFIED ERECTILE DYSFUNCTION TYPE: ICD-10-CM

## 2025-04-28 RX ORDER — SILDENAFIL CITRATE 20 MG/1
TABLET ORAL
Qty: 30 TABLET | Refills: 2 | OUTPATIENT
Start: 2025-04-28

## 2025-07-28 ENCOUNTER — PATIENT OUTREACH (OUTPATIENT)
Dept: CARE COORDINATION | Facility: CLINIC | Age: 45
End: 2025-07-28
Payer: COMMERCIAL

## 2025-08-11 ENCOUNTER — PATIENT OUTREACH (OUTPATIENT)
Dept: CARE COORDINATION | Facility: CLINIC | Age: 45
End: 2025-08-11
Payer: COMMERCIAL

## (undated) DEVICE — SU PDS II 4-0 RB-1 27" Z304H

## (undated) DEVICE — SU MONOCRYL 6-0 P-1 18" UND Y489G

## (undated) DEVICE — SYR 10ML FINGER CONTROL W/O NDL 309695

## (undated) DEVICE — PEN MARKING SKIN W/PAPER RULER 31145785

## (undated) DEVICE — ESU GROUND PAD ADULT W/CORD E7507

## (undated) DEVICE — BLADE KNIFE SURG 11 371111

## (undated) DEVICE — SPONGE COTTONOID 2X1/2" 80-1406

## (undated) DEVICE — NDL 25GA 1.5" 305127

## (undated) DEVICE — BLADE KNIFE SURG 15 371115

## (undated) DEVICE — NDL 27GA 1.25" 305136

## (undated) DEVICE — PACK ENT MINOR CUSTOM ASC

## (undated) DEVICE — CUP AND LID 2PK 2OZ STERILE  SSK9006A

## (undated) DEVICE — SOL WATER IRRIG 500ML BOTTLE 2F7113

## (undated) DEVICE — DRAPE SHEET HALF 40X60" 9358

## (undated) DEVICE — SU ETHILON 3-0 PS-1 18" 1663H

## (undated) DEVICE — SOL NACL 0.9% IRRIG 500ML BOTTLE 2F7123

## (undated) DEVICE — SU PDS II 5-0 RB-2DA 30" Z148H

## (undated) DEVICE — ADH LIQUID MASTISOL TOPICAL VIAL 2-3ML 0523-48

## (undated) DEVICE — GLOVE PROTEXIS BLUE W/NEU-THERA 7.5  2D73EB75

## (undated) DEVICE — EYE PREP BETADINE 5% SOLUTION 30ML 0065-0411-30

## (undated) DEVICE — SU CHROMIC 4-0 M-1DA 744G

## (undated) DEVICE — SPLINT AQUAPLAST 6X9"

## (undated) DEVICE — DRSG GAUZE 4X4" TRAY 6939

## (undated) DEVICE — PREP CHLORAPREP 26ML TINTED ORANGE  260815

## (undated) DEVICE — SU PLAIN 4-0 SC-1 18" 1824H

## (undated) DEVICE — DRSG STERI STRIP 1/4X4" R1546

## (undated) DEVICE — SU CHROMIC 4-0 PS-2 18" 1637G

## (undated) DEVICE — SUCTION MANIFOLD NEPTUNE 2 SYS 1 PORT 702-025-000

## (undated) DEVICE — GLOVE PROTEXIS MICRO 7.0  2D73PM70

## (undated) DEVICE — SPLINT NASAL DOYLE BREATHEASY 20-10500

## (undated) DEVICE — BLADE KNIFE SURG 10 371110

## (undated) DEVICE — PAD CHUX UNDERPAD 23X24" 7136

## (undated) DEVICE — ESU NDL COLORADO MICRO 3CM STR N103A

## (undated) DEVICE — KIT ENDO FIRST STEP DISINFECTANT 200ML W/POUCH EP-4

## (undated) DEVICE — LINEN TOWEL PACK X5 5464

## (undated) RX ORDER — GLYCOPYRROLATE 0.2 MG/ML
INJECTION INTRAMUSCULAR; INTRAVENOUS
Status: DISPENSED
Start: 2022-10-27

## (undated) RX ORDER — KETOROLAC TROMETHAMINE 30 MG/ML
INJECTION, SOLUTION INTRAMUSCULAR; INTRAVENOUS
Status: DISPENSED
Start: 2022-10-27

## (undated) RX ORDER — BUPIVACAINE HYDROCHLORIDE 2.5 MG/ML
INJECTION, SOLUTION EPIDURAL; INFILTRATION; INTRACAUDAL
Status: DISPENSED
Start: 2021-08-19

## (undated) RX ORDER — HYDROMORPHONE HYDROCHLORIDE 1 MG/ML
INJECTION, SOLUTION INTRAMUSCULAR; INTRAVENOUS; SUBCUTANEOUS
Status: DISPENSED
Start: 2022-10-27

## (undated) RX ORDER — FENTANYL CITRATE 50 UG/ML
INJECTION, SOLUTION INTRAMUSCULAR; INTRAVENOUS
Status: DISPENSED
Start: 2021-08-19

## (undated) RX ORDER — FENTANYL CITRATE 50 UG/ML
INJECTION, SOLUTION INTRAMUSCULAR; INTRAVENOUS
Status: DISPENSED
Start: 2023-03-14

## (undated) RX ORDER — PROPOFOL 10 MG/ML
INJECTION, EMULSION INTRAVENOUS
Status: DISPENSED
Start: 2022-10-27

## (undated) RX ORDER — FENTANYL CITRATE 50 UG/ML
INJECTION, SOLUTION INTRAMUSCULAR; INTRAVENOUS
Status: DISPENSED
Start: 2022-10-27

## (undated) RX ORDER — GABAPENTIN 300 MG/1
CAPSULE ORAL
Status: DISPENSED
Start: 2022-10-27

## (undated) RX ORDER — BUPIVACAINE HYDROCHLORIDE 2.5 MG/ML
INJECTION, SOLUTION EPIDURAL; INFILTRATION; INTRACAUDAL
Status: DISPENSED
Start: 2022-10-27

## (undated) RX ORDER — CLINDAMYCIN PHOSPHATE 900 MG/50ML
INJECTION, SOLUTION INTRAVENOUS
Status: DISPENSED
Start: 2022-10-27

## (undated) RX ORDER — DEXAMETHASONE SODIUM PHOSPHATE 10 MG/ML
INJECTION, SOLUTION INTRAMUSCULAR; INTRAVENOUS
Status: DISPENSED
Start: 2021-08-19

## (undated) RX ORDER — OXYCODONE HYDROCHLORIDE 5 MG/1
TABLET ORAL
Status: DISPENSED
Start: 2021-08-19

## (undated) RX ORDER — OXYCODONE HYDROCHLORIDE 5 MG/1
TABLET ORAL
Status: DISPENSED
Start: 2022-10-27

## (undated) RX ORDER — CLINDAMYCIN PHOSPHATE 900 MG/50ML
INJECTION, SOLUTION INTRAVENOUS
Status: DISPENSED
Start: 2021-08-19

## (undated) RX ORDER — ACETAMINOPHEN 325 MG/1
TABLET ORAL
Status: DISPENSED
Start: 2022-10-27

## (undated) RX ORDER — ONDANSETRON 2 MG/ML
INJECTION INTRAMUSCULAR; INTRAVENOUS
Status: DISPENSED
Start: 2022-10-27

## (undated) RX ORDER — ONDANSETRON 2 MG/ML
INJECTION INTRAMUSCULAR; INTRAVENOUS
Status: DISPENSED
Start: 2021-08-19

## (undated) RX ORDER — PROPOFOL 10 MG/ML
INJECTION, EMULSION INTRAVENOUS
Status: DISPENSED
Start: 2021-08-19

## (undated) RX ORDER — OXYMETAZOLINE HYDROCHLORIDE 0.05 G/100ML
SPRAY NASAL
Status: DISPENSED
Start: 2021-08-19

## (undated) RX ORDER — GABAPENTIN 300 MG/1
CAPSULE ORAL
Status: DISPENSED
Start: 2021-08-19

## (undated) RX ORDER — DEXAMETHASONE SODIUM PHOSPHATE 4 MG/ML
INJECTION, SOLUTION INTRA-ARTICULAR; INTRALESIONAL; INTRAMUSCULAR; INTRAVENOUS; SOFT TISSUE
Status: DISPENSED
Start: 2022-10-27

## (undated) RX ORDER — LIDOCAINE HYDROCHLORIDE 20 MG/ML
INJECTION, SOLUTION EPIDURAL; INFILTRATION; INTRACAUDAL; PERINEURAL
Status: DISPENSED
Start: 2022-10-27

## (undated) RX ORDER — ACETAMINOPHEN 325 MG/1
TABLET ORAL
Status: DISPENSED
Start: 2021-08-19

## (undated) RX ORDER — FENTANYL CITRATE 50 UG/ML
INJECTION, SOLUTION INTRAMUSCULAR; INTRAVENOUS
Status: DISPENSED
Start: 2024-11-04

## (undated) RX ORDER — LIDOCAINE HYDROCHLORIDE AND EPINEPHRINE 10; 10 MG/ML; UG/ML
INJECTION, SOLUTION INFILTRATION; PERINEURAL
Status: DISPENSED
Start: 2021-08-19

## (undated) RX ORDER — LIDOCAINE HYDROCHLORIDE 20 MG/ML
INJECTION, SOLUTION EPIDURAL; INFILTRATION; INTRACAUDAL; PERINEURAL
Status: DISPENSED
Start: 2021-08-19